# Patient Record
Sex: FEMALE | Race: WHITE | NOT HISPANIC OR LATINO | Employment: OTHER | URBAN - METROPOLITAN AREA
[De-identification: names, ages, dates, MRNs, and addresses within clinical notes are randomized per-mention and may not be internally consistent; named-entity substitution may affect disease eponyms.]

---

## 2017-01-26 ENCOUNTER — ALLSCRIPTS OFFICE VISIT (OUTPATIENT)
Dept: OTHER | Facility: OTHER | Age: 52
End: 2017-01-26

## 2017-01-26 ENCOUNTER — LAB REQUISITION (OUTPATIENT)
Dept: LAB | Facility: HOSPITAL | Age: 52
End: 2017-01-26
Payer: MEDICARE

## 2017-01-26 DIAGNOSIS — R35.0 FREQUENCY OF MICTURITION: ICD-10-CM

## 2017-01-26 LAB
BILIRUB UR QL STRIP: NORMAL
CLARITY UR: NORMAL
COLOR UR: YELLOW
GLUCOSE (HISTORICAL): NORMAL
HGB UR QL STRIP.AUTO: 250
KETONES UR STRIP-MCNC: NORMAL MG/DL
LEUKOCYTE ESTERASE UR QL STRIP: 500
NITRITE UR QL STRIP: NORMAL
PH UR STRIP.AUTO: 7 [PH]
PROT UR STRIP-MCNC: NORMAL MG/DL
SP GR UR STRIP.AUTO: 1.02
UROBILINOGEN UR QL STRIP.AUTO: NORMAL

## 2017-01-26 PROCEDURE — 87077 CULTURE AEROBIC IDENTIFY: CPT | Performed by: FAMILY MEDICINE

## 2017-01-26 PROCEDURE — 87086 URINE CULTURE/COLONY COUNT: CPT | Performed by: FAMILY MEDICINE

## 2017-01-26 PROCEDURE — 87186 SC STD MICRODIL/AGAR DIL: CPT | Performed by: FAMILY MEDICINE

## 2017-01-28 LAB — BACTERIA UR CULT: NORMAL

## 2017-03-01 ENCOUNTER — ALLSCRIPTS OFFICE VISIT (OUTPATIENT)
Dept: OTHER | Facility: OTHER | Age: 52
End: 2017-03-01

## 2017-03-16 ENCOUNTER — ALLSCRIPTS OFFICE VISIT (OUTPATIENT)
Dept: OTHER | Facility: OTHER | Age: 52
End: 2017-03-16

## 2017-03-16 ENCOUNTER — LAB REQUISITION (OUTPATIENT)
Dept: LAB | Facility: HOSPITAL | Age: 52
End: 2017-03-16
Payer: MEDICARE

## 2017-03-16 DIAGNOSIS — R35.0 FREQUENCY OF MICTURITION: ICD-10-CM

## 2017-03-16 LAB
BILIRUB UR QL STRIP: NORMAL
CLARITY UR: NORMAL
COLOR UR: YELLOW
GLUCOSE (HISTORICAL): NORMAL
HGB UR QL STRIP.AUTO: 250
KETONES UR STRIP-MCNC: NORMAL MG/DL
LEUKOCYTE ESTERASE UR QL STRIP: 75
NITRITE UR QL STRIP: NORMAL
PH UR STRIP.AUTO: 5 [PH]
PROT UR STRIP-MCNC: NORMAL MG/DL
SP GR UR STRIP.AUTO: 1.02
UROBILINOGEN UR QL STRIP.AUTO: NORMAL

## 2017-03-16 PROCEDURE — 87186 SC STD MICRODIL/AGAR DIL: CPT | Performed by: FAMILY MEDICINE

## 2017-03-16 PROCEDURE — 87077 CULTURE AEROBIC IDENTIFY: CPT | Performed by: FAMILY MEDICINE

## 2017-03-16 PROCEDURE — 87086 URINE CULTURE/COLONY COUNT: CPT | Performed by: FAMILY MEDICINE

## 2017-03-18 LAB — BACTERIA UR CULT: NORMAL

## 2017-03-24 ENCOUNTER — GENERIC CONVERSION - ENCOUNTER (OUTPATIENT)
Dept: OTHER | Facility: OTHER | Age: 52
End: 2017-03-24

## 2017-04-03 ENCOUNTER — ALLSCRIPTS OFFICE VISIT (OUTPATIENT)
Dept: OTHER | Facility: OTHER | Age: 52
End: 2017-04-03

## 2017-04-03 LAB
BILIRUB UR QL STRIP: NEGATIVE
CLARITY UR: NORMAL
COLOR UR: YELLOW
GLUCOSE (HISTORICAL): NORMAL
HGB UR QL STRIP.AUTO: 250
KETONES UR STRIP-MCNC: NEGATIVE MG/DL
LEUKOCYTE ESTERASE UR QL STRIP: NEGATIVE
NITRITE UR QL STRIP: NEGATIVE
PH UR STRIP.AUTO: 7 [PH]
PROT UR STRIP-MCNC: NEGATIVE MG/DL
SP GR UR STRIP.AUTO: 1.01
UROBILINOGEN UR QL STRIP.AUTO: NORMAL

## 2017-05-04 ENCOUNTER — HOSPITAL ENCOUNTER (OUTPATIENT)
Dept: RADIOLOGY | Facility: HOSPITAL | Age: 52
Discharge: HOME | End: 2017-05-04
Attending: FAMILY MEDICINE
Payer: MEDICARE

## 2017-05-04 ENCOUNTER — ALLSCRIPTS OFFICE VISIT (OUTPATIENT)
Dept: OTHER | Facility: OTHER | Age: 52
End: 2017-05-04

## 2017-05-04 ENCOUNTER — LAB REQUISITION (OUTPATIENT)
Dept: LAB | Facility: HOSPITAL | Age: 52
End: 2017-05-04
Payer: MEDICARE

## 2017-05-04 DIAGNOSIS — K21.9 GASTRO-ESOPHAGEAL REFLUX DISEASE WITHOUT ESOPHAGITIS: ICD-10-CM

## 2017-05-04 DIAGNOSIS — R35.0 FREQUENCY OF MICTURITION: ICD-10-CM

## 2017-05-04 LAB
BILIRUB UR QL STRIP: NORMAL
CLARITY UR: NORMAL
COLOR UR: YELLOW
GLUCOSE (HISTORICAL): NORMAL
HGB UR QL STRIP.AUTO: 250
KETONES UR STRIP-MCNC: NORMAL MG/DL
LEUKOCYTE ESTERASE UR QL STRIP: NORMAL
NITRITE UR QL STRIP: NORMAL
PH UR STRIP.AUTO: 6 [PH]
PROT UR STRIP-MCNC: NORMAL MG/DL
SP GR UR STRIP.AUTO: 1.02
UROBILINOGEN UR QL STRIP.AUTO: NORMAL

## 2017-05-04 PROCEDURE — 87086 URINE CULTURE/COLONY COUNT: CPT | Performed by: FAMILY MEDICINE

## 2017-05-04 PROCEDURE — 76770 US EXAM ABDO BACK WALL COMP: CPT

## 2017-05-05 LAB — BACTERIA UR CULT: NORMAL

## 2017-06-01 ENCOUNTER — ALLSCRIPTS OFFICE VISIT (OUTPATIENT)
Dept: OTHER | Facility: OTHER | Age: 52
End: 2017-06-01

## 2017-06-30 ENCOUNTER — ALLSCRIPTS OFFICE VISIT (OUTPATIENT)
Dept: OTHER | Facility: OTHER | Age: 52
End: 2017-06-30

## 2017-07-18 ENCOUNTER — ALLSCRIPTS OFFICE VISIT (OUTPATIENT)
Dept: OTHER | Facility: OTHER | Age: 52
End: 2017-07-18

## 2017-07-18 LAB
BILIRUB UR QL STRIP: NORMAL
CLARITY UR: NORMAL
COLOR UR: YELLOW
GLUCOSE (HISTORICAL): NORMAL
HGB UR QL STRIP.AUTO: 250
KETONES UR STRIP-MCNC: NORMAL MG/DL
LEUKOCYTE ESTERASE UR QL STRIP: 25
NITRITE UR QL STRIP: NORMAL
PH UR STRIP.AUTO: 7 [PH]
PROT UR STRIP-MCNC: NORMAL MG/DL
SP GR UR STRIP.AUTO: 1.01
UROBILINOGEN UR QL STRIP.AUTO: 1

## 2017-07-18 PROCEDURE — 87077 CULTURE AEROBIC IDENTIFY: CPT | Performed by: FAMILY MEDICINE

## 2017-07-18 PROCEDURE — 87186 SC STD MICRODIL/AGAR DIL: CPT | Performed by: FAMILY MEDICINE

## 2017-07-18 PROCEDURE — 87086 URINE CULTURE/COLONY COUNT: CPT | Performed by: FAMILY MEDICINE

## 2017-07-19 ENCOUNTER — LAB REQUISITION (OUTPATIENT)
Dept: LAB | Facility: HOSPITAL | Age: 52
End: 2017-07-19
Payer: MEDICARE

## 2017-07-19 ENCOUNTER — ALLSCRIPTS OFFICE VISIT (OUTPATIENT)
Dept: OTHER | Facility: OTHER | Age: 52
End: 2017-07-19

## 2017-07-19 DIAGNOSIS — R10.2 PELVIC AND PERINEAL PAIN: ICD-10-CM

## 2017-07-19 DIAGNOSIS — R31.9 HEMATURIA: ICD-10-CM

## 2017-07-19 DIAGNOSIS — N90.89 OTHER SPECIFIED NONINFLAMMATORY DISORDER OF VULVA AND PERINEUM: ICD-10-CM

## 2017-07-19 DIAGNOSIS — L29.2 PRURITUS VULVAE: ICD-10-CM

## 2017-07-19 PROCEDURE — 87070 CULTURE OTHR SPECIMN AEROBIC: CPT | Performed by: PHYSICIAN ASSISTANT

## 2017-07-19 PROCEDURE — 87186 SC STD MICRODIL/AGAR DIL: CPT | Performed by: PHYSICIAN ASSISTANT

## 2017-07-19 PROCEDURE — 87077 CULTURE AEROBIC IDENTIFY: CPT | Performed by: PHYSICIAN ASSISTANT

## 2017-07-22 LAB
BACTERIA GENITAL AEROBE CULT: NORMAL
BACTERIA UR CULT: NORMAL
BACTERIA UR CULT: NORMAL

## 2017-07-25 ENCOUNTER — HOSPITAL ENCOUNTER (OUTPATIENT)
Dept: RADIOLOGY | Facility: HOSPITAL | Age: 52
Discharge: HOME | End: 2017-07-25
Payer: MEDICARE

## 2017-07-25 DIAGNOSIS — R10.2 PELVIC AND PERINEAL PAIN: ICD-10-CM

## 2017-07-25 PROCEDURE — 76856 US EXAM PELVIC COMPLETE: CPT

## 2017-07-25 PROCEDURE — 76830 TRANSVAGINAL US NON-OB: CPT

## 2017-07-27 ENCOUNTER — ALLSCRIPTS OFFICE VISIT (OUTPATIENT)
Dept: OTHER | Facility: OTHER | Age: 52
End: 2017-07-27

## 2017-08-04 ENCOUNTER — GENERIC CONVERSION - ENCOUNTER (OUTPATIENT)
Dept: OTHER | Facility: OTHER | Age: 52
End: 2017-08-04

## 2017-09-14 ENCOUNTER — GENERIC CONVERSION - ENCOUNTER (OUTPATIENT)
Dept: OTHER | Facility: OTHER | Age: 52
End: 2017-09-14

## 2017-10-19 ENCOUNTER — ALLSCRIPTS OFFICE VISIT (OUTPATIENT)
Dept: OTHER | Facility: OTHER | Age: 52
End: 2017-10-19

## 2017-10-19 DIAGNOSIS — M81.8 OTHER OSTEOPOROSIS WITHOUT CURRENT PATHOLOGICAL FRACTURE: ICD-10-CM

## 2017-10-19 DIAGNOSIS — Z79.52 LONG TERM CURRENT USE OF SYSTEMIC STEROIDS: ICD-10-CM

## 2017-12-12 ENCOUNTER — HOSPITAL ENCOUNTER (OUTPATIENT)
Dept: RADIOLOGY | Facility: HOSPITAL | Age: 52
Discharge: HOME | End: 2017-12-12
Attending: FAMILY MEDICINE
Payer: MEDICARE

## 2017-12-12 DIAGNOSIS — M81.8 OTHER OSTEOPOROSIS WITHOUT CURRENT PATHOLOGICAL FRACTURE: ICD-10-CM

## 2017-12-12 DIAGNOSIS — Z79.52 LONG TERM CURRENT USE OF SYSTEMIC STEROIDS: ICD-10-CM

## 2017-12-12 PROCEDURE — 77080 DXA BONE DENSITY AXIAL: CPT

## 2017-12-21 ENCOUNTER — GENERIC CONVERSION - ENCOUNTER (OUTPATIENT)
Dept: OTHER | Facility: OTHER | Age: 52
End: 2017-12-21

## 2017-12-21 DIAGNOSIS — Z12.31 ENCOUNTER FOR SCREENING MAMMOGRAM FOR MALIGNANT NEOPLASM OF BREAST: ICD-10-CM

## 2018-01-12 VITALS
TEMPERATURE: 97.6 F | HEIGHT: 65 IN | RESPIRATION RATE: 18 BRPM | BODY MASS INDEX: 17 KG/M2 | WEIGHT: 102 LBS | HEART RATE: 66 BPM | DIASTOLIC BLOOD PRESSURE: 70 MMHG | OXYGEN SATURATION: 99 % | SYSTOLIC BLOOD PRESSURE: 122 MMHG

## 2018-01-13 VITALS
HEART RATE: 62 BPM | OXYGEN SATURATION: 98 % | TEMPERATURE: 97.6 F | SYSTOLIC BLOOD PRESSURE: 132 MMHG | WEIGHT: 100.03 LBS | DIASTOLIC BLOOD PRESSURE: 82 MMHG | RESPIRATION RATE: 18 BRPM | BODY MASS INDEX: 16.66 KG/M2 | HEIGHT: 65 IN

## 2018-01-13 VITALS
TEMPERATURE: 99 F | HEART RATE: 71 BPM | BODY MASS INDEX: 16.83 KG/M2 | DIASTOLIC BLOOD PRESSURE: 80 MMHG | RESPIRATION RATE: 18 BRPM | HEIGHT: 65 IN | SYSTOLIC BLOOD PRESSURE: 122 MMHG | WEIGHT: 101 LBS | OXYGEN SATURATION: 96 %

## 2018-01-13 VITALS
WEIGHT: 102 LBS | TEMPERATURE: 97.8 F | SYSTOLIC BLOOD PRESSURE: 132 MMHG | BODY MASS INDEX: 17.24 KG/M2 | OXYGEN SATURATION: 98 % | RESPIRATION RATE: 16 BRPM | HEART RATE: 70 BPM | DIASTOLIC BLOOD PRESSURE: 86 MMHG

## 2018-01-13 VITALS
DIASTOLIC BLOOD PRESSURE: 70 MMHG | WEIGHT: 100 LBS | SYSTOLIC BLOOD PRESSURE: 100 MMHG | HEART RATE: 88 BPM | RESPIRATION RATE: 18 BRPM | BODY MASS INDEX: 16.66 KG/M2 | OXYGEN SATURATION: 97 % | HEIGHT: 65 IN

## 2018-01-13 VITALS
BODY MASS INDEX: 16.91 KG/M2 | WEIGHT: 101.5 LBS | HEIGHT: 65 IN | DIASTOLIC BLOOD PRESSURE: 62 MMHG | SYSTOLIC BLOOD PRESSURE: 120 MMHG

## 2018-01-14 VITALS
RESPIRATION RATE: 16 BRPM | DIASTOLIC BLOOD PRESSURE: 60 MMHG | WEIGHT: 102 LBS | BODY MASS INDEX: 17 KG/M2 | TEMPERATURE: 97.6 F | HEART RATE: 72 BPM | HEIGHT: 65 IN | SYSTOLIC BLOOD PRESSURE: 110 MMHG | OXYGEN SATURATION: 100 %

## 2018-01-14 VITALS
HEART RATE: 52 BPM | RESPIRATION RATE: 18 BRPM | WEIGHT: 100.44 LBS | SYSTOLIC BLOOD PRESSURE: 128 MMHG | OXYGEN SATURATION: 96 % | BODY MASS INDEX: 16.73 KG/M2 | TEMPERATURE: 97.8 F | DIASTOLIC BLOOD PRESSURE: 74 MMHG | HEIGHT: 65 IN

## 2018-01-14 VITALS
HEART RATE: 67 BPM | RESPIRATION RATE: 18 BRPM | BODY MASS INDEX: 16.66 KG/M2 | TEMPERATURE: 97.6 F | DIASTOLIC BLOOD PRESSURE: 100 MMHG | SYSTOLIC BLOOD PRESSURE: 176 MMHG | OXYGEN SATURATION: 99 % | HEIGHT: 65 IN | WEIGHT: 100 LBS

## 2018-01-14 VITALS
BODY MASS INDEX: 16.73 KG/M2 | HEART RATE: 71 BPM | RESPIRATION RATE: 18 BRPM | WEIGHT: 100.44 LBS | DIASTOLIC BLOOD PRESSURE: 60 MMHG | OXYGEN SATURATION: 98 % | HEIGHT: 65 IN | TEMPERATURE: 97.1 F | SYSTOLIC BLOOD PRESSURE: 116 MMHG

## 2018-01-17 NOTE — MISCELLANEOUS
Message  Spoke with patient regarding pelvic U/S and culture results  Pelvic U/S showed stable fibroids  Endometrium of 7 mm; ovaries WNL  Fibroids are little changed over the last 2 years, but could be where pelvic pressure is originating  Vaginal culture showed growth of Proteus and E coli  Patient was put on Bactrim by her PCP, which both are sensitive to  Still complaining of vulvar burning and itching despite Topicort  She will finish course of Bactrim  If symptoms do not resolve, can discuss further treatment for lichen vs  atrophy  Patient to call the office  for appointment  Signatures   Electronically signed by : Salud Underwood, [facility];  Aug  4 2017  2:12PM EST                       (Author)

## 2018-01-18 NOTE — PROGRESS NOTES
Assessment    1  Ulcerative colitis without complications (174 4) (C09 96)   2  Anxiety (300 00) (F41 9)    Plan     Pt will continue her present meds  BP will be checked again in one month  Pt will schedule a PAP also     Order given for a mammogram  Pt had a colonoscopy in Sept 2015 performed by Dr Naif Cook  Dexa scan due in April 2016* MAMMO SCREENING BILATERAL W CAD; Status:Hold For - Scheduling; Requested for:22Jan2016;   Perform:[facility] Radiology; YAE:[id number];AWYQAHD; For:Visit for screening mammogram; Ordered By:Vandana Ruano; Chief Complaint  Multiple issues      History of Present Illness  HPI: Pt doing well as far as her UC is concerned  She is off steroids  She is still on chronic pain meds  Her urinary tract symptoms are resolved  She is trying to gain weight Her recent lipid profile was good  Her BP is up but she states she is under increased stress with her recent hosp and ER visits because of  for her autistic son  She isn't taking Mobic for joint aches because they are gone now   Hospital Based Practices Required Assessment:   Pain Assessment   the patient states they have pain  The pain is located in the lower back  (on a scale of 0 to 10, the patient rates the pain at 4 )       Review of Systems    Constitutional: No fever, no chills, feels well, no tiredness, no recent weight gain or loss  Cardiovascular: no complaints of slow or fast heart rate, no chest pain, no palpitations, no leg claudication or lower extremity edema  Respiratory: no complaints of shortness of breath, no wheezing, no dyspnea on exertion, no orthopnea or PND  Gastrointestinal: no complaints of abdominal pain, no constipation, no nausea or diarrhea, no vomiting, no bloody stools  Musculoskeletal: no complaints of arthralgia, no myalgia, no joint swelling or stiffness, no limb pain or swelling  Active Problems    1  Abdominal pain, epigastric (789 06) (R10 13)   2   Abnormal findings on diagnostic imaging of abdomen (793 6) (R93 5)   3  Abnormal weight loss (783 21) (R63 4)   4  Allergic rhinitis (477 9) (J30 9)   5  Ankle Injury (959 7)   6  Ankle Sprain (845 00)   7  Anxiety (300 00) (F41 9)   8  Arthralgia (719 40) (M25 50)   9  Atypical chest pain (786 59) (R07 89)   10  Benign essential hypertension (401 1) (I10)   11  Choledocholithiasis (574 50) (K80 50)   12  Cholelithiasis (574 20) (K80 20)   13  Closed Fracture Of The First Metatarsal Of The Right Foot (825 25)   14  Contraceptives (V25 02)   15  Controlled diabetes mellitus type II without complication (770 30) (Y93 7)   16  Depression (311) (F32 9)   17  Diarrhea (787 91) (R19 7)   18  Drug-induced osteoporosis (733 09,E980 5) (T50 901A,M81 8)   19  Esophageal reflux (530 81) (K21 9)   20  Fatigue (780 79) (R53 83)   21  Fibroid, uterine (218 9) (D25 9)   22  Foot Injury (959 7)   23  Headache (784 0) (R51)   24  Hematuria (599 70) (R31 9)   25  High risk medication use (V58 69) (Z79 899)   26  Hypercholesterolemia (272 0) (E78 0)   27  Hyperglycemia (790 29) (R73 9)   28  Hyperlipidemia (272 4) (E78 5)   29  Hypokalemia (276 8) (E87 6)   30  Hypotension (458 9) (I95 9)   31  Iatrogenic hypotension (458 29) (I95 89)   32  Infected Abrasion Of Leg (916 1)   33  Inflammatory Disorders Of Breast (611 0)   34  Iron deficiency anemia (280 9) (D50 9)   35  Kidney infection (590 9) (N15 9)   36  Kidney lesion (593 9) (N28 9)   37  Kidney stones (592 0) (N20 0)   38  Lactase deficiency (271 3) (E73 9)   39  Lumbar Strain (847 2)   40  Lump or mass in breast (611 72) (N63)   41  Microscopic hematuria (599 72) (R31 2)   42  Nausea (787 02) (R11 0)   43  Nausea (787 02) (R11 0)   44  Need for influenza vaccination (V04 81) (Z23)   45  Need for prophylactic vaccination and inoculation against influenza (V04 81) (Z23)   46  Need for vaccination with 13-polyvalent pneumococcal conjugate vaccine (V03 82) (Z23)   47   Nephrolithiasis (592 0) (N20 0) 48  Non-neoplastic nevus (448 1) (I78 1)   49  Oral thrush (112 0) (B37 0)   50  Other iron deficiency anemia (280 8) (D50 8)   51  Other polyp of sinus (471 8) (J33 8)   52  Painful joint (719 40) (M25 50)   53  Pancreatic cyst (577 2) (K86 2)   54  Paronychia of toe, unspecified laterality (681 11) (L03 039)   55  Pre-menstrual syndrome (625 4) (N94 3)   56  Pyelonephritis (590 80) (N12)   57  Restless legs syndrome (333 94) (G25 81)   58  Screening breast examination (V76 10) (Z12 39)   59  Seborrheic keratosis (702 19) (L82 1)   60  Skin Abscess Of Leg (682 6)   61  Solar dermatitis (692 70) (L57 8)   62  Ulcerative colitis without complications (028 3) (N11 49)   63  Vaginitis (616 10) (N76 0)   64  Visit for screening mammogram (V76 12) (Z12 31)   65  Wart (078 10) (B07 9)    Past Medical History    1  History of Abdominal Pain In Multiple Locations (789 09)    Family History    1  Family history of Reported Family History Of Heart Disease    2  Family history of Reported Family History Of Heart Disease    3  Family history of Asthma (V17 5)    4  Family history of Colon Cancer (V16 0)    Social History    · Former smoker (Z04 71) (V35 669)   · Never Drank Alcohol   · Never Used Drugs    Surgical History    1  Contraceptives (V25 02)    Current Meds   1  Balsalazide Disodium 750 MG Oral Capsule; TAKE 1 CAPSULE 3 TIMES DAILY    Requested for: 38Bou3937; Last Rx:81Spi3289 Ordered   2  Calcium 600+D 600-200 MG-UNIT Oral Tablet; Therapy: (Reina Johnson) to Recorded   3  Celecoxib 200 MG Oral Capsule; take 1 capsule by mouth twice a day; Therapy: 87OMW8268 to (Last Rx:84Rth9458)  Requested for: 09Orc0535 Ordered   4  Folic Acid 1 MG Oral Tablet; Therapy: (Recorded:26Pkw4328) to Recorded   5  Meloxicam 7 5 MG Oral Tablet; take one tablet by mouth every day; Therapy: 38NET5637 to (Last Rx:02Oct2015)  Requested for: 03GCU3406 Ordered   6   Nabumetone 750 MG Oral Tablet; TAKE 1 TABLET EVERY 12 HOURS DAILY; Therapy: 98DPT8243 to (Evaluate:21Jun2016)  Requested for: 50Veh0867; Last   Rx:34Wuh5795 Ordered   7  Omeprazole 40 MG Oral Capsule Delayed Release; one capsule daily  Requested for:   52Aac6453; Last Rx:96Czg5687 Ordered   8  Oxycodone-Acetaminophen 7 5-325 MG Oral Tablet; TAKE 1 TABLET EVERY 4 TO 6   HOURS AS NEEDED FOR PAIN;   Therapy: 30BBM0356 to (Evaluate:23Jan2016); Last Rx:13Jan2016 Ordered   9  Promethazine HCl - 12 5 MG Oral Tablet; TAKE 1 TABLET EVERY 6 HOURS AS   NEEDED FOR NAUSEA; Last Rx:13Jan2016 Ordered   10  Ventolin  (90 Base) MCG/ACT Inhalation Aerosol Solution; Therapy: 08WFB8650 to Recorded    Allergies    1  Asacol TBEC   2  Levaquin TABS    3  Dairy   4  No Known Latex Allergies    Vitals   Recorded: 96AED8743 10:40AM   Temperature 98 F   Heart Rate 68   Respiration 20   Systolic 593   Diastolic 80   Height 5 ft 4 5 in   Weight 102 lb 3 oz   BMI Calculated 17 27   BSA Calculated 1 48     Physical Exam    Constitutional   General appearance: Abnormal   underweight and appearance reflects stated age  Pulmonary   Respiratory effort: No increased work of breathing or signs of respiratory distress  Auscultation of lungs: Clear to auscultation  Cardiovascular   Auscultation of heart: Normal rate and rhythm, normal S1 and S2, without murmurs  Abdomen   Abdomen: Non-tender, no masses  Liver and spleen: No hepatomegaly or splenomegaly  Musculoskeletal   Gait and station: Normal     Psychiatric   Orientation to person, place, and time: Normal     Mood and affect: Abnormal   Mood and Affect: tearful  Results/Data  PHQ-2 Adult Depression Screening 22Jan2016 10:46AM User, Ahs     Test Name Result Flag Reference   PHQ-2 Adult Depression Score 0     Q1: 0, Q2: 0   PHQ-2 Adult Depression Screening Negative         Future Appointments    Date/Time Provider Specialty Site   03/01/2016 01:00 PM CHELY Marina   [facility]     Signatures Electronically signed by : CHELY Krishnan ; Jan 22 2016  9:29PM EST                       (Author)

## 2018-01-22 VITALS
HEIGHT: 65 IN | OXYGEN SATURATION: 100 % | BODY MASS INDEX: 17.66 KG/M2 | TEMPERATURE: 97.9 F | RESPIRATION RATE: 18 BRPM | HEART RATE: 71 BPM | WEIGHT: 106 LBS | SYSTOLIC BLOOD PRESSURE: 102 MMHG | DIASTOLIC BLOOD PRESSURE: 68 MMHG

## 2018-01-22 VITALS
HEART RATE: 75 BPM | RESPIRATION RATE: 18 BRPM | TEMPERATURE: 97.7 F | WEIGHT: 106 LBS | OXYGEN SATURATION: 99 % | SYSTOLIC BLOOD PRESSURE: 116 MMHG | DIASTOLIC BLOOD PRESSURE: 80 MMHG | BODY MASS INDEX: 17.66 KG/M2 | HEIGHT: 65 IN

## 2018-01-24 VITALS
SYSTOLIC BLOOD PRESSURE: 122 MMHG | DIASTOLIC BLOOD PRESSURE: 76 MMHG | TEMPERATURE: 98 F | HEIGHT: 65 IN | BODY MASS INDEX: 17.41 KG/M2 | WEIGHT: 104.5 LBS | HEART RATE: 86 BPM

## 2018-01-25 DIAGNOSIS — K21.9 CHRONIC GERD: Primary | ICD-10-CM

## 2018-01-25 RX ORDER — OMEPRAZOLE 40 MG/1
CAPSULE, DELAYED RELEASE ORAL
Qty: 90 CAPSULE | Refills: 3 | Status: SHIPPED | OUTPATIENT
Start: 2018-01-25 | End: 2018-01-29 | Stop reason: SDUPTHER

## 2018-01-29 DIAGNOSIS — G89.4 CHRONIC PAIN SYNDROME: Primary | ICD-10-CM

## 2018-01-29 DIAGNOSIS — K21.9 CHRONIC GERD: ICD-10-CM

## 2018-01-29 RX ORDER — OMEPRAZOLE 40 MG/1
40 CAPSULE, DELAYED RELEASE ORAL DAILY
Qty: 90 CAPSULE | Refills: 0 | Status: SHIPPED | OUTPATIENT
Start: 2018-01-29 | End: 2018-04-06 | Stop reason: SDUPTHER

## 2018-01-29 RX ORDER — OXYCODONE AND ACETAMINOPHEN 7.5; 325 MG/1; MG/1
1 TABLET ORAL EVERY 4 HOURS PRN
Qty: 30 TABLET | Refills: 0 | Status: SHIPPED | OUTPATIENT
Start: 2018-01-29 | End: 2018-02-13

## 2018-01-29 RX ORDER — OMEPRAZOLE 40 MG/1
40 CAPSULE, DELAYED RELEASE ORAL DAILY
Qty: 90 CAPSULE | Refills: 3 | Status: SHIPPED | OUTPATIENT
Start: 2018-01-29 | End: 2018-04-16 | Stop reason: SDUPTHER

## 2018-01-29 RX ORDER — OXYCODONE HYDROCHLORIDE AND ACETAMINOPHEN 5; 325 MG/1; MG/1
TABLET ORAL
COMMUNITY
End: 2018-01-29 | Stop reason: SDUPTHER

## 2018-01-29 RX ORDER — OXYCODONE HYDROCHLORIDE AND ACETAMINOPHEN 5; 325 MG/1; MG/1
1 TABLET ORAL EVERY 8 HOURS PRN
Qty: 30 TABLET | Refills: 0
Start: 2018-01-29 | End: 2018-02-13 | Stop reason: SDUPTHER

## 2018-02-12 ENCOUNTER — TELEPHONE (OUTPATIENT)
Dept: FAMILY MEDICINE CLINIC | Facility: CLINIC | Age: 53
End: 2018-02-12

## 2018-02-13 ENCOUNTER — TELEPHONE (OUTPATIENT)
Dept: FAMILY MEDICINE CLINIC | Facility: CLINIC | Age: 53
End: 2018-02-13

## 2018-02-13 DIAGNOSIS — G89.4 CHRONIC PAIN SYNDROME: ICD-10-CM

## 2018-02-13 RX ORDER — OXYCODONE HYDROCHLORIDE AND ACETAMINOPHEN 5; 325 MG/1; MG/1
1 TABLET ORAL EVERY 8 HOURS PRN
Qty: 30 TABLET | Refills: 0 | Status: SHIPPED | OUTPATIENT
Start: 2018-02-13 | End: 2018-02-21 | Stop reason: SDUPTHER

## 2018-02-21 ENCOUNTER — TELEPHONE (OUTPATIENT)
Dept: FAMILY MEDICINE CLINIC | Facility: CLINIC | Age: 53
End: 2018-02-21

## 2018-02-21 DIAGNOSIS — G89.4 CHRONIC PAIN SYNDROME: ICD-10-CM

## 2018-02-21 RX ORDER — OXYCODONE HYDROCHLORIDE AND ACETAMINOPHEN 5; 325 MG/1; MG/1
1 TABLET ORAL EVERY 8 HOURS PRN
Qty: 30 TABLET | Refills: 0 | Status: SHIPPED | OUTPATIENT
Start: 2018-02-21 | End: 2018-03-02 | Stop reason: SDUPTHER

## 2018-03-02 DIAGNOSIS — G89.4 CHRONIC PAIN SYNDROME: ICD-10-CM

## 2018-03-02 RX ORDER — OXYCODONE HYDROCHLORIDE AND ACETAMINOPHEN 5; 325 MG/1; MG/1
1 TABLET ORAL EVERY 8 HOURS PRN
Qty: 30 TABLET | Refills: 0 | Status: SHIPPED | OUTPATIENT
Start: 2018-03-02 | End: 2018-03-08 | Stop reason: SDUPTHER

## 2018-03-08 ENCOUNTER — OFFICE VISIT (OUTPATIENT)
Dept: FAMILY MEDICINE CLINIC | Facility: CLINIC | Age: 53
End: 2018-03-08
Payer: MEDICARE

## 2018-03-08 VITALS
HEART RATE: 77 BPM | DIASTOLIC BLOOD PRESSURE: 84 MMHG | RESPIRATION RATE: 18 BRPM | OXYGEN SATURATION: 100 % | WEIGHT: 100 LBS | SYSTOLIC BLOOD PRESSURE: 120 MMHG | BODY MASS INDEX: 16.9 KG/M2

## 2018-03-08 DIAGNOSIS — K51.919 ULCERATIVE COLITIS WITH COMPLICATION, UNSPECIFIED LOCATION (HCC): Primary | ICD-10-CM

## 2018-03-08 DIAGNOSIS — G89.4 CHRONIC PAIN SYNDROME: ICD-10-CM

## 2018-03-08 DIAGNOSIS — R63.6 UNDERWEIGHT: ICD-10-CM

## 2018-03-08 DIAGNOSIS — I10 ESSENTIAL HYPERTENSION: ICD-10-CM

## 2018-03-08 PROCEDURE — 99214 OFFICE O/P EST MOD 30 MIN: CPT | Performed by: FAMILY MEDICINE

## 2018-03-08 RX ORDER — ALBUTEROL SULFATE 90 UG/1
AEROSOL, METERED RESPIRATORY (INHALATION)
COMMUNITY
Start: 2016-01-04 | End: 2018-04-23 | Stop reason: SDUPTHER

## 2018-03-08 RX ORDER — OXYCODONE HYDROCHLORIDE AND ACETAMINOPHEN 5; 325 MG/1; MG/1
1 TABLET ORAL EVERY 8 HOURS PRN
Qty: 30 TABLET | Refills: 0 | Status: SHIPPED | OUTPATIENT
Start: 2018-03-08 | End: 2018-03-20 | Stop reason: SDUPTHER

## 2018-03-08 RX ORDER — PREDNISONE 1 MG/1
1 TABLET ORAL DAILY
COMMUNITY
Start: 2016-06-01 | End: 2018-04-16 | Stop reason: SDUPTHER

## 2018-03-08 RX ORDER — QUINAPRIL 20 MG/1
1 TABLET ORAL
COMMUNITY
Start: 2016-10-07 | End: 2018-03-20 | Stop reason: SDUPTHER

## 2018-03-08 RX ORDER — PREDNISONE 10 MG/1
TABLET ORAL
COMMUNITY
Start: 2017-06-30 | End: 2018-07-05

## 2018-03-08 RX ORDER — PREDNISONE 2.5 MG
TABLET ORAL
COMMUNITY
Start: 2017-07-27 | End: 2018-04-11 | Stop reason: SDUPTHER

## 2018-03-08 NOTE — PROGRESS NOTES
Assessment/Plan:    No problem-specific Assessment & Plan notes found for this encounter  Diagnoses and all orders for this visit:    Ulcerative colitis with complication, unspecified location [facility])  -     Ambulatory referral to Gastroenterology; Future    Chronic pain syndrome  -     oxyCODONE-acetaminophen (PERCOCET) 5-325 mg per tablet; Take 1 tablet by mouth every 8 (eight) hours as needed for moderate pain Max Daily Amount: 3 tablets    Essential hypertension    Underweight    Other orders  -     aspirin 81 MG tablet; Take 1 tablet by mouth daily  -     albuterol (VENTOLIN HFA) 90 mcg/act inhaler; Inhale  -     predniSONE 10 mg tablet; Take by mouth  -     predniSONE 2 5 mg tablet; Take by mouth  -     predniSONE 5 mg tablet; Take 1 tablet by mouth daily  -     quinapril (ACCUPRIL) 20 mg tablet; Take 1 tablet by mouth        Subjective:      Patient ID: Venecia Marroquin is a 45 y o  female  This is a follow-up appointment for 49-year-old patient who has a history of ulcerative colitis  The patient states that she is having more frequent diarrhea  The patient also states that her last evaluation by GI doctor was over 2 years ago  Her last colonoscopy was over 2 years ago  The patient is under some stress secondary to a court appointment tomorrow  The appointment is for child custody issues  The patient's describes her diet is poor  Patient denies other problems  The following portions of the patient's history were reviewed and updated as appropriate: allergies, current medications, past family history, past medical history, past social history, past surgical history and problem list     Review of Systems   Constitutional: Positive for appetite change  HENT: Negative  Eyes: Negative  Respiratory: Negative  Cardiovascular: Negative  Gastrointestinal: Positive for abdominal pain and diarrhea  Endocrine: Negative  Genitourinary: Negative  Musculoskeletal: Negative  Skin: Negative  Psychiatric/Behavioral: Positive for sleep disturbance  The patient appears anxious today  Objective:      /84   Pulse 77   Resp 18   Wt 45 4 kg (100 lb)   SpO2 100%   BMI 16 90 kg/m²          Physical Exam   Constitutional: She is oriented to person, place, and time  She appears well-developed and well-nourished  The patient is underweight with a BMI of 16 90   Cardiovascular: Normal rate, regular rhythm, normal heart sounds and intact distal pulses  Pulmonary/Chest: Effort normal and breath sounds normal    Abdominal: She exhibits no mass  There is no tenderness  There is no rebound  Bowel sounds are hyperactive  Musculoskeletal: Normal range of motion  Neurological: She is alert and oriented to person, place, and time  Psychiatric: She has a normal mood and affect  Her behavior is normal  Judgment and thought content normal      Patient appears anxious and is crying at times during this exam    Vitals reviewed

## 2018-03-08 NOTE — PATIENT INSTRUCTIONS
The patient was referred to [address] [place] [address] for evaluation of her also diff colitis  Patient most likely needs a follow-up colonoscopy  The patient will continue all her present medications  Her hypertension is well controlled  Patient will continue on prednisone 10 mg for the also for colitis at this point  The patient is aware that the steroids has some side effects and needs to be weaned down off the steroids

## 2018-03-19 ENCOUNTER — TELEPHONE (OUTPATIENT)
Dept: FAMILY MEDICINE CLINIC | Facility: CLINIC | Age: 53
End: 2018-03-19

## 2018-03-19 DIAGNOSIS — G89.4 CHRONIC PAIN SYNDROME: ICD-10-CM

## 2018-03-19 NOTE — TELEPHONE ENCOUNTER
Dr Best Gonzalez  SHE IS OUT OF QUINIPRIL, CALL INTO [address]  CALL HER WHEN PERCOCET IS READY

## 2018-03-20 RX ORDER — OXYCODONE HYDROCHLORIDE AND ACETAMINOPHEN 5; 325 MG/1; MG/1
1 TABLET ORAL EVERY 8 HOURS PRN
Qty: 30 TABLET | Refills: 0 | Status: SHIPPED | OUTPATIENT
Start: 2018-03-20 | End: 2018-04-02 | Stop reason: SDUPTHER

## 2018-03-20 RX ORDER — QUINAPRIL 20 MG/1
20 TABLET ORAL DAILY
Qty: 30 TABLET | Refills: 5 | Status: SHIPPED | OUTPATIENT
Start: 2018-03-20 | End: 2018-04-16 | Stop reason: SDUPTHER

## 2018-04-02 DIAGNOSIS — G89.4 CHRONIC PAIN SYNDROME: ICD-10-CM

## 2018-04-03 ENCOUNTER — TELEPHONE (OUTPATIENT)
Dept: FAMILY MEDICINE CLINIC | Facility: CLINIC | Age: 53
End: 2018-04-03

## 2018-04-03 NOTE — TELEPHONE ENCOUNTER
Pt called the on call phone tonight stating that she has left multiple messages for her medication refills and was told she would get the script for her Percocet by 8:00 PM tonight, but no one has told her it was ready for   Explained to pt that at this time there are no physicians in the office that would be able to write her a script and I as a resident would need the signature of an attending before I could prescribe Percocet  Pt states that she has ulcerative colitis and will be unable to sleep through the night without her pain medication  Let her know to try Tylenol for now and if she continues to have severe pain, to go to the ER until her script can be refilled tomorrow  Task sent to Dr Jacobo Perea for refill

## 2018-04-03 NOTE — TELEPHONE ENCOUNTER
Dr Garcia Screen REFILLS  SHE WANTS THEM TODAY  SEE PREVIOUS MESSAGE  CALL HER WHEN READY  SHE WILL BE UP LATER TODAY FOR THEM

## 2018-04-04 ENCOUNTER — TELEPHONE (OUTPATIENT)
Dept: FAMILY MEDICINE CLINIC | Facility: CLINIC | Age: 53
End: 2018-04-04

## 2018-04-04 RX ORDER — OXYCODONE HYDROCHLORIDE AND ACETAMINOPHEN 5; 325 MG/1; MG/1
1 TABLET ORAL EVERY 8 HOURS PRN
Qty: 30 TABLET | Refills: 0 | Status: SHIPPED | OUTPATIENT
Start: 2018-04-04 | End: 2018-04-13 | Stop reason: SDUPTHER

## 2018-04-04 NOTE — TELEPHONE ENCOUNTER
Dr Terrence Alexis  SHE WANTS TO  THE OXYCODONE AT 3:00 TODAY AND SHED WANTS THE NASAL SPRAY CALLED INTO [facility]

## 2018-04-06 ENCOUNTER — OFFICE VISIT (OUTPATIENT)
Dept: FAMILY MEDICINE CLINIC | Facility: CLINIC | Age: 53
End: 2018-04-06
Payer: MEDICARE

## 2018-04-06 VITALS
HEART RATE: 87 BPM | TEMPERATURE: 97.8 F | SYSTOLIC BLOOD PRESSURE: 108 MMHG | DIASTOLIC BLOOD PRESSURE: 70 MMHG | HEIGHT: 65 IN | WEIGHT: 101 LBS | OXYGEN SATURATION: 100 % | BODY MASS INDEX: 16.83 KG/M2

## 2018-04-06 DIAGNOSIS — I10 ESSENTIAL HYPERTENSION: ICD-10-CM

## 2018-04-06 DIAGNOSIS — R13.10 DYSPHAGIA, UNSPECIFIED TYPE: ICD-10-CM

## 2018-04-06 DIAGNOSIS — K51.911 ULCERATIVE COLITIS WITH RECTAL BLEEDING, UNSPECIFIED LOCATION (HCC): Primary | ICD-10-CM

## 2018-04-06 PROCEDURE — 99214 OFFICE O/P EST MOD 30 MIN: CPT | Performed by: FAMILY MEDICINE

## 2018-04-06 NOTE — PROGRESS NOTES
Assessment/Plan:    No problem-specific Assessment & Plan notes found for this encounter  Diagnoses and all orders for this visit:    Ulcerative colitis with rectal bleeding, unspecified location [facility])  -     Ambulatory referral to Gastroenterology; Future    Dysphagia, unspecified type  -     Ambulatory referral to Gastroenterology; Future        Subjective:      Patient ID: Hellen Crow is a 45 y o  female  F/u appt for UC  The pt is still having bloody diarrhea most of the time  She complains of trouble swallowing solid food at times  She feels like food gets stuck at times  Her weight is stable but she is underweight  She denies other problems  She is still taking Prednisone 10 mg daily to treat her UC        The following portions of the patient's history were reviewed and updated as appropriate: allergies, current medications, past family history, past medical history, past social history, past surgical history and problem list     Review of Systems   Constitutional: Negative  HENT: Positive for trouble swallowing  Respiratory: Negative  Cardiovascular: Negative  Gastrointestinal: Positive for blood in stool and diarrhea  Endocrine: Negative  Genitourinary: Negative  Musculoskeletal: Negative  Neurological: Negative  Psychiatric/Behavioral: Negative  Objective:      /70   Pulse 87   Temp 97 8 °F (36 6 °C) (Tympanic)   Ht 5' 5" (1 651 m)   Wt 45 8 kg (101 lb)   SpO2 100%   BMI 16 81 kg/m²          Physical Exam   Constitutional: She is oriented to person, place, and time  Underweight with BMI of 16 81   Cardiovascular: Normal rate, regular rhythm and normal heart sounds  Pulmonary/Chest: Effort normal and breath sounds normal    Abdominal: She exhibits no distension and no mass  There is tenderness  There is no rebound and no guarding  Musculoskeletal: Normal range of motion     Neurological: She is alert and oriented to person, place, and time  Psychiatric: She has a normal mood and affect   Her behavior is normal  Judgment and thought content normal

## 2018-04-06 NOTE — PATIENT INSTRUCTIONS
Pt has UC with rectal bleeding  She was given a referral to see GI for a colonoscopy and EGD for the UC and dysphagia diagnoses  The pt will continue her meds for HPT and UC  Pt will f/u in 2 months for chronic problems

## 2018-04-10 NOTE — TELEPHONE ENCOUNTER
flonasenasal spray , flovent 44 mcg spray for mouth for trouble swallowing  And also she needs refill of oxycodone she will be out this Saturday, and she wants to pickup on Friday

## 2018-04-11 DIAGNOSIS — G89.4 CHRONIC PAIN SYNDROME: ICD-10-CM

## 2018-04-11 DIAGNOSIS — K51.911 ULCERATIVE COLITIS WITH RECTAL BLEEDING, UNSPECIFIED LOCATION (HCC): Primary | ICD-10-CM

## 2018-04-13 DIAGNOSIS — G89.4 CHRONIC PAIN SYNDROME: ICD-10-CM

## 2018-04-13 DIAGNOSIS — J30.9 ALLERGIC RHINITIS, UNSPECIFIED SEASONALITY, UNSPECIFIED TRIGGER: Primary | ICD-10-CM

## 2018-04-13 RX ORDER — OXYCODONE HYDROCHLORIDE AND ACETAMINOPHEN 5; 325 MG/1; MG/1
1 TABLET ORAL EVERY 8 HOURS PRN
Qty: 30 TABLET | Refills: 0 | Status: SHIPPED | OUTPATIENT
Start: 2018-04-13 | End: 2018-04-20 | Stop reason: SDUPTHER

## 2018-04-13 RX ORDER — FLUTICASONE PROPIONATE 50 MCG
1 SPRAY, SUSPENSION (ML) NASAL DAILY
Qty: 16 G | Refills: 3 | Status: SHIPPED | OUTPATIENT
Start: 2018-04-13 | End: 2018-05-29 | Stop reason: SDUPTHER

## 2018-04-13 RX ORDER — PREDNISONE 1 MG/1
5 TABLET ORAL DAILY
Qty: 90 TABLET | Refills: 5 | Status: CANCELLED | OUTPATIENT
Start: 2018-04-13

## 2018-04-16 DIAGNOSIS — K51.911 ULCERATIVE COLITIS WITH RECTAL BLEEDING, UNSPECIFIED LOCATION (HCC): Primary | ICD-10-CM

## 2018-04-16 DIAGNOSIS — K21.9 CHRONIC GERD: ICD-10-CM

## 2018-04-16 DIAGNOSIS — G89.4 CHRONIC PAIN SYNDROME: ICD-10-CM

## 2018-04-16 RX ORDER — PREDNISONE 2.5 MG
2.5 TABLET ORAL DAILY
Qty: 90 TABLET | Refills: 5 | Status: SHIPPED | OUTPATIENT
Start: 2018-04-16 | End: 2018-07-05 | Stop reason: SDUPTHER

## 2018-04-16 RX ORDER — BALSALAZIDE DISODIUM 750 MG/1
2250 CAPSULE ORAL 2 TIMES DAILY
Qty: 180 CAPSULE | Refills: 5 | Status: SHIPPED | OUTPATIENT
Start: 2018-04-16 | End: 2018-10-03 | Stop reason: SDUPTHER

## 2018-04-16 RX ORDER — OMEPRAZOLE 40 MG/1
40 CAPSULE, DELAYED RELEASE ORAL DAILY
Qty: 90 CAPSULE | Refills: 3 | Status: SHIPPED | OUTPATIENT
Start: 2018-04-16 | End: 2019-02-12 | Stop reason: SDUPTHER

## 2018-04-16 RX ORDER — PREDNISONE 1 MG/1
5 TABLET ORAL DAILY
Qty: 90 TABLET | Refills: 3 | Status: SHIPPED | OUTPATIENT
Start: 2018-04-16 | End: 2018-05-05 | Stop reason: SDUPTHER

## 2018-04-16 RX ORDER — QUINAPRIL 20 MG/1
20 TABLET ORAL DAILY
Qty: 90 TABLET | Refills: 3 | Status: SHIPPED | OUTPATIENT
Start: 2018-04-16 | End: 2018-06-08

## 2018-04-20 DIAGNOSIS — G89.4 CHRONIC PAIN SYNDROME: ICD-10-CM

## 2018-04-20 RX ORDER — OXYCODONE HYDROCHLORIDE AND ACETAMINOPHEN 5; 325 MG/1; MG/1
1 TABLET ORAL EVERY 8 HOURS PRN
Qty: 30 TABLET | Refills: 0 | Status: SHIPPED | OUTPATIENT
Start: 2018-04-20 | End: 2018-05-01 | Stop reason: SDUPTHER

## 2018-04-23 ENCOUNTER — TELEPHONE (OUTPATIENT)
Dept: FAMILY MEDICINE CLINIC | Facility: CLINIC | Age: 53
End: 2018-04-23

## 2018-04-23 DIAGNOSIS — Z87.898 HISTORY OF WHEEZING: Primary | ICD-10-CM

## 2018-04-23 RX ORDER — ALBUTEROL SULFATE 90 UG/1
1 AEROSOL, METERED RESPIRATORY (INHALATION) EVERY 4 HOURS PRN
Qty: 1 INHALER | Refills: 0 | Status: SHIPPED | OUTPATIENT
Start: 2018-04-23 | End: 2019-09-16

## 2018-04-23 RX ORDER — ALBUTEROL SULFATE 90 UG/1
1 AEROSOL, METERED RESPIRATORY (INHALATION) EVERY 4 HOURS PRN
Qty: 1 INHALER | Refills: 5 | Status: SHIPPED | OUTPATIENT
Start: 2018-04-23 | End: 2018-04-23 | Stop reason: SDUPTHER

## 2018-04-23 NOTE — TELEPHONE ENCOUNTER
PT CALLED TO ASK IF YOU WILL ORDER AN ALBUTEROL INHALER FOR HER  SHE SAID SHE HAD THIS IN 2016 AND IT REALLY HELPED WITH HER ALLERGIES  USES [facility]

## 2018-05-01 DIAGNOSIS — G89.4 CHRONIC PAIN SYNDROME: ICD-10-CM

## 2018-05-02 RX ORDER — OXYCODONE HYDROCHLORIDE AND ACETAMINOPHEN 5; 325 MG/1; MG/1
1 TABLET ORAL EVERY 8 HOURS PRN
Qty: 30 TABLET | Refills: 0 | Status: SHIPPED | OUTPATIENT
Start: 2018-05-02 | End: 2018-05-09 | Stop reason: SDUPTHER

## 2018-05-05 DIAGNOSIS — K51.911 ULCERATIVE COLITIS WITH RECTAL BLEEDING, UNSPECIFIED LOCATION (HCC): ICD-10-CM

## 2018-05-09 DIAGNOSIS — G89.4 CHRONIC PAIN SYNDROME: ICD-10-CM

## 2018-05-10 DIAGNOSIS — G89.4 CHRONIC PAIN SYNDROME: ICD-10-CM

## 2018-05-10 RX ORDER — OXYCODONE HYDROCHLORIDE AND ACETAMINOPHEN 5; 325 MG/1; MG/1
1 TABLET ORAL EVERY 8 HOURS PRN
Qty: 30 TABLET | Refills: 0 | Status: SHIPPED | OUTPATIENT
Start: 2018-05-10 | End: 2018-05-10 | Stop reason: SDUPTHER

## 2018-05-10 RX ORDER — OXYCODONE HYDROCHLORIDE AND ACETAMINOPHEN 5; 325 MG/1; MG/1
TABLET ORAL
Qty: 30 TABLET | Refills: 0 | Status: SHIPPED | OUTPATIENT
Start: 2018-05-10 | End: 2018-05-18 | Stop reason: SDUPTHER

## 2018-05-10 RX ORDER — PREDNISONE 1 MG/1
TABLET ORAL
Qty: 30 TABLET | Refills: 5 | Status: SHIPPED | OUTPATIENT
Start: 2018-05-10 | End: 2018-07-05 | Stop reason: SDUPTHER

## 2018-05-18 ENCOUNTER — TELEPHONE (OUTPATIENT)
Dept: FAMILY MEDICINE CLINIC | Facility: CLINIC | Age: 53
End: 2018-05-18

## 2018-05-18 DIAGNOSIS — G89.4 CHRONIC PAIN SYNDROME: ICD-10-CM

## 2018-05-18 RX ORDER — OXYCODONE HYDROCHLORIDE AND ACETAMINOPHEN 5; 325 MG/1; MG/1
TABLET ORAL
Qty: 30 TABLET | Refills: 0 | Status: SHIPPED | OUTPATIENT
Start: 2018-05-21 | End: 2018-05-29 | Stop reason: SDUPTHER

## 2018-05-21 NOTE — TELEPHONE ENCOUNTER
Pt called about pain med, the pharmacy told her they dont have it   The chart says it was received by pharmacy 5/18 and dispense today

## 2018-05-29 ENCOUNTER — OFFICE VISIT (OUTPATIENT)
Dept: FAMILY MEDICINE CLINIC | Facility: CLINIC | Age: 53
End: 2018-05-29
Payer: MEDICARE

## 2018-05-29 VITALS
OXYGEN SATURATION: 100 % | TEMPERATURE: 98.4 F | HEIGHT: 65 IN | BODY MASS INDEX: 16.83 KG/M2 | RESPIRATION RATE: 16 BRPM | DIASTOLIC BLOOD PRESSURE: 60 MMHG | WEIGHT: 101 LBS | HEART RATE: 72 BPM | SYSTOLIC BLOOD PRESSURE: 108 MMHG

## 2018-05-29 DIAGNOSIS — J30.9 ALLERGIC RHINITIS, UNSPECIFIED SEASONALITY, UNSPECIFIED TRIGGER: ICD-10-CM

## 2018-05-29 DIAGNOSIS — G89.4 CHRONIC PAIN SYNDROME: ICD-10-CM

## 2018-05-29 DIAGNOSIS — J32.4 PANSINUSITIS, UNSPECIFIED CHRONICITY: Primary | ICD-10-CM

## 2018-05-29 PROCEDURE — 99213 OFFICE O/P EST LOW 20 MIN: CPT | Performed by: NURSE PRACTITIONER

## 2018-05-29 RX ORDER — FLUTICASONE PROPIONATE 50 MCG
2 SPRAY, SUSPENSION (ML) NASAL DAILY
Qty: 16 G | Refills: 0 | Status: SHIPPED | OUTPATIENT
Start: 2018-05-29

## 2018-05-29 RX ORDER — AMOXICILLIN AND CLAVULANATE POTASSIUM 875; 125 MG/1; MG/1
1 TABLET, FILM COATED ORAL EVERY 12 HOURS SCHEDULED
Qty: 14 TABLET | Refills: 0 | Status: SHIPPED | OUTPATIENT
Start: 2018-05-29 | End: 2018-06-05

## 2018-05-29 RX ORDER — FLUTICASONE PROPIONATE 50 MCG
1 SPRAY, SUSPENSION (ML) NASAL DAILY
Qty: 16 G | Refills: 0 | Status: SHIPPED | OUTPATIENT
Start: 2018-05-29 | End: 2018-07-05 | Stop reason: SDUPTHER

## 2018-05-29 NOTE — PROGRESS NOTES
Assessment/Plan:    1  Take NSAID for symptom relief  2  Drink plenty fluids will feeling ill  3  Follow-up condition changes or worsens     Diagnoses and all orders for this visit:    Pansinusitis, unspecified chronicity  -     amoxicillin-clavulanate (AUGMENTIN) 875-125 mg per tablet; Take 1 tablet by mouth every 12 (twelve) hours for 7 days    Allergic rhinitis, unspecified seasonality, unspecified trigger  -     fluticasone (FLONASE) 50 mcg/act nasal spray; 2 sprays into each nostril daily  -     fluticasone (FLONASE) 50 mcg/act nasal spray; 1 spray into each nostril daily          Subjective:      Patient ID: Jean-Claude Leonard is a 45 y o  female  3-year-old female presents with cough and congestion for about a week  Runny watery nose  Wet cough  Denies wheezing  Denies medications  Denies fever  The following portions of the patient's history were reviewed and updated as appropriate: allergies and current medications  Review of Systems   Constitutional: Negative  HENT: Positive for congestion  Respiratory: Positive for cough  Cardiovascular: Negative  Objective:      /60 (BP Location: Right arm, Patient Position: Sitting)   Pulse 72   Temp 98 4 °F (36 9 °C)   Resp 16   Ht 5' 5" (1 651 m)   Wt 45 8 kg (101 lb)   SpO2 100%   BMI 16 81 kg/m²          Physical Exam   Constitutional: She appears well-developed and well-nourished  HENT:   Head: Normocephalic and atraumatic  Right Ear: External ear normal    Left Ear: External ear normal    Nose: Right sinus exhibits maxillary sinus tenderness and frontal sinus tenderness  Left sinus exhibits maxillary sinus tenderness and frontal sinus tenderness  Mouth/Throat: Oropharynx is clear and moist    Cardiovascular: Normal rate, regular rhythm and normal heart sounds      Pulmonary/Chest: Effort normal and breath sounds normal

## 2018-05-30 RX ORDER — OXYCODONE HYDROCHLORIDE AND ACETAMINOPHEN 5; 325 MG/1; MG/1
TABLET ORAL
Qty: 30 TABLET | Refills: 0 | Status: SHIPPED | OUTPATIENT
Start: 2018-05-30 | End: 2018-06-08 | Stop reason: SDUPTHER

## 2018-06-08 ENCOUNTER — OFFICE VISIT (OUTPATIENT)
Dept: FAMILY MEDICINE CLINIC | Facility: CLINIC | Age: 53
End: 2018-06-08
Payer: MEDICARE

## 2018-06-08 VITALS
HEART RATE: 75 BPM | DIASTOLIC BLOOD PRESSURE: 68 MMHG | RESPIRATION RATE: 16 BRPM | TEMPERATURE: 98.7 F | WEIGHT: 102.8 LBS | SYSTOLIC BLOOD PRESSURE: 102 MMHG | OXYGEN SATURATION: 100 % | BODY MASS INDEX: 17.11 KG/M2

## 2018-06-08 DIAGNOSIS — B37.9 YEAST INFECTION: ICD-10-CM

## 2018-06-08 DIAGNOSIS — I10 ESSENTIAL HYPERTENSION: ICD-10-CM

## 2018-06-08 DIAGNOSIS — K51.90 ULCERATIVE COLITIS WITHOUT COMPLICATIONS, UNSPECIFIED LOCATION (HCC): ICD-10-CM

## 2018-06-08 DIAGNOSIS — L81.9 ATYPICAL PIGMENTED SKIN LESION: ICD-10-CM

## 2018-06-08 DIAGNOSIS — G89.4 CHRONIC PAIN SYNDROME: Primary | ICD-10-CM

## 2018-06-08 DIAGNOSIS — K64.4 EXTERNAL HEMORRHOIDS: ICD-10-CM

## 2018-06-08 PROCEDURE — 99214 OFFICE O/P EST MOD 30 MIN: CPT | Performed by: FAMILY MEDICINE

## 2018-06-08 RX ORDER — QUINAPRIL 10 MG/1
10 TABLET ORAL
Qty: 30 TABLET | Refills: 5 | Status: SHIPPED | OUTPATIENT
Start: 2018-06-08 | End: 2018-11-10 | Stop reason: SDUPTHER

## 2018-06-08 RX ORDER — FLUCONAZOLE 200 MG/1
200 TABLET ORAL DAILY
Qty: 1 TABLET | Refills: 0 | Status: SHIPPED | OUTPATIENT
Start: 2018-06-08 | End: 2018-06-09

## 2018-06-08 RX ORDER — OXYCODONE HYDROCHLORIDE AND ACETAMINOPHEN 5; 325 MG/1; MG/1
TABLET ORAL
Qty: 30 TABLET | Refills: 0 | Status: SHIPPED | OUTPATIENT
Start: 2018-06-08 | End: 2018-06-14 | Stop reason: SDUPTHER

## 2018-06-08 NOTE — PROGRESS NOTES
Assessment/Plan:    No problem-specific Assessment & Plan notes found for this encounter  Diagnoses and all orders for this visit:    Atypical pigmented skin lesion    Chronic pain syndrome  -     quinapril (ACCUPRIL) 10 mg tablet; Take 1 tablet (10 mg total) by mouth daily at bedtime  -     oxyCODONE-acetaminophen (PERCOCET) 5-325 mg per tablet; One tab q 8 hours prn pain    Essential hypertension    Ulcerative colitis without complications, unspecified location (HCC)    External hemorrhoids    Yeast infection  -     fluconazole (DIFLUCAN) 200 mg tablet; Take 1 tablet (200 mg total) by mouth daily for 1 day        Subjective:      Patient ID: Rolando Goldman is a 45 y o  female  This is a follow-up appointment for a 49-year-old female who was chronic medical problems which include hypertension,  Ulcerative colitis,  Opioid dependence  for chronic pain syndrome,  and a new complaint of a potential external hemorrhoid  She denies any new complaints other than the hemorrhoid potential  and a possible yeast infection secondary to recent antibiotics given for sinusitis  The patient also complains of an enlarging skin lesion in the medial aspect of her right thigh  The following portions of the patient's history were reviewed and updated as appropriate: allergies, current medications, past family history, past medical history, past social history, past surgical history and problem list     Review of Systems   Constitutional: Negative  Respiratory: Negative  Cardiovascular: Negative  Gastrointestinal: Positive for rectal pain  Musculoskeletal: Negative  Skin:          Skin lesion present on the medial aspect of her inner  right thigh   Psychiatric/Behavioral: Negative            Objective:      /68   Pulse 75   Temp 98 7 °F (37 1 °C)   Resp 16   Wt 46 6 kg (102 lb 12 8 oz)   SpO2 100%   BMI 17 11 kg/m²          Physical Exam   Constitutional: She is oriented to person, place, and time  She appears well-developed  Patient is underweight with BMI of 17 11   Cardiovascular: Normal rate, regular rhythm and normal heart sounds  Pulmonary/Chest: Effort normal and breath sounds normal    Abdominal: Soft  Bowel sounds are normal     The patient has external hemorrhoids nonthrombosed   Musculoskeletal: Normal range of motion  Neurological: She is alert and oriented to person, place, and time  Skin:    The patient has a large pigmented lesion in the inner aspect of her right thigh   Psychiatric: She has a normal mood and affect  Her behavior is normal  Judgment and thought content normal    Vitals reviewed

## 2018-06-08 NOTE — PATIENT INSTRUCTIONS
The patient's chronic pain meds were refilled today  The patient's blood pressure medicines were decreased secondary to low normal pressure  Her Accupril was decreased to 10 milligrams daily  The patient was started on 1 pill for a yeast infection  She was given Diflucan 200 milligrams 1 tablet today  The patient does have an external hemorrhoid and was advised that she could use preparation H to treat this problem  The patient is also have colitis was stable and her prednisone dose was decreased to 7 5 milligrams daily  The patient was asked to schedule an appointment  For biopsy of the atypical pigmented lesion on the inner aspect of her right thigh  Patient will also follow up in 1 month for chronic medical problems and recheck her blood pressure since I lowered her BP meds

## 2018-06-14 DIAGNOSIS — G89.4 CHRONIC PAIN SYNDROME: ICD-10-CM

## 2018-06-14 RX ORDER — OXYCODONE HYDROCHLORIDE AND ACETAMINOPHEN 5; 325 MG/1; MG/1
TABLET ORAL
Qty: 30 TABLET | Refills: 0 | Status: SHIPPED | OUTPATIENT
Start: 2018-06-14 | End: 2018-06-25 | Stop reason: SDUPTHER

## 2018-06-25 DIAGNOSIS — G89.4 CHRONIC PAIN SYNDROME: ICD-10-CM

## 2018-06-25 RX ORDER — OXYCODONE HYDROCHLORIDE AND ACETAMINOPHEN 5; 325 MG/1; MG/1
TABLET ORAL
Qty: 30 TABLET | Refills: 0 | Status: SHIPPED | OUTPATIENT
Start: 2018-06-25 | End: 2018-07-05 | Stop reason: SDUPTHER

## 2018-07-05 ENCOUNTER — PROCEDURE VISIT (OUTPATIENT)
Dept: FAMILY MEDICINE CLINIC | Facility: CLINIC | Age: 53
End: 2018-07-05
Payer: MEDICARE

## 2018-07-05 VITALS
OXYGEN SATURATION: 100 % | HEART RATE: 76 BPM | WEIGHT: 102 LBS | BODY MASS INDEX: 16.97 KG/M2 | DIASTOLIC BLOOD PRESSURE: 58 MMHG | TEMPERATURE: 98.4 F | RESPIRATION RATE: 16 BRPM | SYSTOLIC BLOOD PRESSURE: 112 MMHG

## 2018-07-05 DIAGNOSIS — L98.9 SKIN LESION, SUPERFICIAL: Primary | ICD-10-CM

## 2018-07-05 DIAGNOSIS — B07.8 OTHER VIRAL WARTS: ICD-10-CM

## 2018-07-05 DIAGNOSIS — G89.4 CHRONIC PAIN SYNDROME: ICD-10-CM

## 2018-07-05 DIAGNOSIS — K51.90 ULCERATIVE COLITIS WITHOUT COMPLICATIONS, UNSPECIFIED LOCATION (HCC): ICD-10-CM

## 2018-07-05 DIAGNOSIS — K51.911 ULCERATIVE COLITIS WITH RECTAL BLEEDING, UNSPECIFIED LOCATION (HCC): ICD-10-CM

## 2018-07-05 DIAGNOSIS — B36.9 FUNGAL DERMATOSIS: ICD-10-CM

## 2018-07-05 PROCEDURE — 88305 TISSUE EXAM BY PATHOLOGIST: CPT | Performed by: PATHOLOGY

## 2018-07-05 PROCEDURE — 99214 OFFICE O/P EST MOD 30 MIN: CPT | Performed by: FAMILY MEDICINE

## 2018-07-05 RX ORDER — OXYCODONE HYDROCHLORIDE AND ACETAMINOPHEN 5; 325 MG/1; MG/1
TABLET ORAL
Qty: 30 TABLET | Refills: 0 | Status: CANCELLED | OUTPATIENT
Start: 2018-07-05

## 2018-07-05 RX ORDER — PREDNISONE 2.5 MG
2.5 TABLET ORAL DAILY
Qty: 90 TABLET | Refills: 0 | Status: CANCELLED | OUTPATIENT
Start: 2018-07-05

## 2018-07-05 RX ORDER — CLOTRIMAZOLE 1 %
CREAM (GRAM) TOPICAL 2 TIMES DAILY
Qty: 30 G | Refills: 5 | Status: SHIPPED | OUTPATIENT
Start: 2018-07-05 | End: 2022-07-01

## 2018-07-05 RX ORDER — PREDNISONE 1 MG/1
5 TABLET ORAL DAILY
Qty: 30 TABLET | Refills: 0 | Status: CANCELLED | OUTPATIENT
Start: 2018-07-05

## 2018-07-05 RX ORDER — PREDNISONE 1 MG/1
5 TABLET ORAL DAILY
Qty: 30 TABLET | Refills: 5 | Status: SHIPPED | OUTPATIENT
Start: 2018-07-05 | End: 2018-12-13 | Stop reason: SDUPTHER

## 2018-07-05 RX ORDER — PREDNISONE 2.5 MG
2.5 TABLET ORAL DAILY
Qty: 30 TABLET | Refills: 5 | Status: SHIPPED | OUTPATIENT
Start: 2018-07-05 | End: 2018-12-13 | Stop reason: SDUPTHER

## 2018-07-05 RX ORDER — OXYCODONE HYDROCHLORIDE AND ACETAMINOPHEN 5; 325 MG/1; MG/1
TABLET ORAL
Qty: 30 TABLET | Refills: 0 | Status: SHIPPED | OUTPATIENT
Start: 2018-07-05 | End: 2018-07-12 | Stop reason: SDUPTHER

## 2018-07-05 NOTE — TELEPHONE ENCOUNTER
Dr Kandice Tinoco     Pt was seen today and when to the pharmacy none script is being release    Prednisone 2 5 and 5 mg    Also pt needs clotrimazole 1% cream (for a rash)      If any questions please call [phone] and [phone]

## 2018-07-05 NOTE — PROGRESS NOTES
Assessment/Plan:    No problem-specific Assessment & Plan notes found for this encounter  Diagnoses and all orders for this visit:    Skin lesion, superficial  -     Tissue Exam        Subjective:      Patient ID: Dwight Santiago is a 45 y o  female  This appointment is scheduled for a punch biopsy on atypical lesion on the patient's right medial thigh  The patient also has a rash in the midline of her back which has been present for some time and not resolving with Clotrimazole cream   The patient is also requesting refills on her steroids and chronic pain meds  Medication Refill   Associated symptoms include a rash  The following portions of the patient's history were reviewed and updated as appropriate: allergies, current medications, past family history, past medical history, past social history, past surgical history and problem list     Review of Systems   Constitutional: Negative  Respiratory: Negative  Cardiovascular: Negative  Skin: Positive for rash  Atypical skin lesion on the right medial thigh area  Psychiatric/Behavioral: Negative  Objective:      /58   Pulse 76   Temp 98 4 °F (36 9 °C)   Resp 16   Wt 46 3 kg (102 lb)   SpO2 100%   BMI 16 97 kg/m²          Physical Exam   Constitutional:   Patient is underweight with a BMI of 16 97   Musculoskeletal: Normal range of motion  Skin:   The patient has an atypical skin lesion on her right medial thigh  The patient also has a warty appearing lesion on her left ankle area  There is an erythematous rash in the midline portion of her back  Psychiatric: She has a normal mood and affect  Her behavior is normal  Judgment and thought content normal    Vitals reviewed

## 2018-07-05 NOTE — PATIENT INSTRUCTIONS
A 4 mm punch biopsy was done on the atypical skin lesion of her right medial thigh  Further workup depends on the biopsy report  The viral wart lesion was cryo'd on her left ankle  The patient's pain meds were refilled for chronic pain syndrome  Patient's prednisone doses were refilled to treat her colitis  The total dose of prednisone will be reduced down to 7 5 mg daily  Patient was asked to follow-up for chronic medical problems 2 months

## 2018-07-12 DIAGNOSIS — G89.4 CHRONIC PAIN SYNDROME: ICD-10-CM

## 2018-07-13 ENCOUNTER — TELEPHONE (OUTPATIENT)
Dept: FAMILY MEDICINE CLINIC | Facility: CLINIC | Age: 53
End: 2018-07-13

## 2018-07-13 RX ORDER — OXYCODONE HYDROCHLORIDE AND ACETAMINOPHEN 5; 325 MG/1; MG/1
TABLET ORAL
Qty: 30 TABLET | Refills: 0
Start: 2018-07-13 | End: 2018-07-20 | Stop reason: SDUPTHER

## 2018-07-13 NOTE — TELEPHONE ENCOUNTER
Script for percocet 5-325 , 30 tabs total no refills, available at  for pickup at patient's convenience      Hanna Vidal DO  07/13/18  9:00 AM

## 2018-07-20 DIAGNOSIS — G89.4 CHRONIC PAIN SYNDROME: ICD-10-CM

## 2018-07-20 RX ORDER — OXYCODONE HYDROCHLORIDE AND ACETAMINOPHEN 5; 325 MG/1; MG/1
TABLET ORAL
Qty: 30 TABLET | Refills: 0 | Status: SHIPPED | OUTPATIENT
Start: 2018-07-20 | End: 2018-07-30 | Stop reason: SDUPTHER

## 2018-07-30 DIAGNOSIS — G89.4 CHRONIC PAIN SYNDROME: ICD-10-CM

## 2018-08-01 RX ORDER — OXYCODONE HYDROCHLORIDE AND ACETAMINOPHEN 5; 325 MG/1; MG/1
TABLET ORAL
Qty: 30 TABLET | Refills: 0 | Status: SHIPPED | OUTPATIENT
Start: 2018-08-01 | End: 2018-08-07 | Stop reason: SDUPTHER

## 2018-08-07 DIAGNOSIS — G89.4 CHRONIC PAIN SYNDROME: ICD-10-CM

## 2018-08-08 RX ORDER — OXYCODONE HYDROCHLORIDE AND ACETAMINOPHEN 5; 325 MG/1; MG/1
TABLET ORAL
Qty: 30 TABLET | Refills: 0 | Status: SHIPPED | OUTPATIENT
Start: 2018-08-08 | End: 2018-08-15 | Stop reason: SDUPTHER

## 2018-08-15 DIAGNOSIS — G89.4 CHRONIC PAIN SYNDROME: ICD-10-CM

## 2018-08-16 RX ORDER — OXYCODONE HYDROCHLORIDE AND ACETAMINOPHEN 5; 325 MG/1; MG/1
TABLET ORAL
Qty: 30 TABLET | Refills: 0 | Status: SHIPPED | OUTPATIENT
Start: 2018-08-16 | End: 2018-08-31 | Stop reason: SDUPTHER

## 2018-08-31 ENCOUNTER — OFFICE VISIT (OUTPATIENT)
Dept: FAMILY MEDICINE CLINIC | Facility: CLINIC | Age: 53
End: 2018-08-31
Payer: MEDICARE

## 2018-08-31 VITALS
HEART RATE: 67 BPM | BODY MASS INDEX: 17.31 KG/M2 | DIASTOLIC BLOOD PRESSURE: 68 MMHG | WEIGHT: 104 LBS | OXYGEN SATURATION: 100 % | SYSTOLIC BLOOD PRESSURE: 108 MMHG | RESPIRATION RATE: 18 BRPM

## 2018-08-31 DIAGNOSIS — K51.911 ULCERATIVE COLITIS WITH RECTAL BLEEDING, UNSPECIFIED LOCATION (HCC): Primary | ICD-10-CM

## 2018-08-31 DIAGNOSIS — B36.9 FUNGAL DERMATOSIS: ICD-10-CM

## 2018-08-31 DIAGNOSIS — G89.4 CHRONIC PAIN SYNDROME: ICD-10-CM

## 2018-08-31 DIAGNOSIS — I10 ESSENTIAL HYPERTENSION: ICD-10-CM

## 2018-08-31 DIAGNOSIS — K64.4 EXTERNAL HEMORRHOIDS: ICD-10-CM

## 2018-08-31 PROCEDURE — 99214 OFFICE O/P EST MOD 30 MIN: CPT | Performed by: FAMILY MEDICINE

## 2018-08-31 RX ORDER — OXYCODONE HYDROCHLORIDE AND ACETAMINOPHEN 5; 325 MG/1; MG/1
TABLET ORAL
Qty: 30 TABLET | Refills: 0 | Status: SHIPPED | OUTPATIENT
Start: 2018-08-31 | End: 2018-09-10 | Stop reason: SDUPTHER

## 2018-08-31 NOTE — PROGRESS NOTES
Assessment/Plan:    No problem-specific Assessment & Plan notes found for this encounter  Diagnoses and all orders for this visit:    Ulcerative colitis with rectal bleeding, unspecified location [facility])    Chronic pain syndrome  -     oxyCODONE-acetaminophen (PERCOCET) 5-325 mg per tablet; One tab q 12 hours prn pain    External hemorrhoids    Essential hypertension    Fungal dermatosis        Subjective:      Patient ID: Chavo Weir is a 45 y o  female  F/u appt for check on ulcerative colitis  Pt still states some blood in her stools but possibly from her external hemorrhoids also  She has gained several lbs  She is being weaned off narcotics to treat her UC  Her prednisone is beinge weaned for the UC also  Her fungal rash on her back is improving,        The following portions of the patient's history were reviewed and updated as appropriate: allergies, current medications, past family history, past medical history, past social history, past surgical history and problem list     Review of Systems   Constitutional: Negative  Respiratory: Negative  Cardiovascular: Negative  Gastrointestinal: Positive for abdominal pain and blood in stool  Endocrine: Negative  Genitourinary: Negative  Musculoskeletal: Negative  Skin: Positive for rash  Psychiatric/Behavioral: Negative  Objective:      /68   Pulse 67   Resp 18   Wt 47 2 kg (104 lb)   SpO2 100%   BMI 17 31 kg/m²          Physical Exam   Constitutional: She is oriented to person, place, and time  Underweight  Cardiovascular: Normal rate, regular rhythm and normal heart sounds  Pulmonary/Chest: Effort normal and breath sounds normal    Abdominal: Soft  Bowel sounds are normal    Musculoskeletal: Normal range of motion  Neurological: She is alert and oriented to person, place, and time  Skin: Rash noted  Resolving fungal rash on hr mid back   Psychiatric: She has a normal mood and affect   Her behavior is normal  Judgment and thought content normal    Vitals reviewed

## 2018-08-31 NOTE — PATIENT INSTRUCTIONS
The pt's hypertension is well controlled  She will continue her present hypertensive meds  I will decrease her Prednisone dose to 5 mg daily to treat her UC  Her narcotic meds were refilled to treat her pain from the UC  The pt will continue her antifungal cream for her rash    The pt will f/u in 4 weeks to check her UC

## 2018-09-10 DIAGNOSIS — G89.4 CHRONIC PAIN SYNDROME: ICD-10-CM

## 2018-09-11 RX ORDER — OXYCODONE HYDROCHLORIDE AND ACETAMINOPHEN 5; 325 MG/1; MG/1
TABLET ORAL
Qty: 30 TABLET | Refills: 0 | Status: SHIPPED | OUTPATIENT
Start: 2018-09-11 | End: 2018-09-21 | Stop reason: SDUPTHER

## 2018-09-21 DIAGNOSIS — G89.4 CHRONIC PAIN SYNDROME: ICD-10-CM

## 2018-09-24 RX ORDER — OXYCODONE HYDROCHLORIDE AND ACETAMINOPHEN 5; 325 MG/1; MG/1
TABLET ORAL
Qty: 30 TABLET | Refills: 0 | Status: SHIPPED | OUTPATIENT
Start: 2018-09-24 | End: 2018-10-05 | Stop reason: SDUPTHER

## 2018-10-03 DIAGNOSIS — K51.911 ULCERATIVE COLITIS WITH RECTAL BLEEDING, UNSPECIFIED LOCATION (HCC): ICD-10-CM

## 2018-10-03 RX ORDER — BALSALAZIDE DISODIUM 750 MG/1
CAPSULE ORAL
Qty: 180 CAPSULE | Refills: 5 | Status: SHIPPED | OUTPATIENT
Start: 2018-10-03 | End: 2018-10-05 | Stop reason: SDUPTHER

## 2018-10-05 ENCOUNTER — OFFICE VISIT (OUTPATIENT)
Dept: FAMILY MEDICINE CLINIC | Facility: CLINIC | Age: 53
End: 2018-10-05
Payer: MEDICARE

## 2018-10-05 VITALS
DIASTOLIC BLOOD PRESSURE: 68 MMHG | BODY MASS INDEX: 17.31 KG/M2 | SYSTOLIC BLOOD PRESSURE: 106 MMHG | OXYGEN SATURATION: 100 % | HEART RATE: 68 BPM | RESPIRATION RATE: 18 BRPM | WEIGHT: 104 LBS | TEMPERATURE: 98.6 F

## 2018-10-05 DIAGNOSIS — G89.4 CHRONIC PAIN SYNDROME: ICD-10-CM

## 2018-10-05 DIAGNOSIS — K51.911 ULCERATIVE COLITIS WITH RECTAL BLEEDING, UNSPECIFIED LOCATION (HCC): ICD-10-CM

## 2018-10-05 DIAGNOSIS — Z23 NEED FOR INFLUENZA VACCINATION: Primary | ICD-10-CM

## 2018-10-05 DIAGNOSIS — K51.90 ULCERATIVE COLITIS WITHOUT COMPLICATIONS, UNSPECIFIED LOCATION (HCC): ICD-10-CM

## 2018-10-05 PROCEDURE — 99214 OFFICE O/P EST MOD 30 MIN: CPT | Performed by: FAMILY MEDICINE

## 2018-10-05 PROCEDURE — 90682 RIV4 VACC RECOMBINANT DNA IM: CPT

## 2018-10-05 PROCEDURE — 90471 IMMUNIZATION ADMIN: CPT

## 2018-10-05 RX ORDER — OXYCODONE HYDROCHLORIDE AND ACETAMINOPHEN 5; 325 MG/1; MG/1
TABLET ORAL
Qty: 30 TABLET | Refills: 0 | Status: SHIPPED | OUTPATIENT
Start: 2018-10-08 | End: 2018-10-18 | Stop reason: SDUPTHER

## 2018-10-05 RX ORDER — BALSALAZIDE DISODIUM 750 MG/1
CAPSULE ORAL
Qty: 180 CAPSULE | Refills: 3 | Status: SHIPPED | OUTPATIENT
Start: 2018-10-05 | End: 2019-05-08 | Stop reason: SDUPTHER

## 2018-10-06 NOTE — PATIENT INSTRUCTIONS
The pt will remain on Prednisone 5 mg daily for now  The pt will continue all her present meds for hypertension and UC  The pt was given a flu vac  The pt will continue her pain meds    F/u in one month for these problems

## 2018-10-06 NOTE — PROGRESS NOTES
Assessment/Plan:    No problem-specific Assessment & Plan notes found for this encounter  Diagnoses and all orders for this visit:    Need for influenza vaccination  -     influenza vaccine, 4425-0893, quadrivalent, recombinant, PF, 0 5 mL, for patients 18 yr+ ([facility])    Ulcerative colitis without complications, unspecified location [facility])    Chronic pain syndrome  -     oxyCODONE-acetaminophen (PERCOCET) 5-325 mg per tablet; One tab q 12 hours prn pain    Ulcerative colitis with rectal bleeding, unspecified location ([facility])  -     balsalazide (COLAZAL) 750 mg capsule; Take 3 capsule twice daily        Subjective:      Patient ID: Ana Cristina Bartlett is a 45 y o  female  This is a f/u appt for this 44 yo pt with UC  The pt's prednisone was decreased to 5 mg daily  She states she has more diarrhea but no blood  She denies any new complaints  She does have abd cramping/pain  The following portions of the patient's history were reviewed and updated as appropriate: allergies, current medications, past family history, past medical history, past social history, past surgical history and problem list     Review of Systems   Constitutional: Negative  Respiratory: Negative  Cardiovascular: Negative  Gastrointestinal: Positive for abdominal pain and diarrhea  Musculoskeletal: Negative  Neurological: Negative  Hematological: Negative  Psychiatric/Behavioral: Negative  Objective:      /68   Pulse 68   Temp 98 6 °F (37 °C)   Resp 18   Wt 47 2 kg (104 lb)   SpO2 100%   BMI 17 31 kg/m²          Physical Exam   Constitutional: She is oriented to person, place, and time  She appears well-developed and well-nourished  Pt is thin with BMI of 17 31   Cardiovascular: Normal rate, regular rhythm and normal heart sounds  Pulmonary/Chest: Effort normal and breath sounds normal    Abdominal: Soft  Bowel sounds are normal    Musculoskeletal: Normal range of motion  Neurological: She is alert and oriented to person, place, and time  Psychiatric: She has a normal mood and affect  Her behavior is normal  Judgment and thought content normal    Vitals reviewed

## 2018-10-18 DIAGNOSIS — G89.4 CHRONIC PAIN SYNDROME: ICD-10-CM

## 2018-10-18 RX ORDER — OXYCODONE HYDROCHLORIDE AND ACETAMINOPHEN 5; 325 MG/1; MG/1
TABLET ORAL
Qty: 30 TABLET | Refills: 0 | Status: SHIPPED | OUTPATIENT
Start: 2018-10-18 | End: 2018-10-30 | Stop reason: SDUPTHER

## 2018-10-30 DIAGNOSIS — G89.4 CHRONIC PAIN SYNDROME: ICD-10-CM

## 2018-10-31 RX ORDER — OXYCODONE HYDROCHLORIDE AND ACETAMINOPHEN 5; 325 MG/1; MG/1
TABLET ORAL
Qty: 30 TABLET | Refills: 0 | Status: SHIPPED | OUTPATIENT
Start: 2018-10-31 | End: 2018-11-12 | Stop reason: SDUPTHER

## 2018-11-10 DIAGNOSIS — G89.4 CHRONIC PAIN SYNDROME: ICD-10-CM

## 2018-11-12 DIAGNOSIS — G89.4 CHRONIC PAIN SYNDROME: ICD-10-CM

## 2018-11-12 RX ORDER — QUINAPRIL 10 MG/1
10 TABLET ORAL
Qty: 30 TABLET | Refills: 5 | Status: SHIPPED | OUTPATIENT
Start: 2018-11-12 | End: 2019-02-08 | Stop reason: ALTCHOICE

## 2018-11-12 RX ORDER — QUINAPRIL 10 MG/1
TABLET ORAL
Qty: 30 TABLET | Refills: 5 | Status: SHIPPED | OUTPATIENT
Start: 2018-11-12 | End: 2018-12-13 | Stop reason: SDUPTHER

## 2018-11-12 RX ORDER — OXYCODONE HYDROCHLORIDE AND ACETAMINOPHEN 5; 325 MG/1; MG/1
TABLET ORAL
Qty: 30 TABLET | Refills: 0 | Status: SHIPPED | OUTPATIENT
Start: 2018-11-15 | End: 2018-11-27 | Stop reason: SDUPTHER

## 2018-11-15 ENCOUNTER — OFFICE VISIT (OUTPATIENT)
Dept: FAMILY MEDICINE CLINIC | Facility: CLINIC | Age: 53
End: 2018-11-15
Payer: MEDICARE

## 2018-11-15 VITALS
OXYGEN SATURATION: 98 % | HEART RATE: 67 BPM | DIASTOLIC BLOOD PRESSURE: 60 MMHG | TEMPERATURE: 97.6 F | BODY MASS INDEX: 16.66 KG/M2 | WEIGHT: 100 LBS | HEIGHT: 65 IN | SYSTOLIC BLOOD PRESSURE: 100 MMHG

## 2018-11-15 DIAGNOSIS — R68.2 DRY MOUTH: ICD-10-CM

## 2018-11-15 DIAGNOSIS — G89.4 CHRONIC PAIN SYNDROME: ICD-10-CM

## 2018-11-15 DIAGNOSIS — H04.123 DRY EYES: ICD-10-CM

## 2018-11-15 DIAGNOSIS — L30.9 DERMATITIS DUE TO UNKNOWN CAUSE: ICD-10-CM

## 2018-11-15 DIAGNOSIS — K51.90 ULCERATIVE COLITIS WITHOUT COMPLICATIONS, UNSPECIFIED LOCATION (HCC): Primary | ICD-10-CM

## 2018-11-15 PROCEDURE — 99214 OFFICE O/P EST MOD 30 MIN: CPT | Performed by: FAMILY MEDICINE

## 2018-11-15 RX ORDER — MOMETASONE FUROATE 1 MG/G
CREAM TOPICAL DAILY
Qty: 45 G | Refills: 1 | Status: SHIPPED | OUTPATIENT
Start: 2018-11-15 | End: 2019-02-08 | Stop reason: ALTCHOICE

## 2018-11-15 NOTE — PROGRESS NOTES
Assessment/Plan:    No problem-specific Assessment & Plan notes found for this encounter  Diagnoses and all orders for this visit:    Ulcerative colitis without complications, unspecified location ([facility])    Chronic pain syndrome    Dry eyes    Dry mouth    Dermatitis due to unknown cause  -     mometasone (ELOCON) 0 1 % cream; Apply topically daily        Subjective:      Patient ID: Anne Wang is a 52 y o  female  This is a f/u appt for a 51 yo female with a history of ulcerative colitis ,hypertension, chronic pain syndrome  and a chronic rash on her back which persists  The rash didn't respond th antifungzl meds  The pt is still having diarrhea but no blood  She complains of dry eyes and mouth  The following portions of the patient's history were reviewed and updated as appropriate: allergies, current medications, past family history, past medical history, past social history, past surgical history and problem list     Review of Systems   Constitutional: Negative  HENT:        Dry eyes and mouth   Respiratory: Negative  Cardiovascular: Negative  Gastrointestinal: Positive for abdominal pain and diarrhea  Musculoskeletal: Negative  Skin: Positive for rash  Neurological: Negative  Psychiatric/Behavioral: Negative  Objective:      /60   Pulse 67   Temp 97 6 °F (36 4 °C)   Ht 5' 5" (1 651 m)   Wt 45 4 kg (100 lb)   SpO2 98%   BMI 16 64 kg/m²          Physical Exam   Constitutional: She appears well-developed and well-nourished  Pt is underweight   Cardiovascular: Normal rate and regular rhythm  Pulmonary/Chest: Effort normal and breath sounds normal    Abdominal: Soft  Bowel sounds are normal    Neurological: She is alert  Skin:   Macular and somewhat pustular rash on her mid back area  Psychiatric: She has a normal mood and affect  Her behavior is normal  Judgment and thought content normal    Vitals reviewed

## 2018-11-16 NOTE — PATIENT INSTRUCTIONS
Pt will continue Prednisone 5 mg daily for her UC  Pt will continue her present dose of pain meds for her chronic pain syndrome  Pt was started on a trial of Mometasone cream to apply to the back rash  Pt will f/u in 2 weeks to check the rash and if not gone will get a skin bx  BP is low and Accupril will be stopped

## 2018-11-26 DIAGNOSIS — G89.4 CHRONIC PAIN SYNDROME: ICD-10-CM

## 2018-11-27 DIAGNOSIS — G89.4 CHRONIC PAIN SYNDROME: ICD-10-CM

## 2018-11-27 RX ORDER — OXYCODONE HYDROCHLORIDE AND ACETAMINOPHEN 5; 325 MG/1; MG/1
TABLET ORAL
Qty: 30 TABLET | Refills: 0 | Status: SHIPPED | OUTPATIENT
Start: 2018-11-30 | End: 2018-12-13 | Stop reason: SDUPTHER

## 2018-11-27 RX ORDER — OXYCODONE HYDROCHLORIDE AND ACETAMINOPHEN 5; 325 MG/1; MG/1
TABLET ORAL
Qty: 30 TABLET | Refills: 0 | Status: CANCELLED | OUTPATIENT
Start: 2018-11-30

## 2018-12-13 ENCOUNTER — OFFICE VISIT (OUTPATIENT)
Dept: FAMILY MEDICINE CLINIC | Facility: CLINIC | Age: 53
End: 2018-12-13
Payer: MEDICARE

## 2018-12-13 VITALS
BODY MASS INDEX: 17.87 KG/M2 | OXYGEN SATURATION: 99 % | TEMPERATURE: 99.2 F | WEIGHT: 107.4 LBS | RESPIRATION RATE: 18 BRPM | HEART RATE: 72 BPM | SYSTOLIC BLOOD PRESSURE: 138 MMHG | DIASTOLIC BLOOD PRESSURE: 70 MMHG

## 2018-12-13 DIAGNOSIS — K51.00 ULCERATIVE (CHRONIC) ENTEROCOLITIS, WITHOUT COMPLICATIONS (HCC): ICD-10-CM

## 2018-12-13 DIAGNOSIS — L81.9 ATYPICAL PIGMENTED SKIN LESION: ICD-10-CM

## 2018-12-13 DIAGNOSIS — K51.90 ULCERATIVE COLITIS WITHOUT COMPLICATIONS, UNSPECIFIED LOCATION (HCC): Primary | ICD-10-CM

## 2018-12-13 DIAGNOSIS — G89.4 CHRONIC PAIN SYNDROME: ICD-10-CM

## 2018-12-13 PROCEDURE — 99214 OFFICE O/P EST MOD 30 MIN: CPT | Performed by: FAMILY MEDICINE

## 2018-12-13 RX ORDER — PREDNISONE 2.5 MG
2.5 TABLET ORAL DAILY
Qty: 15 TABLET | Refills: 0 | Status: SHIPPED | OUTPATIENT
Start: 2018-12-13 | End: 2019-05-08 | Stop reason: SDUPTHER

## 2018-12-13 RX ORDER — PREDNISONE 1 MG/1
5 TABLET ORAL DAILY
Qty: 30 TABLET | Refills: 5 | Status: SHIPPED | OUTPATIENT
Start: 2018-12-13 | End: 2019-02-12 | Stop reason: SDUPTHER

## 2018-12-13 RX ORDER — OXYCODONE HYDROCHLORIDE AND ACETAMINOPHEN 5; 325 MG/1; MG/1
TABLET ORAL
Qty: 30 TABLET | Refills: 0 | Status: SHIPPED | OUTPATIENT
Start: 2018-12-13 | End: 2018-12-26 | Stop reason: SDUPTHER

## 2018-12-14 DIAGNOSIS — K51.00 ULCERATIVE (CHRONIC) ENTEROCOLITIS, WITHOUT COMPLICATIONS (HCC): ICD-10-CM

## 2018-12-14 RX ORDER — PREDNISONE 2.5 MG
2.5 TABLET ORAL DAILY
Qty: 15 TABLET | Refills: 0 | OUTPATIENT
Start: 2018-12-14

## 2018-12-14 NOTE — PROGRESS NOTES
Assessment/Plan:    No problem-specific Assessment & Plan notes found for this encounter  Diagnoses and all orders for this visit:    Ulcerative colitis with rectal bleeding, unspecified location (HCC)  -     predniSONE 5 mg tablet; Take 1 tablet (5 mg total) by mouth daily  -     predniSONE 2 5 mg tablet; Take 1 tablet (2 5 mg total) by mouth daily        Subjective:      Patient ID: Nena Allen is a 52 y o  female  F/u appt for UC and psoriatic rash on her back  The pt states recently more diarrhea but no blood  The pt did gain 7 lbs  She states the rash on her back is decreasing in size  She needs refills on her pain meds  The following portions of the patient's history were reviewed and updated as appropriate: allergies, current medications, past family history, past medical history, past social history, past surgical history and problem list     Review of Systems   Constitutional: Negative  Respiratory: Negative  Cardiovascular: Negative  Gastrointestinal: Positive for diarrhea  Endocrine: Negative  Genitourinary: Negative  Skin: Positive for rash  Psychiatric/Behavioral: Negative  Objective:      /70 (BP Location: Left arm, Patient Position: Sitting, Cuff Size: Adult)   Pulse 72   Temp 99 2 °F (37 3 °C) (Tympanic)   Resp 18   Wt 48 7 kg (107 lb 6 4 oz)   SpO2 99%   BMI 17 87 kg/m²          Physical Exam   Constitutional: She is oriented to person, place, and time  Pt is underweight with BMI of 17 87   Cardiovascular: Regular rhythm and normal heart sounds  Pulmonary/Chest: Breath sounds normal    Neurological: She is alert and oriented to person, place, and time  Skin:   Decreasing erythematous rash on middle of back   Psychiatric: She has a normal mood and affect  Her behavior is normal  Judgment and thought content normal    Vitals reviewed

## 2018-12-14 NOTE — PATIENT INSTRUCTIONS
Prednisone will be increased to 7 5 mg daily for two weeks to treat UC  Prednisone refill called in  Pain meds refilled  Pt will continue steroid cream to skin lesion which looks like psoriatic skin lesion  F/u appt in 1 month  Pt will go back to Prdnisone 5 mg daily after 2 weeks

## 2018-12-26 DIAGNOSIS — G89.4 CHRONIC PAIN SYNDROME: ICD-10-CM

## 2018-12-26 RX ORDER — OXYCODONE HYDROCHLORIDE AND ACETAMINOPHEN 5; 325 MG/1; MG/1
TABLET ORAL
Qty: 36 TABLET | Refills: 0 | Status: SHIPPED | OUTPATIENT
Start: 2018-12-29 | End: 2019-01-14 | Stop reason: SDUPTHER

## 2019-01-09 DIAGNOSIS — G89.4 CHRONIC PAIN SYNDROME: ICD-10-CM

## 2019-01-09 RX ORDER — OXYCODONE HYDROCHLORIDE AND ACETAMINOPHEN 5; 325 MG/1; MG/1
TABLET ORAL
Qty: 36 TABLET | Refills: 0 | OUTPATIENT
Start: 2019-01-09 | End: 2019-01-26

## 2019-01-14 DIAGNOSIS — G89.4 CHRONIC PAIN SYNDROME: ICD-10-CM

## 2019-01-14 RX ORDER — OXYCODONE HYDROCHLORIDE AND ACETAMINOPHEN 5; 325 MG/1; MG/1
TABLET ORAL
Qty: 36 TABLET | Refills: 0 | Status: SHIPPED | OUTPATIENT
Start: 2019-01-14 | End: 2019-01-29 | Stop reason: SDUPTHER

## 2019-01-14 NOTE — TELEPHONE ENCOUNTER
Pt came at 3:36pm to check if any doctor prescribe  the oxycodone acetaminophen percocet 5-325 mg and was sent the script to the pharmacy  Pt is out of med today, she came early and told her to come back Monday      [address] [place] [address] Drive     Please call pt if any problem [phone]

## 2019-01-29 DIAGNOSIS — G89.4 CHRONIC PAIN SYNDROME: ICD-10-CM

## 2019-01-29 RX ORDER — OXYCODONE HYDROCHLORIDE AND ACETAMINOPHEN 5; 325 MG/1; MG/1
TABLET ORAL
Qty: 30 TABLET | Refills: 0 | Status: SHIPPED | OUTPATIENT
Start: 2019-02-01 | End: 2019-02-08 | Stop reason: SDUPTHER

## 2019-02-08 ENCOUNTER — OFFICE VISIT (OUTPATIENT)
Dept: FAMILY MEDICINE CLINIC | Facility: CLINIC | Age: 54
End: 2019-02-08
Payer: MEDICARE

## 2019-02-08 VITALS
HEART RATE: 68 BPM | RESPIRATION RATE: 18 BRPM | OXYGEN SATURATION: 99 % | BODY MASS INDEX: 17.83 KG/M2 | DIASTOLIC BLOOD PRESSURE: 80 MMHG | TEMPERATURE: 97.8 F | WEIGHT: 107 LBS | HEIGHT: 65 IN | SYSTOLIC BLOOD PRESSURE: 140 MMHG

## 2019-02-08 DIAGNOSIS — I10 ESSENTIAL HYPERTENSION: ICD-10-CM

## 2019-02-08 DIAGNOSIS — G89.4 CHRONIC PAIN SYNDROME: ICD-10-CM

## 2019-02-08 DIAGNOSIS — G47.00 INSOMNIA, UNSPECIFIED TYPE: Primary | ICD-10-CM

## 2019-02-08 DIAGNOSIS — K51.90 ULCERATIVE COLITIS WITHOUT COMPLICATIONS, UNSPECIFIED LOCATION (HCC): ICD-10-CM

## 2019-02-08 PROCEDURE — 99214 OFFICE O/P EST MOD 30 MIN: CPT | Performed by: FAMILY MEDICINE

## 2019-02-08 RX ORDER — OXYCODONE HYDROCHLORIDE AND ACETAMINOPHEN 5; 325 MG/1; MG/1
TABLET ORAL
Qty: 30 TABLET | Refills: 0 | Status: SHIPPED | OUTPATIENT
Start: 2019-02-12 | End: 2019-02-21 | Stop reason: SDUPTHER

## 2019-02-08 NOTE — PROGRESS NOTES
Assessment/Plan:    No problem-specific Assessment & Plan notes found for this encounter  Diagnoses and all orders for this visit:    Insomnia, unspecified type    Ulcerative colitis without complications, unspecified location (HCC)    Chronic pain syndrome    Essential hypertension        Subjective:      Patient ID: Anne Wang is a 52 y o  female  This is a follow-up appointment for 47-year-old female with a history of ulcerative colitis and hypertension  Most recently I had stopped patient's hypertensive meds because of low blood pressure readings  Patient states that also colitis is not acting up has it has in the past   Patient is on long-term steroid use and Asacol for treatment of her ulcerative colitis  Patient's weight is actually very good at 107 lb  She denies any new complaints  The following portions of the patient's history were reviewed and updated as appropriate: allergies, current medications, past family history, past medical history, past social history, past surgical history and problem list     Review of Systems   Constitutional: Negative  Respiratory: Negative  Cardiovascular: Negative  Gastrointestinal: Negative  Endocrine: Negative  Musculoskeletal: Negative  Neurological: Negative  Psychiatric/Behavioral: Negative  Objective:      /80   Pulse 68   Temp 97 8 °F (36 6 °C) (Tympanic)   Resp 18   Ht 5' 5" (1 651 m)   Wt 48 5 kg (107 lb)   SpO2 99%   BMI 17 81 kg/m²          Physical Exam   Constitutional: She is oriented to person, place, and time  She appears well-developed and well-nourished  The patient is underweight with a BMI of 17 81   Cardiovascular: Normal rate, regular rhythm, normal heart sounds and intact distal pulses  Pulmonary/Chest: Effort normal and breath sounds normal    Abdominal: Soft  Bowel sounds are normal    Musculoskeletal: Normal range of motion     Neurological: She is alert and oriented to person, place, and time  Psychiatric: She has a normal mood and affect  Her behavior is normal  Judgment and thought content normal    Vitals reviewed

## 2019-02-08 NOTE — PATIENT INSTRUCTIONS
Patient's hypertension is stable without medications at this point  I will continue to monitor her blood pressure her follow-up appointments to see if I need to restart medications  The patient is also colitis is actually stable at this point also  Patient will continue prednisone 5 mg daily in her Asacol to treat her also colitis  Patient will follow up with me in 2 months for the also colitis and hypertension screening  The patient does some instances insomnia and she was suggested that try melatonin 2 mg at bedtime to treat this problem

## 2019-02-12 DIAGNOSIS — K21.9 CHRONIC GERD: ICD-10-CM

## 2019-02-12 DIAGNOSIS — K51.00 ULCERATIVE (CHRONIC) ENTEROCOLITIS, WITHOUT COMPLICATIONS (HCC): ICD-10-CM

## 2019-02-13 RX ORDER — PREDNISONE 1 MG/1
TABLET ORAL
Qty: 90 TABLET | Refills: 3 | Status: SHIPPED | OUTPATIENT
Start: 2019-02-13 | End: 2019-09-18 | Stop reason: SDUPTHER

## 2019-02-13 RX ORDER — OMEPRAZOLE 40 MG/1
CAPSULE, DELAYED RELEASE ORAL
Qty: 90 CAPSULE | Refills: 3 | Status: SHIPPED | OUTPATIENT
Start: 2019-02-13 | End: 2019-09-18 | Stop reason: HOSPADM

## 2019-02-21 DIAGNOSIS — G89.4 CHRONIC PAIN SYNDROME: ICD-10-CM

## 2019-02-21 RX ORDER — OXYCODONE HYDROCHLORIDE AND ACETAMINOPHEN 5; 325 MG/1; MG/1
TABLET ORAL
Qty: 29 TABLET | Refills: 0 | Status: SHIPPED | OUTPATIENT
Start: 2019-02-21 | End: 2019-03-05 | Stop reason: SDUPTHER

## 2019-03-05 DIAGNOSIS — G89.4 CHRONIC PAIN SYNDROME: ICD-10-CM

## 2019-03-07 RX ORDER — OXYCODONE HYDROCHLORIDE AND ACETAMINOPHEN 5; 325 MG/1; MG/1
TABLET ORAL
Qty: 28 TABLET | Refills: 0 | Status: SHIPPED | OUTPATIENT
Start: 2019-03-07 | End: 2019-03-18 | Stop reason: SDUPTHER

## 2019-03-07 NOTE — TELEPHONE ENCOUNTER
DR Judah Randall PT IS CALLING AGAIN FOR HER REFILL  SHE WOULD LIKE THIS TO BE DONE, AS SHE IS OVER AT PHYSICAL THERAPY WITH HER SON AND SHE WOULD LIKE TO PICK IT UP AT THE PHARM AFTER THAT

## 2019-03-07 NOTE — TELEPHONE ENCOUNTER
Pt called in again looking for RX  Pt would like to pick this up tonight, States she is out of medication as of today

## 2019-03-18 DIAGNOSIS — G89.4 CHRONIC PAIN SYNDROME: ICD-10-CM

## 2019-03-18 RX ORDER — OXYCODONE HYDROCHLORIDE AND ACETAMINOPHEN 5; 325 MG/1; MG/1
TABLET ORAL
Qty: 28 TABLET | Refills: 0 | Status: SHIPPED | OUTPATIENT
Start: 2019-03-22 | End: 2019-03-28 | Stop reason: SDUPTHER

## 2019-03-18 RX ORDER — OXYCODONE HYDROCHLORIDE AND ACETAMINOPHEN 5; 325 MG/1; MG/1
TABLET ORAL
Qty: 28 TABLET | Refills: 0 | Status: CANCELLED | OUTPATIENT
Start: 2019-03-18 | End: 2019-03-31

## 2019-03-28 DIAGNOSIS — G89.4 CHRONIC PAIN SYNDROME: ICD-10-CM

## 2019-03-28 RX ORDER — OXYCODONE HYDROCHLORIDE AND ACETAMINOPHEN 5; 325 MG/1; MG/1
TABLET ORAL
Qty: 28 TABLET | Refills: 0 | Status: SHIPPED | OUTPATIENT
Start: 2019-04-02 | End: 2019-04-08 | Stop reason: SDUPTHER

## 2019-04-08 DIAGNOSIS — G89.4 CHRONIC PAIN SYNDROME: ICD-10-CM

## 2019-04-09 ENCOUNTER — TELEPHONE (OUTPATIENT)
Dept: FAMILY MEDICINE CLINIC | Facility: CLINIC | Age: 54
End: 2019-04-09

## 2019-04-09 RX ORDER — OXYCODONE HYDROCHLORIDE AND ACETAMINOPHEN 5; 325 MG/1; MG/1
TABLET ORAL
Qty: 26 TABLET | Refills: 0 | Status: SHIPPED | OUTPATIENT
Start: 2019-04-17 | End: 2019-04-11 | Stop reason: SDUPTHER

## 2019-04-11 ENCOUNTER — OFFICE VISIT (OUTPATIENT)
Dept: FAMILY MEDICINE CLINIC | Facility: CLINIC | Age: 54
End: 2019-04-11
Payer: MEDICARE

## 2019-04-11 VITALS
BODY MASS INDEX: 17.64 KG/M2 | DIASTOLIC BLOOD PRESSURE: 82 MMHG | SYSTOLIC BLOOD PRESSURE: 140 MMHG | WEIGHT: 106 LBS | HEART RATE: 66 BPM | RESPIRATION RATE: 18 BRPM | TEMPERATURE: 98.7 F | OXYGEN SATURATION: 99 %

## 2019-04-11 DIAGNOSIS — G47.01 INSOMNIA DUE TO MEDICAL CONDITION: ICD-10-CM

## 2019-04-11 DIAGNOSIS — K51.911 ULCERATIVE COLITIS WITH RECTAL BLEEDING, UNSPECIFIED LOCATION (HCC): Primary | ICD-10-CM

## 2019-04-11 DIAGNOSIS — G89.4 CHRONIC PAIN SYNDROME: ICD-10-CM

## 2019-04-11 PROCEDURE — 99214 OFFICE O/P EST MOD 30 MIN: CPT | Performed by: FAMILY MEDICINE

## 2019-04-11 RX ORDER — OXYCODONE HYDROCHLORIDE AND ACETAMINOPHEN 5; 325 MG/1; MG/1
TABLET ORAL
Qty: 26 TABLET | Refills: 0 | Status: SHIPPED | OUTPATIENT
Start: 2019-04-11 | End: 2019-04-11

## 2019-04-11 RX ORDER — OXYCODONE HYDROCHLORIDE AND ACETAMINOPHEN 5; 325 MG/1; MG/1
TABLET ORAL
Qty: 26 TABLET | Refills: 0 | Status: SHIPPED | OUTPATIENT
Start: 2019-04-11 | End: 2019-04-22

## 2019-04-15 ENCOUNTER — TELEPHONE (OUTPATIENT)
Dept: FAMILY MEDICINE CLINIC | Facility: CLINIC | Age: 54
End: 2019-04-15

## 2019-04-15 DIAGNOSIS — K51.911 ULCERATIVE COLITIS WITH RECTAL BLEEDING, UNSPECIFIED LOCATION (HCC): Primary | ICD-10-CM

## 2019-04-15 RX ORDER — PREDNISONE 10 MG/1
10 TABLET ORAL DAILY
Qty: 14 TABLET | Refills: 0 | Status: SHIPPED | OUTPATIENT
Start: 2019-04-15 | End: 2019-07-30 | Stop reason: ALTCHOICE

## 2019-04-22 ENCOUNTER — TELEPHONE (OUTPATIENT)
Dept: FAMILY MEDICINE CLINIC | Facility: CLINIC | Age: 54
End: 2019-04-22

## 2019-04-24 DIAGNOSIS — G89.4 CHRONIC PAIN SYNDROME: Primary | ICD-10-CM

## 2019-04-24 RX ORDER — OXYCODONE HYDROCHLORIDE AND ACETAMINOPHEN 5; 325 MG/1; MG/1
TABLET ORAL
Qty: 24 TABLET | Refills: 0 | Status: SHIPPED | OUTPATIENT
Start: 2019-04-24 | End: 2019-05-03 | Stop reason: SDUPTHER

## 2019-05-01 ENCOUNTER — OFFICE VISIT (OUTPATIENT)
Dept: FAMILY MEDICINE CLINIC | Facility: CLINIC | Age: 54
End: 2019-05-01
Payer: MEDICARE

## 2019-05-01 VITALS — SYSTOLIC BLOOD PRESSURE: 160 MMHG | DIASTOLIC BLOOD PRESSURE: 90 MMHG

## 2019-05-01 DIAGNOSIS — L81.9 ATYPICAL PIGMENTED SKIN LESION: ICD-10-CM

## 2019-05-01 DIAGNOSIS — I10 ESSENTIAL HYPERTENSION: ICD-10-CM

## 2019-05-01 DIAGNOSIS — L03.031 PARONYCHIA OF GREAT TOE, RIGHT: ICD-10-CM

## 2019-05-01 DIAGNOSIS — K21.9 GASTROESOPHAGEAL REFLUX DISEASE WITHOUT ESOPHAGITIS: ICD-10-CM

## 2019-05-01 DIAGNOSIS — R63.6 UNDERWEIGHT: ICD-10-CM

## 2019-05-01 DIAGNOSIS — K51.911 ULCERATIVE COLITIS WITH RECTAL BLEEDING, UNSPECIFIED LOCATION (HCC): Primary | ICD-10-CM

## 2019-05-01 DIAGNOSIS — G89.4 CHRONIC PAIN SYNDROME: ICD-10-CM

## 2019-05-01 PROCEDURE — 99214 OFFICE O/P EST MOD 30 MIN: CPT | Performed by: FAMILY MEDICINE

## 2019-05-01 RX ORDER — DICYCLOMINE HCL 20 MG
TABLET ORAL
Qty: 30 TABLET | Refills: 5 | Status: SHIPPED | OUTPATIENT
Start: 2019-05-01 | End: 2019-08-15

## 2019-05-01 RX ORDER — CEPHALEXIN 500 MG/1
500 CAPSULE ORAL EVERY 12 HOURS SCHEDULED
Qty: 14 CAPSULE | Refills: 0 | Status: SHIPPED | OUTPATIENT
Start: 2019-05-01 | End: 2019-05-08

## 2019-05-02 ENCOUNTER — TELEPHONE (OUTPATIENT)
Dept: FAMILY MEDICINE CLINIC | Facility: CLINIC | Age: 54
End: 2019-05-02

## 2019-05-03 DIAGNOSIS — G89.4 CHRONIC PAIN SYNDROME: ICD-10-CM

## 2019-05-03 RX ORDER — OXYCODONE HYDROCHLORIDE AND ACETAMINOPHEN 5; 325 MG/1; MG/1
TABLET ORAL
Qty: 24 TABLET | Refills: 0 | Status: SHIPPED | OUTPATIENT
Start: 2019-05-05 | End: 2019-05-16 | Stop reason: SDUPTHER

## 2019-05-08 ENCOUNTER — TELEPHONE (OUTPATIENT)
Dept: FAMILY MEDICINE CLINIC | Facility: CLINIC | Age: 54
End: 2019-05-08

## 2019-05-08 DIAGNOSIS — K51.00 ULCERATIVE (CHRONIC) ENTEROCOLITIS, WITHOUT COMPLICATIONS (HCC): ICD-10-CM

## 2019-05-08 DIAGNOSIS — K51.911 ULCERATIVE COLITIS WITH RECTAL BLEEDING, UNSPECIFIED LOCATION (HCC): ICD-10-CM

## 2019-05-09 RX ORDER — PREDNISONE 2.5 MG
TABLET ORAL
Qty: 90 TABLET | Refills: 5 | Status: SHIPPED | OUTPATIENT
Start: 2019-05-09 | End: 2019-09-18 | Stop reason: HOSPADM

## 2019-05-09 RX ORDER — BALSALAZIDE DISODIUM 750 MG/1
CAPSULE ORAL
Qty: 540 CAPSULE | Refills: 5 | Status: SHIPPED | OUTPATIENT
Start: 2019-05-09 | End: 2020-05-16

## 2019-05-13 DIAGNOSIS — G89.4 CHRONIC PAIN SYNDROME: ICD-10-CM

## 2019-05-13 RX ORDER — OXYCODONE HYDROCHLORIDE AND ACETAMINOPHEN 5; 325 MG/1; MG/1
TABLET ORAL
Qty: 24 TABLET | Refills: 0 | Status: CANCELLED | OUTPATIENT
Start: 2019-05-13 | End: 2019-05-25

## 2019-05-15 ENCOUNTER — TELEPHONE (OUTPATIENT)
Dept: FAMILY MEDICINE CLINIC | Facility: CLINIC | Age: 54
End: 2019-05-15

## 2019-05-16 DIAGNOSIS — G89.4 CHRONIC PAIN SYNDROME: ICD-10-CM

## 2019-05-16 RX ORDER — OXYCODONE HYDROCHLORIDE AND ACETAMINOPHEN 5; 325 MG/1; MG/1
TABLET ORAL
Qty: 12 TABLET | Refills: 0 | Status: SHIPPED | OUTPATIENT
Start: 2019-05-16 | End: 2019-05-20 | Stop reason: SDUPTHER

## 2019-05-20 DIAGNOSIS — G89.4 CHRONIC PAIN SYNDROME: ICD-10-CM

## 2019-05-20 RX ORDER — OXYCODONE HYDROCHLORIDE AND ACETAMINOPHEN 5; 325 MG/1; MG/1
TABLET ORAL
Qty: 11 TABLET | Refills: 0 | Status: SHIPPED | OUTPATIENT
Start: 2019-05-22 | End: 2019-05-30 | Stop reason: SDUPTHER

## 2019-05-22 ENCOUNTER — TELEPHONE (OUTPATIENT)
Dept: FAMILY MEDICINE CLINIC | Facility: CLINIC | Age: 54
End: 2019-05-22

## 2019-05-23 DIAGNOSIS — B37.9 YEAST INFECTION: Primary | ICD-10-CM

## 2019-05-23 RX ORDER — FLUCONAZOLE 150 MG/1
150 TABLET ORAL ONCE
Qty: 1 TABLET | Refills: 0 | Status: SHIPPED | OUTPATIENT
Start: 2019-05-23 | End: 2019-05-23

## 2019-05-30 ENCOUNTER — OFFICE VISIT (OUTPATIENT)
Dept: FAMILY MEDICINE CLINIC | Facility: CLINIC | Age: 54
End: 2019-05-30
Payer: MEDICARE

## 2019-05-30 VITALS
HEART RATE: 70 BPM | DIASTOLIC BLOOD PRESSURE: 76 MMHG | WEIGHT: 109.13 LBS | TEMPERATURE: 98.7 F | BODY MASS INDEX: 18.16 KG/M2 | SYSTOLIC BLOOD PRESSURE: 128 MMHG | OXYGEN SATURATION: 98 %

## 2019-05-30 DIAGNOSIS — L03.031 ONYCHIA OF TOE OF RIGHT FOOT: ICD-10-CM

## 2019-05-30 DIAGNOSIS — I10 ESSENTIAL HYPERTENSION: Primary | ICD-10-CM

## 2019-05-30 DIAGNOSIS — R10.13 EPIGASTRIC PAIN: ICD-10-CM

## 2019-05-30 DIAGNOSIS — G89.4 CHRONIC PAIN SYNDROME: ICD-10-CM

## 2019-05-30 DIAGNOSIS — K51.90 ULCERATIVE COLITIS WITHOUT COMPLICATIONS, UNSPECIFIED LOCATION (HCC): ICD-10-CM

## 2019-05-30 PROCEDURE — 99214 OFFICE O/P EST MOD 30 MIN: CPT | Performed by: FAMILY MEDICINE

## 2019-05-30 RX ORDER — QUINAPRIL 20 MG/1
TABLET ORAL EVERY 24 HOURS
COMMUNITY
End: 2020-01-21 | Stop reason: SDUPTHER

## 2019-05-30 RX ORDER — OXYCODONE HYDROCHLORIDE AND ACETAMINOPHEN 5; 325 MG/1; MG/1
TABLET ORAL
Qty: 10 TABLET | Refills: 0 | Status: SHIPPED | OUTPATIENT
Start: 2019-05-30 | End: 2019-06-07 | Stop reason: SDUPTHER

## 2019-05-31 DIAGNOSIS — G89.4 CHRONIC PAIN SYNDROME: ICD-10-CM

## 2019-06-03 ENCOUNTER — PATIENT OUTREACH (OUTPATIENT)
Dept: FAMILY MEDICINE CLINIC | Facility: CLINIC | Age: 54
End: 2019-06-03

## 2019-06-07 DIAGNOSIS — G89.4 CHRONIC PAIN SYNDROME: ICD-10-CM

## 2019-06-07 RX ORDER — OXYCODONE HYDROCHLORIDE AND ACETAMINOPHEN 5; 325 MG/1; MG/1
TABLET ORAL
Qty: 10 TABLET | Refills: 0 | Status: SHIPPED | OUTPATIENT
Start: 2019-06-07 | End: 2019-06-10

## 2019-06-07 RX ORDER — OXYCODONE HYDROCHLORIDE AND ACETAMINOPHEN 5; 325 MG/1; MG/1
TABLET ORAL
Qty: 9 TABLET | Refills: 0 | Status: SHIPPED | OUTPATIENT
Start: 2019-06-07 | End: 2019-06-17 | Stop reason: SDUPTHER

## 2019-06-11 ENCOUNTER — OFFICE VISIT (OUTPATIENT)
Dept: PODIATRY | Facility: CLINIC | Age: 54
End: 2019-06-11
Payer: MEDICARE

## 2019-06-11 ENCOUNTER — TELEPHONE (OUTPATIENT)
Dept: FAMILY MEDICINE CLINIC | Facility: CLINIC | Age: 54
End: 2019-06-11

## 2019-06-11 VITALS
BODY MASS INDEX: 18.16 KG/M2 | HEART RATE: 70 BPM | RESPIRATION RATE: 16 BRPM | DIASTOLIC BLOOD PRESSURE: 76 MMHG | WEIGHT: 109 LBS | HEIGHT: 65 IN | SYSTOLIC BLOOD PRESSURE: 128 MMHG

## 2019-06-11 DIAGNOSIS — L03.039 PARONYCHIA OF TOENAIL, UNSPECIFIED LATERALITY: ICD-10-CM

## 2019-06-11 DIAGNOSIS — B35.1 ONYCHOMYCOSIS: ICD-10-CM

## 2019-06-11 DIAGNOSIS — M79.671 PAIN IN BOTH FEET: Primary | ICD-10-CM

## 2019-06-11 DIAGNOSIS — B35.3 TINEA PEDIS OF BOTH FEET: ICD-10-CM

## 2019-06-11 DIAGNOSIS — M79.672 PAIN IN BOTH FEET: Primary | ICD-10-CM

## 2019-06-11 PROCEDURE — 99202 OFFICE O/P NEW SF 15 MIN: CPT | Performed by: PODIATRIST

## 2019-06-11 RX ORDER — TERBINAFINE HYDROCHLORIDE 250 MG/1
250 TABLET ORAL DAILY
Qty: 30 TABLET | Refills: 0 | Status: SHIPPED | OUTPATIENT
Start: 2019-06-11 | End: 2019-07-11

## 2019-06-11 RX ORDER — KETOCONAZOLE 20 MG/G
CREAM TOPICAL DAILY
Qty: 60 G | Refills: 1 | Status: SHIPPED | OUTPATIENT
Start: 2019-06-11 | End: 2022-07-01

## 2019-06-17 DIAGNOSIS — G89.4 CHRONIC PAIN SYNDROME: ICD-10-CM

## 2019-06-17 RX ORDER — OXYCODONE HYDROCHLORIDE AND ACETAMINOPHEN 5; 325 MG/1; MG/1
TABLET ORAL
Qty: 8 TABLET | Refills: 0 | Status: SHIPPED | OUTPATIENT
Start: 2019-06-17 | End: 2019-06-21 | Stop reason: SDUPTHER

## 2019-06-21 DIAGNOSIS — G89.4 CHRONIC PAIN SYNDROME: ICD-10-CM

## 2019-06-21 RX ORDER — OXYCODONE HYDROCHLORIDE AND ACETAMINOPHEN 5; 325 MG/1; MG/1
TABLET ORAL
Qty: 7 TABLET | Refills: 0 | Status: SHIPPED | OUTPATIENT
Start: 2019-06-21 | End: 2019-07-01 | Stop reason: SDUPTHER

## 2019-06-27 ENCOUNTER — TELEPHONE (OUTPATIENT)
Dept: FAMILY MEDICINE CLINIC | Facility: CLINIC | Age: 54
End: 2019-06-27

## 2019-07-01 DIAGNOSIS — G89.4 CHRONIC PAIN SYNDROME: ICD-10-CM

## 2019-07-01 RX ORDER — OXYCODONE HYDROCHLORIDE AND ACETAMINOPHEN 5; 325 MG/1; MG/1
TABLET ORAL
Qty: 6 TABLET | Refills: 0 | Status: SHIPPED | OUTPATIENT
Start: 2019-07-01 | End: 2019-07-05 | Stop reason: SDUPTHER

## 2019-07-05 DIAGNOSIS — G89.4 CHRONIC PAIN SYNDROME: ICD-10-CM

## 2019-07-05 RX ORDER — OXYCODONE HYDROCHLORIDE AND ACETAMINOPHEN 5; 325 MG/1; MG/1
TABLET ORAL
Qty: 5 TABLET | Refills: 0 | Status: SHIPPED | OUTPATIENT
Start: 2019-07-05 | End: 2019-07-10 | Stop reason: SDUPTHER

## 2019-07-08 ENCOUNTER — TELEPHONE (OUTPATIENT)
Dept: FAMILY MEDICINE CLINIC | Facility: CLINIC | Age: 54
End: 2019-07-08

## 2019-07-08 NOTE — TELEPHONE ENCOUNTER
Patient came in office for refill of Percocet  Dr Robina Lombard on vacation, was advised to forward to you  Thank you  Patient also request to refill passed weekend, since office is closed

## 2019-07-10 DIAGNOSIS — G89.4 CHRONIC PAIN SYNDROME: ICD-10-CM

## 2019-07-10 RX ORDER — OXYCODONE HYDROCHLORIDE AND ACETAMINOPHEN 5; 325 MG/1; MG/1
TABLET ORAL
Qty: 5 TABLET | Refills: 0 | Status: SHIPPED | OUTPATIENT
Start: 2019-07-10 | End: 2019-07-12 | Stop reason: SDUPTHER

## 2019-07-10 NOTE — TELEPHONE ENCOUNTER
[facility]  Pt called and needs this today  Can you please refill for Dr Nile Adames?

## 2019-07-10 NOTE — TELEPHONE ENCOUNTER
Patient requesting refill for Percocet  Can you refill this for Richy Bobo who it out of office? Please advise!

## 2019-07-12 ENCOUNTER — TELEPHONE (OUTPATIENT)
Dept: FAMILY MEDICINE CLINIC | Facility: CLINIC | Age: 54
End: 2019-07-12

## 2019-07-12 DIAGNOSIS — G89.4 CHRONIC PAIN SYNDROME: ICD-10-CM

## 2019-07-13 RX ORDER — OXYCODONE HYDROCHLORIDE AND ACETAMINOPHEN 5; 325 MG/1; MG/1
TABLET ORAL
Qty: 4 TABLET | Refills: 0 | Status: SHIPPED | OUTPATIENT
Start: 2019-07-16 | End: 2019-07-19 | Stop reason: SDUPTHER

## 2019-07-15 DIAGNOSIS — G89.4 CHRONIC PAIN SYNDROME: Primary | ICD-10-CM

## 2019-07-15 RX ORDER — IBUPROFEN 600 MG/1
600 TABLET ORAL EVERY 6 HOURS PRN
Qty: 30 TABLET | Refills: 1 | Status: SHIPPED | OUTPATIENT
Start: 2019-07-15 | End: 2022-07-01

## 2019-07-15 NOTE — TELEPHONE ENCOUNTER
Pt says she doesn't have one for tonight and she is also concerned about what she is supposed to do for pain/sleep when you stop this   Pharmacist told her  there are other meds that arent as strong for pain/sleep

## 2019-07-16 ENCOUNTER — TELEPHONE (OUTPATIENT)
Dept: FAMILY MEDICINE CLINIC | Facility: CLINIC | Age: 54
End: 2019-07-16

## 2019-07-16 NOTE — TELEPHONE ENCOUNTER
Weaning off Percocet to continue  Pt has appt with GI on 7/30 which she needs to keep to check status of UC  Pt has been weaned down off of Percocet over 1 1/2 years  Narcotics are not her answer to UC problems

## 2019-07-16 NOTE — TELEPHONE ENCOUNTER
Pt took ibuprofen last night and had pain and diarrhea all night  Cant take ibuprofen with UC per the pt who talked to the pharmacist   Feet hurt and feel restless at night  Says percocet helps her sleep 4 hours  Asking for something to help calm stomach pain and nerves at night to sleep but nothing to strong  Very concerned about weaning process of percocet

## 2019-07-17 NOTE — TELEPHONE ENCOUNTER
Pt called back and she will stop the Ibuprofen for side effects    Pt can continue her present Percocet med

## 2019-07-17 NOTE — TELEPHONE ENCOUNTER
DR Jarad Frost - PT IS CALLING AGAIN TO SPEAK TO YOU  SHE CAN'T TAKE IBUPROFEN  IT'S MAKING HER SICK  SHE WANTS YOU TO CALL HER  SHE SAID SHE IS TAKING TOO MUCH MEDICATION AND HER STOMACH CAN'T HANDLE IT    SEE PREVIOUS MESSAGE

## 2019-07-19 ENCOUNTER — TELEPHONE (OUTPATIENT)
Dept: FAMILY MEDICINE CLINIC | Facility: CLINIC | Age: 54
End: 2019-07-19

## 2019-07-19 DIAGNOSIS — G89.4 CHRONIC PAIN SYNDROME: ICD-10-CM

## 2019-07-19 RX ORDER — OXYCODONE HYDROCHLORIDE AND ACETAMINOPHEN 5; 325 MG/1; MG/1
TABLET ORAL
Qty: 14 TABLET | Refills: 0 | Status: SHIPPED | OUTPATIENT
Start: 2019-07-19 | End: 2019-07-23

## 2019-07-29 ENCOUNTER — TELEPHONE (OUTPATIENT)
Dept: FAMILY MEDICINE CLINIC | Facility: CLINIC | Age: 54
End: 2019-07-29

## 2019-07-29 NOTE — TELEPHONE ENCOUNTER
Pt was given a rx on 7/19/19 for 14 pills, one pill daily  She has enough until 8/1/19 that will get her through her GI appt  She doesn't need any more refills

## 2019-07-30 ENCOUNTER — TELEPHONE (OUTPATIENT)
Dept: FAMILY MEDICINE CLINIC | Facility: CLINIC | Age: 54
End: 2019-07-30

## 2019-07-30 ENCOUNTER — OFFICE VISIT (OUTPATIENT)
Dept: GASTROENTEROLOGY | Facility: CLINIC | Age: 54
End: 2019-07-30
Payer: MEDICARE

## 2019-07-30 VITALS
DIASTOLIC BLOOD PRESSURE: 80 MMHG | HEIGHT: 65 IN | BODY MASS INDEX: 18.46 KG/M2 | HEART RATE: 60 BPM | RESPIRATION RATE: 18 BRPM | SYSTOLIC BLOOD PRESSURE: 128 MMHG | WEIGHT: 110.8 LBS | TEMPERATURE: 96.7 F

## 2019-07-30 DIAGNOSIS — K21.9 GASTROESOPHAGEAL REFLUX DISEASE WITHOUT ESOPHAGITIS: ICD-10-CM

## 2019-07-30 DIAGNOSIS — F41.9 ANXIETY: Primary | ICD-10-CM

## 2019-07-30 DIAGNOSIS — R13.10 DYSPHAGIA, UNSPECIFIED TYPE: ICD-10-CM

## 2019-07-30 DIAGNOSIS — K51.011 ULCERATIVE PANCOLITIS WITH RECTAL BLEEDING (HCC): Primary | ICD-10-CM

## 2019-07-30 PROCEDURE — 99204 OFFICE O/P NEW MOD 45 MIN: CPT | Performed by: INTERNAL MEDICINE

## 2019-07-30 RX ORDER — ESCITALOPRAM OXALATE 10 MG/1
10 TABLET ORAL DAILY
Qty: 30 TABLET | Refills: 5 | Status: SHIPPED | OUTPATIENT
Start: 2019-07-30 | End: 2019-09-16

## 2019-07-30 NOTE — TELEPHONE ENCOUNTER
I spoke to the pt again  We will continue the wean off narcotics with her last rx  She will keep her appt with Dr Hortencia Wolf today at 3:25 PM for her UC and possible ulcer ds    I will start the pt on Lexapro 10 mg for anxiety/stress which is also contributing to her insomnia

## 2019-07-30 NOTE — ASSESSMENT & PLAN NOTE
Possible from peptic stricture especially with history of chronic GERD    Rule out eosinophilic esophagitis or Schatzki's ring     -advised to chew well before swallowing    -schedule for EGD

## 2019-07-30 NOTE — ASSESSMENT & PLAN NOTE
History of ulcerative colitis, on chronic steroid therapy as described in HPI  She still reports having episodes of diarrhea with some fresh bleeding when she wipes     -which should differently consider biologic agents and get her off prednisone because of the long-term side effects     -check stool studies including fecal calprotectin    -routine lab workup along with CRP, sed rate    -check viral hepatitis panel and also TB QuantiFERON    -schedule for colonoscopy to assess the status colitis and also for biopsies as surveillance    -patient was given instructions for the bowel prep and she expressed understanding    -Patient was explained about  the risks and benefits of the procedure  Risks including but not limited to bleeding, infection, perforation were explained in detail  Also explained about less than 100% sensitivity with the exam and other alternatives

## 2019-07-30 NOTE — PROGRESS NOTES
Consultation - 126 [facility]  Nancy Gomez 1965 female         Chief Complaint:  Ulcerative colitis    HPI:  57-year-old female with history of ulcerative colitis diagnosed about 20 years ago on chronic steroids was referred since she is trying to change her gastroenterologist   She was followed by Dr Jayro Putnam for many years and on chronic steroids with prednisone 40 mg daily in the beginning that was gradually decreased to 20 and then to 7 5 mg now  She tried 6 MP but could not tolerate because of severe leg pain and weakness  She also takes Colazal 1 tablet 3 times a day  She never tried on biologic agents  Still reports having loose bowel movements with blood when she wipes  Complaining about some cramping  Good appetite, no recent weight loss  She has problems with acid reflux for which she takes omeprazole and reports having difficulty swallowing at times  Her last colonoscopy was about 4 years ago  She has not had any lab work in the past couple of years  REVIEW OF SYSTEMS: Review of Systems   Constitutional: Negative for activity change, appetite change, chills, diaphoresis, fatigue, fever and unexpected weight change  HENT: Negative for ear discharge, ear pain, facial swelling, hearing loss, nosebleeds, sore throat, tinnitus and voice change  Eyes: Negative for pain, discharge, redness, itching and visual disturbance  Respiratory: Negative for apnea, cough, chest tightness, shortness of breath and wheezing  Cardiovascular: Negative for chest pain and palpitations  Gastrointestinal:        As noted in HPI   Endocrine: Negative for cold intolerance, heat intolerance and polyuria  Genitourinary: Negative for difficulty urinating, dysuria, flank pain, hematuria and urgency  Musculoskeletal: Negative for arthralgias, back pain, gait problem, joint swelling and myalgias  Skin: Negative for rash and wound     Neurological: Negative for dizziness, tremors, seizures, speech difficulty, light-headedness, numbness and headaches  Hematological: Negative for adenopathy  Does not bruise/bleed easily  Psychiatric/Behavioral: Negative for agitation, behavioral problems and confusion  The patient is not nervous/anxious           Past Medical History:   Diagnosis Date    Arthritis     Atypical chest pain     last assessed: 13    Bloody diarrhea     last assesed:     Breast lump     last assessed: 10/10/14    [facility]     last assessed: 16    Candidiasis of mouth     last assessed: 14    Choledocholithiasis     last assessed: 14    Cholelithiasis     last assessed: 7/21/15    Closed fracture of first metatarsal bone of right foot     last assessed: 13    Costovertebral angle pain     last assessed: 17    Elevated blood pressure reading     last assessed: 16    Grief reaction     last assessed: 16    Herpes zoster     last assessed: 11/3/16    High risk medication use     last assessed: 16    Hypokalemia     last assessed: 16    Hypotension     last assessed: 5/15/15    Inflammatory disorder of breast     last assessed: 13    Iron deficiency anemia     last assessed: 13    Microscopic hematuria     last assessed: 13    On prednisone therapy     last assessed: 10/19/17    Opioid use, unspecified, uncomplicated     last assessed: 3/1/17    Other polyp of sinus     last assessed: 13    Paronychia of toe     last assessed: 13    Pharyngoesophageal dysphagia     resolved: 3/1/16    Tinea corporis     last assessed: 16    Ul[facility]ative colitis [facility]     Underweight     last assessed: 3/1/16    Urinary frequency     resolved: 16    Viral warts       Past Surgical History:   Procedure Laterality Date    BREAST SURGERY      left breast lumpectomy    CARPAL TUNNEL RELEASE Bilateral      SECTION N/A     CHOLECYSTECTOMY      ESOPHAGOGASTRODUODENOSCOPY N/A 3/14/2016    Procedure: ESOPHAGOGASTRODUODENOSCOPY (EGD); Surgeon: Sandrine Dorsey MD;  Location: [facility]; Service:     TONSILLECTOMY N/A      Social History     Socioeconomic History    Marital status: Single     Spouse name: Not on file    Number of children: Not on file    Years of education: Not on file    Highest education level: Not on file   Occupational History    Not on file   Social Needs    Financial resource strain: Not on file    Food insecurity:     Worry: Not on file     Inability: Not on file    Transportation needs:     Medical: Not on file     Non-medical: Not on file   Tobacco Use    Smoking status: Never Smoker    Smokeless tobacco: Never Used   Substance and Sexual Activity    Alcohol use: No    Drug use: No    Sexual activity: Not on file   Lifestyle    Physical activity:     Days per week: Not on file     Minutes per session: Not on file    Stress: Not on file   Relationships    Social connections:     Talks on phone: Not on file     Gets together: Not on file     Attends Sabianist service: Not on file     Active member of club or organization: Not on file     Attends meetings of clubs or organizations: Not on file     Relationship status: Not on file    Intimate partner violence:     Fear of current or ex partner: Not on file     Emotionally abused: Not on file     Physically abused: Not on file     Forced sexual activity: Not on file   Other Topics Concern    Not on file   Social History Narrative    Not on file     Family History   Problem Relation Age of Onset    Heart disease Mother     Heart disease Father     Colon cancer Maternal Grandfather     Asthma Son      Dairy aid  [lactase]; Levaquin [levofloxacin in d5w];  Levofloxacin; Mesalamine; and Other  Current Outpatient Medications   Medication Sig Dispense Refill    albuterol (VENTOLIN HFA) 90 mcg/act inhaler Inhale 1 puff every 4 (four) hours as needed for wheezing 1 Inhaler 0    aspirin 81 MG tablet Take 1 tablet by mouth daily      balsalazide (COLAZAL) 750 mg capsule TAKE 3 CAPSULES TWICE DAILY 540 capsule 5    clotrimazole (LOTRIMIN) 1 % cream Apply topically 2 (two) times a day 30 g 5    escitalopram (LEXAPRO) 10 mg tablet Take 1 tablet (10 mg total) by mouth daily 30 tablet 5    fluticasone (FLONASE) 50 mcg/act nasal spray 2 sprays into each nostril daily 16 g 0    folic acid (FOLVITE) 1 mg tablet Take 1 mg by mouth daily   ibuprofen (MOTRIN) 600 mg tablet Take 1 tablet (600 mg total) by mouth every 6 (six) hours as needed for moderate pain 30 tablet 1    omeprazole (PriLOSEC) 40 MG capsule TAKE 1 CAPSULE EVERY DAY 90 capsule 3    predniSONE 2 5 mg tablet TAKE 1 TABLET EVERY DAY 90 tablet 5    predniSONE 5 mg tablet TAKE 1 TABLET EVERY DAY 90 tablet 3    quinapril (ACCUPRIL) 20 mg tablet every 24 hours      dicyclomine (BENTYL) 20 mg tablet One tab q 6 hours prn abdominal cramps and pain (Patient not taking: Reported on 5/30/2019) 30 tablet 5    ketoconazole (NIZORAL) 2 % cream Apply topically daily for 30 days 60 g 1     No current facility-administered medications for this visit  Blood pressure 128/80, pulse 60, temperature (!) 96 7 °F (35 9 °C), temperature source Tympanic, resp  rate 18, height 5' 5" (1 651 m), weight 50 3 kg (110 lb 12 8 oz)  PHYSICAL EXAM: Physical Exam   Constitutional: She is oriented to person, place, and time  She appears well-developed and well-nourished  HENT:   Head: Normocephalic and atraumatic  Nose: Nose normal    Mouth/Throat: Oropharynx is clear and moist    Eyes: Conjunctivae are normal  Right eye exhibits no discharge  Left eye exhibits no discharge  No scleral icterus  Neck: Neck supple  No JVD present  No tracheal deviation present  No thyromegaly present  Cardiovascular: Normal rate, regular rhythm and normal heart sounds  Exam reveals no gallop and no friction rub     No murmur heard   Pulmonary/Chest: Effort normal and breath sounds normal  No respiratory distress  She has no wheezes  She has no rales  She exhibits no tenderness  Abdominal: Soft  Bowel sounds are normal  She exhibits no distension and no mass  There is no tenderness  There is no rebound and no guarding  No hernia  Musculoskeletal: She exhibits no edema, tenderness or deformity  Lymphadenopathy:     She has no cervical adenopathy  Neurological: She is alert and oriented to person, place, and time  Skin: Skin is warm and dry  No rash noted  No erythema  Psychiatric: She has a normal mood and affect  Her behavior is normal  Thought content normal         Lab Results   Component Value Date    WBC 12 60 (H) 04/21/2016    HGB 13 2 05/23/2016    HCT 40 9 04/21/2016    MCV 92 04/21/2016     04/21/2016     Lab Results   Component Value Date    GLUCOSE 107 01/04/2016    CALCIUM 9 0 10/21/2016     01/04/2016    K 3 7 10/21/2016    CO2 35 (H) 10/21/2016     10/21/2016    BUN 16 10/21/2016    CREATININE 0 88 10/21/2016     Lab Results   Component Value Date    ALT 22 04/11/2016    AST 16 04/11/2016    ALKPHOS 154 (H) 04/11/2016    BILITOT 0 5 01/04/2016     No results found for: INR, PROTIME    Dxa Bone Density Spine Hip And Pelvis    Result Date: 12/12/2017  Impression: 1  Based on the [facility] classification, the T-score of -2 5 in the lumbar spine is consistent with osteoporosis  2   Since the prior study, there has been no significant change in bone mineral density  3   According to the [address] [place] [address], prescription therapy is recommended with a T-score of -2 5 or less in the spine or hip after appropriate evaluation to exclude secondary causes   4   A daily intake of at least 1200 mg Calcium and 800 to 1000 IU of Vitamin D, as well as weight bearing and muscle strengthening exercise, fall prevention and avoidance of tobacco and excessive alcohol intake as  basic preventive measures are suggested  5   Repeat DXA  in 18 - 24 months, on the same machine, as clinically indicated  The 10 year risk of hip fracture is 2 6%, with the 10 year risk of major osteoporotic fracture being 12 5%, as calculated by the [facility] fracture risk assessment tool (FRAX)  WHO CLASSIFICATION: Normal (a T-score of -1 0 or higher) Low bone mineral density (a T-score of less than -1 0 but higher than -2 5) Osteoporosis (a T-score of -2 5 or less) Severe osteoporosis (a T-score of -2 5 or less with a fragility fracture)   Workstation performed: [id number]       ASSESSMENT & PLAN:    Ulcerative colitis with rectal bleeding (HCC)  History of ulcerative colitis, on chronic steroid therapy as described in HPI  She still reports having episodes of diarrhea with some fresh bleeding when she wipes     -which should differently consider biologic agents and get her off prednisone because of the long-term side effects     -check stool studies including fecal calprotectin    -routine lab workup along with CRP, sed rate    -check viral hepatitis panel and also TB QuantiFERON    -schedule for colonoscopy to assess the status colitis and also for biopsies as surveillance    -patient was given instructions for the bowel prep and she expressed understanding    -Patient was explained about  the risks and benefits of the procedure  Risks including but not limited to bleeding, infection, perforation were explained in detail  Also explained about less than 100% sensitivity with the exam and other alternatives  Dysphagia  Possible from peptic stricture especially with history of chronic GERD  Rule out eosinophilic esophagitis or Schatzki's ring     -advised to chew well before swallowing    -schedule for EGD    Gastroesophageal reflux disease without esophagitis  Gastroesophageal reflux disease - Patient has the symptoms of chronic acid reflux for a long time  Possible hiatal hernia or LES weakness    Should rule out Street's esophagus because of chronic symptoms  -schedule for EGD    -consider change omeprazole to Pepcid or Zantac depending on the endoscopy findings    -Patient was explained about the lifestyle and dietary modifications  Advised to avoid fatty foods, chocolates, caffeine, alcohol and any other triggering foods  Avoid eating for at least 3 hours before going to bed

## 2019-07-30 NOTE — TELEPHONE ENCOUNTER
PT STATES THE SIDE EFFECTS ON THE NEW MEDICATION HAS HER CONCERNED AND IS ASKING IF YOU COULD PLEASE CALL HER TO DISCUSS

## 2019-07-30 NOTE — TELEPHONE ENCOUNTER
Patient states she is requesting the medication 2 days early so she doesn't run out after 8/1/2019   Please advise

## 2019-07-30 NOTE — ASSESSMENT & PLAN NOTE
Gastroesophageal reflux disease - Patient has the symptoms of chronic acid reflux for a long time  Possible hiatal hernia or LES weakness  Should rule out Street's esophagus because of chronic symptoms  -schedule for EGD    -consider change omeprazole to Pepcid or Zantac depending on the endoscopy findings    -Patient was explained about the lifestyle and dietary modifications  Advised to avoid fatty foods, chocolates, caffeine, alcohol and any other triggering foods  Avoid eating for at least 3 hours before going to bed

## 2019-07-30 NOTE — TELEPHONE ENCOUNTER
Pt is being weaned off medication and she is aware of the long weaning process    She doesn't need anymore narcotics after this long weaning process

## 2019-07-31 ENCOUNTER — TELEPHONE (OUTPATIENT)
Dept: FAMILY MEDICINE CLINIC | Facility: CLINIC | Age: 54
End: 2019-07-31

## 2019-07-31 DIAGNOSIS — G89.4 CHRONIC PAIN SYNDROME: Primary | ICD-10-CM

## 2019-07-31 RX ORDER — OXYCODONE HYDROCHLORIDE AND ACETAMINOPHEN 5; 325 MG/1; MG/1
TABLET ORAL
Qty: 7 TABLET | Refills: 0 | Status: SHIPPED | OUTPATIENT
Start: 2019-07-31 | End: 2019-08-15

## 2019-07-31 NOTE — TELEPHONE ENCOUNTER
Pt was looking for a Refill for Oxycodone  She was requesting 1 more refill her RX being she took her last dose today  I did explain to Pt that she no longer has a script for Oxycodone as she has been weaned off of the med and  Pt states she went to GI and has an upcoming appt for 9/18/19 for EDG/colonoscopy  Pt states she is really not feeling well and like this filled tonight

## 2019-07-31 NOTE — TELEPHONE ENCOUNTER
DR Navneet Tavarez - PT IS TAKING ESCITALOPRAM   IT'S MAKING HER SICK  SHE WAS UP ALL NIGHT VOMITTING  SHE WANTS TO SPEAK TO YOU

## 2019-08-05 DIAGNOSIS — G89.4 CHRONIC PAIN SYNDROME: ICD-10-CM

## 2019-08-06 RX ORDER — OXYCODONE HYDROCHLORIDE AND ACETAMINOPHEN 5; 325 MG/1; MG/1
TABLET ORAL
Qty: 7 TABLET | Refills: 0 | OUTPATIENT
Start: 2019-08-06

## 2019-08-07 NOTE — TELEPHONE ENCOUNTER
Pt called in looking for Pain meds, informed Per Note that she was out of refills and informed her that this has been discussed  Pt states that she was to be started a new medication after stopping Lexapro, she would like to start the new medication if possible  Pt states she is not able to sleep and has tried many OTC for this  Pt would like a call back       [phone]

## 2019-08-15 ENCOUNTER — TRANSCRIBE ORDERS (OUTPATIENT)
Dept: ADMINISTRATIVE | Facility: HOSPITAL | Age: 54
End: 2019-08-15

## 2019-08-15 ENCOUNTER — APPOINTMENT (OUTPATIENT)
Dept: LAB | Facility: HOSPITAL | Age: 54
End: 2019-08-15
Attending: INTERNAL MEDICINE
Payer: MEDICARE

## 2019-08-15 ENCOUNTER — OFFICE VISIT (OUTPATIENT)
Dept: FAMILY MEDICINE CLINIC | Facility: CLINIC | Age: 54
End: 2019-08-15
Payer: MEDICARE

## 2019-08-15 VITALS
TEMPERATURE: 98.8 F | OXYGEN SATURATION: 99 % | SYSTOLIC BLOOD PRESSURE: 154 MMHG | HEART RATE: 68 BPM | BODY MASS INDEX: 18.8 KG/M2 | WEIGHT: 113 LBS | DIASTOLIC BLOOD PRESSURE: 84 MMHG

## 2019-08-15 DIAGNOSIS — R63.6 UNDERWEIGHT: ICD-10-CM

## 2019-08-15 DIAGNOSIS — K21.9 GASTROESOPHAGEAL REFLUX DISEASE WITHOUT ESOPHAGITIS: ICD-10-CM

## 2019-08-15 DIAGNOSIS — L82.1 SEBORRHEIC KERATOSES: ICD-10-CM

## 2019-08-15 DIAGNOSIS — K51.90 ULCERATIVE COLITIS WITHOUT COMPLICATIONS, UNSPECIFIED LOCATION (HCC): Primary | ICD-10-CM

## 2019-08-15 DIAGNOSIS — I10 ESSENTIAL HYPERTENSION: ICD-10-CM

## 2019-08-15 DIAGNOSIS — G47.09 OTHER INSOMNIA: ICD-10-CM

## 2019-08-15 DIAGNOSIS — K51.011 ULCERATIVE PANCOLITIS WITH RECTAL BLEEDING (HCC): ICD-10-CM

## 2019-08-15 LAB
ALBUMIN SERPL BCP-MCNC: 3.5 G/DL (ref 3.5–5)
ALP SERPL-CCNC: 99 U/L (ref 46–116)
ALT SERPL W P-5'-P-CCNC: 35 U/L (ref 12–78)
ANION GAP SERPL CALCULATED.3IONS-SCNC: 8 MMOL/L (ref 4–13)
AST SERPL W P-5'-P-CCNC: 51 U/L (ref 5–45)
BASOPHILS # BLD AUTO: 0.06 THOUSANDS/ΜL (ref 0–0.1)
BASOPHILS NFR BLD AUTO: 0 % (ref 0–1)
BILIRUB DIRECT SERPL-MCNC: 0 MG/DL (ref 0–0.2)
BILIRUB SERPL-MCNC: 0.3 MG/DL (ref 0.2–1)
BUN SERPL-MCNC: 21 MG/DL (ref 5–25)
CALCIUM SERPL-MCNC: 9.1 MG/DL (ref 8.3–10.1)
CHLORIDE SERPL-SCNC: 104 MMOL/L (ref 100–108)
CO2 SERPL-SCNC: 28 MMOL/L (ref 21–32)
CREAT SERPL-MCNC: 0.94 MG/DL (ref 0.6–1.3)
CRP SERPL QL: <0.5 MG/L
EOSINOPHIL # BLD AUTO: 0.09 THOUSAND/ΜL (ref 0–0.61)
EOSINOPHIL NFR BLD AUTO: 1 % (ref 0–6)
ERYTHROCYTE [DISTWIDTH] IN BLOOD BY AUTOMATED COUNT: 23.1 % (ref 11.6–15.1)
ERYTHROCYTE [SEDIMENTATION RATE] IN BLOOD: 16 MM/HOUR (ref 2–25)
GFR SERPL CREATININE-BSD FRML MDRD: 69 ML/MIN/1.73SQ M
GLUCOSE SERPL-MCNC: 92 MG/DL (ref 65–140)
HCT VFR BLD AUTO: 32.3 % (ref 34.8–46.1)
HGB BLD-MCNC: 9.1 G/DL (ref 11.5–15.4)
IMM GRANULOCYTES # BLD AUTO: 0.12 THOUSAND/UL (ref 0–0.2)
IMM GRANULOCYTES NFR BLD AUTO: 1 % (ref 0–2)
LYMPHOCYTES # BLD AUTO: 1.8 THOUSANDS/ΜL (ref 0.6–4.47)
LYMPHOCYTES NFR BLD AUTO: 11 % (ref 14–44)
MCH RBC QN AUTO: 20.6 PG (ref 26.8–34.3)
MCHC RBC AUTO-ENTMCNC: 28.2 G/DL (ref 31.4–37.4)
MCV RBC AUTO: 73 FL (ref 82–98)
MONOCYTES # BLD AUTO: 0.78 THOUSAND/ΜL (ref 0.17–1.22)
MONOCYTES NFR BLD AUTO: 5 % (ref 4–12)
NEUTROPHILS # BLD AUTO: 13.96 THOUSANDS/ΜL (ref 1.85–7.62)
NEUTS SEG NFR BLD AUTO: 82 % (ref 43–75)
NRBC BLD AUTO-RTO: 0 /100 WBCS
PLATELET # BLD AUTO: 218 THOUSANDS/UL (ref 149–390)
PMV BLD AUTO: 10.3 FL (ref 8.9–12.7)
POTASSIUM SERPL-SCNC: 4.4 MMOL/L (ref 3.5–5.3)
PROT SERPL-MCNC: 7.8 G/DL (ref 6.4–8.2)
RBC # BLD AUTO: 4.42 MILLION/UL (ref 3.81–5.12)
SODIUM SERPL-SCNC: 140 MMOL/L (ref 136–145)
WBC # BLD AUTO: 16.81 THOUSAND/UL (ref 4.31–10.16)

## 2019-08-15 PROCEDURE — 80048 BASIC METABOLIC PNL TOTAL CA: CPT

## 2019-08-15 PROCEDURE — 80076 HEPATIC FUNCTION PANEL: CPT

## 2019-08-15 PROCEDURE — 86803 HEPATITIS C AB TEST: CPT

## 2019-08-15 PROCEDURE — 86705 HEP B CORE ANTIBODY IGM: CPT

## 2019-08-15 PROCEDURE — 87340 HEPATITIS B SURFACE AG IA: CPT

## 2019-08-15 PROCEDURE — 36415 COLL VENOUS BLD VENIPUNCTURE: CPT

## 2019-08-15 PROCEDURE — 85025 COMPLETE CBC W/AUTO DIFF WBC: CPT

## 2019-08-15 PROCEDURE — 83993 ASSAY FOR CALPROTECTIN FECAL: CPT

## 2019-08-15 PROCEDURE — 85652 RBC SED RATE AUTOMATED: CPT

## 2019-08-15 PROCEDURE — 99214 OFFICE O/P EST MOD 30 MIN: CPT | Performed by: FAMILY MEDICINE

## 2019-08-15 PROCEDURE — 86480 TB TEST CELL IMMUN MEASURE: CPT

## 2019-08-15 PROCEDURE — 86140 C-REACTIVE PROTEIN: CPT

## 2019-08-15 PROCEDURE — 87505 NFCT AGENT DETECTION GI: CPT

## 2019-08-15 PROCEDURE — 86704 HEP B CORE ANTIBODY TOTAL: CPT

## 2019-08-15 NOTE — PATIENT INSTRUCTIONS
The pt's UC is stable at this point  She will continue to use Prednisone 5 mg daily and Colazal to treat the UC pending her colonoscopy  She will continue her Omeprazole to treat her GERD until her EGD  Her insomnia may be improving because she is off Percocet  The pt was advised to supplement with Vit D and calcium to treat her osteoporosis  The pt will schedule an appt for cryo to her seborrheic keratosis  The pt will continue her present BP meds to treat hypertension    F/u in 6 weeks for chronic medical problems

## 2019-08-15 NOTE — PROGRESS NOTES
Assessment/Plan:    No problem-specific Assessment & Plan notes found for this encounter  Diagnoses and all orders for this visit:    Seborrheic keratoses        Subjective:      Patient ID: Yvonne Kenny is a 52 y o  female  This is a f/u appt for a 53 yo female with a past history of ulcerative colitis and GERD  The pt recently saw her new GI physician who has scheduled her for an EGD and colonoscopy on 9/18/19 The pt is a long time user of Prednisone to treat her UC because of lack of insurance to afford meds or med side effects  The pt has developed side effects to the Prednisone that include past kidney stones, hypertension, GERD and osteoporosis  She has been weaned down to 5 mg of Prednisone daily  She also was addicted to [employer] but has now been weaned off after 1 1/2 years  The pt's weight is actually very good at 113 lbs  She is still having problems with insomnia but it is improving also  The pt does have several skin lesions on her legs that appear to be seborrheic keratosis  The following portions of the patient's history were reviewed and updated as appropriate: allergies, current medications, past family history, past medical history, past social history, past surgical history and problem list     Review of Systems   Constitutional: Negative  HENT: Negative  Eyes: Negative  Respiratory: Negative  Cardiovascular: Negative  Gastrointestinal: Negative  Endocrine: Negative  Genitourinary: Negative  Musculoskeletal: Negative  Skin:        Skin lesions on her ankles and calves which are tan to brown and scaly   Allergic/Immunologic: Negative  Neurological: Negative  Hematological: Negative  Psychiatric/Behavioral: Negative  Objective:      /84   Pulse 68   Temp 98 8 °F (37 1 °C) (Tympanic)   Wt 51 3 kg (113 lb)   SpO2 99%   BMI 18 80 kg/m²          Physical Exam   Constitutional: She is oriented to person, place, and time  She appears well-developed and well-nourished  Under weight with BMI of 18 80   Cardiovascular: Normal rate, regular rhythm, normal heart sounds and intact distal pulses  Pulmonary/Chest: Effort normal and breath sounds normal    Abdominal: Soft  Bowel sounds are normal    Musculoskeletal: Normal range of motion  Neurological: She is alert and oriented to person, place, and time  Skin:   Brownish skin lesions on her ankles and calves    Psychiatric: She has a normal mood and affect  Her behavior is normal  Judgment and thought content normal    Vitals reviewed

## 2019-08-16 ENCOUNTER — TELEPHONE (OUTPATIENT)
Dept: GASTROENTEROLOGY | Facility: AMBULARY SURGERY CENTER | Age: 54
End: 2019-08-16

## 2019-08-16 ENCOUNTER — TELEPHONE (OUTPATIENT)
Dept: FAMILY MEDICINE CLINIC | Facility: CLINIC | Age: 54
End: 2019-08-16

## 2019-08-16 LAB
C DIFF TOX GENS STL QL NAA+PROBE: NORMAL
CAMPYLOBACTER DNA SPEC NAA+PROBE: NORMAL
GAMMA INTERFERON BACKGROUND BLD IA-ACNC: 0.06 IU/ML
HBV CORE AB SER QL: NORMAL
HBV CORE IGM SER QL: NORMAL
HBV SURFACE AG SER QL: NORMAL
HCV AB SER QL: NORMAL
M TB IFN-G BLD-IMP: ABNORMAL
M TB IFN-G CD4+ BCKGRND COR BLD-ACNC: -0.01 IU/ML
M TB IFN-G CD4+ BCKGRND COR BLD-ACNC: 0.01 IU/ML
MITOGEN IGNF BCKGRD COR BLD-ACNC: <0.1 IU/ML
SALMONELLA DNA SPEC QL NAA+PROBE: NORMAL
SHIGA TOXIN STX GENE SPEC NAA+PROBE: NORMAL
SHIGELLA DNA SPEC QL NAA+PROBE: NORMAL

## 2019-08-16 NOTE — TELEPHONE ENCOUNTER
----- Message from Bart Li MD sent at 8/15/2019  6:01 PM EDT -----  Inflammation markers are normal   White cell count is high which could be due to steroids    Proceed with colonoscopy

## 2019-08-16 NOTE — TELEPHONE ENCOUNTER
S/w Pt, states she received a  from [facility] and was informed that her WB count was high, she would like a call back to see if you could look over her labs and explain why this might be

## 2019-08-19 LAB — CALPROTECTIN STL-MCNT: 316 UG/G (ref 0–120)

## 2019-08-23 NOTE — TELEPHONE ENCOUNTER
Pt was away on vacation for one week  I finally reached her and discussed her results on the phone    WBC count elevated with chronic steroid use

## 2019-08-28 ENCOUNTER — HOSPITAL ENCOUNTER (OUTPATIENT)
Dept: RADIOLOGY | Facility: HOSPITAL | Age: 54
Discharge: HOME | End: 2019-08-28
Payer: MEDICARE

## 2019-08-28 ENCOUNTER — TELEPHONE (OUTPATIENT)
Dept: FAMILY MEDICINE CLINIC | Facility: CLINIC | Age: 54
End: 2019-08-28

## 2019-08-28 DIAGNOSIS — R89.9 ABNORMAL LABORATORY TEST: ICD-10-CM

## 2019-08-28 DIAGNOSIS — R89.9 ABNORMAL LABORATORY TEST: Primary | ICD-10-CM

## 2019-08-28 PROCEDURE — 71046 X-RAY EXAM CHEST 2 VIEWS: CPT

## 2019-08-28 NOTE — TELEPHONE ENCOUNTER
Pt called to let you know that her TB Gold was + and she is going for a chest xray  - ordered by GI  She said she feels so much better off of pain medication

## 2019-08-29 ENCOUNTER — TELEPHONE (OUTPATIENT)
Dept: GASTROENTEROLOGY | Facility: AMBULARY SURGERY CENTER | Age: 54
End: 2019-08-29

## 2019-08-29 NOTE — TELEPHONE ENCOUNTER
----- Message from Gilberto Sommer PA-C sent at 8/28/2019  3:58 PM EDT -----  Please let patient know her CXR is negative  This is good news   We will see her at her upcoming colonoscopy

## 2019-09-09 ENCOUNTER — TELEPHONE (OUTPATIENT)
Dept: FAMILY MEDICINE CLINIC | Facility: CLINIC | Age: 54
End: 2019-09-09

## 2019-09-09 NOTE — TELEPHONE ENCOUNTER
PT CALLED TO REQUEST A LETTER TO BE EXCUSED FROM [employer] DUE TO HER HEALTH AND CARING FOR HER DISABLED SON

## 2019-09-16 NOTE — PRE-PROCEDURE INSTRUCTIONS
Pre-Surgery Instructions:   Medication Instructions    aspirin 81 MG tablet Patient was instructed by Physician and understands   balsalazide (COLAZAL) 750 mg capsule Patient was instructed by Physician and understands   clotrimazole (LOTRIMIN) 1 % cream Patient was instructed by Physician and understands   fluticasone (FLONASE) 50 mcg/act nasal spray Patient was instructed by Physician and understands   folic acid (FOLVITE) 1 mg tablet Patient was instructed by Physician and understands   ibuprofen (MOTRIN) 600 mg tablet Patient was instructed by Physician and understands   ketoconazole (NIZORAL) 2 % cream Patient was instructed by Physician and understands   omeprazole (PriLOSEC) 40 MG capsule Patient was instructed by Physician and understands   predniSONE 2 5 mg tablet Patient was instructed by Physician and understands   predniSONE 5 mg tablet Patient was instructed by Physician and understands   quinapril (ACCUPRIL) 20 mg tablet Instructed patient per Anesthesia Guidelines

## 2019-09-17 ENCOUNTER — TELEPHONE (OUTPATIENT)
Dept: FAMILY MEDICINE CLINIC | Facility: CLINIC | Age: 54
End: 2019-09-17

## 2019-09-17 NOTE — TELEPHONE ENCOUNTER
Dr Cadena Later      PT needs a letter for [facility] duty  Please call pt when is ready at the front      thanks

## 2019-09-18 ENCOUNTER — ANESTHESIA (OUTPATIENT)
Dept: GASTROENTEROLOGY | Facility: AMBULARY SURGERY CENTER | Age: 54
End: 2019-09-18

## 2019-09-18 ENCOUNTER — ANESTHESIA EVENT (OUTPATIENT)
Dept: GASTROENTEROLOGY | Facility: AMBULARY SURGERY CENTER | Age: 54
End: 2019-09-18

## 2019-09-18 ENCOUNTER — HOSPITAL ENCOUNTER (OUTPATIENT)
Dept: GASTROENTEROLOGY | Facility: AMBULARY SURGERY CENTER | Age: 54
Setting detail: OUTPATIENT SURGERY
Discharge: HOME | End: 2019-09-18
Attending: INTERNAL MEDICINE
Payer: MEDICARE

## 2019-09-18 VITALS
TEMPERATURE: 96.6 F | SYSTOLIC BLOOD PRESSURE: 147 MMHG | DIASTOLIC BLOOD PRESSURE: 82 MMHG | WEIGHT: 113 LBS | HEIGHT: 65 IN | BODY MASS INDEX: 18.83 KG/M2 | HEART RATE: 59 BPM | RESPIRATION RATE: 16 BRPM | OXYGEN SATURATION: 99 %

## 2019-09-18 DIAGNOSIS — K51.011 ULCERATIVE PANCOLITIS WITH RECTAL BLEEDING (HCC): ICD-10-CM

## 2019-09-18 DIAGNOSIS — K21.9 GASTROESOPHAGEAL REFLUX DISEASE WITHOUT ESOPHAGITIS: ICD-10-CM

## 2019-09-18 DIAGNOSIS — R13.10 DYSPHAGIA, UNSPECIFIED TYPE: ICD-10-CM

## 2019-09-18 DIAGNOSIS — K51.811 OTHER ULCERATIVE COLITIS WITH RECTAL BLEEDING (HCC): Primary | ICD-10-CM

## 2019-09-18 DIAGNOSIS — K51.00 ULCERATIVE (CHRONIC) ENTEROCOLITIS, WITHOUT COMPLICATIONS (HCC): ICD-10-CM

## 2019-09-18 PROCEDURE — 45380 COLONOSCOPY AND BIOPSY: CPT | Performed by: INTERNAL MEDICINE

## 2019-09-18 PROCEDURE — 88305 TISSUE EXAM BY PATHOLOGIST: CPT | Performed by: PATHOLOGY

## 2019-09-18 PROCEDURE — 43239 EGD BIOPSY SINGLE/MULTIPLE: CPT | Performed by: INTERNAL MEDICINE

## 2019-09-18 RX ORDER — LIDOCAINE HYDROCHLORIDE 10 MG/ML
INJECTION, SOLUTION EPIDURAL; INFILTRATION; INTRACAUDAL; PERINEURAL AS NEEDED
Status: DISCONTINUED | OUTPATIENT
Start: 2019-09-18 | End: 2019-09-18 | Stop reason: SURG

## 2019-09-18 RX ORDER — FAMOTIDINE 20 MG/1
20 TABLET, FILM COATED ORAL 2 TIMES DAILY
Qty: 60 TABLET | Refills: 11 | Status: SHIPPED | OUTPATIENT
Start: 2019-09-18

## 2019-09-18 RX ORDER — PREDNISONE 1 MG/1
40 TABLET ORAL DAILY
Qty: 300 TABLET | Refills: 0 | Status: SHIPPED | OUTPATIENT
Start: 2019-09-18 | End: 2019-12-13 | Stop reason: SDUPTHER

## 2019-09-18 RX ORDER — PROPOFOL 10 MG/ML
INJECTION, EMULSION INTRAVENOUS AS NEEDED
Status: DISCONTINUED | OUTPATIENT
Start: 2019-09-18 | End: 2019-09-18 | Stop reason: SURG

## 2019-09-18 RX ORDER — PROPOFOL 10 MG/ML
INJECTION, EMULSION INTRAVENOUS CONTINUOUS PRN
Status: DISCONTINUED | OUTPATIENT
Start: 2019-09-18 | End: 2019-09-18 | Stop reason: SURG

## 2019-09-18 RX ORDER — SODIUM CHLORIDE 9 MG/ML
100 INJECTION, SOLUTION INTRAVENOUS CONTINUOUS
Status: DISCONTINUED | OUTPATIENT
Start: 2019-09-18 | End: 2019-09-22 | Stop reason: HOSPADM

## 2019-09-18 RX ADMIN — PROPOFOL 30 MG: 10 INJECTION, EMULSION INTRAVENOUS at 10:50

## 2019-09-18 RX ADMIN — PROPOFOL 70 MG: 10 INJECTION, EMULSION INTRAVENOUS at 10:49

## 2019-09-18 RX ADMIN — PROPOFOL 150 MCG/KG/MIN: 10 INJECTION, EMULSION INTRAVENOUS at 10:49

## 2019-09-18 RX ADMIN — SODIUM CHLORIDE 100 ML/HR: 0.9 INJECTION, SOLUTION INTRAVENOUS at 10:43

## 2019-09-18 RX ADMIN — LIDOCAINE HYDROCHLORIDE 50 MG: 10 INJECTION, SOLUTION EPIDURAL; INFILTRATION; INTRACAUDAL; PERINEURAL at 10:49

## 2019-09-18 NOTE — ANESTHESIA POSTPROCEDURE EVALUATION
Post-Op Assessment Note    CV Status:  Stable  Pain Score: 0    Pain management: adequate     Mental Status:  Sleepy   Hydration Status:  Euvolemic   PONV Controlled:  Controlled   Airway Patency:  Patent   Post Op Vitals Reviewed: Yes      Staff: Anesthesiologist           BP   112/64   Temp     Pulse 56   Resp   18   SpO2   99

## 2019-09-18 NOTE — ANESTHESIA PREPROCEDURE EVALUATION
Review of Systems/Medical History  Patient summary reviewed  Chart reviewed  No history of anesthetic complications     Cardiovascular  Exercise tolerance (METS): >4,  Hypertension ,    Pulmonary  Negative pulmonary ROS        GI/Hepatic    GERD ,   Comment: ulcerative colitis     Kidney stones,        Endo/Other  Negative endo/other ROS      GYN  Negative gynecology ROS          Hematology  Anemia ,     Musculoskeletal    Arthritis     Neurology  Negative neurology ROS      Psychology   Anxiety,              Physical Exam    Airway    Mallampati score: I  TM Distance: >3 FB  Neck ROM: full     Dental   No notable dental hx     Cardiovascular  Rhythm: regular, Rate: normal, Cardiovascular exam normal    Pulmonary  Pulmonary exam normal Breath sounds clear to auscultation,     Other Findings        Anesthesia Plan  ASA Score- 2     Anesthesia Type- IV sedation with anesthesia with ASA Monitors  Additional Monitors:   Airway Plan:         Plan Factors- Patient instructed to abstain from smoking on day of procedure  Patient did not smoke on day of surgery  Induction- intravenous  Postoperative Plan-     Informed Consent- Anesthetic plan and risks discussed with patient

## 2019-09-18 NOTE — H&P
History and Physical - [facility]  Ghazala Castillo 52 y o  female MRN: [id number]        HPI:  24-year-old female with history ulcerative colitis on chronic steroid therapy still reports having loose bowel movements  Complaining about some difficulty swallowing      Historical Information   Past Medical History:   Diagnosis Date    Anxiety     Arthritis     Atypical chest pain     last assessed: 12/6/13    Bloody diarrhea     last assesed: 58/23/16    Breast lump     last assessed: 10/10/14    Candidiasis of esophagus ([facility] [address] )     last assessed: 2/24/16    Candidiasis of mouth     last assessed: 8/7/14    Choledocholithiasis     last assessed: 6/5/14    Cholelithiasis     last assessed: 7/21/15    Closed fracture of first metatarsal bone of right foot     last assessed: 12/11/13    Costovertebral angle pain     last assessed: 5/4/17    Elevated blood pressure reading     last assessed: 7/29/16    Grief reaction     last assessed: 4/4/16    Herpes zoster     last assessed: 11/3/16    High risk medication use     last assessed: 12/23/16    Hypokalemia     last assessed: 4/12/16    Hypotension     last assessed: 5/15/15    Inflammatory disorder of breast     last assessed: 12/6/13    Iron deficiency anemia     last assessed: 12/6/13    Microscopic hematuria     last assessed: 12/6/13    On prednisone therapy     last assessed: 10/19/17    Opioid use, unspecified, uncomplicated     last assessed: 3/1/17    Other polyp of sinus     last assessed: 12/6/13    Paronychia of toe     last assessed: 12/6/13    Pharyngoesophageal dysphagia     resolved: 3/1/16    Tinea corporis     last assessed: 8/25/16    Ulcerative colitis ([facility] [address] )     Underweight     last assessed: 3/1/16    Urinary frequency     resolved: 7/29/16    Viral warts      Past Surgical History:   Procedure Laterality Date    BREAST SURGERY      left breast lumpectomy    CARPAL TUNNEL RELEASE Bilateral      SECTION N/A     CHOLECYSTECTOMY      ESOPHAGOGASTRODUODENOSCOPY N/A 3/14/2016    Procedure: ESOPHAGOGASTRODUODENOSCOPY (EGD); Surgeon: Mariana Isidro MD;  [facility]; Service:     TONSILLECTOMY N/A      Social History   Social History     Substance and Sexual Activity   Alcohol Use No     Social History     Substance and Sexual Activity   Drug Use No     Social History     Tobacco Use   Smoking Status Never Smoker   Smokeless Tobacco Never Used     Family History   Problem Relation Age of Onset    Heart disease Mother     Stroke Mother     Heart disease Father         MI    Colon cancer Maternal Grandfather     Asthma Son     Autism Son     Heart disease Brother         CAD    Cancer Maternal Grandmother         colon    Heart disease Brother     COPD Brother         smoker    No Known Problems Brother     No Known Problems Half-Sister        Meds/Allergies       (Not in a hospital admission)    Allergies   Allergen Reactions    Dairy Aid [Lactase] GI Intolerance    Levaquin [Levofloxacin In D5w] Other (See Comments)     Redness at injection site    Mercaptopurine      Causes weakness in legs    Mesalamine GI Intolerance    Other Other (See Comments)     6 mp  Causes weakness in legs  Objective     There were no vitals taken for this visit      PHYSICAL EXAM:    Gen: NAD  CV: S1 & S2 normal, RRR  CHEST: Clear to auscultate  ABD: soft, NT/ND, good bowel sounds  EXT: no edema    ASSESSMENT:     Dysphagia, diarrhea, ulcerative colitis    PLAN:    EGD and colonoscopy

## 2019-09-20 ENCOUNTER — TELEPHONE (OUTPATIENT)
Dept: GASTROENTEROLOGY | Facility: AMBULARY SURGERY CENTER | Age: 54
End: 2019-09-20

## 2019-09-23 ENCOUNTER — IMMUNIZATIONS (OUTPATIENT)
Dept: FAMILY MEDICINE CLINIC | Facility: CLINIC | Age: 54
End: 2019-09-23
Payer: MEDICARE

## 2019-09-23 DIAGNOSIS — Z23 ENCOUNTER FOR IMMUNIZATION: ICD-10-CM

## 2019-09-23 PROCEDURE — 90682 RIV4 VACC RECOMBINANT DNA IM: CPT | Performed by: FAMILY MEDICINE

## 2019-09-23 PROCEDURE — 90471 IMMUNIZATION ADMIN: CPT | Performed by: FAMILY MEDICINE

## 2019-09-24 ENCOUNTER — TELEPHONE (OUTPATIENT)
Dept: FAMILY MEDICINE CLINIC | Facility: CLINIC | Age: 54
End: 2019-09-24

## 2019-09-24 NOTE — TELEPHONE ENCOUNTER
Patient was prescribed Prednisone 40mg does not want to take this dose that was ordered by GI would like to talk to you

## 2019-09-25 NOTE — TELEPHONE ENCOUNTER
Pt called back to discuss Prednisone treatment  Secondary to many side effects from high dose Prednisone she will start at 20 mg instead of 40 mg

## 2019-10-09 ENCOUNTER — TELEPHONE (OUTPATIENT)
Dept: GASTROENTEROLOGY | Facility: CLINIC | Age: 54
End: 2019-10-09

## 2019-10-09 NOTE — TELEPHONE ENCOUNTER
----- Message from Naeved Driscoll MD sent at 9/24/2019  5:38 PM EDT -----  Biopsies showed inflammation of the colon as noted during the colonoscopy  Continue prednisone 40 mg daily and taper down by 5 mg every week until 15 mg a week to be continued  Spoke to pt at length about biologic agents but she is concerned about the side effects and refusing  She prefers to continue prednisone 15 or 20 mg depending on her symptoms and she understands long-term side effects  Advised her to get bone scan as ordered and to take calcium supplements  She would like to see Dr Umm Dunn in [place] and discuss with him also  Doreen Her- please call pt with appt

## 2019-10-09 NOTE — TELEPHONE ENCOUNTER
Called pt to inform her that [facility] stated they will follow Medicare guidelines for her apt with Dr Racquel Dean on 11/4  Ref # [id number] with note:   "(9/25/2019 09:33 EDT by Michelle Bocanegra:)  Member have Medicare A & B primary, therefore no precertification is required   Authorization request voided "

## 2019-10-11 ENCOUNTER — OFFICE VISIT (OUTPATIENT)
Dept: FAMILY MEDICINE CLINIC | Facility: CLINIC | Age: 54
End: 2019-10-11
Payer: MEDICARE

## 2019-10-11 VITALS
BODY MASS INDEX: 18.97 KG/M2 | OXYGEN SATURATION: 99 % | RESPIRATION RATE: 16 BRPM | WEIGHT: 114 LBS | SYSTOLIC BLOOD PRESSURE: 140 MMHG | DIASTOLIC BLOOD PRESSURE: 72 MMHG | HEART RATE: 78 BPM

## 2019-10-11 DIAGNOSIS — K51.90 ULCERATIVE COLITIS WITHOUT COMPLICATIONS, UNSPECIFIED LOCATION (HCC): Primary | ICD-10-CM

## 2019-10-11 DIAGNOSIS — R63.6 UNDERWEIGHT: ICD-10-CM

## 2019-10-11 DIAGNOSIS — L82.1 SEBORRHEIC KERATOSES: ICD-10-CM

## 2019-10-11 DIAGNOSIS — I10 ESSENTIAL HYPERTENSION: ICD-10-CM

## 2019-10-11 PROCEDURE — 99214 OFFICE O/P EST MOD 30 MIN: CPT | Performed by: FAMILY MEDICINE

## 2019-10-11 RX ORDER — HYDROCODONE BITARTRATE AND ACETAMINOPHEN 5; 325 MG/1; MG/1
TABLET ORAL
Refills: 0 | COMMUNITY
Start: 2019-09-30 | End: 2020-01-16

## 2019-10-14 NOTE — PATIENT INSTRUCTIONS
Pt will continue 20 mg prednisone daily and follow GI weaning protocol  Pt will continue her present hypertensive meds  Cryo will be rescheduled for her seborrheic keratosis

## 2019-10-14 NOTE — PROGRESS NOTES
Assessment/Plan:    No problem-specific Assessment & Plan notes found for this encounter  There are no diagnoses linked to this encounter  Subjective:      Patient ID: Ira Purdy is a 62 y o  female  This 63 yo female has a hx of ulcerative colitis  She was recently scoped by GI(EGD and Colonoscopy)  GI wanted her to start high dose Prednisone at 40 mg daily but she has past history of many side effects from the Prednisone so she started 20 mg daily  GI will wean her down off the steroids  The pt is considering Remicaid use  She states some mild diarrhea but she is maintaining her weight  She has some skin lesions to cryo but the tank was empty and cryo will be rescheduled  Not e the pt is off all narcotic meds  The following portions of the patient's history were reviewed and updated as appropriate: allergies, current medications, past family history, past medical history, past social history, past surgical history and problem list     Review of Systems   Constitutional: Negative  HENT: Negative  Eyes: Negative  Respiratory: Negative  Cardiovascular: Negative  Gastrointestinal: Positive for blood in stool and diarrhea  Endocrine: Negative  Musculoskeletal: Negative  Allergic/Immunologic: Negative  Neurological: Negative  Hematological: Negative  Psychiatric/Behavioral: Negative  All other systems reviewed and are negative  Objective:      /72   Pulse 78   Resp 16   Wt 51 7 kg (114 lb)   SpO2 99%   BMI 18 97 kg/m²          Physical Exam   Constitutional: She is oriented to person, place, and time  She appears well-developed and well-nourished  Pt is underweight   Cardiovascular: Normal rate, regular rhythm, normal heart sounds and intact distal pulses  Pulmonary/Chest: Effort normal and breath sounds normal    Abdominal: Soft  Bowel sounds are normal    Musculoskeletal: Normal range of motion     Neurological: She is alert and oriented to person, place, and time  Psychiatric: She has a normal mood and affect   Her behavior is normal  Judgment and thought content normal

## 2019-10-25 ENCOUNTER — TELEPHONE (OUTPATIENT)
Dept: FAMILY MEDICINE CLINIC | Facility: CLINIC | Age: 54
End: 2019-10-25

## 2019-10-25 NOTE — TELEPHONE ENCOUNTER
Patient called in looking for a letter that was discussed on 10/15/19  She needs letter before court date which is scheduled for 11/6/19

## 2019-10-28 NOTE — TELEPHONE ENCOUNTER
Patient walked in to follow up on the letter she states she requested 2 weeks ago  Patient states she needs the letter this week urgently  Patient can be reached at [phone] or [phone]  Please advise  Thank you

## 2019-10-29 ENCOUNTER — TELEPHONE (OUTPATIENT)
Dept: GASTROENTEROLOGY | Facility: CLINIC | Age: 54
End: 2019-10-29

## 2019-10-29 NOTE — TELEPHONE ENCOUNTER
Patients GI provider:  Dr Andreia Clarke     Number to return call: ([phone] or [phone]    Reason for call: Pt calling to r/s her appt she has with dr Andreia Clarke, she is unable to do Monday appts   Please assist in r/s     Scheduled procedure/appointment date if applicable: Apt/procedure - 11/4 follow up per dr Niranjan Robertson (cancelled)

## 2019-10-31 NOTE — TELEPHONE ENCOUNTER
Patient scheduled with Adalberto Worthington in [place] after fellow 11-6 3:00 pm -difficult to get to [facility] with disabled son for early morning or early afternoon due to school

## 2019-11-05 ENCOUNTER — OFFICE VISIT (OUTPATIENT)
Dept: GASTROENTEROLOGY | Facility: CLINIC | Age: 54
End: 2019-11-05
Payer: MEDICARE

## 2019-11-05 VITALS
SYSTOLIC BLOOD PRESSURE: 147 MMHG | HEIGHT: 65 IN | BODY MASS INDEX: 18.49 KG/M2 | WEIGHT: 111 LBS | TEMPERATURE: 99.2 F | HEART RATE: 60 BPM | DIASTOLIC BLOOD PRESSURE: 79 MMHG

## 2019-11-05 DIAGNOSIS — K51.011 ULCERATIVE PANCOLITIS WITH RECTAL BLEEDING (HCC): Primary | ICD-10-CM

## 2019-11-05 PROCEDURE — 99204 OFFICE O/P NEW MOD 45 MIN: CPT | Performed by: INTERNAL MEDICINE

## 2019-11-05 NOTE — PROGRESS NOTES
[facility] - Outpatient Consultation  Opal Bazan 62 y o  female MRN: [id number]  Encounter: [id number]          ASSESSMENT AND PLAN:    Opal Bazan is a 62 y o  female who presents with complaint of long-standing UC, treated with mesalamines and chronic prednisone use  We had a very long discussion regarding her disease course, the negative impact of chronic steroid use, the benefits of steroid sparing agents (namely biologic) and the potential side effects but overall safety profile of the biologic medications  She is adamantly against using biologics and states multiple reasons, but mainly revolving around potential side effects (some real, some factitious and not proven to be associated with the use of biologics)  We discussed the safety profile of Entyvio but she insists it will cause her to have a heart attack (although I explain this is not a known side effect and would be very rare)  Available labs and imaging reviewed  1  Ulcerative pancolitis with rectal bleeding ([address] )        No orders of the defined types were placed in this encounter  -- Continue treatment as you have been doing  Please continue to wean off the prednisone as you are doing  Please continue the Balsalazide  -- Please consider starting the Entyvio (or Vedolizumab) as we discussed  -- Recommend using the Crohn's and colitis foundation website as a resource for information and for support (http://www  crohnscolitisfoundation  org [crohnscolitisfoundation  org])  -- Avoid NSAIDs, smoking  -- Routine health care maintenance, including  *Yearly Flu shot - can receive from PCP  *DEXA scan, once off steroids for 3 months  *Routine PAP smears  *Skin exam with a dermatologist routinely  *Routine dental and opthalmology examinations     Please call in 3 weeks to touch base about medication decisions  Please reach out if you have any questions or concerns at any time         I have spent 60 minutes with Patient  today in which greater than 50% of this time was spent in counseling/coordination of care regarding Diagnostic results, Prognosis, Risks and benefits of tx options, Patient and family education and Risk factor reductions  ______________________________________________________________________    HPI:    Demetria Gant is a 62 y o  female who presents with complaint of long-standing UC, managed mostly with mesalamines and prednisone  She developed symptoms in  with a sense of food poisoning, with loose stools, abdominal cramps, and then bloody stools  She was started on 6MP but could not walk or move her legs  She also developed panic attacks (from the stress)  She has been on steroids for the past 20 years  Grandmother  of colon cancer at age 80    On average she has 2-3 BMs per day, but can be liquidy at times  She will occasionally see blood in her stool  + Lower abdominal pains  Occasional lower back pain  + rash on her back  No mouth sores  No fevers, chills  Occasional heartburn and she takes omeprazole, and will switch to pepcid  Colonoscopy (2019):  · Diffuse mild inflammation was noted throughout more in the cecum and rectum  Biopsies were done from different parts of the colon and placed in separate containers  · Few diverticula in the descending colon and sigmoid colon    EGD (2019):  Normal    Path (2019):  A  Esophagus, bx:  - Squamous mucosa with no significant histopathologic abnormality   - No intraepithelial eosinophils identified   - Negative for intestinal metaplasia, dysplasia, and malignancy       B  Large Intestine, Cecal bx:  - Chronic active colitis  - Negative for granulomas, dysplasia, and malignancy       C  Large Intestine, right colon bx:  - Chronic active colitis  - Negative for granulomas, dysplasia, and malignancy       D  Large Intestine, Transverse Colon bx:  - Chronic active colitis    - Negative for granulomas, dysplasia, and malignancy       E  Large Intestine, left colon bx:  - Colonic mucosa with no significant histopathologic abnormality   - No evidence of microscopic colitis or acute inflammation   - Negative for dysplasia and malignancy       F  Rectum, rectal bx:  - Chronic active proctitis  - Negative for granulomas, dysplasia, and malignancy        REVIEW OF SYSTEMS:  10 point ROS reviewed and negative, except as above    Historical Information   Past Medical History:   Diagnosis Date    Anxiety     Arthritis     Atypical chest pain     last assessed: 12/6/13    Bloody diarrhea     last assesed: 58/23/16    Breast lump     last assessed: 10/10/14    [facility]     last assessed: 2/24/16    Candidiasis of mouth     last assessed: 8/7/14    Choledocholithiasis     last assessed: 6/5/14    Cholelithiasis     last assessed: 7/21/15    Closed fracture of first metatarsal bone of right foot     last assessed: 12/11/13    Costovertebral angle pain     last assessed: 5/4/17    Elevated blood pressure reading     last assessed: 7/29/16    Grief reaction     last assessed: 4/4/16    Herpes zoster     last assessed: 11/3/16    High risk medication use     last assessed: 12/23/16    Hypokalemia     last assessed: 4/12/16    Hypotension     last assessed: 5/15/15    Inflammatory disorder of breast     last assessed: 12/6/13    Iron deficiency anemia     last assessed: 12/6/13    Microscopic hematuria     last assessed: 12/6/13    On prednisone therapy     last assessed: 10/19/17    Opioid use, unspecified, uncomplicated     last assessed: 3/1/17    Other polyp of sinus     last assessed: 12/6/13    Paronychia of toe     last assessed: 12/6/13    Pharyngoesophageal dysphagia     resolved: 3/1/16    Tinea corporis     last assessed: 8/25/16    [facility]     Underweight     last assessed: 3/1/16    Urinary frequency     resolved: 7/29/16    Viral warts      Past Surgical History:   Procedure Laterality Date    BREAST SURGERY      left breast lumpectomy    CARPAL TUNNEL RELEASE Bilateral      SECTION N/A     CHOLECYSTECTOMY      COLONOSCOPY  2019    EGD  2019    ESOPHAGOGASTRODUODENOSCOPY N/A 3/14/2016    Procedure: ESOPHAGOGASTRODUODENOSCOPY (EGD); Surgeon: Loreto Madden MD;  Location: [facility] GI LAB; Service:     TONSILLECTOMY N/A      Social History   Social History     Substance and Sexual Activity   Alcohol Use No     Social History     Substance and Sexual Activity   Drug Use No     Social History     Tobacco Use   Smoking Status Never Smoker   Smokeless Tobacco Never Used     Family History   Problem Relation Age of Onset    Heart disease Mother     Stroke Mother     Heart disease Father         MI    Colon cancer Maternal Grandfather     Asthma Son     Autism Son     Heart disease Brother         CAD    Cancer Maternal Grandmother         colon    Heart disease Brother     COPD Brother         smoker    No Known Problems Brother     No Known Problems Half-Sister        Meds/Allergies       Current Outpatient Medications:     aspirin 81 MG tablet    balsalazide (COLAZAL) 750 mg capsule    fluticasone (FLONASE) 50 mcg/act nasal spray    folic acid (FOLVITE) 1 mg tablet    ibuprofen (MOTRIN) 600 mg tablet    predniSONE 5 mg tablet    quinapril (ACCUPRIL) 20 mg tablet    clotrimazole (LOTRIMIN) 1 % cream    famotidine (PEPCID) 20 mg tablet    HYDROcodone-acetaminophen (NORCO) 5-325 mg per tablet    ketoconazole (NIZORAL) 2 % cream    Allergies   Allergen Reactions    Dairy Aid [Lactase] GI Intolerance    Levaquin [Levofloxacin In D5w] Other (See Comments)     Redness at injection site    Mercaptopurine      Causes weakness in legs    Mesalamine GI Intolerance    Other Other (See Comments)     6 mp  Causes weakness in legs             Objective     Blood pressure 147/79, pulse 60, temperature 99 2 °F (37 3 °C), temperature source Tympanic, height 5' 5" (1 651 m), weight 50 3 kg (111 lb)  Body mass index is 18 47 kg/m²  PHYSICAL EXAM:      General Appearance:   Alert, cooperative, no distress   HEENT:   Normocephalic, atraumatic, anicteric  Neck:  Supple, symmetrical, trachea midline   Lungs:   Clear to auscultation bilaterally; no rales, rhonchi or wheezing; respirations unlabored    Heart[de-identified]   Regular rate and rhythm; no murmur, rub, or gallop  Abdomen:   Soft, non-tender, non-distended; normal bowel sounds; no masses, no organomegaly    Genitalia:   Deferred    Rectal:   Deferred    Extremities:  No cyanosis, clubbing or edema    Pulses:  2+ and symmetric    Skin:  No jaundice, rashes, or lesions    Lymph nodes:  No palpable cervical lymphadenopathy        Lab Results:   No visits with results within 1 Day(s) from this visit     Latest known visit with results is:   Hospital Outpatient Visit on 09/18/2019   Component Date Value    Case Report 09/18/2019                      Value:Surgical Pathology Report                         Case: [id number]                                   Authorizing Provider:  Junior Turner MD          Collected:           09/18/2019 1053              Ordering Location:     [facility]   Received:            09/18/2019 [address] [place] [address] East:           Valentino Marques MD                                                   Specimens:   A) - Esophagus, Esophageal bx                                                                       B) - Large Intestine, Cecum, Cecal bx                                                               C) - Large Intestine, Right/Ascending Colon, right colon bx                                         D) - Large Intestine, Transverse Colon, Transverse Colon bx                                         E) - Large Intestine, Left/Descending Colon, left colon bx                                                                    F) - Rectum, rectal bx                                                                     Final Diagnosis 09/18/2019                      Value: This result contains rich text formatting which cannot be displayed here   Additional Information 09/18/2019                      Value: This result contains rich text formatting which cannot be displayed here  Paige Au Gross Description 09/18/2019                      Value: This result contains rich text formatting which cannot be displayed here  Lab Results   Component Value Date    WBC 16 81 (H) 08/15/2019    HGB 9 1 (L) 08/15/2019    HCT 32 3 (L) 08/15/2019    MCV 73 (L) 08/15/2019     08/15/2019     Lab Results   Component Value Date     01/04/2016    SODIUM 140 08/15/2019    K 4 4 08/15/2019     08/15/2019    CO2 28 08/15/2019    ANIONGAP 17 2 01/04/2016    AGAP 8 08/15/2019    BUN 21 08/15/2019    CREATININE 0 94 08/15/2019    GLUC 92 08/15/2019    CALCIUM 9 1 08/15/2019    AST 51 (H) 08/15/2019    ALT 35 08/15/2019    ALKPHOS 99 08/15/2019    PROT 7 4 01/04/2016    TP 7 8 08/15/2019    BILITOT 0 5 01/04/2016    TBILI 0 30 08/15/2019    EGFR 69 08/15/2019         Radiology Results:   No results found

## 2019-11-05 NOTE — PATIENT INSTRUCTIONS
-- Continue treatment as you have been doing  Please continue to wean off the prednisone as you are doing  Please continue the Balsalazide  -- Please consider starting the Entyvio (or Vedolizumab) as we discussed  -- Recommend using the Crohn's and colitis foundation website as a resource for information and for support (http://www  crohnscolitisfoundation  org [crohnscolitisfoundation  org])  -- Avoid NSAIDs, smoking  -- Routine health care maintenance, including  *Yearly Flu shot - can receive from PCP  *DEXA scan, once off steroids for 3 months  *Routine PAP smears  *Skin exam with a dermatologist routinely  *Routine dental and opthalmology examinations     Please call in 3 weeks to touch base about medication decisions  Please reach out if you have any questions or concerns at any time

## 2019-11-19 ENCOUNTER — HOSPITAL ENCOUNTER (EMERGENCY)
Facility: HOSPITAL | Age: 54
Discharge: HOME | End: 2019-11-19
Attending: EMERGENCY MEDICINE | Admitting: EMERGENCY MEDICINE
Payer: MEDICARE

## 2019-11-19 ENCOUNTER — APPOINTMENT (EMERGENCY)
Dept: RADIOLOGY | Facility: HOSPITAL | Age: 54
End: 2019-11-19
Payer: MEDICARE

## 2019-11-19 VITALS
BODY MASS INDEX: 19.12 KG/M2 | TEMPERATURE: 98 F | SYSTOLIC BLOOD PRESSURE: 175 MMHG | WEIGHT: 112 LBS | OXYGEN SATURATION: 99 % | RESPIRATION RATE: 18 BRPM | DIASTOLIC BLOOD PRESSURE: 98 MMHG | HEART RATE: 60 BPM | HEIGHT: 64 IN

## 2019-11-19 DIAGNOSIS — N39.0 UTI (URINARY TRACT INFECTION): ICD-10-CM

## 2019-11-19 DIAGNOSIS — N20.1 URETERAL STONE: ICD-10-CM

## 2019-11-19 DIAGNOSIS — N13.30 HYDRONEPHROSIS: Primary | ICD-10-CM

## 2019-11-19 LAB
ANION GAP SERPL CALCULATED.3IONS-SCNC: 6 MMOL/L (ref 4–13)
BACTERIA UR QL AUTO: ABNORMAL /HPF
BASOPHILS # BLD AUTO: 0.05 THOUSANDS/ΜL (ref 0–0.1)
BASOPHILS NFR BLD AUTO: 0 % (ref 0–1)
BILIRUB UR QL STRIP: NEGATIVE
BUN SERPL-MCNC: 14 MG/DL (ref 5–25)
CALCIUM SERPL-MCNC: 8.1 MG/DL (ref 8.3–10.1)
CHLORIDE SERPL-SCNC: 106 MMOL/L (ref 100–108)
CLARITY UR: ABNORMAL
CO2 SERPL-SCNC: 28 MMOL/L (ref 21–32)
COLOR UR: YELLOW
CREAT SERPL-MCNC: 0.9 MG/DL (ref 0.6–1.3)
EOSINOPHIL # BLD AUTO: 0.07 THOUSAND/ΜL (ref 0–0.61)
EOSINOPHIL NFR BLD AUTO: 0 % (ref 0–6)
ERYTHROCYTE [DISTWIDTH] IN BLOOD BY AUTOMATED COUNT: 21 % (ref 11.6–15.1)
GFR SERPL CREATININE-BSD FRML MDRD: 72 ML/MIN/1.73SQ M
GLUCOSE SERPL-MCNC: 94 MG/DL (ref 65–140)
GLUCOSE UR STRIP-MCNC: NEGATIVE MG/DL
HCT VFR BLD AUTO: 28.8 % (ref 34.8–46.1)
HGB BLD-MCNC: 8.2 G/DL (ref 11.5–15.4)
HGB UR QL STRIP.AUTO: ABNORMAL
HYALINE CASTS #/AREA URNS LPF: ABNORMAL /LPF
IMM GRANULOCYTES # BLD AUTO: 0.26 THOUSAND/UL (ref 0–0.2)
IMM GRANULOCYTES NFR BLD AUTO: 2 % (ref 0–2)
KETONES UR STRIP-MCNC: NEGATIVE MG/DL
LEUKOCYTE ESTERASE UR QL STRIP: ABNORMAL
LYMPHOCYTES # BLD AUTO: 1.85 THOUSANDS/ΜL (ref 0.6–4.47)
LYMPHOCYTES NFR BLD AUTO: 11 % (ref 14–44)
MCH RBC QN AUTO: 21.5 PG (ref 26.8–34.3)
MCHC RBC AUTO-ENTMCNC: 28.5 G/DL (ref 31.4–37.4)
MCV RBC AUTO: 76 FL (ref 82–98)
MONOCYTES # BLD AUTO: 0.68 THOUSAND/ΜL (ref 0.17–1.22)
MONOCYTES NFR BLD AUTO: 4 % (ref 4–12)
MUCOUS THREADS UR QL AUTO: ABNORMAL
NEUTROPHILS # BLD AUTO: 13.52 THOUSANDS/ΜL (ref 1.85–7.62)
NEUTS SEG NFR BLD AUTO: 83 % (ref 43–75)
NITRITE UR QL STRIP: NEGATIVE
NON-SQ EPI CELLS URNS QL MICRO: ABNORMAL /HPF
NRBC BLD AUTO-RTO: 0 /100 WBCS
PH UR STRIP.AUTO: 6 [PH]
PLATELET # BLD AUTO: 473 THOUSANDS/UL (ref 149–390)
PMV BLD AUTO: 9.1 FL (ref 8.9–12.7)
POTASSIUM SERPL-SCNC: 3.9 MMOL/L (ref 3.5–5.3)
PROT UR STRIP-MCNC: ABNORMAL MG/DL
RBC # BLD AUTO: 3.81 MILLION/UL (ref 3.81–5.12)
RBC #/AREA URNS AUTO: ABNORMAL /HPF
SODIUM SERPL-SCNC: 140 MMOL/L (ref 136–145)
SP GR UR STRIP.AUTO: 1.02 (ref 1–1.03)
UROBILINOGEN UR QL STRIP.AUTO: 0.2 E.U./DL
WBC # BLD AUTO: 16.43 THOUSAND/UL (ref 4.31–10.16)
WBC #/AREA URNS AUTO: ABNORMAL /HPF

## 2019-11-19 PROCEDURE — 74018 RADEX ABDOMEN 1 VIEW: CPT

## 2019-11-19 PROCEDURE — 81001 URINALYSIS AUTO W/SCOPE: CPT | Performed by: PHYSICIAN ASSISTANT

## 2019-11-19 PROCEDURE — 85025 COMPLETE CBC W/AUTO DIFF WBC: CPT | Performed by: PHYSICIAN ASSISTANT

## 2019-11-19 PROCEDURE — 87077 CULTURE AEROBIC IDENTIFY: CPT | Performed by: PHYSICIAN ASSISTANT

## 2019-11-19 PROCEDURE — 96365 THER/PROPH/DIAG IV INF INIT: CPT

## 2019-11-19 PROCEDURE — 99284 EMERGENCY DEPT VISIT MOD MDM: CPT

## 2019-11-19 PROCEDURE — 96375 TX/PRO/DX INJ NEW DRUG ADDON: CPT

## 2019-11-19 PROCEDURE — 36415 COLL VENOUS BLD VENIPUNCTURE: CPT | Performed by: PHYSICIAN ASSISTANT

## 2019-11-19 PROCEDURE — 87086 URINE CULTURE/COLONY COUNT: CPT | Performed by: PHYSICIAN ASSISTANT

## 2019-11-19 PROCEDURE — 96361 HYDRATE IV INFUSION ADD-ON: CPT

## 2019-11-19 PROCEDURE — 87186 SC STD MICRODIL/AGAR DIL: CPT | Performed by: PHYSICIAN ASSISTANT

## 2019-11-19 PROCEDURE — 74176 CT ABD & PELVIS W/O CONTRAST: CPT

## 2019-11-19 PROCEDURE — 80048 BASIC METABOLIC PNL TOTAL CA: CPT | Performed by: PHYSICIAN ASSISTANT

## 2019-11-19 RX ORDER — KETOROLAC TROMETHAMINE 30 MG/ML
15 INJECTION, SOLUTION INTRAMUSCULAR; INTRAVENOUS ONCE
Status: COMPLETED | OUTPATIENT
Start: 2019-11-19 | End: 2019-11-19

## 2019-11-19 RX ORDER — TAMSULOSIN HYDROCHLORIDE 0.4 MG/1
0.4 CAPSULE ORAL
Qty: 5 CAPSULE | Refills: 0 | Status: SHIPPED | OUTPATIENT
Start: 2019-11-19 | End: 2022-07-01

## 2019-11-19 RX ORDER — MORPHINE SULFATE 4 MG/ML
4 INJECTION, SOLUTION INTRAMUSCULAR; INTRAVENOUS ONCE
Status: DISCONTINUED | OUTPATIENT
Start: 2019-11-19 | End: 2019-11-19

## 2019-11-19 RX ORDER — CEFTRIAXONE 2 G/50ML
2000 INJECTION, SOLUTION INTRAVENOUS ONCE
Status: COMPLETED | OUTPATIENT
Start: 2019-11-19 | End: 2019-11-19

## 2019-11-19 RX ORDER — CEPHALEXIN 500 MG/1
500 CAPSULE ORAL 2 TIMES DAILY
Qty: 14 CAPSULE | Refills: 0 | Status: SHIPPED | OUTPATIENT
Start: 2019-11-19 | End: 2019-11-26

## 2019-11-19 RX ADMIN — SODIUM CHLORIDE 1000 ML: 0.9 INJECTION, SOLUTION INTRAVENOUS at 10:50

## 2019-11-19 RX ADMIN — KETOROLAC TROMETHAMINE 15 MG: 30 INJECTION, SOLUTION INTRAMUSCULAR at 11:56

## 2019-11-19 RX ADMIN — CEFTRIAXONE 2000 MG: 2 INJECTION, SOLUTION INTRAVENOUS at 11:56

## 2019-11-19 NOTE — ED PROVIDER NOTES
History  Chief Complaint   Patient presents with    Back Pain     L sided back and groin pain, started this am  Pt states "I think I am passing a stone"     63 y/o female, h/o UC/HTN/GERD, presenting today with left-sided back pain that radiates to the left groin and left lower quadrant, feels very similar to her prior kidney stones which she has not have in the past 2 years  States that she has had issues passing stones before in the past for the head place multiple stents  She recently was seen by her GI doctor on  to increase her prednisone dose to 15 mg daily, states that this sometimes will cause her to get stones  This feels different from her UC pain  Had an episode of nausea and vomiting this morning however this has subsided  She did take ibuprofen earlier this morning as well as Percocet with minimal relief  Urinating regularly  Pain is very sharp  Denies diarrhea, constipation, fevers, cough, congestion, changes in urination  Prior to Admission Medications   Prescriptions Last Dose Informant Patient Reported? Taking? HYDROcodone-acetaminophen (NORCO) 5-325 mg per tablet  Self Yes No   aspirin 81 MG tablet  Self Yes No   Sig: Take 1 tablet by mouth daily   balsalazide (COLAZAL) 750 mg capsule  Self No No   Sig: TAKE 3 CAPSULES TWICE DAILY   clotrimazole (LOTRIMIN) 1 % cream  Self No No   Sig: Apply topically 2 (two) times a day   Patient not taking: Reported on 2019   famotidine (PEPCID) 20 mg tablet  Self No No   Sig: Take 1 tablet (20 mg total) by mouth 2 (two) times a day   Patient not taking: Reported on 2019   fluticasone (FLONASE) 50 mcg/act nasal spray  Self No No   Si sprays into each nostril daily   Patient taking differently: 2 sprays into each nostril as needed    folic acid (FOLVITE) 1 mg tablet  Self Yes No   Sig: Take 1 mg by mouth daily     ibuprofen (MOTRIN) 600 mg tablet  Self No No   Sig: Take 1 tablet (600 mg total) by mouth every 6 (six) hours as needed for moderate pain   Patient taking differently: Take 600 mg by mouth as needed for moderate pain    ketoconazole (NIZORAL) 2 % cream   No No   Sig: Apply topically daily for 30 days   predniSONE 5 mg tablet  Self No No   Sig: Take 8 tablets (40 mg total) by mouth daily Taper down by 5 mg every week   quinapril (ACCUPRIL) 20 mg tablet  Self Yes No   Sig: every 24 hours      Facility-Administered Medications: None       Past Medical History:   Diagnosis Date    Anxiety     Arthritis     Atypical chest pain     last assessed: 12/6/13    Bloody diarrhea     last assesed: 58/23/16    Breast lump     last assessed: 10/10/14    Candidiasis of esophagus ([facility] [address] )     last assessed: 2/24/16    Candidiasis of mouth     last assessed: 8/7/14    Choledocholithiasis     last assessed: 6/5/14    Cholelithiasis     last assessed: 7/21/15    Closed fracture of first metatarsal bone of right foot     last assessed: 12/11/13    Costovertebral angle pain     last assessed: 5/4/17    Elevated blood pressure reading     last assessed: 7/29/16    Grief reaction     last assessed: 4/4/16    Herpes zoster     last assessed: 11/3/16    High risk medication use     last assessed: 12/23/16    Hypokalemia     last assessed: 4/12/16    Hypotension     last assessed: 5/15/15    Inflammatory disorder of breast     last assessed: 12/6/13    Iron deficiency anemia     last assessed: 12/6/13    Microscopic hematuria     last assessed: 12/6/13    On prednisone therapy     last assessed: 10/19/17    Opioid use, unspecified, uncomplicated     last assessed: 3/1/17    Other polyp of sinus     last assessed: 12/6/13    Paronychia of toe     last assessed: 12/6/13    Pharyngoesophageal dysphagia     resolved: 3/1/16    Tinea corporis     last assessed: 8/25/16    Ulcerative colitis ([facility] [address] )     Underweight     last assessed: 3/1/16    Urinary frequency     resolved: 7/29/16    Viral warts        Past Surgical History: Procedure Laterality Date    BREAST SURGERY      left breast lumpectomy    CARPAL TUNNEL RELEASE Bilateral      SECTION N/A     CHOLECYSTECTOMY      COLONOSCOPY  2019    EGD  2019    ESOPHAGOGASTRODUODENOSCOPY N/A 3/14/2016    Procedure: ESOPHAGOGASTRODUODENOSCOPY (EGD); Surgeon: Inga Wang MD;  Location: [facility] GI LAB; Service:     TONSILLECTOMY N/A        Family History   Problem Relation Age of Onset    Heart disease Mother     Stroke Mother     Heart disease Father         MI    Colon cancer Maternal Grandfather     Asthma Son     Autism Son     Heart disease Brother         CAD    Cancer Maternal Grandmother         colon    Heart disease Brother     COPD Brother         smoker    No Known Problems Brother     No Known Problems Half-Sister      I have reviewed and agree with the history as documented  Social History     Tobacco Use    Smoking status: Never Smoker    Smokeless tobacco: Never Used   Substance Use Topics    Alcohol use: No    Drug use: No        Review of Systems   Constitutional: Negative  Negative for chills, fever and unexpected weight change  Denies IV drug use     HENT: Negative  Eyes: Negative  Respiratory: Negative  Negative for cough, chest tightness, shortness of breath and wheezing  Cardiovascular: Negative  Negative for chest pain and palpitations  Gastrointestinal: Negative  Negative for abdominal pain, constipation, diarrhea, nausea and vomiting  Genitourinary: Positive for flank pain  Negative for decreased urine volume, difficulty urinating, dyspareunia, dysuria, enuresis, frequency, genital sores, hematuria, menstrual problem, pelvic pain, urgency, vaginal bleeding, vaginal discharge and vaginal pain  Denies numbness, tingling in the groin  Musculoskeletal: Positive for back pain  Negative for arthralgias, gait problem, joint swelling, myalgias, neck pain and neck stiffness  Skin: Negative  Negative for color change  Neurological: Negative  Negative for dizziness, tremors, weakness, light-headedness, numbness and headaches  All other systems reviewed and are negative  Physical Exam  Physical Exam   Constitutional: She is oriented to person, place, and time  She appears well-developed and well-nourished  HENT:   Head: Normocephalic and atraumatic  Nose: Nose normal    Eyes: Conjunctivae are normal    Neck: Normal range of motion  Cardiovascular: Normal rate, regular rhythm, normal heart sounds and intact distal pulses  Exam reveals no gallop and no friction rub  No murmur heard  Pulmonary/Chest: Effort normal and breath sounds normal  No stridor  No respiratory distress  She has no wheezes  She has no rales  She exhibits no tenderness  S PO2 is 99% indicating adequate oxygenation   Abdominal: Soft  Bowel sounds are normal  She exhibits no distension and no mass  There is tenderness  There is no rebound and no guarding  No hernia  Musculoskeletal:        Arms:  Neurological: She is alert and oriented to person, place, and time  Skin: Skin is warm and dry  Capillary refill takes less than 2 seconds  Nursing note and vitals reviewed        Vital Signs  ED Triage Vitals   Temperature Pulse Respirations Blood Pressure SpO2   11/19/19 1016 11/19/19 1016 11/19/19 1016 11/19/19 1016 11/19/19 1016   (!) 97 3 °F (36 3 °C) 56 18 (!) 206/102 99 %      Temp Source Heart Rate Source Patient Position - Orthostatic VS BP Location FiO2 (%)   11/19/19 1016 11/19/19 1016 11/19/19 1016 11/19/19 1016 --   Tympanic Monitor Lying Right arm       Pain Score       11/19/19 1019       Worst Possible Pain           Vitals:    11/19/19 1016   BP: (!) 206/102   Pulse: 56   Patient Position - Orthostatic VS: Lying         Visual Acuity      ED Medications  Medications   sodium chloride 0 9 % bolus 1,000 mL (1,000 mL Intravenous New Bag 11/19/19 1050)   ketorolac (TORADOL) injection 15 mg (15 mg Intravenous Given 11/19/19 1156)   cefTRIAXone (ROCEPHIN) IVPB (premix) 2,000 mg (2,000 mg Intravenous New Bag 11/19/19 1156)       Diagnostic Studies  Results Reviewed     Procedure Component Value Units Date/Time    Urine Microscopic [[id number]]  (Abnormal) Collected:  11/19/19 1053    Lab Status:  Final result Specimen:  Urine, Clean Catch Updated:  11/19/19 1119     RBC, UA 4-10 /hpf      WBC, UA 30-50 /hpf      Epithelial Cells Occasional /hpf      Bacteria, UA Occasional /hpf      Hyaline Casts, UA 0-1 /lpf      MUCUS THREADS Occasional    Urine culture [[id number]] Collected:  11/19/19 1053    Lab Status:   In process Specimen:  Urine, Clean Catch Updated:  11/19/19 6182    Basic metabolic panel [[id number]]  (Abnormal) Collected:  11/19/19 1050    Lab Status:  Final result Specimen:  Blood from Arm, Right Updated:  11/19/19 1108     Sodium 140 mmol/L      Potassium 3 9 mmol/L      Chloride 106 mmol/L      CO2 28 mmol/L      ANION GAP 6 mmol/L      BUN 14 mg/dL      Creatinine 0 90 mg/dL      Glucose 94 mg/dL      Calcium 8 1 mg/dL      eGFR 72 ml/min/1 73sq m     Narrative:       [place] guidelines for Chronic Kidney Disease (CKD):     Stage 1 with normal or high GFR (GFR > 90 mL/min/1 73 square meters)    Stage 2 Mild CKD (GFR = 60-89 mL/min/1 73 square meters)    Stage 3A Moderate CKD (GFR = 45-59 mL/min/1 73 square meters)    Stage 3B Moderate CKD (GFR = 30-44 mL/min/1 73 square meters)    Stage 4 Severe CKD (GFR = 15-29 mL/min/1 73 square meters)    Stage 5 End Stage CKD (GFR <15 mL/min/1 73 square meters)  Note: GFR calculation is accurate only with a steady state creatinine    UA w Reflex to Microscopic w Reflex to Culture [[id number]]  (Abnormal) Collected:  11/19/19 1053    Lab Status:  Final result Specimen:  Urine, Clean Catch Updated:  11/19/19 1100     Color, UA Yellow     Clarity, UA Slightly Cloudy     Specific Gravity, UA 1 025     pH, UA 6 0     Leukocytes, UA Small     Nitrite, UA Negative     Protein, UA 30 (1+) mg/dl      Glucose, UA Negative mg/dl      Ketones, UA Negative mg/dl      Urobilinogen, UA 0 2 E U /dl      Bilirubin, UA Negative     Blood, UA Large    CBC and differential [[id number]]  (Abnormal) Collected:  11/19/19 1050    Lab Status:  Final result Specimen:  Blood from Arm, Right Updated:  11/19/19 1056     WBC 16 43 Thousand/uL      RBC 3 81 Million/uL      Hemoglobin 8 2 g/dL      Hematocrit 28 8 %      MCV 76 fL      MCH 21 5 pg      MCHC 28 5 g/dL      RDW 21 0 %      MPV 9 1 fL      Platelets 724 Thousands/uL      nRBC 0 /100 WBCs      Neutrophils Relative 83 %      Immat GRANS % 2 %      Lymphocytes Relative 11 %      Monocytes Relative 4 %      Eosinophils Relative 0 %      Basophils Relative 0 %      Neutrophils Absolute 13 52 Thousands/µL      Immature Grans Absolute 0 26 Thousand/uL      Lymphocytes Absolute 1 85 Thousands/µL      Monocytes Absolute 0 68 Thousand/µL      Eosinophils Absolute 0 07 Thousand/µL      Basophils Absolute 0 05 Thousands/µL                  XR abdomen 1 view kub   Final Result by Ida Zavala MD (11/19 1221)      The patient's known left ureter calculus is extremely difficult to see but is faintly visible overlying the sacrum on image #1  Workstation performed: [id number]         CT renal stone study abdomen pelvis wo contrast   Final Result by Say Coronel DO (11/19 1130)      Mild LEFT hydroureteronephrosis secondary to a 5 mm distal ureteric calculus  Mild ipsilateral perinephric fat stranding which may be reactive  Correlate clinically for infection  Question punctate nonobstructing right renal calculus  Bilateral renal cysts including probable hemorrhagic or proteinaceous left lower pole cyst       Colonic diverticulosis without evidence of diverticulitis        Workstation performed: [id number]                    Procedures  Procedures       ED Course  ED Course as of Nov 19 1242 Tue Nov 19, 2019   1058 Patient on high-dose steroids   WBC(!): 16 43   1141 Speaking with Dr Edwin Campo       [phone] Patient states she cannot stay in the hospital                                   MDM  Number of Diagnoses or Management Options  Hydronephrosis:   Ureteral stone:   UTI (urinary tract infection):   Diagnosis management comments: Patient researched on [facility] with her most recent controlled substance being filled on 9/30 for percocet 2 days supply  She is not requesting narcotics for her pain as she was recently tapered off of oxycodone  She is feeling better after taking her left over percocet at home  Leukocytosis may be secondary due to chronic steroid use, patient's prior WBC level was the same 3 months ago  However will consider infectious etiology  Discussed case with Dr Edwin Campo regarding patient's ureter stone and labs showing UTI that is also present  Ideally it would be best for patient to be admitted however patient states that she is unable to stay here as she takes care of her son and needs to pick him up from school and is autistic so she is unable to stay overnight in the hospital   I discussed with patient that we will start her on antibiotics  Dr Edwin Campo was able to fit her in for an appointment tomorrow at 9:00 a m  Patient understands that having signs of an obstructed stone with a UTI could be detrimental to her health and gave her strict return precautions for any worsening otherwise she agrees to follow up with Dr Edwin Campo tomorrow  Patient verbalizes understanding and agrees with the above assessment and plan         Amount and/or Complexity of Data Reviewed  Clinical lab tests: ordered and reviewed  Tests in the radiology section of CPT®: ordered and reviewed  Review and summarize past medical records: yes  Discuss the patient with other providers: yes (Dr Edwin Campo )  Independent visualization of images, tracings, or specimens: yes        Disposition  Final diagnoses:   Hydronephrosis   UTI (urinary tract infection)   Ureteral stone     Time reflects when diagnosis was documented in both MDM as applicable and the Disposition within this note     Time User Action Codes Description Comment    11/19/2019 12:39 PM Andreia Prince Add [N13 30] Hydronephrosis     11/19/2019 12:39 PM Andreia Prince Add [N39 0] UTI (urinary tract infection)     11/19/2019 12:39 PM Andreia Prince Add [N20 1] Ureteral stone       ED Disposition     ED Disposition Condition Date/Time Comment    Discharge Stable Tue Nov 19, 2019 12:38 PM Romeo Garg discharge to home/self care  Follow-up Information     Follow up With Specialties Details Why Contact Info Additional Information    Henri Licona MD Urology Go in 1 day for your appointment at [address] Emergency Department Emergency Medicine Go to  If symptoms worsen such as fevers, severe pain etc [address] [facility] [address]  [phone] [facility] [address]          Patient's Medications   Discharge Prescriptions    CEPHALEXIN (KEFLEX) 500 MG CAPSULE    Take 1 capsule (500 mg total) by mouth 2 (two) times a day for 7 days       Start Date: 11/19/2019End Date: 11/26/2019       Order Dose: 500 mg       Quantity: 14 capsule    Refills: 0    TAMSULOSIN (FLOMAX) 0 4 MG    Take 1 capsule (0 4 mg total) by mouth daily with dinner for 5 days       Start Date: 11/19/2019End Date: 11/24/2019       Order Dose: 0 4 mg       Quantity: 5 capsule    Refills: 0     No discharge procedures on file      ED Provider  Electronically Signed by           eVra Leon PA-C  11/19/19 5829

## 2019-11-21 ENCOUNTER — TRANSCRIBE ORDERS (OUTPATIENT)
Dept: ADMINISTRATIVE | Facility: HOSPITAL | Age: 54
End: 2019-11-21

## 2019-11-21 ENCOUNTER — OFFICE VISIT (OUTPATIENT)
Dept: PODIATRY | Facility: CLINIC | Age: 54
End: 2019-11-21
Payer: MEDICARE

## 2019-11-21 ENCOUNTER — HOSPITAL ENCOUNTER (OUTPATIENT)
Dept: RADIOLOGY | Facility: HOSPITAL | Age: 54
Discharge: HOME | End: 2019-11-21
Attending: UROLOGY
Payer: MEDICARE

## 2019-11-21 VITALS — WEIGHT: 112 LBS | HEIGHT: 64 IN | BODY MASS INDEX: 19.12 KG/M2 | RESPIRATION RATE: 16 BRPM

## 2019-11-21 DIAGNOSIS — B35.1 ONYCHOMYCOSIS: ICD-10-CM

## 2019-11-21 DIAGNOSIS — L03.039 PARONYCHIA OF TOENAIL, UNSPECIFIED LATERALITY: ICD-10-CM

## 2019-11-21 DIAGNOSIS — M20.41 HAMMER TOE OF RIGHT FOOT: ICD-10-CM

## 2019-11-21 DIAGNOSIS — M79.671 PAIN IN BOTH FEET: Primary | ICD-10-CM

## 2019-11-21 DIAGNOSIS — M79.672 PAIN IN BOTH FEET: Primary | ICD-10-CM

## 2019-11-21 DIAGNOSIS — N20.0 KIDNEY STONE: Primary | ICD-10-CM

## 2019-11-21 LAB — BACTERIA UR CULT: ABNORMAL

## 2019-11-21 PROCEDURE — 99213 OFFICE O/P EST LOW 20 MIN: CPT | Performed by: PODIATRIST

## 2019-11-21 PROCEDURE — 74018 RADEX ABDOMEN 1 VIEW: CPT

## 2019-11-21 RX ORDER — TERBINAFINE HYDROCHLORIDE 250 MG/1
250 TABLET ORAL DAILY
Qty: 30 TABLET | Refills: 0 | Status: SHIPPED | OUTPATIENT
Start: 2019-11-21 | End: 2020-01-02 | Stop reason: SDUPTHER

## 2019-11-21 NOTE — PROGRESS NOTES
Assessment/Plan:  Recalcitrant mycosis right hallux nail  Secondary paronychia  Pain upon ambulation  Hammertoe 5th right toe with secondary clavi     Plan  Foot exam performed  Nails debrided  Clavi by debrided  Refill of terbinafine ordered  Patient remain on topical antifungal   She will take biotin as directed  She may need osteo trips he  No problem-specific Assessment & Plan notes found for this encounter  Diagnoses and all orders for this visit:    Pain in both feet    Onychomycosis  -     terbinafine (LamISIL) 250 mg tablet; Take 1 tablet (250 mg total) by mouth daily    Paronychia of toenail, unspecified laterality  -     terbinafine (LamISIL) 250 mg tablet; Take 1 tablet (250 mg total) by mouth daily    Hammer toe of right foot          Subjective:  Patient has ongoing problems with her feet  She is concerned about fungus of her nails  She also has of painful small toe  No history of trauma  She is not taking Lamisil for many months      Past Medical History:   Diagnosis Date    Anxiety     Arthritis     Atypical chest pain     last assessed: 12/6/13    Bloody diarrhea     last assesed: 58/23/16    Breast lump     last assessed: 10/10/14    Candidiasis of esophagus ([facility] [address] )     last assessed: 2/24/16    Candidiasis of mouth     last assessed: 8/7/14    Choledocholithiasis     last assessed: 6/5/14    Cholelithiasis     last assessed: 7/21/15    Closed fracture of first metatarsal bone of right foot     last assessed: 12/11/13    Costovertebral angle pain     last assessed: 5/4/17    Elevated blood pressure reading     last assessed: 7/29/16    Grief reaction     last assessed: 4/4/16    Herpes zoster     last assessed: 11/3/16    High risk medication use     last assessed: 12/23/16    Hypokalemia     last assessed: 4/12/16    Hypotension     last assessed: 5/15/15    Inflammatory disorder of breast     last assessed: 12/6/13    Iron deficiency anemia     last assessed: 13    Microscopic hematuria     last assessed: 13    On prednisone therapy     last assessed: 10/19/17    Opioid use, unspecified, uncomplicated     last assessed: 3/1/17    Other polyp of sinus     last assessed: 13    Paronychia of toe     last assessed: 13    Pharyngoesophageal dysphagia     resolved: 3/1/16    Tinea corporis     last assessed: 16    Ulcerative colitis ([address] )     Underweight     last assessed: 3/1/16    Urinary frequency     resolved: 16    Viral warts        Past Surgical History:   Procedure Laterality Date    BREAST SURGERY      left breast lumpectomy    CARPAL TUNNEL RELEASE Bilateral      SECTION N/A     CHOLECYSTECTOMY      COLONOSCOPY  2019    EGD  2019    ESOPHAGOGASTRODUODENOSCOPY N/A 3/14/2016    Procedure: ESOPHAGOGASTRODUODENOSCOPY (EGD); Surgeon: Lisa Stock MD;  Location: [facility] GI LAB; Service:     TONSILLECTOMY N/A        Allergies   Allergen Reactions    Dairy Aid [Lactase] GI Intolerance    Levaquin [Levofloxacin In D5w] Other (See Comments)     Redness at injection site    Mercaptopurine      Causes weakness in legs    Mesalamine GI Intolerance    Other Other (See Comments)     6 mp  Causes weakness in legs           Current Outpatient Medications:     aspirin 81 MG tablet, Take 1 tablet by mouth daily, Disp: , Rfl:     balsalazide (COLAZAL) 750 mg capsule, TAKE 3 CAPSULES TWICE DAILY, Disp: 540 capsule, Rfl: 5    cephalexin (KEFLEX) 500 mg capsule, Take 1 capsule (500 mg total) by mouth 2 (two) times a day for 7 days, Disp: 14 capsule, Rfl: 0    clotrimazole (LOTRIMIN) 1 % cream, Apply topically 2 (two) times a day (Patient not taking: Reported on 2019), Disp: 30 g, Rfl: 5    famotidine (PEPCID) 20 mg tablet, Take 1 tablet (20 mg total) by mouth 2 (two) times a day (Patient not taking: Reported on 2019), Disp: 60 tablet, Rfl: 11    fluticasone (FLONASE) 50 mcg/act nasal spray, 2 sprays into each nostril daily (Patient taking differently: 2 sprays into each nostril as needed ), Disp: 16 g, Rfl: 0    folic acid (FOLVITE) 1 mg tablet, Take 1 mg by mouth daily  , Disp: , Rfl:     HYDROcodone-acetaminophen (NORCO) 5-325 mg per tablet, , Disp: , Rfl: 0    ibuprofen (MOTRIN) 600 mg tablet, Take 1 tablet (600 mg total) by mouth every 6 (six) hours as needed for moderate pain (Patient taking differently: Take 600 mg by mouth as needed for moderate pain ), Disp: 30 tablet, Rfl: 1    ketoconazole (NIZORAL) 2 % cream, Apply topically daily for 30 days, Disp: 60 g, Rfl: 1    predniSONE 5 mg tablet, Take 8 tablets (40 mg total) by mouth daily Taper down by 5 mg every week, Disp: 300 tablet, Rfl: 0    quinapril (ACCUPRIL) 20 mg tablet, every 24 hours, Disp: , Rfl:     tamsulosin (FLOMAX) 0 4 mg, Take 1 capsule (0 4 mg total) by mouth daily with dinner for 5 days, Disp: 5 capsule, Rfl: 0    terbinafine (LamISIL) 250 mg tablet, Take 1 tablet (250 mg total) by mouth daily, Disp: 30 tablet, Rfl: 0    Patient Active Problem List   Diagnosis    Ulcerative colitis with rectal bleeding (HCC)    Dysphagia    Pansinusitis    Chronic pain syndrome    Essential hypertension    Atypical pigmented skin lesion    Ulcerative colitis without complications (HCC)    External hemorrhoids    Yeast infection    Skin lesion, superficial    Fungal dermatosis    Other viral warts    Need for influenza vaccination    Dry eyes    Dry mouth    Dermatitis due to unknown cause    Insomnia    Gastroesophageal reflux disease without esophagitis    Underweight    Paronychia of great toe, right    Onychia of toe of right foot    Epigastric pain    Pain in both feet    Tinea pedis of both feet    Onychomycosis    Paronychia of toenail    Seborrheic keratoses          Patient ID: Romeo Garg is a 62 y o  female      HPI    The following portions of the patient's history were reviewed and updated as appropriate:     family history includes Asthma in her son; Autism in her son; COPD in her brother; Cancer in her maternal grandmother; Colon cancer in her maternal grandfather; Heart disease in her brother, brother, father, and mother; No Known Problems in her brother and half-sister; Stroke in her mother  reports that she has never smoked  She has never used smokeless tobacco  She reports that she does not drink alcohol or use drugs  Vitals:    11/21/19 1653   Resp: 16       Review of Systems      Objective:  Patient's shoes and socks removed  Foot ExamPhysical Exam      Patient's shoes and socks removed  Foot Exam     General  General Appearance: appears stated age and healthy   Orientation: alert and oriented to person, place, and time   Affect: appropriate   Gait: unimpaired         Right Foot/Ankle      Inspection and Palpation  Ecchymosis: none  Swelling: none   Arch: pes planus  Hammertoes: fifth toe  Hallux valgus: no  Hallux limitus: yes  Skin Exam: callus, dry skin, tinea and skin changes;      Neurovascular  Dorsalis pedis: 2+  Posterior tibial: 2+  Saphenous nerve sensation: normal  Tibial nerve sensation: normal  Superficial peroneal nerve sensation: normal  Deep peroneal nerve sensation: normal  Sural nerve sensation: normal  Achilles reflex: 2+  Babinski reflex: 2+        Left Foot/Ankle       Inspection and Palpation  Ecchymosis: none  Swelling: none   Arch: pes planus  Hammertoes: fifth toe  Hallux valgus: no  Hallux limitus: yes  Skin Exam: callus, dry skin, tinea and skin changes;      Neurovascular  Dorsalis pedis: 2+  Posterior tibial: 2+  Saphenous nerve sensation: normal  Tibial nerve sensation: normal  Superficial peroneal nerve sensation: normal  Deep peroneal nerve sensation: normal  Sural nerve sensation: normal  Achilles reflex: 2+  Babinski reflex: 2+           Physical Exam   Constitutional: She is oriented to person, place, and time   She appears well-developed and well-nourished  Cardiovascular: Normal rate and regular rhythm  Pulses:       Dorsalis pedis pulses are 2+ on the right side, and 2+ on the left side  Posterior tibial pulses are 2+ on the right side, and 2+ on the left side  Feet:   Right Foot:   Skin Integrity: Positive for callus and dry skin  Left Foot:   Skin Integrity: Positive for callus and dry skin  Neurological: She is alert and oriented to person, place, and time  Reflex Scores:       Achilles reflexes are 2+ on the right side and 2+ on the left side  Skin: Skin is warm and dry  Capillary refill takes less than 2 seconds  All nails demonstrate dystrophy  Hallux nail bilateral has distal mycosis  There is also periungual tinea pedis of hallux bilateral     Clavi  medial aspect right toe  Vitals reviewed      Procedures

## 2019-11-22 ENCOUNTER — HOSPITAL ENCOUNTER (OUTPATIENT)
Dept: RADIOLOGY | Facility: HOSPITAL | Age: 54
Discharge: HOME | End: 2019-11-22
Attending: UROLOGY
Payer: MEDICARE

## 2019-11-22 ENCOUNTER — TRANSCRIBE ORDERS (OUTPATIENT)
Dept: ADMINISTRATIVE | Facility: HOSPITAL | Age: 54
End: 2019-11-22

## 2019-11-22 DIAGNOSIS — Z87.442 PERSONAL HISTORY OF URINARY CALCULI: ICD-10-CM

## 2019-11-22 DIAGNOSIS — Z87.442 PERSONAL HISTORY OF URINARY CALCULI: Primary | ICD-10-CM

## 2019-11-22 PROCEDURE — 74176 CT ABD & PELVIS W/O CONTRAST: CPT

## 2019-11-25 ENCOUNTER — TELEPHONE (OUTPATIENT)
Dept: FAMILY MEDICINE CLINIC | Facility: CLINIC | Age: 54
End: 2019-11-25

## 2019-11-25 DIAGNOSIS — N76.0 ACUTE VAGINITIS: Primary | ICD-10-CM

## 2019-11-25 RX ORDER — FLUCONAZOLE 150 MG/1
150 TABLET ORAL ONCE
Qty: 1 TABLET | Refills: 0 | Status: SHIPPED | OUTPATIENT
Start: 2019-11-25 | End: 2019-11-25

## 2019-12-13 ENCOUNTER — OFFICE VISIT (OUTPATIENT)
Dept: FAMILY MEDICINE CLINIC | Facility: CLINIC | Age: 54
End: 2019-12-13
Payer: MEDICARE

## 2019-12-13 VITALS
DIASTOLIC BLOOD PRESSURE: 80 MMHG | SYSTOLIC BLOOD PRESSURE: 130 MMHG | HEART RATE: 67 BPM | WEIGHT: 116 LBS | BODY MASS INDEX: 19.91 KG/M2 | RESPIRATION RATE: 16 BRPM | OXYGEN SATURATION: 98 %

## 2019-12-13 DIAGNOSIS — Z23 NEED FOR DIPHTHERIA-TETANUS-PERTUSSIS (TDAP) VACCINE: ICD-10-CM

## 2019-12-13 DIAGNOSIS — I10 ESSENTIAL HYPERTENSION: Primary | ICD-10-CM

## 2019-12-13 DIAGNOSIS — K51.90 ULCERATIVE COLITIS WITHOUT COMPLICATIONS, UNSPECIFIED LOCATION (HCC): ICD-10-CM

## 2019-12-13 DIAGNOSIS — M54.50 LOW BACK PAIN WITHOUT SCIATICA, UNSPECIFIED BACK PAIN LATERALITY, UNSPECIFIED CHRONICITY: ICD-10-CM

## 2019-12-13 DIAGNOSIS — Z12.31 BREAST CANCER SCREENING BY MAMMOGRAM: ICD-10-CM

## 2019-12-13 DIAGNOSIS — R31.9 HEMATURIA, UNSPECIFIED TYPE: ICD-10-CM

## 2019-12-13 DIAGNOSIS — N20.0 KIDNEY STONES: ICD-10-CM

## 2019-12-13 DIAGNOSIS — K51.00 ULCERATIVE (CHRONIC) ENTEROCOLITIS, WITHOUT COMPLICATIONS (HCC): ICD-10-CM

## 2019-12-13 DIAGNOSIS — Z23 ENCOUNTER FOR IMMUNIZATION: ICD-10-CM

## 2019-12-13 DIAGNOSIS — L82.1 SEBORRHEIC KERATOSES: ICD-10-CM

## 2019-12-13 LAB
SL AMB  POCT GLUCOSE, UA: NORMAL
SL AMB LEUKOCYTE ESTERASE,UA: NORMAL
SL AMB POCT BILIRUBIN,UA: NORMAL
SL AMB POCT BLOOD,UA: 250
SL AMB POCT CLARITY,UA: CLEAR
SL AMB POCT COLOR,UA: YELLOW
SL AMB POCT KETONES,UA: NORMAL
SL AMB POCT NITRITE,UA: NORMAL
SL AMB POCT PH,UA: 6
SL AMB POCT SPECIFIC GRAVITY,UA: 1.02
SL AMB POCT URINE PROTEIN: NORMAL
SL AMB POCT UROBILINOGEN: NORMAL

## 2019-12-13 PROCEDURE — 90471 IMMUNIZATION ADMIN: CPT

## 2019-12-13 PROCEDURE — 99214 OFFICE O/P EST MOD 30 MIN: CPT | Performed by: FAMILY MEDICINE

## 2019-12-13 PROCEDURE — 81002 URINALYSIS NONAUTO W/O SCOPE: CPT | Performed by: FAMILY MEDICINE

## 2019-12-13 PROCEDURE — 90715 TDAP VACCINE 7 YRS/> IM: CPT

## 2019-12-13 RX ORDER — PREDNISONE 1 MG/1
5 TABLET ORAL DAILY
Qty: 30 TABLET | Refills: 5 | Status: SHIPPED | OUTPATIENT
Start: 2019-12-13 | End: 2019-12-17 | Stop reason: SDUPTHER

## 2019-12-13 NOTE — PROGRESS NOTES
Addended by: TUNDE KELLOGG on: 9/22/2017 04:05 PM     Modules accepted: Orders     Assessment/Plan:    No problem-specific Assessment & Plan notes found for this encounter  Diagnoses and all orders for this visit:    Essential hypertension    Ulcerative colitis without complications, unspecified location [facility])    Breast cancer screening by mammogram  -     Mammo screening bilateral w cad; Future    Need for diphtheria-tetanus-pertussis (Tdap) vaccine    Seborrheic keratoses    Ulcerative (chronic) enterocolitis, without complications (HCC)  -     predniSONE 5 mg tablet; Take 1 tablet (5 mg total) by mouth daily    Encounter for immunization  -     TDAP VACCINE GREATER THAN OR EQUAL TO 6YO IM    Low back pain without sciatica, unspecified back pain laterality, unspecified chronicity  -     POCT urine dip  -     Urine culture    Kidney stones    Hematuria, unspecified type        Subjective:      Patient ID: Lucio Perales is a 62 y o  female  This is a follow-up appointment for a 38-year-old female with a history of ulcerative colitis, hypertension, and recently diagnosed with recurring kidney stones  The patient also was recently diagnosed with urinary tract infection and has completed antibiotics  The patient is concerned that the infection may not be done  The patient also is presently going through some dental work  Presently the patient takes prednisone 10 mg daily for her ulcerative colitis treatment  The patient also has some skin lesions which he is requesting cryotherapy  The following portions of the patient's history were reviewed and updated as appropriate: allergies, current medications, past family history, past medical history, past social history, past surgical history and problem list     Review of Systems   Constitutional: Negative  Respiratory: Negative  Cardiovascular: Negative  Endocrine: Negative  Musculoskeletal: Positive for back pain  Skin:        Three skin lesions, which appear to be seborrheic keratosis   Neurological: Negative  Psychiatric/Behavioral: Negative  Objective:      /80   Pulse 67   Resp 16   Wt 52 6 kg (116 lb)   SpO2 98%   BMI 19 91 kg/m²          Physical Exam   Constitutional: She is oriented to person, place, and time  She appears well-developed and well-nourished  Cardiovascular: Normal rate, regular rhythm and normal heart sounds  Pulmonary/Chest: Effort normal and breath sounds normal    Abdominal: Soft  Bowel sounds are normal    Musculoskeletal: Normal range of motion  Neurological: She is alert and oriented to person, place, and time  Skin:   The patient has 3 dry skin lesions on her lower extremities  Psychiatric: She has a normal mood and affect  Her behavior is normal  Judgment and thought content normal    Vitals reviewed

## 2019-12-13 NOTE — PATIENT INSTRUCTIONS
The patient is also to have colitis is stable at this point  Her prednisone will be decreased to 5 mg daily  The patient has kidney stones presently  Her risk for kidney stones or increase with long-term prednisone use  The patient's hypertension is well controlled with present medications  The use of prednisone also increases the risk for hypertension  The patient had a repeat urine analysis done  It shows some hematuria and a culture was sent for analysis  Antibiotic treatment will be pending the results of the culture  The patient had cryotherapy to her 3 seborrheic keratosis like lesions on her legs  The patient was given an order for screening mammogram   The patient was also given DTaP to update her immunization status  The patient will follow up with me and approximately 1 month to check the status of her hypertension and kidney stones

## 2019-12-15 LAB
BACTERIA UR CULT: NORMAL
Lab: NO GROWTH

## 2019-12-17 DIAGNOSIS — K51.00 ULCERATIVE (CHRONIC) ENTEROCOLITIS, WITHOUT COMPLICATIONS (HCC): ICD-10-CM

## 2019-12-19 RX ORDER — PREDNISONE 1 MG/1
TABLET ORAL
Qty: 90 TABLET | Refills: 0 | Status: SHIPPED | OUTPATIENT
Start: 2019-12-19 | End: 2020-05-08

## 2019-12-19 RX ORDER — OMEPRAZOLE 40 MG/1
CAPSULE, DELAYED RELEASE ORAL
Qty: 90 CAPSULE | Refills: 0 | Status: SHIPPED | OUTPATIENT
Start: 2019-12-19 | End: 2020-05-08

## 2020-01-02 ENCOUNTER — OFFICE VISIT (OUTPATIENT)
Dept: PODIATRY | Facility: CLINIC | Age: 55
End: 2020-01-02
Payer: MEDICARE

## 2020-01-02 VITALS — WEIGHT: 116 LBS | HEIGHT: 64 IN | BODY MASS INDEX: 19.81 KG/M2 | RESPIRATION RATE: 17 BRPM

## 2020-01-02 DIAGNOSIS — B35.1 ONYCHOMYCOSIS: ICD-10-CM

## 2020-01-02 DIAGNOSIS — M20.41 HAMMER TOE OF RIGHT FOOT: Primary | ICD-10-CM

## 2020-01-02 DIAGNOSIS — M79.671 PAIN IN BOTH FEET: ICD-10-CM

## 2020-01-02 DIAGNOSIS — M79.672 PAIN IN BOTH FEET: ICD-10-CM

## 2020-01-02 DIAGNOSIS — L03.039 PARONYCHIA OF TOENAIL, UNSPECIFIED LATERALITY: ICD-10-CM

## 2020-01-02 DIAGNOSIS — M89.8X9 BONY EXOSTOSIS: ICD-10-CM

## 2020-01-02 PROCEDURE — 99213 OFFICE O/P EST LOW 20 MIN: CPT | Performed by: PODIATRIST

## 2020-01-02 RX ORDER — TERBINAFINE HYDROCHLORIDE 250 MG/1
250 TABLET ORAL DAILY
Qty: 30 TABLET | Refills: 0 | Status: SHIPPED | OUTPATIENT
Start: 2020-01-02 | End: 2020-02-06 | Stop reason: SDUPTHER

## 2020-01-02 NOTE — PROGRESS NOTES
Assessment/Plan:  Recalcitrant mycosis right hallux nail  Secondary paronychia  Pain upon ambulation  Hammertoe 5th right toe with secondary clavi      Plan  Foot exam performed  Nails debrided  Clavi by debrided  Refill of terbinafine ordered  Patient remain on topical antifungal   She will take biotin as directed  She may need ostectomy of distal phalanx  We will try Cantharone 1st   Patient declines     No problem-specific Assessment & Plan notes found for this encounter          Diagnoses and all orders for this visit:     Pain in both feet     Onychomycosis  -     terbinafine (LamISIL) 250 mg tablet; Take 1 tablet (250 mg total) by mouth daily     Paronychia of toenail, unspecified laterality  -     terbinafine (LamISIL) 250 mg tablet; Take 1 tablet (250 mg total) by mouth daily     Hammer toe of right foot            Subjective:  Patient has ongoing problems with her feet  She is concerned about fungus of her nails  She also has of painful small toe  No history of trauma    She is not taking Lamisil for many months           Past Medical History:   Diagnosis Date    Anxiety      Arthritis      Atypical chest pain       last assessed: 12/6/13    Bloody diarrhea       last assesed: 58/23/16    Breast lump       last assessed: 10/10/14    Candidiasis of esophagus (HCC)       last assessed: 2/24/16    Candidiasis of mouth       last assessed: 8/7/14    Choledocholithiasis       last assessed: 6/5/14    Cholelithiasis       last assessed: 7/21/15    Closed fracture of first metatarsal bone of right foot       last assessed: 12/11/13    Costovertebral angle pain       last assessed: 5/4/17    Elevated blood pressure reading       last assessed: 7/29/16    Grief reaction       last assessed: 4/4/16    Herpes zoster       last assessed: 11/3/16    High risk medication use       last assessed: 12/23/16    Hypokalemia       last assessed: 4/12/16    Hypotension       last assessed: 5/15/15  Inflammatory disorder of breast       last assessed: 13    Iron deficiency anemia       last assessed: 13    Microscopic hematuria       last assessed: 13    On prednisone therapy       last assessed: 10/19/17    Opioid use, unspecified, uncomplicated       last assessed: 3/1/17    Other polyp of sinus       last assessed: 13    Paronychia of toe       last assessed: 13    Pharyngoesophageal dysphagia       resolved: 3/1/16    Tinea corporis       last assessed: 16    Ulcerative colitis ([address] )      Underweight       last assessed: 3/1/16    Urinary frequency       resolved: 16    Viral warts                 Past Surgical History:   Procedure Laterality Date    BREAST SURGERY         left breast lumpectomy    CARPAL TUNNEL RELEASE Bilateral       SECTION N/A      CHOLECYSTECTOMY        COLONOSCOPY   2019    EGD   2019    ESOPHAGOGASTRODUODENOSCOPY N/A 3/14/2016     Procedure: ESOPHAGOGASTRODUODENOSCOPY (EGD); Surgeon: Loreto Madden MD;  Location: [facility]; Service:     TONSILLECTOMY N/A                 Allergies   Allergen Reactions    Dairy Aid [Lactase] GI Intolerance    Levaquin [Levofloxacin In D5w] Other (See Comments)       Redness at injection site    Mercaptopurine         Causes weakness in legs    Mesalamine GI Intolerance    Other Other (See Comments)       6 mp   Causes weakness in legs             Current Outpatient Medications:     aspirin 81 MG tablet, Take 1 tablet by mouth daily, Disp: , Rfl:     balsalazide (COLAZAL) 750 mg capsule, TAKE 3 CAPSULES TWICE DAILY, Disp: 540 capsule, Rfl: 5    cephalexin (KEFLEX) 500 mg capsule, Take 1 capsule (500 mg total) by mouth 2 (two) times a day for 7 days, Disp: 14 capsule, Rfl: 0    clotrimazole (LOTRIMIN) 1 % cream, Apply topically 2 (two) times a day (Patient not taking: Reported on 2019), Disp: 30 g, Rfl: 5    famotidine (PEPCID) 20 mg tablet, Take 1 tablet (20 mg total) by mouth 2 (two) times a day (Patient not taking: Reported on 11/5/2019), Disp: 60 tablet, Rfl: 11    fluticasone (FLONASE) 50 mcg/act nasal spray, 2 sprays into each nostril daily (Patient taking differently: 2 sprays into each nostril as needed ), Disp: 16 g, Rfl: 0    folic acid (FOLVITE) 1 mg tablet, Take 1 mg by mouth daily  , Disp: , Rfl:     HYDROcodone-acetaminophen (NORCO) 5-325 mg per tablet, , Disp: , Rfl: 0    ibuprofen (MOTRIN) 600 mg tablet, Take 1 tablet (600 mg total) by mouth every 6 (six) hours as needed for moderate pain (Patient taking differently: Take 600 mg by mouth as needed for moderate pain ), Disp: 30 tablet, Rfl: 1    ketoconazole (NIZORAL) 2 % cream, Apply topically daily for 30 days, Disp: 60 g, Rfl: 1    predniSONE 5 mg tablet, Take 8 tablets (40 mg total) by mouth daily Taper down by 5 mg every week, Disp: 300 tablet, Rfl: 0    quinapril (ACCUPRIL) 20 mg tablet, every 24 hours, Disp: , Rfl:     tamsulosin (FLOMAX) 0 4 mg, Take 1 capsule (0 4 mg total) by mouth daily with dinner for 5 days, Disp: 5 capsule, Rfl: 0    terbinafine (LamISIL) 250 mg tablet, Take 1 tablet (250 mg total) by mouth daily, Disp: 30 tablet, Rfl: 0         Patient Active Problem List   Diagnosis    Ulcerative colitis with rectal bleeding (HCC)    Dysphagia    Pansinusitis    Chronic pain syndrome    Essential hypertension    Atypical pigmented skin lesion    Ulcerative colitis without complications (HCC)    External hemorrhoids    Yeast infection    Skin lesion, superficial    Fungal dermatosis    Other viral warts    Need for influenza vaccination    Dry eyes    Dry mouth    Dermatitis due to unknown cause    Insomnia    Gastroesophageal reflux disease without esophagitis    Underweight    Paronychia of great toe, right    Onychia of toe of right foot    Epigastric pain    Pain in both feet    Tinea pedis of both feet    Onychomycosis    Paronychia of toenail    Seborrheic keratoses             Patient ID: Skylar Buckner is a 62 y o  female      HPI     The following portions of the patient's history were reviewed and updated as appropriate:      family history includes Asthma in her son; Autism in her son; COPD in her brother; Cancer in her maternal grandmother; Colon cancer in her maternal grandfather; Heart disease in her brother, brother, father, and mother; No Known Problems in her brother and half-sister; Stroke in her mother        reports that she has never smoked  She has never used smokeless tobacco  She reports that she does not drink alcohol or use drugs          Vitals:     11/21/19 1653   Resp: 16         Review of Systems       Objective:  Patient's shoes and socks removed     Foot ExamPhysical Exam       Patient's shoes and socks removed    Foot Exam     General  General Appearance: appears stated age and healthy   Orientation: alert and oriented to person, place, and time   Affect: appropriate   Gait: unimpaired         Right Foot/Ankle      Inspection and Palpation  Ecchymosis: none  Swelling: none   Arch: pes planus  Hammertoes: fifth toe  Hallux valgus: no  Hallux limitus: yes  Skin Exam: callus, dry skin, tinea and skin changes;      Neurovascular  Dorsalis pedis: 2+  Posterior tibial: 2+  Saphenous nerve sensation: normal  Tibial nerve sensation: normal  Superficial peroneal nerve sensation: normal  Deep peroneal nerve sensation: normal  Sural nerve sensation: normal  Achilles reflex: 2+  Babinski reflex: 2+        Left Foot/Ankle       Inspection and Palpation  Ecchymosis: none  Swelling: none   Arch: pes planus  Hammertoes: fifth toe  Hallux valgus: no  Hallux limitus: yes  Skin Exam: callus, dry skin, tinea and skin changes;      Neurovascular  Dorsalis pedis: 2+  Posterior tibial: 2+  Saphenous nerve sensation: normal  Tibial nerve sensation: normal  Superficial peroneal nerve sensation: normal  Deep peroneal nerve sensation: normal  Sural nerve sensation: normal  Achilles reflex: 2+  Babinski reflex: 2+           Physical Exam   Constitutional: She is oriented to person, place, and time  She appears well-developed and well-nourished  Cardiovascular: Normal rate and regular rhythm  Pulses:       Dorsalis pedis pulses are 2+ on the right side, and 2+ on the left side         Posterior tibial pulses are 2+ on the right side, and 2+ on the left side  Feet:   Right Foot:   Skin Integrity: Positive for callus and dry skin  Left Foot:   Skin Integrity: Positive for callus and dry skin  Neurological: She is alert and oriented to person, place, and time  Reflex Scores:       Achilles reflexes are 2+ on the right side and 2+ on the left side  Skin: Skin is warm and dry  Capillary refill takes less than 2 seconds    All nails demonstrate dystrophy   Hallux nail bilateral has distal mycosis   There is also periungual tinea pedis of hallux bilateral    Mycosis resolving slowly      Clavi  medial aspect right toe  Vitals reviewed

## 2020-01-06 ENCOUNTER — HOSPITAL ENCOUNTER (OUTPATIENT)
Dept: RADIOLOGY | Facility: HOSPITAL | Age: 55
Discharge: HOME | End: 2020-01-06
Attending: INTERNAL MEDICINE
Payer: MEDICARE

## 2020-01-06 ENCOUNTER — TRANSCRIBE ORDERS (OUTPATIENT)
Dept: ADMINISTRATIVE | Facility: HOSPITAL | Age: 55
End: 2020-01-06

## 2020-01-06 DIAGNOSIS — K51.011 ULCERATIVE PANCOLITIS WITH RECTAL BLEEDING (HCC): ICD-10-CM

## 2020-01-06 PROCEDURE — 77080 DXA BONE DENSITY AXIAL: CPT

## 2020-01-16 ENCOUNTER — OFFICE VISIT (OUTPATIENT)
Dept: FAMILY MEDICINE CLINIC | Facility: CLINIC | Age: 55
End: 2020-01-16
Payer: MEDICARE

## 2020-01-16 ENCOUNTER — TELEPHONE (OUTPATIENT)
Dept: GASTROENTEROLOGY | Facility: MEDICAL CENTER | Age: 55
End: 2020-01-16

## 2020-01-16 VITALS
SYSTOLIC BLOOD PRESSURE: 130 MMHG | OXYGEN SATURATION: 99 % | DIASTOLIC BLOOD PRESSURE: 68 MMHG | HEIGHT: 64 IN | TEMPERATURE: 98.1 F | HEART RATE: 75 BPM | BODY MASS INDEX: 19.12 KG/M2 | WEIGHT: 112 LBS

## 2020-01-16 DIAGNOSIS — Z12.31 SCREENING MAMMOGRAM, ENCOUNTER FOR: ICD-10-CM

## 2020-01-16 DIAGNOSIS — L82.1 SEBORRHEIC KERATOSES: ICD-10-CM

## 2020-01-16 DIAGNOSIS — I10 ESSENTIAL HYPERTENSION: Primary | ICD-10-CM

## 2020-01-16 DIAGNOSIS — B36.9 FUNGAL DERMATOSIS: ICD-10-CM

## 2020-01-16 DIAGNOSIS — K51.90 ULCERATIVE COLITIS WITHOUT COMPLICATIONS, UNSPECIFIED LOCATION (HCC): ICD-10-CM

## 2020-01-16 PROCEDURE — 99214 OFFICE O/P EST MOD 30 MIN: CPT | Performed by: FAMILY MEDICINE

## 2020-01-17 PROBLEM — Z12.31 SCREENING MAMMOGRAM, ENCOUNTER FOR: Status: ACTIVE | Noted: 2020-01-17

## 2020-01-17 NOTE — PROGRESS NOTES
Assessment/Plan:    No problem-specific Assessment & Plan notes found for this encounter  Diagnoses and all orders for this visit:    Essential hypertension    Ulcerative colitis without complications, unspecified location [facility])    Screening mammogram, encounter for  -     Mammo screening bilateral w cad; Future    Fungal dermatosis    Seborrheic keratoses        Subjective:      Patient ID: Sammy Herring is a 62 y o  female  This is a f/u visit for this 63 yo female with past history of ulcerative colitis, hypertension, and GERD who is on Prednisone and Colazal to treat her UC  She states some recent diarrhea but no blood in her stools  She denies any abd pain  Her weight is stable      The following portions of the patient's history were reviewed and updated as appropriate: allergies, current medications, past family history, past medical history, past social history, past surgical history and problem list     Review of Systems   Constitutional: Negative  HENT: Negative  Respiratory: Negative  Cardiovascular: Negative  Gastrointestinal: Positive for diarrhea  Negative for blood in stool  Endocrine: Negative  Musculoskeletal: Negative  Neurological: Negative  Psychiatric/Behavioral: Negative  Objective:      /68   Pulse 75   Temp 98 1 °F (36 7 °C)   Ht 5' 4" (1 626 m)   Wt 50 8 kg (112 lb)   SpO2 99%   BMI 19 22 kg/m²          Physical Exam   Constitutional: She is oriented to person, place, and time  She appears well-developed and well-nourished  Pt is under weight with BMI of 19 22   Cardiovascular: Normal rate, regular rhythm and normal heart sounds  Pulmonary/Chest: Effort normal and breath sounds normal    Abdominal: Soft  Bowel sounds are normal  She exhibits no distension and no mass  There is no tenderness  There is no guarding  Musculoskeletal: Normal range of motion  Neurological: She is alert and oriented to person, place, and time  Skin:   Pt has a scaly rash on her mid back   Psychiatric: She has a normal mood and affect  Her behavior is normal  Judgment and thought content normal    Vitals reviewed

## 2020-01-17 NOTE — PATIENT INSTRUCTIONS
The pt 's BP is up slightly but she increased her Prednisone to 10 mg daily with her recent diarrhea  Pt advised to decrease Prednisone to 5 mg daily  Pt will continue her present BP meds for hypertension  Pt will continue Colazal for UC also  Pt can continue to apply antifungal cream to rash on her mid back  Pt has several seborrheic keratosis lesions that will be cryoed with her next visit  Pt given order for a screening mammogram again    F/u in 3 months

## 2020-01-21 DIAGNOSIS — K51.811 OTHER ULCERATIVE COLITIS WITH RECTAL BLEEDING (HCC): ICD-10-CM

## 2020-01-21 DIAGNOSIS — I10 ESSENTIAL HYPERTENSION: Primary | ICD-10-CM

## 2020-01-21 RX ORDER — QUINAPRIL 20 MG/1
20 TABLET ORAL DAILY
Qty: 30 TABLET | Refills: 5 | Status: SHIPPED | OUTPATIENT
Start: 2020-01-21 | End: 2020-03-16 | Stop reason: SDUPTHER

## 2020-02-06 ENCOUNTER — OFFICE VISIT (OUTPATIENT)
Dept: PODIATRY | Facility: CLINIC | Age: 55
End: 2020-02-06
Payer: MEDICARE

## 2020-02-06 VITALS
BODY MASS INDEX: 19.12 KG/M2 | DIASTOLIC BLOOD PRESSURE: 68 MMHG | SYSTOLIC BLOOD PRESSURE: 130 MMHG | HEART RATE: 75 BPM | WEIGHT: 112 LBS | HEIGHT: 64 IN | RESPIRATION RATE: 16 BRPM

## 2020-02-06 DIAGNOSIS — B35.1 ONYCHOMYCOSIS: ICD-10-CM

## 2020-02-06 DIAGNOSIS — M20.41 HAMMER TOE OF RIGHT FOOT: Primary | ICD-10-CM

## 2020-02-06 DIAGNOSIS — M89.8X9 BONY EXOSTOSIS: ICD-10-CM

## 2020-02-06 DIAGNOSIS — L03.039 PARONYCHIA OF TOENAIL, UNSPECIFIED LATERALITY: ICD-10-CM

## 2020-02-06 PROCEDURE — 99213 OFFICE O/P EST LOW 20 MIN: CPT | Performed by: PODIATRIST

## 2020-02-06 PROCEDURE — 73620 X-RAY EXAM OF FOOT: CPT | Performed by: PODIATRIST

## 2020-02-06 RX ORDER — TERBINAFINE HYDROCHLORIDE 250 MG/1
250 TABLET ORAL DAILY
Qty: 30 TABLET | Refills: 0 | Status: SHIPPED | OUTPATIENT
Start: 2020-02-06 | End: 2020-03-07

## 2020-03-12 NOTE — PROGRESS NOTES
Assessment/Plan:  Pain upon ambulation  Hammertoe formation  Resolving mycosis  Plan  Foot exam performed  Nails debrided  X-rays taken to evaluate osteoarthritis  Hammertoe evaluation done  Refill of Lamisil done  There are no diagnoses linked to this encounter  Subjective:  Patient has decreased pain in the right foot  She only has pain occasionally depending on the shoes she wears  She took medication as prescribed  Allergies   Allergen Reactions    Dairy Aid [Lactase] GI Intolerance    Levaquin [Levofloxacin In D5w] Other (See Comments)     Redness at injection site    Mercaptopurine      Causes weakness in legs    Mesalamine GI Intolerance    Other Other (See Comments)     6 mp  Causes weakness in legs  Current Outpatient Medications:     aspirin 81 MG tablet, Take 1 tablet by mouth daily, Disp: , Rfl:     balsalazide (COLAZAL) 750 mg capsule, TAKE 3 CAPSULES TWICE DAILY, Disp: 540 capsule, Rfl: 5    clotrimazole (LOTRIMIN) 1 % cream, Apply topically 2 (two) times a day (Patient not taking: Reported on 11/5/2019), Disp: 30 g, Rfl: 5    famotidine (PEPCID) 20 mg tablet, Take 1 tablet (20 mg total) by mouth 2 (two) times a day (Patient not taking: Reported on 11/5/2019), Disp: 60 tablet, Rfl: 11    fluticasone (FLONASE) 50 mcg/act nasal spray, 2 sprays into each nostril daily (Patient taking differently: 2 sprays into each nostril as needed ), Disp: 16 g, Rfl: 0    folic acid (FOLVITE) 1 mg tablet, Take 1 mg by mouth daily  , Disp: , Rfl:     ibuprofen (MOTRIN) 600 mg tablet, Take 1 tablet (600 mg total) by mouth every 6 (six) hours as needed for moderate pain (Patient taking differently: Take 600 mg by mouth as needed for moderate pain ), Disp: 30 tablet, Rfl: 1    ketoconazole (NIZORAL) 2 % cream, Apply topically daily for 30 days, Disp: 60 g, Rfl: 1    omeprazole (PriLOSEC) 40 MG capsule, TAKE 1 CAPSULE EVERY DAY, Disp: 90 capsule, Rfl: 0    predniSONE 5 mg tablet, TAKE 1 TABLET EVERY DAY, Disp: 90 tablet, Rfl: 0    quinapril (ACCUPRIL) 20 mg tablet, Take 1 tablet (20 mg total) by mouth daily, Disp: 30 tablet, Rfl: 5    tamsulosin (FLOMAX) 0 4 mg, Take 1 capsule (0 4 mg total) by mouth daily with dinner for 5 days, Disp: 5 capsule, Rfl: 0    Patient Active Problem List   Diagnosis    Ulcerative colitis with rectal bleeding (HCC)    Dysphagia    Pansinusitis    Chronic pain syndrome    Essential hypertension    Atypical pigmented skin lesion    Ulcerative colitis without complications (HCC)    External hemorrhoids    Yeast infection    Skin lesion, superficial    Fungal dermatosis    Other viral warts    Need for influenza vaccination    Dry eyes    Dry mouth    Dermatitis due to unknown cause    Insomnia    Gastroesophageal reflux disease without esophagitis    Underweight    Paronychia of great toe, right    Onychia of toe of right foot    Epigastric pain    Pain in both feet    Tinea pedis of both feet    Onychomycosis    Paronychia of toenail    Seborrheic keratoses    Breast cancer screening by mammogram    Need for diphtheria-tetanus-pertussis (Tdap) vaccine    Kidney stones    Hematuria    Encounter for immunization    Low back pain without sciatica    Ulcerative (chronic) enterocolitis, without complications (HCC)    Screening mammogram, encounter for          Patient ID: Ira Purdy is a 62 y o  female  HPI    The following portions of the patient's history were reviewed and updated as appropriate:     family history includes Asthma in her son; Autism in her son; COPD in her brother; Cancer in her maternal grandmother; Colon cancer in her maternal grandfather; Heart disease in her brother, brother, father, and mother; No Known Problems in her brother and half-sister; Stroke in her mother  reports that she has never smoked   She has never used smokeless tobacco  She reports that she does not drink alcohol or use drugs     Vitals:    02/06/20 1652   BP: 130/68   Pulse: 75   Resp: 16       Review of Systems      Objective:  Patient's shoes and socks removed  Foot ExamPhysical Exam       Foot Exam     General  General Appearance: appears stated age and healthy   Orientation: alert and oriented to person, place, and time   Affect: appropriate   Gait: unimpaired         Right Foot/Ankle      Inspection and Palpation  Ecchymosis: none  Swelling: none   Arch: pes planus  Hammertoes: fifth toe  Hallux valgus: no  Hallux limitus: yes  Skin Exam: callus, dry skin, tinea and skin changes;      Neurovascular  Dorsalis pedis: 2+  Posterior tibial: 2+  Saphenous nerve sensation: normal  Tibial nerve sensation: normal  Superficial peroneal nerve sensation: normal  Deep peroneal nerve sensation: normal  Sural nerve sensation: normal  Achilles reflex: 2+  Babinski reflex: 2+        Left Foot/Ankle       Inspection and Palpation  Ecchymosis: none  Swelling: none   Arch: pes planus  Hammertoes: fifth toe  Hallux valgus: no  Hallux limitus: yes  Skin Exam: callus, dry skin, tinea and skin changes;      Neurovascular  Dorsalis pedis: 2+  Posterior tibial: 2+  Saphenous nerve sensation: normal  Tibial nerve sensation: normal  Superficial peroneal nerve sensation: normal  Deep peroneal nerve sensation: normal  Sural nerve sensation: normal  Achilles reflex: 2+  Babinski reflex: 2+           Physical Exam   Constitutional: She is oriented to person, place, and time  She appears well-developed and well-nourished  Cardiovascular: Normal rate and regular rhythm  Pulses:       Dorsalis pedis pulses are 2+ on the right side, and 2+ on the left side         Posterior tibial pulses are 2+ on the right side, and 2+ on the left side  Feet:   Right Foot:   Skin Integrity: Positive for callus and dry skin  Left Foot:   Skin Integrity: Positive for callus and dry skin  Neurological: She is alert and oriented to person, place, and time     Reflex Scores:       Achilles reflexes are 2+ on the right side and 2+ on the left side  Skin: Skin is warm and dry  Capillary refill takes less than 2 seconds    All nails demonstrate dystrophy   Hallux nail bilateral has distal mycosis   There is also periungual tinea pedis of hallux bilateral    Mycosis resolving slowly      Clavi  medial aspect right toe  X-ray  Right foot x-ray demonstrates osteoarthritis  There is hallux valgus formation  Hammertoe formation    There is enlarged medial tubercle 5th right toe distal phalanx noted The patient is a 38y Male complaining of altered mental status.

## 2020-03-16 DIAGNOSIS — I10 ESSENTIAL HYPERTENSION: ICD-10-CM

## 2020-03-16 RX ORDER — QUINAPRIL 20 MG/1
20 TABLET ORAL DAILY
Qty: 30 TABLET | Refills: 5 | Status: SHIPPED | OUTPATIENT
Start: 2020-03-16 | End: 2020-03-19 | Stop reason: SDUPTHER

## 2020-03-19 RX ORDER — QUINAPRIL 20 MG/1
20 TABLET ORAL DAILY
Qty: 30 TABLET | Refills: 5 | Status: SHIPPED | OUTPATIENT
Start: 2020-03-19 | End: 2020-07-30

## 2020-03-19 NOTE — TELEPHONE ENCOUNTER
Patient called stating the prescription was sent to the wrong place, would like it resent to [facility] [place], [place]  Please advise  thank you

## 2020-03-20 ENCOUNTER — TELEPHONE (OUTPATIENT)
Dept: FAMILY MEDICINE CLINIC | Facility: CLINIC | Age: 55
End: 2020-03-20

## 2020-03-20 NOTE — TELEPHONE ENCOUNTER
Patient called requesting a call back from Dr Arvin Diamond but did not want to disclose why  Please advise  Thank you

## 2020-05-08 DIAGNOSIS — K51.00 ULCERATIVE (CHRONIC) ENTEROCOLITIS, WITHOUT COMPLICATIONS (HCC): ICD-10-CM

## 2020-05-08 RX ORDER — OMEPRAZOLE 40 MG/1
CAPSULE, DELAYED RELEASE ORAL
Qty: 90 CAPSULE | Refills: 3 | Status: SHIPPED | OUTPATIENT
Start: 2020-05-08 | End: 2021-02-13

## 2020-05-08 RX ORDER — PREDNISONE 1 MG/1
TABLET ORAL
Qty: 90 TABLET | Refills: 3 | Status: SHIPPED | OUTPATIENT
Start: 2020-05-08 | End: 2020-12-17

## 2020-05-15 DIAGNOSIS — K51.911 ULCERATIVE COLITIS WITH RECTAL BLEEDING, UNSPECIFIED LOCATION (HCC): ICD-10-CM

## 2020-05-16 RX ORDER — BALSALAZIDE DISODIUM 750 MG/1
CAPSULE ORAL
Qty: 540 CAPSULE | Refills: 5 | Status: SHIPPED | OUTPATIENT
Start: 2020-05-16

## 2020-05-26 DIAGNOSIS — K51.919 ULCERATIVE COLITIS WITH COMPLICATION, UNSPECIFIED LOCATION (HCC): Primary | ICD-10-CM

## 2020-05-26 RX ORDER — PREDNISONE 2.5 MG
TABLET ORAL
Qty: 90 TABLET | Refills: 0 | Status: SHIPPED | OUTPATIENT
Start: 2020-05-26 | End: 2020-08-04

## 2020-07-29 DIAGNOSIS — I10 ESSENTIAL HYPERTENSION: ICD-10-CM

## 2020-07-30 RX ORDER — QUINAPRIL 20 MG/1
TABLET ORAL
Qty: 90 TABLET | Refills: 3 | Status: SHIPPED | OUTPATIENT
Start: 2020-07-30 | End: 2021-05-27

## 2020-08-04 DIAGNOSIS — K51.919 ULCERATIVE COLITIS WITH COMPLICATION, UNSPECIFIED LOCATION (HCC): ICD-10-CM

## 2020-08-04 RX ORDER — PREDNISONE 2.5 MG
TABLET ORAL
Qty: 90 TABLET | Refills: 0 | Status: SHIPPED | OUTPATIENT
Start: 2020-08-04 | End: 2020-10-10

## 2020-09-24 ENCOUNTER — IMMUNIZATIONS (OUTPATIENT)
Dept: FAMILY MEDICINE CLINIC | Facility: CLINIC | Age: 55
End: 2020-09-24
Payer: MEDICARE

## 2020-09-24 ENCOUNTER — TELEPHONE (OUTPATIENT)
Dept: FAMILY MEDICINE CLINIC | Facility: CLINIC | Age: 55
End: 2020-09-24

## 2020-09-24 DIAGNOSIS — Z23 ENCOUNTER FOR IMMUNIZATION: ICD-10-CM

## 2020-09-24 PROCEDURE — 90682 RIV4 VACC RECOMBINANT DNA IM: CPT

## 2020-09-24 PROCEDURE — G0008 ADMIN INFLUENZA VIRUS VAC: HCPCS

## 2020-10-09 DIAGNOSIS — K51.919 ULCERATIVE COLITIS WITH COMPLICATION, UNSPECIFIED LOCATION (HCC): ICD-10-CM

## 2020-10-10 RX ORDER — PREDNISONE 2.5 MG
TABLET ORAL
Qty: 90 TABLET | Refills: 0 | Status: SHIPPED | OUTPATIENT
Start: 2020-10-10 | End: 2020-12-17

## 2020-12-16 DIAGNOSIS — K51.919 ULCERATIVE COLITIS WITH COMPLICATION, UNSPECIFIED LOCATION (HCC): ICD-10-CM

## 2020-12-17 RX ORDER — PREDNISONE 2.5 MG
TABLET ORAL
Qty: 90 TABLET | Refills: 0 | Status: SHIPPED | OUTPATIENT
Start: 2020-12-17 | End: 2021-02-24

## 2021-02-12 DIAGNOSIS — K51.00 ULCERATIVE (CHRONIC) ENTEROCOLITIS, WITHOUT COMPLICATIONS (HCC): ICD-10-CM

## 2021-02-13 RX ORDER — OMEPRAZOLE 40 MG/1
CAPSULE, DELAYED RELEASE ORAL
Qty: 90 CAPSULE | Refills: 3 | Status: SHIPPED | OUTPATIENT
Start: 2021-02-13 | End: 2021-11-23

## 2021-02-22 DIAGNOSIS — K51.919 ULCERATIVE COLITIS WITH COMPLICATION, UNSPECIFIED LOCATION (HCC): ICD-10-CM

## 2021-02-24 RX ORDER — PREDNISONE 2.5 MG
TABLET ORAL
Qty: 90 TABLET | Refills: 0 | Status: SHIPPED | OUTPATIENT
Start: 2021-02-24 | End: 2021-04-29

## 2021-03-01 ENCOUNTER — TELEPHONE (OUTPATIENT)
Dept: FAMILY MEDICINE CLINIC | Facility: CLINIC | Age: 56
End: 2021-03-01

## 2021-03-01 NOTE — TELEPHONE ENCOUNTER
Patient is C/o of Red Rash with scabs in her scalp, states she has had for over a month and states that it seems to give her a headache as the Rash is in [facility] area  I did schedule her in office visit for 3/17/2020 @ 7:30 pm but is would like sooner appt time is possible as Patient's Son in normally in bed by that time

## 2021-03-17 ENCOUNTER — OFFICE VISIT (OUTPATIENT)
Dept: FAMILY MEDICINE CLINIC | Facility: CLINIC | Age: 56
End: 2021-03-17
Payer: MEDICARE

## 2021-03-17 VITALS
SYSTOLIC BLOOD PRESSURE: 136 MMHG | TEMPERATURE: 97.3 F | HEART RATE: 70 BPM | OXYGEN SATURATION: 98 % | BODY MASS INDEX: 20.06 KG/M2 | RESPIRATION RATE: 20 BRPM | DIASTOLIC BLOOD PRESSURE: 80 MMHG | WEIGHT: 120.4 LBS | HEIGHT: 65 IN

## 2021-03-17 DIAGNOSIS — Z12.31 SCREENING MAMMOGRAM, ENCOUNTER FOR: ICD-10-CM

## 2021-03-17 DIAGNOSIS — L98.9 DERMATOSIS: Primary | ICD-10-CM

## 2021-03-17 PROCEDURE — 99213 OFFICE O/P EST LOW 20 MIN: CPT | Performed by: FAMILY MEDICINE

## 2021-03-17 RX ORDER — MOMETASONE FUROATE 1 MG/G
CREAM TOPICAL DAILY
Qty: 45 G | Refills: 0 | Status: SHIPPED | OUTPATIENT
Start: 2021-03-17

## 2021-03-20 NOTE — PATIENT INSTRUCTIONS
Pt given rx for Elocon to treat rash in the right temple area  Pt can use it on the rash on her back also  Pt given order for screening mammogram   Pt will sign up with My Chart and schedule Covid vaccination

## 2021-03-20 NOTE — PROGRESS NOTES
Assessment/Plan:    No problem-specific Assessment & Plan notes found for this encounter  Diagnoses and all orders for this visit:    Dermatosis  -     mometasone (ELOCON) 0 1 % cream; Apply topically daily    Screening mammogram, encounter for  -     Mammo screening bilateral w 3d & cad; Future        Subjective:      Patient ID: Sammy Herring is a 60 y o  female  Pt states itchy rash at her right temple area for about two weeks  She has a chronic rash in her mid back area that is still present but not spreading  She denies other problems      The following portions of the patient's history were reviewed and updated as appropriate: allergies, current medications, past family history, past medical history, past social history, past surgical history and problem list     Review of Systems   HENT: Negative  Eyes: Negative  Respiratory: Negative  Cardiovascular: Negative  Gastrointestinal: Negative  Endocrine: Negative  Musculoskeletal: Negative  Skin: Positive for rash  Allergic/Immunologic: Negative  Neurological: Negative  Hematological: Negative  Psychiatric/Behavioral: Negative  All other systems reviewed and are negative  Objective:      /80 (BP Location: Left arm, Patient Position: Sitting, Cuff Size: Standard)   Pulse 70   Temp (!) 97 3 °F (36 3 °C) (Tympanic)   Resp 20   Ht 5' 5" (1 651 m)   Wt 54 6 kg (120 lb 6 4 oz)   SpO2 98%   BMI 20 04 kg/m²          Physical Exam  Constitutional:       Appearance: Normal appearance  She is normal weight  Cardiovascular:      Rate and Rhythm: Normal rate and regular rhythm  Pulmonary:      Effort: Pulmonary effort is normal       Breath sounds: Normal breath sounds  Skin:     Comments: Less than dime sized erythematous rash with area of excoriation in the right temple area  Erythematous rash in mid thoracic area of her back   Neurological:      Mental Status: She is alert     Psychiatric: Mood and Affect: Mood normal          Behavior: Behavior normal          Thought Content:  Thought content normal          Judgment: Judgment normal

## 2021-04-21 ENCOUNTER — IMMUNIZATIONS (OUTPATIENT)
Dept: FAMILY MEDICINE CLINIC | Facility: HOSPITAL | Age: 56
End: 2021-04-21

## 2021-04-21 DIAGNOSIS — Z23 ENCOUNTER FOR IMMUNIZATION: Primary | ICD-10-CM

## 2021-04-21 PROCEDURE — 91301 SARS-COV-2 / COVID-19 MRNA VACCINE (MODERNA) 100 MCG: CPT

## 2021-04-21 PROCEDURE — 0011A SARS-COV-2 / COVID-19 MRNA VACCINE (MODERNA) 100 MCG: CPT

## 2021-04-28 DIAGNOSIS — K51.919 ULCERATIVE COLITIS WITH COMPLICATION, UNSPECIFIED LOCATION (HCC): ICD-10-CM

## 2021-04-29 RX ORDER — PREDNISONE 2.5 MG
TABLET ORAL
Qty: 90 TABLET | Refills: 0 | Status: SHIPPED | OUTPATIENT
Start: 2021-04-29 | End: 2021-05-16

## 2021-05-15 DIAGNOSIS — K51.919 ULCERATIVE COLITIS WITH COMPLICATION, UNSPECIFIED LOCATION (HCC): ICD-10-CM

## 2021-05-16 RX ORDER — PREDNISONE 2.5 MG
TABLET ORAL
Qty: 90 TABLET | Refills: 0 | Status: SHIPPED | OUTPATIENT
Start: 2021-05-16 | End: 2021-07-30

## 2021-05-25 ENCOUNTER — IMMUNIZATIONS (OUTPATIENT)
Dept: FAMILY MEDICINE CLINIC | Facility: HOSPITAL | Age: 56
End: 2021-05-25

## 2021-05-25 DIAGNOSIS — Z23 ENCOUNTER FOR IMMUNIZATION: Primary | ICD-10-CM

## 2021-05-25 PROCEDURE — 91301 SARS-COV-2 / COVID-19 MRNA VACCINE (MODERNA) 100 MCG: CPT

## 2021-05-25 PROCEDURE — 0012A SARS-COV-2 / COVID-19 MRNA VACCINE (MODERNA) 100 MCG: CPT

## 2021-05-26 DIAGNOSIS — I10 ESSENTIAL HYPERTENSION: ICD-10-CM

## 2021-05-27 RX ORDER — QUINAPRIL 20 MG/1
TABLET ORAL
Qty: 90 TABLET | Refills: 3 | Status: SHIPPED | OUTPATIENT
Start: 2021-05-27 | End: 2022-03-17 | Stop reason: SDUPTHER

## 2021-07-30 DIAGNOSIS — K51.919 ULCERATIVE COLITIS WITH COMPLICATION, UNSPECIFIED LOCATION (HCC): ICD-10-CM

## 2021-07-30 RX ORDER — PREDNISONE 2.5 MG
TABLET ORAL
Qty: 90 TABLET | Refills: 0 | Status: SHIPPED | OUTPATIENT
Start: 2021-07-30 | End: 2021-08-18

## 2021-08-17 DIAGNOSIS — K51.919 ULCERATIVE COLITIS WITH COMPLICATION, UNSPECIFIED LOCATION (HCC): ICD-10-CM

## 2021-08-18 RX ORDER — PREDNISONE 2.5 MG
TABLET ORAL
Qty: 90 TABLET | Refills: 0 | Status: SHIPPED | OUTPATIENT
Start: 2021-08-18 | End: 2021-09-07

## 2021-09-05 DIAGNOSIS — K51.919 ULCERATIVE COLITIS WITH COMPLICATION, UNSPECIFIED LOCATION (HCC): ICD-10-CM

## 2021-09-07 RX ORDER — PREDNISONE 2.5 MG
TABLET ORAL
Qty: 90 TABLET | Refills: 0 | Status: SHIPPED | OUTPATIENT
Start: 2021-09-07 | End: 2021-09-23

## 2021-09-22 DIAGNOSIS — K51.919 ULCERATIVE COLITIS WITH COMPLICATION, UNSPECIFIED LOCATION (HCC): ICD-10-CM

## 2021-09-23 RX ORDER — PREDNISONE 2.5 MG
TABLET ORAL
Qty: 90 TABLET | Refills: 0 | Status: SHIPPED | OUTPATIENT
Start: 2021-09-23 | End: 2021-10-19

## 2021-10-11 DIAGNOSIS — K51.919 ULCERATIVE COLITIS WITH COMPLICATION, UNSPECIFIED LOCATION (HCC): ICD-10-CM

## 2021-10-15 ENCOUNTER — IMMUNIZATIONS (OUTPATIENT)
Dept: FAMILY MEDICINE CLINIC | Facility: CLINIC | Age: 56
End: 2021-10-15
Payer: MEDICARE

## 2021-10-15 DIAGNOSIS — Z23 ENCOUNTER FOR IMMUNIZATION: Primary | ICD-10-CM

## 2021-10-15 PROCEDURE — 90682 RIV4 VACC RECOMBINANT DNA IM: CPT

## 2021-10-15 PROCEDURE — G0008 ADMIN INFLUENZA VIRUS VAC: HCPCS

## 2021-10-19 RX ORDER — PREDNISONE 2.5 MG
TABLET ORAL
Qty: 90 TABLET | Refills: 0 | Status: SHIPPED | OUTPATIENT
Start: 2021-10-19 | End: 2021-11-08 | Stop reason: SDUPTHER

## 2021-11-08 DIAGNOSIS — K51.919 ULCERATIVE COLITIS WITH COMPLICATION, UNSPECIFIED LOCATION (HCC): ICD-10-CM

## 2021-11-08 RX ORDER — PREDNISONE 2.5 MG
TABLET ORAL
Qty: 90 TABLET | Refills: 0 | Status: SHIPPED | OUTPATIENT
Start: 2021-11-08 | End: 2021-12-20 | Stop reason: SDUPTHER

## 2021-11-10 ENCOUNTER — TELEPHONE (OUTPATIENT)
Dept: FAMILY MEDICINE CLINIC | Facility: CLINIC | Age: 56
End: 2021-11-10

## 2021-11-22 DIAGNOSIS — K51.00 ULCERATIVE (CHRONIC) ENTEROCOLITIS, WITHOUT COMPLICATIONS (HCC): ICD-10-CM

## 2021-11-23 RX ORDER — OMEPRAZOLE 40 MG/1
CAPSULE, DELAYED RELEASE ORAL
Qty: 90 CAPSULE | Refills: 3 | Status: SHIPPED | OUTPATIENT
Start: 2021-11-23

## 2021-12-17 ENCOUNTER — IMMUNIZATIONS (OUTPATIENT)
Dept: FAMILY MEDICINE CLINIC | Facility: HOSPITAL | Age: 56
End: 2021-12-17

## 2021-12-17 DIAGNOSIS — Z23 ENCOUNTER FOR IMMUNIZATION: Primary | ICD-10-CM

## 2021-12-17 PROCEDURE — 91301 COVID-19 MODERNA VACC 0.5 ML: CPT

## 2021-12-17 PROCEDURE — 0011A COVID-19 MODERNA VACC 0.5 ML: CPT

## 2021-12-20 ENCOUNTER — TELEPHONE (OUTPATIENT)
Dept: FAMILY MEDICINE CLINIC | Facility: CLINIC | Age: 56
End: 2021-12-20

## 2021-12-20 DIAGNOSIS — K51.919 ULCERATIVE COLITIS WITH COMPLICATION, UNSPECIFIED LOCATION (HCC): ICD-10-CM

## 2021-12-20 RX ORDER — PREDNISONE 2.5 MG
TABLET ORAL
Qty: 90 TABLET | Refills: 0 | Status: SHIPPED | OUTPATIENT
Start: 2021-12-20 | End: 2021-12-22 | Stop reason: SDUPTHER

## 2021-12-22 ENCOUNTER — TELEPHONE (OUTPATIENT)
Dept: FAMILY MEDICINE CLINIC | Facility: CLINIC | Age: 56
End: 2021-12-22

## 2021-12-22 DIAGNOSIS — R11.2 NON-INTRACTABLE VOMITING WITH NAUSEA, UNSPECIFIED VOMITING TYPE: Primary | ICD-10-CM

## 2021-12-22 DIAGNOSIS — K51.919 ULCERATIVE COLITIS WITH COMPLICATION, UNSPECIFIED LOCATION (HCC): ICD-10-CM

## 2021-12-22 RX ORDER — PREDNISONE 2.5 MG
TABLET ORAL
Qty: 90 TABLET | Refills: 5 | Status: SHIPPED | OUTPATIENT
Start: 2021-12-22 | End: 2022-05-20

## 2021-12-22 RX ORDER — ONDANSETRON 4 MG/1
4 TABLET, FILM COATED ORAL EVERY 8 HOURS PRN
Qty: 10 TABLET | Refills: 0 | Status: SHIPPED | OUTPATIENT
Start: 2021-12-22

## 2021-12-22 RX ORDER — PREDNISONE 2.5 MG
TABLET ORAL
Qty: 21 TABLET | Refills: 1 | Status: SHIPPED | OUTPATIENT
Start: 2021-12-22 | End: 2021-12-22 | Stop reason: SDUPTHER

## 2022-03-16 ENCOUNTER — TELEPHONE (OUTPATIENT)
Dept: FAMILY MEDICINE CLINIC | Facility: CLINIC | Age: 57
End: 2022-03-16

## 2022-03-17 DIAGNOSIS — I10 ESSENTIAL HYPERTENSION: ICD-10-CM

## 2022-03-18 RX ORDER — QUINAPRIL 20 MG/1
20 TABLET ORAL DAILY
Qty: 90 TABLET | Refills: 3 | Status: SHIPPED | OUTPATIENT
Start: 2022-03-18

## 2022-05-19 DIAGNOSIS — K51.919 ULCERATIVE COLITIS WITH COMPLICATION, UNSPECIFIED LOCATION (HCC): ICD-10-CM

## 2022-05-24 RX ORDER — PREDNISONE 2.5 MG
TABLET ORAL
Qty: 270 TABLET | Refills: 0 | Status: SHIPPED | OUTPATIENT
Start: 2022-05-24

## 2022-06-27 ENCOUNTER — RA CDI HCC (OUTPATIENT)
Dept: OTHER | Facility: HOSPITAL | Age: 57
End: 2022-06-27

## 2022-07-01 ENCOUNTER — OFFICE VISIT (OUTPATIENT)
Dept: FAMILY MEDICINE CLINIC | Facility: CLINIC | Age: 57
End: 2022-07-01
Payer: MEDICARE

## 2022-07-01 VITALS
SYSTOLIC BLOOD PRESSURE: 144 MMHG | RESPIRATION RATE: 18 BRPM | BODY MASS INDEX: 19.73 KG/M2 | TEMPERATURE: 97.6 F | WEIGHT: 118.4 LBS | HEIGHT: 65 IN | HEART RATE: 76 BPM | OXYGEN SATURATION: 98 % | DIASTOLIC BLOOD PRESSURE: 90 MMHG

## 2022-07-01 DIAGNOSIS — Z12.31 BREAST CANCER SCREENING BY MAMMOGRAM: ICD-10-CM

## 2022-07-01 DIAGNOSIS — R13.10 DYSPHAGIA, UNSPECIFIED TYPE: ICD-10-CM

## 2022-07-01 DIAGNOSIS — M81.0 OSTEOPOROSIS, UNSPECIFIED OSTEOPOROSIS TYPE, UNSPECIFIED PATHOLOGICAL FRACTURE PRESENCE: ICD-10-CM

## 2022-07-01 DIAGNOSIS — K51.90 ULCERATIVE COLITIS WITHOUT COMPLICATIONS, UNSPECIFIED LOCATION (HCC): Primary | ICD-10-CM

## 2022-07-01 DIAGNOSIS — I10 ESSENTIAL HYPERTENSION: ICD-10-CM

## 2022-07-01 DIAGNOSIS — L98.9 SKIN LESION OF BACK: ICD-10-CM

## 2022-07-01 PROCEDURE — 99214 OFFICE O/P EST MOD 30 MIN: CPT | Performed by: FAMILY MEDICINE

## 2022-07-01 NOTE — PATIENT INSTRUCTIONS
The patient was referred to GI secondary to her history of ulcerative colitis and being overdue for colonoscopy  She was also referred to GI because of her dysphagia  The patient has a longstanding history of prednisone use to treat her also diff colitis which could be contributing to problems with dysphagia  She was also referred to Dermatology for potential biopsy of the lesion in her mid back  Dermatology could also evaluate the rash on her right temple  I doubt this is a rash associated with shingles  The patient was given an order for DEXA scan to follow-up her history of osteoporosis  She was also given an order for screening mammogram   The patient is overdue for a mammogram   Patient will follow-up with me in 2 months when she has completed consults with GI and Dermatology  She is overdue for a gynecological exam also  She is up-to-date with her COVID vaccinations

## 2022-07-01 NOTE — PROGRESS NOTES
Assessment/Plan:    No problem-specific Assessment & Plan notes found for this encounter  Diagnoses and all orders for this visit:    Ulcerative colitis without complications, unspecified location [facility])  -     Ambulatory Referral to Gastroenterology; Future    Osteoporosis, unspecified osteoporosis type, unspecified pathological fracture presence  -     DXA bone density spine hip and pelvis; Future    Dysphagia, unspecified type  -     Ambulatory Referral to Gastroenterology; Future    Essential hypertension    Skin lesion of back  -     Ambulatory Referral to Dermatology; Future    Breast cancer screening by mammogram  -     Mammo screening bilateral w 3d & cad; Future        Subjective:      Patient ID: Thuan Kendall is a 60 y o  female  This is a follow-up appointment for this 47-year-old female with past medical problems that include ulcerative colitis, hypertension, osteoporosis, GERD, and a atypical pigmented lesion on her back  The patient states most recently problems with swallowing  She states she has to massage her neck in order to get food down her throat  She also has a rash on her right temple area which at times causes burning sensation  She states that this rash has been there for many months  The patient is also overdue for a mammogram and for a DEXA scan to follow-up her osteoporosis  Note the patient has been on oral prednisone to treat her ulcerative colitis for many years  The patient was either allergic or could not afford other medications used to treat ulcerative colitis  The patient has been compliant with her hypertensive meds  She states she is under increased stress secondary to problems generated by the father of her autistic son        The following portions of the patient's history were reviewed and updated as appropriate: allergies, current medications, past family history, past medical history, past social history, past surgical history and problem list     Review of Systems   HENT:        Pain in the right temple area associated with a rash   Gastrointestinal:        Difficulty swallowing   Skin:        Erythematous papule on middle of her back   Psychiatric/Behavioral: Negative  Objective:      /90   Pulse 76   Temp 97 6 °F (36 4 °C) (Tympanic)   Resp 18   Ht 5' 5" (1 651 m)   Wt 53 7 kg (118 lb 6 4 oz)   SpO2 98%   BMI 19 70 kg/m²          Physical Exam  Constitutional:       Comments: Patient is thin with a BMI of 19 70   HENT:      Right Ear: Tympanic membrane normal       Left Ear: Tympanic membrane normal       Mouth/Throat:      Mouth: Mucous membranes are moist       Pharynx: Oropharynx is clear  Cardiovascular:      Rate and Rhythm: Normal rate and regular rhythm  Heart sounds: Normal heart sounds  Pulmonary:      Effort: Pulmonary effort is normal       Breath sounds: Normal breath sounds  Abdominal:      General: Abdomen is flat  Bowel sounds are normal       Palpations: Abdomen is soft  Musculoskeletal:         General: Normal range of motion  Skin:     Comments: Erythematous papule in the midline of her back  Mild erythematous rash in her right temple area   Neurological:      Mental Status: She is alert  Psychiatric:         Mood and Affect: Mood normal          Behavior: Behavior normal          Thought Content:  Thought content normal          Judgment: Judgment normal

## 2022-08-16 ENCOUNTER — TELEPHONE (OUTPATIENT)
Dept: FAMILY MEDICINE CLINIC | Facility: CLINIC | Age: 57
End: 2022-08-16

## 2022-09-21 DIAGNOSIS — K51.00 ULCERATIVE (CHRONIC) ENTEROCOLITIS, WITHOUT COMPLICATIONS (HCC): ICD-10-CM

## 2022-09-21 RX ORDER — OMEPRAZOLE 40 MG/1
CAPSULE, DELAYED RELEASE ORAL
Qty: 90 CAPSULE | Refills: 3 | Status: SHIPPED | OUTPATIENT
Start: 2022-09-21

## 2022-10-17 ENCOUNTER — OFFICE VISIT (OUTPATIENT)
Dept: FAMILY MEDICINE CLINIC | Facility: CLINIC | Age: 57
End: 2022-10-17
Payer: MEDICARE

## 2022-10-17 DIAGNOSIS — J30.9 ALLERGIC RHINITIS, UNSPECIFIED SEASONALITY, UNSPECIFIED TRIGGER: ICD-10-CM

## 2022-10-17 DIAGNOSIS — Z23 ENCOUNTER FOR IMMUNIZATION: ICD-10-CM

## 2022-10-17 DIAGNOSIS — Z11.4 SCREENING FOR HIV (HUMAN IMMUNODEFICIENCY VIRUS): ICD-10-CM

## 2022-10-17 DIAGNOSIS — Z12.31 ENCOUNTER FOR SCREENING MAMMOGRAM FOR BREAST CANCER: ICD-10-CM

## 2022-10-17 DIAGNOSIS — M81.0 OSTEOPOROSIS, UNSPECIFIED OSTEOPOROSIS TYPE, UNSPECIFIED PATHOLOGICAL FRACTURE PRESENCE: ICD-10-CM

## 2022-10-17 DIAGNOSIS — K51.90 ULCERATIVE COLITIS WITHOUT COMPLICATIONS, UNSPECIFIED LOCATION (HCC): Primary | ICD-10-CM

## 2022-10-17 DIAGNOSIS — K51.919 ULCERATIVE COLITIS WITH COMPLICATION, UNSPECIFIED LOCATION (HCC): ICD-10-CM

## 2022-10-17 PROBLEM — G89.4 CHRONIC PAIN SYNDROME: Status: RESOLVED | Noted: 2018-06-08 | Resolved: 2022-10-17

## 2022-10-17 PROCEDURE — G0008 ADMIN INFLUENZA VIRUS VAC: HCPCS

## 2022-10-17 PROCEDURE — 90682 RIV4 VACC RECOMBINANT DNA IM: CPT

## 2022-10-17 PROCEDURE — 99214 OFFICE O/P EST MOD 30 MIN: CPT | Performed by: FAMILY MEDICINE

## 2022-10-17 RX ORDER — FLUTICASONE PROPIONATE 50 MCG
2 SPRAY, SUSPENSION (ML) NASAL DAILY
Qty: 16 G | Refills: 0 | Status: SHIPPED | OUTPATIENT
Start: 2022-10-17

## 2022-10-17 RX ORDER — PREDNISONE 2.5 MG
TABLET ORAL
Qty: 270 TABLET | Refills: 0 | Status: CANCELLED | OUTPATIENT
Start: 2022-10-17

## 2022-10-17 NOTE — PROGRESS NOTES
Name: Hermes Cartwright      : 1965      MRN: [id number]  Encounter Provider: Andi Chapman DO  Encounter Date: 10/17/2022   Encounter department: [facility]     1  Ulcerative colitis without complications, unspecified location ([facility] )    2  Osteoporosis, unspecified osteoporosis type, unspecified pathological fracture presence    3  Encounter for immunization  -     influenza vaccine, quadrivalent, recombinant, PF, 0 5 mL, for patients 18 yr+ (FLUBLOK)    4  Encounter for screening mammogram for breast cancer  -     Mammo screening bilateral w 3d & cad; Future; Expected date: 10/17/2022    5  Screening for HIV (human immunodeficiency virus)  -     HIV 1/2 Antigen/Antibody (4th Generation) w Reflex [facility]; Future    6  Allergic rhinitis, unspecified seasonality, unspecified trigger  -     fluticasone (FLONASE) 50 mcg/act nasal spray; 2 sprays into each nostril daily    7  Ulcerative colitis with complication, unspecified location [facility])           Subjective      This is a follow-up appointment for this 57-year-old female with past medical history of ulcerative colitis  Patient has been on steroids for many years to treat ulcerative colitis because of side effects from past medications and also because of lack of insurance to afford other also colitis medications  Patient is overdue for follow-up with her GI doctor  She was given referrals in July to make an appointment but did not do it  The patient is also overdue for DEXA scan  Her osteoporosis is related to her long-term steroid use  The patient has skin changes also secondary to long-term steroid use  I had a long discussion with the patient that she needs to be weaned off steroids  The patient also has a history of epigastric pain related to the steroids  Review of Systems   HENT: Negative  Eyes: Negative  Respiratory: Negative  Cardiovascular: Negative  Gastrointestinal: Negative  Endocrine: Negative  Musculoskeletal: Negative  Skin:        Purplish skin lesions on her arms and extremities related to steroid use   Allergic/Immunologic: Negative  Neurological: Negative  Hematological: Negative  Psychiatric/Behavioral: Negative  All other systems reviewed and are negative  Current Outpatient Medications on File Prior to Visit   Medication Sig   • aspirin 81 MG tablet Take 1 tablet by mouth daily   • balsalazide (COLAZAL) 750 mg capsule TAKE 3 CAPSULES TWICE DAILY   • folic acid (FOLVITE) 1 mg tablet Take 1 mg by mouth daily  • omeprazole (PriLOSEC) 40 MG capsule TAKE 1 CAPSULE EVERY DAY   • predniSONE 2 5 mg tablet TAKE 3 TABLETS EVERY DAY   • quinapril (ACCUPRIL) 20 mg tablet Take 1 tablet (20 mg total) by mouth daily   • [DISCONTINUED] fluticasone (FLONASE) 50 mcg/act nasal spray 2 sprays into each nostril daily (Patient taking differently: 2 sprays into each nostril as needed)   • [DISCONTINUED] ondansetron (ZOFRAN) 4 mg tablet Take 1 tablet (4 mg total) by mouth every 8 (eight) hours as needed for nausea or vomiting   • [DISCONTINUED] famotidine (PEPCID) 20 mg tablet Take 1 tablet (20 mg total) by mouth 2 (two) times a day (Patient not taking: Reported on 11/5/2019)   • [DISCONTINUED] mometasone (ELOCON) 0 1 % cream Apply topically daily (Patient not taking: No sig reported)       Objective     There were no vitals taken for this visit  Physical Exam  Constitutional:       Appearance: Normal appearance  Musculoskeletal:         General: Normal range of motion  Skin:     Comments: Ecchymotic areas scattered on her forearms related to steroid use   Neurological:      Mental Status: She is alert  Psychiatric:         Mood and Affect: Mood normal          Behavior: Behavior normal          Thought Content:  Thought content normal          Judgment: Judgment normal        Massimo Duarte DO

## 2022-10-17 NOTE — PATIENT INSTRUCTIONS
Patient steroid use will be weaned down from 7 5 mg a day to 5 mg per day  In 30 days the patient should be weaned down even further  Patient needs to make an appointment for follow-up with her GI doctor for her ulcerative colitis  The patient needs to get off long-term steroid use because of all the side effects that is causing her which includes the osteoporosis and skin changes  The patient needs to get a DEXA scan to follow-up her osteoporosis  The patient did receive a flu shot tonight

## 2022-10-31 DIAGNOSIS — I10 ESSENTIAL HYPERTENSION: ICD-10-CM

## 2022-10-31 RX ORDER — QUINAPRIL 20 MG/1
20 TABLET ORAL DAILY
Qty: 90 TABLET | Refills: 3 | Status: SHIPPED | OUTPATIENT
Start: 2022-10-31

## 2022-12-28 DIAGNOSIS — K51.919 ULCERATIVE COLITIS WITH COMPLICATION, UNSPECIFIED LOCATION (HCC): ICD-10-CM

## 2022-12-28 RX ORDER — PREDNISONE 2.5 MG
TABLET ORAL
Qty: 270 TABLET | Refills: 0 | Status: SHIPPED | OUTPATIENT
Start: 2022-12-28

## 2023-03-07 NOTE — TELEPHONE ENCOUNTER
Dr Jayjay Cuellar REFILL FOR OXYCODONE AND NASAL SPRAY/THROAT SPRAY  SHE NEEDS HER OXYCODONE TODAY  CALL HER WHEN THIS IS READY  THE SPRAYS CAN BE CALLED INTO [facility] 
none

## 2023-07-05 DIAGNOSIS — K51.919 ULCERATIVE COLITIS WITH COMPLICATION, UNSPECIFIED LOCATION (HCC): ICD-10-CM

## 2023-07-06 ENCOUNTER — TELEPHONE (OUTPATIENT)
Dept: FAMILY MEDICINE CLINIC | Facility: CLINIC | Age: 58
End: 2023-07-06

## 2023-07-06 ENCOUNTER — APPOINTMENT (EMERGENCY)
Dept: RADIOLOGY | Facility: HOSPITAL | Age: 58
DRG: 386 | End: 2023-07-06
Payer: MEDICARE

## 2023-07-06 ENCOUNTER — HOSPITAL ENCOUNTER (INPATIENT)
Facility: HOSPITAL | Age: 58
LOS: 4 days | Discharge: HOME | DRG: 386 | End: 2023-07-11
Attending: EMERGENCY MEDICINE | Admitting: FAMILY MEDICINE
Payer: MEDICARE

## 2023-07-06 DIAGNOSIS — L98.9 SKIN LESION, SUPERFICIAL: ICD-10-CM

## 2023-07-06 DIAGNOSIS — R00.1 SINUS BRADYCARDIA: ICD-10-CM

## 2023-07-06 DIAGNOSIS — K51.011 ULCERATIVE PANCOLITIS WITH RECTAL BLEEDING (HCC): ICD-10-CM

## 2023-07-06 DIAGNOSIS — K52.9 COLITIS: Primary | ICD-10-CM

## 2023-07-06 DIAGNOSIS — I10 ESSENTIAL HYPERTENSION: ICD-10-CM

## 2023-07-06 DIAGNOSIS — D64.9 SYMPTOMATIC ANEMIA: ICD-10-CM

## 2023-07-06 DIAGNOSIS — R00.1 BRADYCARDIA WITH LESS THAN 60 BEATS PER MINUTE: ICD-10-CM

## 2023-07-06 DIAGNOSIS — K51.90 ULCERATIVE COLITIS WITHOUT COMPLICATIONS, UNSPECIFIED LOCATION (HCC): ICD-10-CM

## 2023-07-06 DIAGNOSIS — K51.911 ULCERATIVE COLITIS WITH RECTAL BLEEDING, UNSPECIFIED LOCATION (HCC): ICD-10-CM

## 2023-07-06 DIAGNOSIS — I49.8 BRADYARRHYTHMIA: ICD-10-CM

## 2023-07-06 DIAGNOSIS — K51.90 ULCERATIVE COLITIS (HCC): ICD-10-CM

## 2023-07-06 DIAGNOSIS — D50.0 ANEMIA, BLOOD LOSS: ICD-10-CM

## 2023-07-06 DIAGNOSIS — K51.00 ULCERATIVE (CHRONIC) ENTEROCOLITIS, WITHOUT COMPLICATIONS (HCC): ICD-10-CM

## 2023-07-06 PROBLEM — N17.9 ACUTE KIDNEY INJURY (HCC): Status: ACTIVE | Noted: 2023-07-06

## 2023-07-06 PROBLEM — Z87.19 HISTORY OF ULCERATIVE COLITIS: Status: ACTIVE | Noted: 2019-12-13

## 2023-07-06 PROBLEM — D17.1 LIPOMA OF ABDOMINAL WALL: Status: ACTIVE | Noted: 2017-05-04

## 2023-07-06 PROBLEM — F11.20 UNCOMPLICATED OPIOID DEPENDENCE (HCC): Status: ACTIVE | Noted: 2017-09-14

## 2023-07-06 PROBLEM — R10.2 PELVIC PAIN IN FEMALE: Status: ACTIVE | Noted: 2017-07-19

## 2023-07-06 LAB
2HR DELTA HS TROPONIN: 0 NG/L
ABO GROUP BLD: NORMAL
ALBUMIN SERPL BCP-MCNC: 3.1 G/DL (ref 3.5–5)
ALP SERPL-CCNC: 65 U/L (ref 34–104)
ALT SERPL W P-5'-P-CCNC: 6 U/L (ref 7–52)
ANION GAP SERPL CALCULATED.3IONS-SCNC: 8 MMOL/L
AST SERPL W P-5'-P-CCNC: 15 U/L (ref 13–39)
BACTERIA UR QL AUTO: ABNORMAL /HPF
BASOPHILS # BLD AUTO: 0.05 THOUSANDS/ÂΜL (ref 0–0.1)
BASOPHILS NFR BLD AUTO: 0 % (ref 0–1)
BILIRUB SERPL-MCNC: 0.37 MG/DL (ref 0.2–1)
BILIRUB UR QL STRIP: NEGATIVE
BLD GP AB SCN SERPL QL: NEGATIVE
BUN SERPL-MCNC: 17 MG/DL (ref 5–25)
CALCIUM ALBUM COR SERPL-MCNC: 9.5 MG/DL (ref 8.3–10.1)
CALCIUM SERPL-MCNC: 8.8 MG/DL (ref 8.4–10.2)
CARDIAC TROPONIN I PNL SERPL HS: 3 NG/L
CARDIAC TROPONIN I PNL SERPL HS: 3 NG/L
CHLORIDE SERPL-SCNC: 107 MMOL/L (ref 96–108)
CLARITY UR: CLEAR
CO2 SERPL-SCNC: 22 MMOL/L (ref 21–32)
COLOR UR: ABNORMAL
CREAT SERPL-MCNC: 1.41 MG/DL (ref 0.6–1.3)
CRP SERPL QL: 5 MG/L
EOSINOPHIL # BLD AUTO: 0.17 THOUSAND/ÂΜL (ref 0–0.61)
EOSINOPHIL NFR BLD AUTO: 1 % (ref 0–6)
ERYTHROCYTE [DISTWIDTH] IN BLOOD BY AUTOMATED COUNT: 24.2 % (ref 11.6–15.1)
GFR SERPL CREATININE-BSD FRML MDRD: 40 ML/MIN/1.73SQ M
GLUCOSE SERPL-MCNC: 85 MG/DL (ref 65–140)
GLUCOSE UR STRIP-MCNC: NEGATIVE MG/DL
HCT VFR BLD AUTO: 23.2 % (ref 34.8–46.1)
HGB BLD-MCNC: 5.9 G/DL (ref 11.5–15.4)
HGB UR QL STRIP.AUTO: ABNORMAL
HYALINE CASTS #/AREA URNS LPF: ABNORMAL /LPF
IMM GRANULOCYTES # BLD AUTO: 0.13 THOUSAND/UL (ref 0–0.2)
IMM GRANULOCYTES NFR BLD AUTO: 1 % (ref 0–2)
KETONES UR STRIP-MCNC: NEGATIVE MG/DL
LEUKOCYTE ESTERASE UR QL STRIP: ABNORMAL
LIPASE SERPL-CCNC: 14 U/L (ref 11–82)
LYMPHOCYTES # BLD AUTO: 1.28 THOUSANDS/ÂΜL (ref 0.6–4.47)
LYMPHOCYTES NFR BLD AUTO: 8 % (ref 14–44)
MAGNESIUM SERPL-MCNC: 1.7 MG/DL (ref 1.9–2.7)
MCH RBC QN AUTO: 16.5 PG (ref 26.8–34.3)
MCHC RBC AUTO-ENTMCNC: 25.8 G/DL (ref 31.4–37.4)
MCV RBC AUTO: 65 FL (ref 82–98)
MONOCYTES # BLD AUTO: 1.26 THOUSAND/ÂΜL (ref 0.17–1.22)
MONOCYTES NFR BLD AUTO: 8 % (ref 4–12)
NEUTROPHILS # BLD AUTO: 13.99 THOUSANDS/ÂΜL (ref 1.85–7.62)
NEUTS SEG NFR BLD AUTO: 82 % (ref 43–75)
NITRITE UR QL STRIP: NEGATIVE
NON-SQ EPI CELLS URNS QL MICRO: ABNORMAL /HPF
NRBC BLD AUTO-RTO: 0 /100 WBCS
PH UR STRIP.AUTO: 6 [PH]
PLATELET # BLD AUTO: 652 THOUSANDS/UL (ref 149–390)
PMV BLD AUTO: 8.2 FL (ref 8.9–12.7)
POTASSIUM SERPL-SCNC: 3.6 MMOL/L (ref 3.5–5.3)
PROT SERPL-MCNC: 6.6 G/DL (ref 6.4–8.4)
PROT UR STRIP-MCNC: NEGATIVE MG/DL
RBC # BLD AUTO: 3.58 MILLION/UL (ref 3.81–5.12)
RBC #/AREA URNS AUTO: ABNORMAL /HPF
RH BLD: POSITIVE
SODIUM SERPL-SCNC: 137 MMOL/L (ref 135–147)
SP GR UR STRIP.AUTO: <=1.005 (ref 1–1.03)
SPECIMEN EXPIRATION DATE: NORMAL
UROBILINOGEN UR QL STRIP.AUTO: 0.2 E.U./DL
WBC # BLD AUTO: 16.88 THOUSAND/UL (ref 4.31–10.16)
WBC #/AREA URNS AUTO: ABNORMAL /HPF

## 2023-07-06 PROCEDURE — 84484 ASSAY OF TROPONIN QUANT: CPT | Performed by: EMERGENCY MEDICINE

## 2023-07-06 PROCEDURE — 82728 ASSAY OF FERRITIN: CPT

## 2023-07-06 PROCEDURE — 99284 EMERGENCY DEPT VISIT MOD MDM: CPT

## 2023-07-06 PROCEDURE — 99223 1ST HOSP IP/OBS HIGH 75: CPT | Performed by: INTERNAL MEDICINE

## 2023-07-06 PROCEDURE — 86140 C-REACTIVE PROTEIN: CPT | Performed by: PHYSICIAN ASSISTANT

## 2023-07-06 PROCEDURE — 86900 BLOOD TYPING SEROLOGIC ABO: CPT | Performed by: EMERGENCY MEDICINE

## 2023-07-06 PROCEDURE — 81001 URINALYSIS AUTO W/SCOPE: CPT | Performed by: EMERGENCY MEDICINE

## 2023-07-06 PROCEDURE — G1004 CDSM NDSC: HCPCS

## 2023-07-06 PROCEDURE — 83735 ASSAY OF MAGNESIUM: CPT | Performed by: EMERGENCY MEDICINE

## 2023-07-06 PROCEDURE — 87209 SMEAR COMPLEX STAIN: CPT | Performed by: PHYSICIAN ASSISTANT

## 2023-07-06 PROCEDURE — 74177 CT ABD & PELVIS W/CONTRAST: CPT

## 2023-07-06 PROCEDURE — 30233N1 TRANSFUSION OF NONAUTOLOGOUS RED BLOOD CELLS INTO PERIPHERAL VEIN, PERCUTANEOUS APPROACH: ICD-10-PCS | Performed by: INTERNAL MEDICINE

## 2023-07-06 PROCEDURE — 86920 COMPATIBILITY TEST SPIN: CPT

## 2023-07-06 PROCEDURE — 86901 BLOOD TYPING SEROLOGIC RH(D): CPT | Performed by: EMERGENCY MEDICINE

## 2023-07-06 PROCEDURE — 85025 COMPLETE CBC W/AUTO DIFF WBC: CPT | Performed by: EMERGENCY MEDICINE

## 2023-07-06 PROCEDURE — 83550 IRON BINDING TEST: CPT

## 2023-07-06 PROCEDURE — 87177 OVA AND PARASITES SMEARS: CPT | Performed by: PHYSICIAN ASSISTANT

## 2023-07-06 PROCEDURE — 96374 THER/PROPH/DIAG INJ IV PUSH: CPT

## 2023-07-06 PROCEDURE — 83690 ASSAY OF LIPASE: CPT | Performed by: EMERGENCY MEDICINE

## 2023-07-06 PROCEDURE — 87493 C DIFF AMPLIFIED PROBE: CPT | Performed by: PHYSICIAN ASSISTANT

## 2023-07-06 PROCEDURE — 93005 ELECTROCARDIOGRAM TRACING: CPT

## 2023-07-06 PROCEDURE — 80053 COMPREHEN METABOLIC PANEL: CPT | Performed by: EMERGENCY MEDICINE

## 2023-07-06 PROCEDURE — 36415 COLL VENOUS BLD VENIPUNCTURE: CPT | Performed by: EMERGENCY MEDICINE

## 2023-07-06 PROCEDURE — 96361 HYDRATE IV INFUSION ADD-ON: CPT

## 2023-07-06 PROCEDURE — 86850 RBC ANTIBODY SCREEN: CPT | Performed by: EMERGENCY MEDICINE

## 2023-07-06 PROCEDURE — 96375 TX/PRO/DX INJ NEW DRUG ADDON: CPT

## 2023-07-06 PROCEDURE — 87505 NFCT AGENT DETECTION GI: CPT | Performed by: PHYSICIAN ASSISTANT

## 2023-07-06 PROCEDURE — P9016 RBC LEUKOCYTES REDUCED: HCPCS

## 2023-07-06 PROCEDURE — 83540 ASSAY OF IRON: CPT

## 2023-07-06 PROCEDURE — 36430 TRANSFUSION BLD/BLD COMPNT: CPT

## 2023-07-06 RX ORDER — METRONIDAZOLE 500 MG/100ML
500 INJECTION, SOLUTION INTRAVENOUS EVERY 8 HOURS
Status: DISCONTINUED | OUTPATIENT
Start: 2023-07-06 | End: 2023-07-07

## 2023-07-06 RX ORDER — PANTOPRAZOLE SODIUM 40 MG/1
40 TABLET, DELAYED RELEASE ORAL
Status: DISCONTINUED | OUTPATIENT
Start: 2023-07-07 | End: 2023-07-07

## 2023-07-06 RX ORDER — MESALAMINE 400 MG/1
800 CAPSULE, DELAYED RELEASE ORAL 3 TIMES DAILY
Status: DISCONTINUED | OUTPATIENT
Start: 2023-07-06 | End: 2023-07-11 | Stop reason: HOSPADM

## 2023-07-06 RX ORDER — POTASSIUM CHLORIDE 29.8 MG/ML
40 INJECTION INTRAVENOUS ONCE
Status: DISCONTINUED | OUTPATIENT
Start: 2023-07-06 | End: 2023-07-06

## 2023-07-06 RX ORDER — ONDANSETRON 2 MG/ML
4 INJECTION INTRAMUSCULAR; INTRAVENOUS EVERY 6 HOURS PRN
Status: DISCONTINUED | OUTPATIENT
Start: 2023-07-06 | End: 2023-07-11 | Stop reason: ALTCHOICE

## 2023-07-06 RX ORDER — LORAZEPAM 2 MG/ML
1 INJECTION INTRAMUSCULAR ONCE
Status: COMPLETED | OUTPATIENT
Start: 2023-07-06 | End: 2023-07-06

## 2023-07-06 RX ORDER — FAMOTIDINE 10 MG/ML
20 INJECTION, SOLUTION INTRAVENOUS ONCE
Status: COMPLETED | OUTPATIENT
Start: 2023-07-06 | End: 2023-07-06

## 2023-07-06 RX ORDER — HYDROXYZINE HYDROCHLORIDE 25 MG/1
25 TABLET, FILM COATED ORAL
Status: COMPLETED | OUTPATIENT
Start: 2023-07-06 | End: 2023-07-06

## 2023-07-06 RX ORDER — CEFTRIAXONE 1 G/50ML
1000 INJECTION, SOLUTION INTRAVENOUS EVERY 24 HOURS
Status: DISCONTINUED | OUTPATIENT
Start: 2023-07-06 | End: 2023-07-07

## 2023-07-06 RX ORDER — POTASSIUM CHLORIDE 14.9 MG/ML
20 INJECTION INTRAVENOUS ONCE
Status: COMPLETED | OUTPATIENT
Start: 2023-07-06 | End: 2023-07-06

## 2023-07-06 RX ORDER — PREDNISONE 2.5 MG/1
TABLET ORAL
Qty: 180 TABLET | Refills: 1 | Status: SHIPPED | OUTPATIENT
Start: 2023-07-06 | End: 2023-07-11

## 2023-07-06 RX ORDER — ONDANSETRON 2 MG/ML
4 INJECTION INTRAMUSCULAR; INTRAVENOUS ONCE
Status: COMPLETED | OUTPATIENT
Start: 2023-07-06 | End: 2023-07-06

## 2023-07-06 RX ORDER — METRONIDAZOLE 500 MG/100ML
500 INJECTION, SOLUTION INTRAVENOUS ONCE
Status: DISCONTINUED | OUTPATIENT
Start: 2023-07-06 | End: 2023-07-06

## 2023-07-06 RX ORDER — SODIUM CHLORIDE 9 MG/ML
100 INJECTION, SOLUTION INTRAVENOUS CONTINUOUS
Status: DISCONTINUED | OUTPATIENT
Start: 2023-07-06 | End: 2023-07-08

## 2023-07-06 RX ORDER — POTASSIUM CHLORIDE 14.9 MG/ML
20 INJECTION INTRAVENOUS ONCE
Status: COMPLETED | OUTPATIENT
Start: 2023-07-06 | End: 2023-07-07

## 2023-07-06 RX ORDER — FOLIC ACID 1 MG/1
1 TABLET ORAL DAILY
Status: DISCONTINUED | OUTPATIENT
Start: 2023-07-07 | End: 2023-07-11 | Stop reason: HOSPADM

## 2023-07-06 RX ORDER — MAGNESIUM SULFATE HEPTAHYDRATE 40 MG/ML
4 INJECTION, SOLUTION INTRAVENOUS ONCE
Status: COMPLETED | OUTPATIENT
Start: 2023-07-06 | End: 2023-07-07

## 2023-07-06 RX ORDER — CEFTRIAXONE 1 G/50ML
1000 INJECTION, SOLUTION INTRAVENOUS ONCE
Status: DISCONTINUED | OUTPATIENT
Start: 2023-07-06 | End: 2023-07-06

## 2023-07-06 RX ADMIN — HYDROXYZINE HYDROCHLORIDE 25 MG: 25 TABLET, FILM COATED ORAL at 22:28

## 2023-07-06 RX ADMIN — SODIUM CHLORIDE 1000 ML: 0.9 INJECTION, SOLUTION INTRAVENOUS at 12:25

## 2023-07-06 RX ADMIN — MESALAMINE 800 MG: 400 CAPSULE, DELAYED RELEASE ORAL at 20:47

## 2023-07-06 RX ADMIN — METRONIDAZOLE 500 MG: 500 INJECTION, SOLUTION INTRAVENOUS at 20:14

## 2023-07-06 RX ADMIN — FAMOTIDINE 20 MG: 10 INJECTION, SOLUTION INTRAVENOUS at 12:28

## 2023-07-06 RX ADMIN — SODIUM CHLORIDE 100 ML/HR: 0.9 INJECTION, SOLUTION INTRAVENOUS at 17:27

## 2023-07-06 RX ADMIN — MAGNESIUM SULFATE HEPTAHYDRATE 4 G: 40 INJECTION, SOLUTION INTRAVENOUS at 20:13

## 2023-07-06 RX ADMIN — LORAZEPAM 1 MG: 2 INJECTION INTRAMUSCULAR; INTRAVENOUS at 15:11

## 2023-07-06 RX ADMIN — CEFTRIAXONE 1000 MG: 1 INJECTION, SOLUTION INTRAVENOUS at 19:55

## 2023-07-06 RX ADMIN — POTASSIUM CHLORIDE 20 MEQ: 14.9 INJECTION, SOLUTION INTRAVENOUS at 20:13

## 2023-07-06 RX ADMIN — POTASSIUM CHLORIDE 20 MEQ: 14.9 INJECTION, SOLUTION INTRAVENOUS at 22:51

## 2023-07-06 RX ADMIN — MESALAMINE 800 MG: 400 CAPSULE, DELAYED RELEASE ORAL at 17:26

## 2023-07-06 RX ADMIN — ONDANSETRON 4 MG: 2 INJECTION INTRAMUSCULAR; INTRAVENOUS at 20:46

## 2023-07-06 RX ADMIN — IOHEXOL 100 ML: 350 INJECTION, SOLUTION INTRAVENOUS at 14:03

## 2023-07-06 RX ADMIN — ONDANSETRON 4 MG: 2 INJECTION INTRAMUSCULAR; INTRAVENOUS at 12:27

## 2023-07-06 NOTE — ASSESSMENT & PLAN NOTE
Reported mechanical dysphagia over the past couple months. · Continue on PPI 40 mg daily  · EGD demonstrated strictures status post dilation. Biopsy results pending to rule out eosinophilic esophagitis and H. Pylori.

## 2023-07-06 NOTE — TELEPHONE ENCOUNTER
Called to schedule an appt. She said she will have to call back she is currently on way to ED to help with stomach issues.

## 2023-07-06 NOTE — ASSESSMENT & PLAN NOTE
Resolved  · POA Cr 1.41. Baseline appears to be around 0.8-0.9.   · Likely related to GI volume loss and dehydration  · IV hydration  · Avoid nephrotoxic medications

## 2023-07-06 NOTE — ASSESSMENT & PLAN NOTE
Stable BP on admission, have been elevated throughout admission. Home medication include quinapril 20 mg daily.   · Discharged on quinapril 40 mg daily and amlodipine 5 mg daily  · Follow-up [facility] in 1 week with BP log  · Reduced BP meds if appropriate

## 2023-07-06 NOTE — PLAN OF CARE
Problem: PAIN - ADULT  Goal: Verbalizes/displays adequate comfort level or baseline comfort level  Description: Interventions:  - Encourage patient to monitor pain and request assistance  - Assess pain using appropriate pain scale  - Administer analgesics based on type and severity of pain and evaluate response  - Implement non-pharmacological measures as appropriate and evaluate response  - Consider cultural and social influences on pain and pain management  - Notify physician/advanced practitioner if interventions unsuccessful or patient reports new pain  Outcome: Progressing     Problem: INFECTION - ADULT  Goal: Absence or prevention of progression during hospitalization  Description: INTERVENTIONS:  - Assess and monitor for signs and symptoms of infection  - Monitor lab/diagnostic results  - Monitor all insertion sites, i.e. indwelling lines, tubes, and drains  - Monitor endotracheal if appropriate and nasal secretions for changes in amount and color  - [place] appropriate cooling/warming therapies per order  - Administer medications as ordered  - Instruct and encourage patient and family to use good hand hygiene technique  - Identify and instruct in appropriate isolation precautions for identified infection/condition  Outcome: Progressing  Goal: Absence of fever/infection during neutropenic period  Description: INTERVENTIONS:  - Monitor WBC    Outcome: Progressing     Problem: DISCHARGE PLANNING  Goal: Discharge to home or other facility with appropriate resources  Description: INTERVENTIONS:  - Identify barriers to discharge w/patient and caregiver  - Arrange for needed discharge resources and transportation as appropriate  - Identify discharge learning needs (meds, wound care, etc.)  - Arrange for interpretive services to assist at discharge as needed  - Refer to Case Management Department for coordinating discharge planning if the patient needs post-hospital services based on physician/advanced practitioner order or complex needs related to functional status, cognitive ability, or social support system  Outcome: Progressing     Problem: Knowledge Deficit  Goal: Patient/family/caregiver demonstrates understanding of disease process, treatment plan, medications, and discharge instructions  Description: Complete learning assessment and assess knowledge base.   Interventions:  - Provide teaching at level of understanding  - Provide teaching via preferred learning methods  Outcome: Progressing     Problem: GASTROINTESTINAL - ADULT  Goal: Minimal or absence of nausea and/or vomiting  Description: INTERVENTIONS:  - Administer IV fluids if ordered to ensure adequate hydration  - Maintain NPO status until nausea and vomiting are resolved  - Nasogastric tube if ordered  - Administer ordered antiemetic medications as needed  - Provide nonpharmacologic comfort measures as appropriate  - Advance diet as tolerated, if ordered  - Consider nutrition services referral to assist patient with adequate nutrition and appropriate food choices  Outcome: Progressing  Goal: Maintains or returns to baseline bowel function  Description: INTERVENTIONS:  - Assess bowel function  - Encourage oral fluids to ensure adequate hydration  - Administer IV fluids if ordered to ensure adequate hydration  - Administer ordered medications as needed  - Encourage mobilization and activity  - Consider nutritional services referral to assist patient with adequate nutrition and appropriate food choices  Outcome: Progressing  Goal: Maintains adequate nutritional intake  Description: INTERVENTIONS:  - Monitor percentage of each meal consumed  - Identify factors contributing to decreased intake, treat as appropriate  - Assist with meals as needed  - Monitor I&O, weight, and lab values if indicated  - Obtain nutrition services referral as needed  Outcome: Progressing  Goal: Oral mucous membranes remain intact  Description: INTERVENTIONS  - Assess oral mucosa and hygiene practices  - Implement preventative oral hygiene regimen  - Implement oral medicated treatments as ordered  - Initiate Nutrition services referral as needed  Outcome: Progressing

## 2023-07-06 NOTE — Clinical Note
Case was discussed with Dr Chiquis West and the patient's admission status was agreed to be Admission Status: inpatient status to the service of   .

## 2023-07-06 NOTE — QUICK NOTE
76-year-old female with past medical history of ulcerative colitis anemia on chronic steroids presented with abdominal discomfort with bloody diarrhea for the past 2 weeks. Patient also has very poor oral intake. Patient also reported that she has trouble swallowing solid food and feels like gas bubble in the retrosternal area sometimes food getting stuck in her throat. Patient has chronically been enlarged which is being down titrated by PCP. Patient reported that she tried multiple medications for ulcerative colitis and allergic reaction to same. Vitals were stable in the ED. Labs showed a white count of 16,000 with a hemoglobin level of 5.9. CT abdomen pelvis with chronic wall thickening extending from transverse colon to the rectum with loss of haustral folds keeping with colitis with mild bladder wall thickening suggesting cystitis. Physical examination:  Cardiovascular-S2 heard without any murmurs rubs gallops  Respiratory-clear to auscultation bilaterally  Abdomen-bowel sounds soft mild left-sided tenderness    Assessment and plan:  1. Symptomatic anemia-patient noted to have significantly low MCV. Check iron panel. Likely in the setting of blood loss from GI bleeding. Patient will be given during self-care with the transition. Monitor hemoglobin. 2. Colitis in a patient with known history ulcerative colitis-ulcerative colitis flareup versus infectious colitis. Check stool for bacterial panel, C. difficile toxin A/B and calprotectin level. Patient will be empirically started on Rocephin 1 g IV daily and 500 mg IV every 8 hours. Patient was kept NPO. Symptomatic treatment with IV hydration and medications. GI consultation. If stool studies are negative might need to consider steroids  3.  Elevated creatinine-likely secondary dehydration

## 2023-07-06 NOTE — H&P
History and Physical - [facility] [facility]     Patient Information: Jose Gonzalez 59 y.o. female MRN: [id number]  Unit/Bed#: [address] [place] [address] Encounter: [id number]  Admitting Physician: Aung Henry MD   PCP: Desiree Flower DO  Date of Admission:  07/06/23     Assessment and Plan     * Symptomatic anemia  Assessment & Plan  · POA Hgb 5.9 a/w SOB, palpitation, lightheadedness. She reported having worsening blood in stool and  diarrhea over the last 2 weeks. · History of ulcerative colitis with GI bleed. Last GI follow-up in 2019  · CT AP: Colonic wall thickening from the transverse colon to the rectum with loss of normal haustral folds    Plan  · Remain n.p.o. and IVF hydration  · Administered 2 units of blood, monitor H&H  · GI consulted, appreciate recommendations    Colitis  Assessment & Plan  As evidenced by colonic wall thickening from the transverse colon to the rectum. · Infection versus inflammation cause of GI bleed  · Stool studies pending: Fecal calprotectin, C. difficile PCR, stool enteric panel, Hep B surface antigen, hep B core antibody, ova and parasite, TB  · Continue Rocephin daily and Flagyl 500 mg every 8 hours  · Monitor CBC and fever curve    Acute kidney injury (HCC)  Assessment & Plan  · POA Cr 1.41. Baseline appears to be around 0.8-0.9  · No excessive use of NSAIDs  · Likely related to GI volume loss and dehydration  · IV hydration  · Avoid nephrotoxic medications    History of ulcerative colitis  Assessment & Plan  · History of ulcerative colitis diagnosed in 1989, been on steroid for the past 20 years. · Home medications include steroid 5 mg daily and balsalazide 750 mg 3 tablets twice daily  · GI consulted, recommended that biologic could be beneficial if infectious etiology is ruled out  · Continue on melena Solimene 300 mg 3 times daily  · Holding prednisone    Essential hypertension  Assessment & Plan  Stable BP on admission.   Home medication include quinapril 20 mg daily. · Hold home BP med in setting of possible GI bleed and soft BP    Pharyngoesophageal dysphagia  Assessment & Plan  · Reported mechanical dysphagia over the past couple months. · Will likely need EGD  · GI consulted, appreciate recommendations  · Continue on PPI 40 mg daily       Fluids:  cc/hr  Electrolyte repletion: Replete Mag and Potassium as needed. Nutrition:        Diet Orders   (From admission, onward)             Start     Ordered    07/06/23 1606  Diet NPO  Diet effective now        References:    Adult Nutrition Support Algorithm    RD Therapeutic Diet Order Protocol   Question Answer Comment   Diet Type NPO    RD to adjust diet per protocol? Yes        07/06/23 1605               VTE Prophylaxis: VTE Score: 1 Low Risk (Score 0-2) - Encourage Ambulation. Code Status: Level 1 - Full Code  Anticipated Length of Stay:  Patient will be admitted on an Observation basis with an anticipated length of stay of  less than 2 midnights. Justification for Hospital Stay: Symptomatic anemia and colitis  Total Time for Visit, including Counseling / Coordination of Care: 45 mins. Greater than 50% of this total time spent on direct patient counseling and coordination of care. Patient Information Sharing: With the consent of Tiff Dunn, their loved ones Elisa Kidd, son) were notified today by inpatient team of the patient’s condition and current plan. All questions answered. Chief Complaint:     Chief Complaint   Patient presents with   • Abdominal Pain     States hx of ulcerative colitis. Has had diarrhea for a week or more. Took Pepto Bismol, Imodium, Dramamine and eating brat diet. States has pain upper abdomen. • Diarrhea       Subjective      History of Present Illness:     Tiff Dunn is a 59 y.o. female with PMH of ulcerative colitis on chronic oral steroids presents with worsening bloody diarrhea for the past 2 weeks.   She reported having scant blood in the stool. It is associated with nausea and diffuse abdominal pain, but no vomiting. Patient reported having 4-5 bowel movements a days, some of which wakes her up at night. She usually eats out and cannot recall if she had anything out of ordinary. She denies any sick contact, recent travels. She reported taking only 1 Advil and denies excessive NSAID use. She states that she was diagnosed with ulcerative colitis back in 1989 and have been on steroids for the past 20 years. Last time she saw a GI doctor was in 2019, but has not followed up due to [facility]. ED course: Ativan 1 mg, famotidine 20 mg, Zofran 4 mg, NS 1 L     Review of Systems:  Review of Systems   Constitutional: Positive for fatigue. Negative for chills and fever. HENT: Negative for ear pain and sore throat. Eyes: Negative for pain and visual disturbance. Respiratory: Negative for cough and shortness of breath. Cardiovascular: Negative for chest pain and palpitations. Gastrointestinal: Positive for abdominal pain, blood in stool, diarrhea and nausea. Negative for vomiting. Endocrine: Positive for cold intolerance. Genitourinary: Negative for dysuria and hematuria. Musculoskeletal: Negative for arthralgias and back pain. Skin: Negative for color change and rash. Neurological: Positive for dizziness and light-headedness. Negative for seizures and syncope. All other systems reviewed and are negative.        Past Medical History:   Diagnosis Date   • Anxiety    • Arthritis    • Atypical chest pain     last assessed: 12/6/13   • Bloody diarrhea     last assesed: 58/23/16   • Breast lump     last assessed: 10/10/14   • Candidiasis of esophagus ([address])     last assessed: 2/24/16   • Candidiasis of mouth     last assessed: 8/7/14   • Choledocholithiasis     last assessed: 6/5/14   • Cholelithiasis     last assessed: 7/21/15   • Closed fracture of first metatarsal bone of right foot     last assessed: 12/11/13   • Costovertebral angle pain     last assessed: 17   • Elevated blood pressure reading     last assessed: 16   • Grief reaction     last assessed: 16   • Herpes zoster     last assessed: 11/3/16   • High risk medication use     last assessed: 16   • Hypokalemia     last assessed: 16   • Hypotension     last assessed: 5/15/15   • Inflammatory disorder of breast     last assessed: 13   • Iron deficiency anemia     last assessed: 13   • Microscopic hematuria     last assessed: 13   • On prednisone therapy     last assessed: 10/19/17   • Opioid use, unspecified, uncomplicated     last assessed: 3/1/17   • Other polyp of sinus     last assessed: 13   • Paronychia of toe     last assessed: 13   • Pharyngoesophageal dysphagia     resolved: 3/1/16   • Tinea corporis     last assessed: 16   • Ulcerative colitis ([address])    • Underweight     last assessed: 3/1/16   • Urinary frequency     resolved: 16   • Viral warts      Past Surgical History:   Procedure Laterality Date   • BREAST SURGERY      left breast lumpectomy   • CARPAL TUNNEL RELEASE Bilateral    •  SECTION N/A    • CHOLECYSTECTOMY     • COLONOSCOPY  2019   • EGD  2019   • ESOPHAGOGASTRODUODENOSCOPY N/A 3/14/2016    Procedure: ESOPHAGOGASTRODUODENOSCOPY (EGD); Surgeon: Anotnio Sanchez MD;  Location: [facility]; Service:    • TONSILLECTOMY N/A      Allergies   Allergen Reactions   • Dairy Aid [Tilactase] GI Intolerance   • Levaquin [Levofloxacin In D5w] Other (See Comments)     Redness at injection site   • Mercaptopurine      Causes weakness in legs   • Mesalamine GI Intolerance   • Other Other (See Comments)     6 mp. Causes weakness in legs. Prior to Admission Medications   Prescriptions Last Dose Informant Patient Reported?  Taking?   aspirin 81 MG tablet 2023 Self Yes Yes   Sig: Take 1 tablet by mouth daily   balsalazide (COLAZAL) 750 mg capsule 2023  No Yes   Sig: TAKE 3 CAPSULES TWICE DAILY   fluticasone (FLONASE) 50 mcg/act nasal spray Not Taking  No No   Si sprays into each nostril daily   Patient not taking: Reported on 140   folic acid (FOLVITE) 1 mg tablet 2023 Self Yes Yes   Sig: Take 1 mg by mouth daily.    omeprazole (PriLOSEC) 40 MG capsule 2023  No Yes   Sig: TAKE 1 CAPSULE EVERY DAY   predniSONE 2.5 mg tablet 2023  No Yes   Sig: TAKE TWO TABLET DAILY   quinapril (ACCUPRIL) 20 mg tablet 2023  No Yes   Sig: TAKE ONE TABLET DAILY      Facility-Administered Medications: None     Social History     Socioeconomic History   • Marital status: Single     Spouse name: Not on file   • Number of children: Not on file   • Years of education: Not on file   • Highest education level: Not on file   Occupational History   • Not on file   Tobacco Use   • Smoking status: Never   • Smokeless tobacco: Never   Vaping Use   • Vaping Use: Never used   Substance and Sexual Activity   • Alcohol use: No   • Drug use: No   • Sexual activity: Not on file   Other Topics Concern   • Not on file   Social History Narrative   • Not on file     Social Determinants of Health     Financial Resource Strain: Not on file   Food Insecurity: Not on file   Transportation Needs: Not on file   Physical Activity: Not on file   Stress: Not on file   Social Connections: Not on file   Intimate Partner Violence: Not on file   Housing Stability: Not on file     Family History   Problem Relation Age of Onset   • Heart disease Mother    • Stroke Mother    • Heart disease Father         MI   • Colon cancer Maternal Grandfather    • Asthma Son    • Autism Son    • Heart disease Brother         CAD   • Cancer Maternal Grandmother         colon   • Heart disease Brother    • COPD Brother         smoker   • No Known Problems Brother    • No Known Problems Half-Sister            Objective     Physical Exam:   Vitals:   Blood Pressure: 127/77 (23 1639)  Pulse: 76 (23 1639)  Temperature: 99.4 °F (37.4 °C) (07/06/23 1639)  Temp Source: Oral (07/06/23 1639)  Respirations: 20 (07/06/23 1638)  Height: 5' 5" (165.1 cm) (07/06/23 1639)  Weight - Scale: 47.4 kg (104 lb 8 oz) (07/06/23 1639)  SpO2: 99 % (07/06/23 1639)     Physical Exam  Vitals and nursing note reviewed. Constitutional:       General: She is not in acute distress. Appearance: Normal appearance. She is not ill-appearing. HENT:      Head: Normocephalic and atraumatic. Cardiovascular:      Rate and Rhythm: Normal rate and regular rhythm. Pulses: Normal pulses. Heart sounds: Normal heart sounds. No murmur heard. Pulmonary:      Effort: Pulmonary effort is normal. No respiratory distress. Breath sounds: Normal breath sounds. No wheezing. Abdominal:      General: Bowel sounds are normal.      Palpations: Abdomen is soft. Tenderness: There is abdominal tenderness (periumbilical). Musculoskeletal:      Right lower leg: No edema. Left lower leg: No edema. Skin:     General: Skin is warm and dry. Findings: Lesion (mid back lumbar region, pink scab, no tenderness to touch) present. Neurological:      General: No focal deficit present. Mental Status: She is alert and oriented to person, place, and time. Psychiatric:         Mood and Affect: Mood normal.         Behavior: Behavior normal.          Lab Results: I have personally reviewed pertinent reports.     Results from last 7 days   Lab Units 07/06/23  1223   WBC Thousand/uL 16.88*   HEMOGLOBIN g/dL 5.9*   HEMATOCRIT % 23.2*   PLATELETS Thousands/uL 652*   NEUTROS PCT % 82*   LYMPHS PCT % 8*   MONOS PCT % 8   EOS PCT % 1     Results from last 7 days   Lab Units 07/06/23  1223   POTASSIUM mmol/L 3.6   CHLORIDE mmol/L 107   CO2 mmol/L 22   BUN mg/dL 17   CREATININE mg/dL 1.41*   CALCIUM mg/dL 8.8   ALK PHOS U/L 65   ALT U/L 6*   AST U/L 15   EGFR ml/min/1.73sq m 40   MAGNESIUM mg/dL 1.7*                      Results from last 7 days   Lab Units 07/06/23  1536   COLOR UA  Light Yellow   CLARITY UA  Clear   SPEC GRAV UA  <=1.005   PH UA  6.0   LEUKOCYTES UA  Trace*   NITRITE UA  Negative   GLUCOSE UA mg/dl Negative   KETONES UA mg/dl Negative   BILIRUBIN UA  Negative   BLOOD UA  Small*      Results from last 7 days   Lab Units 07/06/23  1536   RBC UA /hpf None Seen   WBC UA /hpf 0-1   EPITHELIAL CELLS WET PREP /hpf Occasional   BACTERIA UA /hpf None Seen      Results from last 7 days   Lab Units 07/06/23  1454 07/06/23  1302   HS TNI 0HR ng/L  --  3   HS TNI 2HR ng/L 3  --              Imaging: I have personally reviewed pertinent reports. CT abdomen pelvis with contrast    Result Date: 7/6/2023  1. There is colonic wall thickening extending from the transverse colon to the rectum with loss of the normal haustral folds. Findings in keeping with colitis, likely related to history of ulcerative colitis with active inflammation. 2. Mild bladder wall thickening may be due to cystitis. Suggest correlation with urinalysis. The study was marked in [facility] for immediate notification.  Workstation performed: [id number]             Entire H&P was discussed with Dr. Prashant Elizabeth who agreed to what is noted above     ** Please Note: This note has been constructed using a voice recognition system **     Nolvia Dickerson MD  07/06/23  5:37 PM

## 2023-07-06 NOTE — ASSESSMENT & PLAN NOTE
POA bouts of diarrhea, abdominal pain. CT AP revealing colonic wall thickening from the transverse colon to the rectum. s/p Rocephin and Flagyl, antibiotics were discontinued once C. difficile stool enteric panels returned negative  · Negative ova and parasite, elevated fecal calprotectin.   · Negative Hep B surface antigen, hep B core antibody, and TB     Plan  · Testing for Sjogren's ordered due to patient's complaint of dry eyes and mouth  · Continue Balsalazide   · Complete steroid taper (prednisone 40 mg daily, taper down by 5 mg weekly)  · Follow-up GI outpatient  · Follow-up tissue biopsy result  · Continue daily low fiber diet  · Repeat colonoscopy in 1 year

## 2023-07-06 NOTE — ASSESSMENT & PLAN NOTE
History of ulcerative colitis diagnosed in 1989, been on steroid for the past 20 years. Home medications include steroid 5 mg daily and balsalazide 750 mg 3 tablets twice daily.   · See "colitis" for further plan

## 2023-07-06 NOTE — TELEPHONE ENCOUNTER
----- Message from Marta Delaney MD sent at 7/6/2023  8:21 AM EDT -----  Regarding: Please schedule patient for Medicare Annual Wellness Visit. Please schedule patient for Medicare Annual Wellness Visit within the next 8 weeks.   Thanks, Dr. Gregorio Ibrahim

## 2023-07-06 NOTE — CONSULTS
Consultation -[facility]   Keya Bates 59 y.o. female MRN: [id number]    Unit/Bed#: ED 09 Encounter: [id number]\      Physician Requesting Consult: Dr Fidel Hernández     Reason for Consult / Principal Problem: colitis      HPI:   59-year-old female with history of ulcerative colitis. She is on chronic oral steroids presents with worsening bloody diarrhea for the past 2 weeks. She reported having scant blood in the stool. It is associated with nausea and diffuse abdominal pain, but no vomiting. Patient reported having 4-5 bowel movements a days, some of which wakes her up at night. She usually eats out and cannot recall if she had anything out of ordinary. She denies any sick contact, recent travels. She reported taking only 1 Advil and denies excessive NSAID use. She states that she was diagnosed with ulcerative colitis back in 1989 and have been on steroids for the past 20 years  Patient was last seen by GI in 2019 by IBD specialsit and at that time was recommended to start biologic medications and patient was having chronic prednisone use but she was adamantly against using biologics. Appears she was getting prednisone through her primary doctor although they were advising her that she would need to wean off. She otherwise had colonoscopy in 2019 which showed diffuse mild inflammation throughout the colon but more in the cecum and the rectum. EGD at that time was normal.  CAT scan on admission showed colonic wall thickening extending from the transverse colon to the rectum with loss of the normal haustral folds. Findings in keeping with colitis, likely related to history of ulcerative colitis with active inflammation. Mild bladder wall thickening may be due to cystitis. Suggest correlation with urinalysis.   Patient reports that her primary doctor did take her down to the 2.5 mg of prednisone but she did not tolerate this and was having increasing diarrhea and was taking Pepto-Bismol as well as Imodium with little relief. She otherwise reports that she was hesitant to start the medication other than the steroids due to potential side effects as stated above which she reiterated. Patient reports that she is taking care of her son who is autistic so it is difficult for her to have procedures done as well as get a ride so she would prefer having GI work-up while she is here. Her primary doctor wanted her to have an EGD due to difficulty swallowing as well as colonoscopy for follow-up to the ulcerative colitis. Her  was present. Allergies: Allergies   Allergen Reactions   • Dairy Aid [Tilactase] GI Intolerance   • Levaquin [Levofloxacin In D5w] Other (See Comments)     Redness at injection site   • Mercaptopurine      Causes weakness in legs   • Mesalamine GI Intolerance   • Other Other (See Comments)     6 mp. Causes weakness in legs. Medications:No current facility-administered medications for this encounter. Current Outpatient Medications:   •  aspirin 81 MG tablet, Take 1 tablet by mouth daily, Disp: , Rfl:   •  balsalazide (COLAZAL) 750 mg capsule, TAKE 3 CAPSULES TWICE DAILY, Disp: 540 capsule, Rfl: 5  •  fluticasone (FLONASE) 50 mcg/act nasal spray, 2 sprays into each nostril daily, Disp: 16 g, Rfl: 0  •  folic acid (FOLVITE) 1 mg tablet, Take 1 mg by mouth daily. , Disp: , Rfl:   •  omeprazole (PriLOSEC) 40 MG capsule, TAKE 1 CAPSULE EVERY DAY, Disp: 90 capsule, Rfl: 3  •  predniSONE 2.5 mg tablet, TAKE TWO TABLET DAILY, Disp: 180 tablet, Rfl: 1  •  quinapril (ACCUPRIL) 20 mg tablet, TAKE ONE TABLET DAILY, Disp: 90 tablet, Rfl: 3    Past Medical history:  Past Medical History:   Diagnosis Date   • Anxiety    • Arthritis    • Atypical chest pain     last assessed: 12/6/13   • Bloody diarrhea     last assesed: 58/23/16   • Breast lump     last assessed: 10/10/14   • Candidiasis of esophagus ([address])     last assessed: 2/24/16   • Candidiasis of mouth last assessed: 14   • Choledocholithiasis     last assessed: 14   • Cholelithiasis     last assessed: 7/21/15   • Closed fracture of first metatarsal bone of right foot     last assessed: 13   • Costovertebral angle pain     last assessed: 17   • Elevated blood pressure reading     last assessed: 16   • Grief reaction     last assessed: 16   • Herpes zoster     last assessed: 11/3/16   • High risk medication use     last assessed: 16   • Hypokalemia     last assessed: 16   • Hypotension     last assessed: 5/15/15   • Inflammatory disorder of breast     last assessed: 13   • Iron deficiency anemia     last assessed: 13   • Microscopic hematuria     last assessed: 13   • On prednisone therapy     last assessed: 10/19/17   • Opioid use, unspecified, uncomplicated     last assessed: 3/1/17   • Other polyp of sinus     last assessed: 13   • Paronychia of toe     last assessed: 13   • Pharyngoesophageal dysphagia     resolved: 3/1/16   • Tinea corporis     last assessed: 16   • Ulcerative colitis ([address])    • Underweight     last assessed: 3/1/16   • Urinary frequency     resolved: 16   • Viral warts        Past Surgical History:   Past Surgical History:   Procedure Laterality Date   • BREAST SURGERY      left breast lumpectomy   • CARPAL TUNNEL RELEASE Bilateral    •  SECTION N/A    • CHOLECYSTECTOMY     • COLONOSCOPY  2019   • EGD  2019   • ESOPHAGOGASTRODUODENOSCOPY N/A 3/14/2016    Procedure: ESOPHAGOGASTRODUODENOSCOPY (EGD); Surgeon: Jazzy Lacy MD;  Location: [facility] GI LAB;   Service:    • TONSILLECTOMY N/A        Family History:   Family History   Problem Relation Age of Onset   • Heart disease Mother    • Stroke Mother    • Heart disease Father         MI   • Colon cancer Maternal Grandfather    • Asthma Son    • Autism Son    • Heart disease Brother         CAD   • Cancer Maternal Grandmother         colon   • Heart disease Brother    • COPD Brother         smoker   • No Known Problems Brother    • No Known Problems Half-Sister        Social history:   Social History     Socioeconomic History   • Marital status: Single     Spouse name: Not on file   • Number of children: Not on file   • Years of education: Not on file   • Highest education level: Not on file   Occupational History   • Not on file   Tobacco Use   • Smoking status: Never   • Smokeless tobacco: Never   Vaping Use   • Vaping Use: Never used   Substance and Sexual Activity   • Alcohol use: No   • Drug use: No   • Sexual activity: Not on file   Other Topics Concern   • Not on file   Social History Narrative   • Not on file     Social Determinants of Health     Financial Resource Strain: Not on file   Food Insecurity: Not on file   Transportation Needs: Not on file   Physical Activity: Not on file   Stress: Not on file   Social Connections: Not on file   Intimate Partner Violence: Not on file   Housing Stability: Not on file       Review of Systems: All other systems were reviewed and were negative, otherwise please refer to HPI    Physical Exam: /80   Pulse 82   Temp 98.1 °F (36.7 °C) (Oral)   Resp 18   Wt 47.4 kg (104 lb 9.6 oz)   SpO2 100%   BMI 17.41 kg/m²     General Appearance:    Alert, cooperative, no distress, appears stated age   Head:    Normocephalic, without obvious abnormality, atraumatic   Eyes:    No scleral icterus           Mouth:  Mucosa moist   Neck:   Supple, symmetrical, trachea midline, no thyromegaly       Lungs:     Clear to auscultation bilaterally, respirations unlabored       Heart:    Regular rate and rhythm, S1 and S2 normal, no murmur, rub   or gallop     Abdomen:     Soft, positive bowel sounds tenderness palpation of epigastric area nondistended no rebound rigidity or guarding   Genitalia:   deferred   Rectal:   deferred   Extremities:   Extremities normal,no cyanosis or edema       Skin:   Skin color, texture, turgor normal, no rashes or lesions       Neurologic:   Grossly intact, no focal deficit           Lab Results:   Recent Results (from the past 24 hour(s))   CBC and differential    Collection Time: 07/06/23 12:23 PM   Result Value Ref Range    WBC 16.88 (H) 4.31 - 10.16 Thousand/uL    RBC 3.58 (L) 3.81 - 5.12 Million/uL    Hemoglobin 5.9 (LL) 11.5 - 15.4 g/dL    Hematocrit 23.2 (L) 34.8 - 46.1 %    MCV 65 (L) 82 - 98 fL    MCH 16.5 (L) 26.8 - 34.3 pg    MCHC 25.8 (L) 31.4 - 37.4 g/dL    RDW 24.2 (H) 11.6 - 15.1 %    MPV 8.2 (L) 8.9 - 12.7 fL    Platelets 731 (H) 436 - 390 Thousands/uL    nRBC 0 /100 WBCs    Neutrophils Relative 82 (H) 43 - 75 %    Immat GRANS % 1 0 - 2 %    Lymphocytes Relative 8 (L) 14 - 44 %    Monocytes Relative 8 4 - 12 %    Eosinophils Relative 1 0 - 6 %    Basophils Relative 0 0 - 1 %    Neutrophils Absolute 13.99 (H) 1.85 - 7.62 Thousands/µL    Immature Grans Absolute 0.13 0.00 - 0.20 Thousand/uL    Lymphocytes Absolute 1.28 0.60 - 4.47 Thousands/µL    Monocytes Absolute 1.26 (H) 0.17 - 1.22 Thousand/µL    Eosinophils Absolute 0.17 0.00 - 0.61 Thousand/µL    Basophils Absolute 0.05 0.00 - 0.10 Thousands/µL   Comprehensive metabolic panel    Collection Time: 07/06/23 12:23 PM   Result Value Ref Range    Sodium 137 135 - 147 mmol/L    Potassium 3.6 3.5 - 5.3 mmol/L    Chloride 107 96 - 108 mmol/L    CO2 22 21 - 32 mmol/L    ANION GAP 8 mmol/L    BUN 17 5 - 25 mg/dL    Creatinine 1.41 (H) 0.60 - 1.30 mg/dL    Glucose 85 65 - 140 mg/dL    Calcium 8.8 8.4 - 10.2 mg/dL    Corrected Calcium 9.5 8.3 - 10.1 mg/dL    AST 15 13 - 39 U/L    ALT 6 (L) 7 - 52 U/L    Alkaline Phosphatase 65 34 - 104 U/L    Total Protein 6.6 6.4 - 8.4 g/dL    Albumin 3.1 (L) 3.5 - 5.0 g/dL    Total Bilirubin 0.37 0.20 - 1.00 mg/dL    eGFR 40 ml/min/1.73sq m   Magnesium    Collection Time: 07/06/23 12:23 PM   Result Value Ref Range    Magnesium 1.7 (L) 1.9 - 2.7 mg/dL   Lipase    Collection Time: 07/06/23 12:23 PM   Result Value Ref Range    Lipase 14 11 - 82 u/L   Type and screen    Collection Time: 07/06/23  1:02 PM   Result Value Ref Range    ABO Grouping O     Rh Factor Positive     Antibody Screen Negative     Specimen Expiration Date [id number]    HS Troponin 0hr (reflex protocol)    Collection Time: 07/06/23  1:02 PM   Result Value Ref Range    hs TnI 0hr 3 "Refer to ACS Flowchart"- see link ng/L   Prepare Leukoreduced RBC: 2 Units    Collection Time: 07/06/23  2:33 PM   Result Value Ref Range    Unit Product Code H2081C36     Unit Number [id number]     Unit ABO O     Unit [facility] POS     Crossmatch Compatible     Unit Dispense Status Issued     Unit Product Volume 350 mL    Unit Product Code I1812C50     Unit Number [id number]     Unit ABO O     Unit RH POS     Crossmatch Compatible     Unit Dispense Status Crossmatched     Unit Product Volume 350 mL   HS Troponin I 2hr    Collection Time: 07/06/23  2:54 PM   Result Value Ref Range    hs TnI 2hr 3 "Refer to ACS Flowchart"- see link ng/L    Delta 2hr hsTnI 0 <20 ng/L       Imaging Studies: CT abdomen pelvis with contrast    Result Date: 7/6/2023  Narrative: CT ABDOMEN AND PELVIS WITH IV CONTRAST INDICATION:   Diarrhea Nausea/vomiting Anemia diarrhea. COMPARISON: CT abdomen pelvis 11/22/2019 TECHNIQUE:  CT examination of the abdomen and pelvis was performed. Multiplanar 2D reformatted images were created from the source data. This examination, like all CT scans performed in the [facility], was performed utilizing techniques to minimize radiation dose exposure, including the use of iterative reconstruction and automated exposure control. Radiation dose length product (DLP) for this visit:  708.04 mGy-cm IV Contrast:  100 mL of iohexol (OMNIPAQUE) Enteric Contrast:  Enteric contrast was not administered. FINDINGS: Evaluation of the upper abdomen limited by motion artifact.  ABDOMEN LOWER CHEST:  No clinically significant abnormality identified in the visualized lower chest. LIVER/BILIARY TREE:  Unremarkable. GALLBLADDER: Gallbladder is surgically absent. SPLEEN:  Unremarkable. PANCREAS:  Unremarkable. ADRENAL GLANDS:  Unremarkable. KIDNEYS/URETERS: No hydronephrosis. Punctate nonobstructing left renal calculus. Bilateral renal cysts. One or more sharply circumscribed subcentimeter renal hypodensities are present, too small to accurately characterize, and statistically most likely benign findings. According to recent literature (Radiology 2019) no further workup of these findings is recommended. STOMACH AND BOWEL: There is colonic wall thickening extending from the transverse colon to the rectum with loss of the normal haustral folds. There is associated prominence of the pericolic vessels and subcentimeter pericolic lymph nodes. Findings in keeping with colitis, likely related to history of ulcerative colitis. APPENDIX:  No findings to suggest appendicitis. ABDOMINOPELVIC CAVITY:  No ascites. No pneumoperitoneum. No lymphadenopathy. VESSELS: Atherosclerotic changes are present. No evidence of aneurysm. PELVIS REPRODUCTIVE ORGANS: Fibroid uterus. URINARY BLADDER: Mild bladder wall thickening may be due to cystitis. Suggest correlation with urinalysis. ABDOMINAL WALL/INGUINAL REGIONS:  Unremarkable. OSSEOUS STRUCTURES:  No acute fracture or destructive osseous lesion. Impression: 1. There is colonic wall thickening extending from the transverse colon to the rectum with loss of the normal haustral folds. Findings in keeping with colitis, likely related to history of ulcerative colitis with active inflammation. 2. Mild bladder wall thickening may be due to cystitis. Suggest correlation with urinalysis. The study was marked in [facility] for immediate notification.  Workstation performed: [id number]       Assessment/Plan:   64-year-old female with longstanding ulcerative colitis and CAT scan does show colonic wall thickening extending from the transverse colon to the rectum. -Patient noted to have severe anemia, patient is status post 2 units of packed RBCs, hemoglobin 9.3 today  -Stool studies were negative for enteric panel as well as C. difficile and ova and parasite is pending  -C-reactive protein was mildly elevated at 5  -We will start on IV steroids to treat suspected ulcerative colitis exacerbation  -No need to continue antibiotics from GI standpoint  -Patient will need further evaluation with EGD and colonoscopy due to the severe anemia, this can be done inpatient versus outpatient pending her clinical course patient would prefer inpatient due to lack of transportation, but patient does have to care for her child with autism  -Patient has also complained of dysphagia so EGD will be planned in the near future as above, will recommend to continue PPI and will add Carafate  -Patient likely needs biologic therapy as recommended in the past which she adamantly have refused to control her disease state but will get updated labs including QuantiFERON as well as hepatitis B testing  -We will advance diet to clear liquid diet and then can advance as tolerated to low residue lactose restricted thereafter    Thank you for the consultation. We will make further recommendations pending her course. Case will be discussed with Dr. Chico Odell.

## 2023-07-06 NOTE — ASSESSMENT & PLAN NOTE
Hgb 5.9 on admission, patient presenting with SOB, palpitation, lightheadedness. Reporting having worsening blood in stool and diarrhea X 2 weeks. · S/p 3 doses of Venofer and 2 units PRBCs, hemoglobin has remained stable ranging form 8-9s. · Likely due to iron deficiency as evidenced by low iron and ferritin  · Consider continuing Venofer infusion due to colitis.

## 2023-07-07 ENCOUNTER — TELEPHONE (OUTPATIENT)
Dept: FAMILY MEDICINE CLINIC | Facility: CLINIC | Age: 58
End: 2023-07-07

## 2023-07-07 LAB
ABO GROUP BLD BPU: NORMAL
ABO GROUP BLD BPU: NORMAL
ALBUMIN SERPL BCP-MCNC: 2.9 G/DL (ref 3.5–5)
ALP SERPL-CCNC: 63 U/L (ref 34–104)
ALT SERPL W P-5'-P-CCNC: 5 U/L (ref 7–52)
ANION GAP SERPL CALCULATED.3IONS-SCNC: 6 MMOL/L
AST SERPL W P-5'-P-CCNC: 14 U/L (ref 13–39)
ATRIAL RATE: 78 BPM
BASOPHILS # BLD AUTO: 0.04 THOUSANDS/ÂΜL (ref 0–0.1)
BASOPHILS NFR BLD AUTO: 0 % (ref 0–1)
BILIRUB SERPL-MCNC: 0.44 MG/DL (ref 0.2–1)
BPU ID: NORMAL
BPU ID: NORMAL
BUN SERPL-MCNC: 10 MG/DL (ref 5–25)
C DIFF TOX GENS STL QL NAA+PROBE: NEGATIVE
CALCIUM ALBUM COR SERPL-MCNC: 8.7 MG/DL (ref 8.3–10.1)
CALCIUM SERPL-MCNC: 7.8 MG/DL (ref 8.4–10.2)
CAMPYLOBACTER DNA SPEC NAA+PROBE: NORMAL
CHLORIDE SERPL-SCNC: 110 MMOL/L (ref 96–108)
CO2 SERPL-SCNC: 21 MMOL/L (ref 21–32)
CREAT SERPL-MCNC: 1.14 MG/DL (ref 0.6–1.3)
CROSSMATCH: NORMAL
CROSSMATCH: NORMAL
EOSINOPHIL # BLD AUTO: 0.14 THOUSAND/ÂΜL (ref 0–0.61)
EOSINOPHIL NFR BLD AUTO: 1 % (ref 0–6)
ERYTHROCYTE [DISTWIDTH] IN BLOOD BY AUTOMATED COUNT: 29.5 % (ref 11.6–15.1)
FERRITIN SERPL-MCNC: 2 NG/ML (ref 11–307)
GFR SERPL CREATININE-BSD FRML MDRD: 52 ML/MIN/1.73SQ M
GLUCOSE P FAST SERPL-MCNC: 69 MG/DL (ref 65–99)
GLUCOSE SERPL-MCNC: 69 MG/DL (ref 65–140)
HBV CORE AB SER QL: NORMAL
HBV SURFACE AG SER QL: NORMAL
HCT VFR BLD AUTO: 32.1 % (ref 34.8–46.1)
HCT VFR BLD AUTO: 33.2 % (ref 34.8–46.1)
HGB BLD-MCNC: 9.3 G/DL (ref 11.5–15.4)
HGB BLD-MCNC: 9.4 G/DL (ref 11.5–15.4)
IMM GRANULOCYTES # BLD AUTO: 0.04 THOUSAND/UL (ref 0–0.2)
IMM GRANULOCYTES NFR BLD AUTO: 0 % (ref 0–2)
IRON SATN MFR SERPL: 4 % (ref 15–50)
IRON SERPL-MCNC: 12 UG/DL (ref 50–170)
LYMPHOCYTES # BLD AUTO: 2.29 THOUSANDS/ÂΜL (ref 0.6–4.47)
LYMPHOCYTES NFR BLD AUTO: 18 % (ref 14–44)
MAGNESIUM SERPL-MCNC: 2.7 MG/DL (ref 1.9–2.7)
MCH RBC QN AUTO: 21.2 PG (ref 26.8–34.3)
MCHC RBC AUTO-ENTMCNC: 29 G/DL (ref 31.4–37.4)
MCV RBC AUTO: 73 FL (ref 82–98)
MONOCYTES # BLD AUTO: 1.49 THOUSAND/ÂΜL (ref 0.17–1.22)
MONOCYTES NFR BLD AUTO: 12 % (ref 4–12)
NEUTROPHILS # BLD AUTO: 8.53 THOUSANDS/ÂΜL (ref 1.85–7.62)
NEUTS SEG NFR BLD AUTO: 69 % (ref 43–75)
NRBC BLD AUTO-RTO: 0 /100 WBCS
P AXIS: -19 DEGREES
PLATELET # BLD AUTO: 515 THOUSANDS/UL (ref 149–390)
PMV BLD AUTO: 8.4 FL (ref 8.9–12.7)
POTASSIUM SERPL-SCNC: 4 MMOL/L (ref 3.5–5.3)
PR INTERVAL: 138 MS
PROT SERPL-MCNC: 5.9 G/DL (ref 6.4–8.4)
QRS AXIS: 39 DEGREES
QRSD INTERVAL: 90 MS
QT INTERVAL: 396 MS
QTC INTERVAL: 451 MS
RBC # BLD AUTO: 4.38 MILLION/UL (ref 3.81–5.12)
SALMONELLA DNA SPEC QL NAA+PROBE: NORMAL
SHIGA TOXIN STX GENE SPEC NAA+PROBE: NORMAL
SHIGELLA DNA SPEC QL NAA+PROBE: NORMAL
SODIUM SERPL-SCNC: 137 MMOL/L (ref 135–147)
T WAVE AXIS: 80 DEGREES
TIBC SERPL-MCNC: 328 UG/DL (ref 250–450)
UNIT DISPENSE STATUS: NORMAL
UNIT DISPENSE STATUS: NORMAL
UNIT PRODUCT CODE: NORMAL
UNIT PRODUCT CODE: NORMAL
UNIT PRODUCT VOLUME: 350 ML
UNIT PRODUCT VOLUME: 350 ML
UNIT RH: NORMAL
UNIT RH: NORMAL
VENTRICULAR RATE: 78 BPM
WBC # BLD AUTO: 12.53 THOUSAND/UL (ref 4.31–10.16)

## 2023-07-07 PROCEDURE — 99232 SBSQ HOSP IP/OBS MODERATE 35: CPT | Performed by: INTERNAL MEDICINE

## 2023-07-07 PROCEDURE — 85025 COMPLETE CBC W/AUTO DIFF WBC: CPT

## 2023-07-07 PROCEDURE — 83993 ASSAY FOR CALPROTECTIN FECAL: CPT | Performed by: PHYSICIAN ASSISTANT

## 2023-07-07 PROCEDURE — 80053 COMPREHEN METABOLIC PANEL: CPT

## 2023-07-07 PROCEDURE — 86480 TB TEST CELL IMMUN MEASURE: CPT | Performed by: PHYSICIAN ASSISTANT

## 2023-07-07 PROCEDURE — 85018 HEMOGLOBIN: CPT

## 2023-07-07 PROCEDURE — 87340 HEPATITIS B SURFACE AG IA: CPT | Performed by: PHYSICIAN ASSISTANT

## 2023-07-07 PROCEDURE — 86704 HEP B CORE ANTIBODY TOTAL: CPT | Performed by: PHYSICIAN ASSISTANT

## 2023-07-07 PROCEDURE — 93010 ELECTROCARDIOGRAM REPORT: CPT | Performed by: INTERNAL MEDICINE

## 2023-07-07 PROCEDURE — 83735 ASSAY OF MAGNESIUM: CPT

## 2023-07-07 PROCEDURE — 99222 1ST HOSP IP/OBS MODERATE 55: CPT | Performed by: INTERNAL MEDICINE

## 2023-07-07 PROCEDURE — 85014 HEMATOCRIT: CPT

## 2023-07-07 RX ORDER — LISINOPRIL 20 MG/1
20 TABLET ORAL DAILY
Status: DISCONTINUED | OUTPATIENT
Start: 2023-07-07 | End: 2023-07-09

## 2023-07-07 RX ORDER — METHYLPREDNISOLONE SODIUM SUCCINATE 40 MG/ML
20 INJECTION, POWDER, LYOPHILIZED, FOR SOLUTION INTRAMUSCULAR; INTRAVENOUS EVERY 8 HOURS SCHEDULED
Status: COMPLETED | OUTPATIENT
Start: 2023-07-07 | End: 2023-07-11

## 2023-07-07 RX ORDER — SUCRALFATE 1 G/1
1 TABLET ORAL
Status: DISCONTINUED | OUTPATIENT
Start: 2023-07-07 | End: 2023-07-11

## 2023-07-07 RX ORDER — ACETAMINOPHEN 325 MG/1
650 TABLET ORAL ONCE
Status: DISCONTINUED | OUTPATIENT
Start: 2023-07-07 | End: 2023-07-10

## 2023-07-07 RX ORDER — HYDROXYZINE HYDROCHLORIDE 25 MG/1
25 TABLET, FILM COATED ORAL
Status: DISCONTINUED | OUTPATIENT
Start: 2023-07-08 | End: 2023-07-11 | Stop reason: HOSPADM

## 2023-07-07 RX ORDER — PANTOPRAZOLE SODIUM 40 MG/1
40 TABLET, DELAYED RELEASE ORAL
Status: DISCONTINUED | OUTPATIENT
Start: 2023-07-07 | End: 2023-07-11

## 2023-07-07 RX ADMIN — MESALAMINE 800 MG: 400 CAPSULE, DELAYED RELEASE ORAL at 21:17

## 2023-07-07 RX ADMIN — PANTOPRAZOLE SODIUM 40 MG: 40 TABLET, DELAYED RELEASE ORAL at 15:41

## 2023-07-07 RX ADMIN — IRON SUCROSE 300 MG: 20 INJECTION, SOLUTION INTRAVENOUS at 08:42

## 2023-07-07 RX ADMIN — SODIUM CHLORIDE 100 ML/HR: 0.9 INJECTION, SOLUTION INTRAVENOUS at 02:35

## 2023-07-07 RX ADMIN — MESALAMINE 800 MG: 400 CAPSULE, DELAYED RELEASE ORAL at 15:46

## 2023-07-07 RX ADMIN — METRONIDAZOLE 500 MG: 500 INJECTION, SOLUTION INTRAVENOUS at 10:32

## 2023-07-07 RX ADMIN — MESALAMINE 800 MG: 400 CAPSULE, DELAYED RELEASE ORAL at 08:36

## 2023-07-07 RX ADMIN — PANTOPRAZOLE SODIUM 40 MG: 40 TABLET, DELAYED RELEASE ORAL at 06:00

## 2023-07-07 RX ADMIN — FOLIC ACID 1 MG: 1 TABLET ORAL at 08:36

## 2023-07-07 RX ADMIN — LISINOPRIL 20 MG: 20 TABLET ORAL at 10:31

## 2023-07-07 RX ADMIN — SODIUM CHLORIDE 100 ML/HR: 0.9 INJECTION, SOLUTION INTRAVENOUS at 15:49

## 2023-07-07 RX ADMIN — METHYLPREDNISOLONE SODIUM SUCCINATE 20 MG: 40 INJECTION, POWDER, FOR SOLUTION INTRAMUSCULAR; INTRAVENOUS at 14:42

## 2023-07-07 RX ADMIN — SUCRALFATE 1 G: 1 TABLET ORAL at 15:41

## 2023-07-07 RX ADMIN — METHYLPREDNISOLONE SODIUM SUCCINATE 20 MG: 40 INJECTION, POWDER, FOR SOLUTION INTRAMUSCULAR; INTRAVENOUS at 21:17

## 2023-07-07 RX ADMIN — MORPHINE SULFATE 1 MG: 2 INJECTION, SOLUTION INTRAMUSCULAR; INTRAVENOUS at 10:58

## 2023-07-07 RX ADMIN — METRONIDAZOLE 500 MG: 500 INJECTION, SOLUTION INTRAVENOUS at 02:35

## 2023-07-07 RX ADMIN — ONDANSETRON 4 MG: 2 INJECTION INTRAMUSCULAR; INTRAVENOUS at 23:47

## 2023-07-07 RX ADMIN — SUCRALFATE 1 G: 1 TABLET ORAL at 21:17

## 2023-07-07 RX ADMIN — MORPHINE SULFATE 1 MG: 2 INJECTION, SOLUTION INTRAMUSCULAR; INTRAVENOUS at 20:12

## 2023-07-07 NOTE — ED PROVIDER NOTES
History  Chief Complaint   Patient presents with   • Abdominal Pain     States hx of ulcerative colitis. Has had diarrhea for a week or more. Took Pepto Bismol, Imodium, Dramamine and eating brat diet. States has pain upper abdomen. • Diarrhea     Patient is a 52-year-old female with a history of ulcerative colitis with complaint of nonbloody diarrhea for approximately 1 week. She states that symptoms began shortly after her chronic prednisone was decreased. No relief with diet changes. She also complains of diffuse abdominal pain. Patient states that she has had approximately 11 pound weight loss in the past month. Differential diagnosis includes but is not limited to acute colitis, diverticulitis, gastroenteritis      History provided by:  Patient   used: No        Prior to Admission Medications   Prescriptions Last Dose Informant Patient Reported? Taking?   aspirin 81 MG tablet 2023 Self Yes Yes   Sig: Take 1 tablet by mouth daily   balsalazide (COLAZAL) 750 mg capsule 2023  No Yes   Sig: TAKE 3 CAPSULES TWICE DAILY   fluticasone (FLONASE) 50 mcg/act nasal spray Not Taking  No No   Si sprays into each nostril daily   Patient not taking: Reported on 6611   folic acid (FOLVITE) 1 mg tablet 2023 Self Yes Yes   Sig: Take 1 mg by mouth daily.    omeprazole (PriLOSEC) 40 MG capsule 2023  No Yes   Sig: TAKE 1 CAPSULE EVERY DAY   predniSONE 2.5 mg tablet 2023  No Yes   Sig: TAKE TWO TABLET DAILY   quinapril (ACCUPRIL) 20 mg tablet 2023  No Yes   Sig: TAKE ONE TABLET DAILY      Facility-Administered Medications: None       Past Medical History:   Diagnosis Date   • Anxiety    • Arthritis    • Atypical chest pain     last assessed: 13   • Bloody diarrhea     last assesed:    • Breast lump     last assessed: 10/10/14   • Candidiasis of esophagus ([address])     last assessed: 16   • Candidiasis of mouth     last assessed: 14   • Choledocholithiasis last assessed: 14   • Cholelithiasis     last assessed: 7/21/15   • Closed fracture of first metatarsal bone of right foot     last assessed: 13   • Costovertebral angle pain     last assessed: 17   • Elevated blood pressure reading     last assessed: 16   • Grief reaction     last assessed: 16   • Herpes zoster     last assessed: 11/3/16   • High risk medication use     last assessed: 16   • Hypokalemia     last assessed: 16   • Hypotension     last assessed: 5/15/15   • Inflammatory disorder of breast     last assessed: 13   • Iron deficiency anemia     last assessed: 13   • Microscopic hematuria     last assessed: 13   • On prednisone therapy     last assessed: 10/19/17   • Opioid use, unspecified, uncomplicated     last assessed: 3/1/17   • Other polyp of sinus     last assessed: 13   • Paronychia of toe     last assessed: 13   • Pharyngoesophageal dysphagia     resolved: 3/1/16   • Tinea corporis     last assessed: 16   • Ulcerative colitis ([address])    • Underweight     last assessed: 3/1/16   • Urinary frequency     resolved: 16   • Viral warts        Past Surgical History:   Procedure Laterality Date   • BREAST SURGERY      left breast lumpectomy   • CARPAL TUNNEL RELEASE Bilateral    •  SECTION N/A    • CHOLECYSTECTOMY     • COLONOSCOPY  2019   • EGD  2019   • ESOPHAGOGASTRODUODENOSCOPY N/A 3/14/2016    Procedure: ESOPHAGOGASTRODUODENOSCOPY (EGD); Surgeon: Cj Bernard MD;  Location: [facility];   Service:    • TONSILLECTOMY N/A        Family History   Problem Relation Age of Onset   • Heart disease Mother    • Stroke Mother    • Heart disease Father         MI   • Colon cancer Maternal Grandfather    • Asthma Son    • Autism Son    • Heart disease Brother         CAD   • Cancer Maternal Grandmother         colon   • Heart disease Brother    • COPD Brother         smoker   • No Known Problems Brother    • No Known Problems Half-Sister      I have reviewed and agree with the history as documented. E-Cigarette/Vaping   • E-Cigarette Use Never User      E-Cigarette/Vaping Substances     Social History     Tobacco Use   • Smoking status: Never   • Smokeless tobacco: Never   Vaping Use   • Vaping Use: Never used   Substance Use Topics   • Alcohol use: Never   • Drug use: Never       Review of Systems   Constitutional: Negative for chills and fever. Respiratory: Negative for cough, chest tightness and shortness of breath. Gastrointestinal: Positive for abdominal pain and diarrhea. Negative for nausea and vomiting. Genitourinary: Negative for dysuria, frequency, hematuria and urgency. Musculoskeletal: Negative for back pain, neck pain and neck stiffness. All other systems reviewed and are negative. Physical Exam  Physical Exam  Vitals and nursing note reviewed. Constitutional:       General: She is not in acute distress. Appearance: She is well-developed. She is not diaphoretic. HENT:      Head: Normocephalic and atraumatic. Eyes:      Conjunctiva/sclera: Conjunctivae normal.      Pupils: Pupils are equal, round, and reactive to light. Cardiovascular:      Rate and Rhythm: Normal rate and regular rhythm. Heart sounds: Normal heart sounds. No murmur heard. Pulmonary:      Effort: Pulmonary effort is normal. No respiratory distress. Breath sounds: Normal breath sounds. Abdominal:      General: Bowel sounds are normal. There is no distension. Palpations: Abdomen is soft. Tenderness: There is no abdominal tenderness. Genitourinary:     Rectum: Guaiac result negative. Comments: Heme-negative brown stool per rectum  Musculoskeletal:         General: No deformity. Normal range of motion. Cervical back: Normal range of motion and neck supple. Skin:     General: Skin is warm and dry. Coloration: Skin is not pale. Findings: No rash.    Neurological:      Mental Status: She is alert. Cranial Nerves: No cranial nerve deficit.    Psychiatric:         Behavior: Behavior normal.         Vital Signs  ED Triage Vitals [07/06/23 1151]   Temperature Pulse Respirations Blood Pressure SpO2   98.1 °F (36.7 °C) 92 18 108/67 99 %      Temp Source Heart Rate Source Patient Position - Orthostatic VS BP Location FiO2 (%)   Tympanic Monitor Sitting Left arm --      Pain Score       8           Vitals:    07/07/23 0308 07/07/23 0738 07/07/23 1549 07/07/23 1945   BP: 144/85 143/82 118/65 118/66   Pulse: 79 81 82 73   Patient Position - Orthostatic VS:  Lying Lying          Visual Acuity      ED Medications  Medications   folic acid (FOLVITE) tablet 1 mg (1 mg Oral Given 7/7/23 0836)   mesalamine (DELZICOL) delayed release capsule 800 mg (800 mg Oral Given 7/7/23 1546)   sodium chloride 0.9 % infusion (100 mL/hr Intravenous New Bag 7/7/23 1549)   ondansetron (ZOFRAN) injection 4 mg (4 mg Intravenous Given 7/6/23 2046)   phenol (CHLORASEPTIC) 1.4 % mucosal liquid 1 spray (has no administration in time range)   acetaminophen (TYLENOL) tablet 650 mg (650 mg Oral Not Given 7/7/23 0611)   iron sucrose (VENOFER) 300 mg in sodium chloride 0.9 % 250 mL IVPB (300 mg Intravenous New Bag 7/7/23 0842)   lisinopril (ZESTRIL) tablet 20 mg (20 mg Oral Given 7/7/23 1031)   morphine injection 1 mg (1 mg Intravenous Given 7/7/23 1058)   methylPREDNISolone sodium succinate (Solu-MEDROL) injection 20 mg (20 mg Intravenous Given 7/7/23 1442)   pantoprazole (PROTONIX) EC tablet 40 mg (40 mg Oral Given 7/7/23 1541)   sucralfate (CARAFATE) tablet 1 g (1 g Oral Given 7/7/23 1541)   sodium chloride 0.9 % bolus 1,000 mL (0 mL Intravenous Stopped 7/6/23 1325)   ondansetron (ZOFRAN) injection 4 mg (4 mg Intravenous Given 7/6/23 1227)   Famotidine (PF) (PEPCID) injection 20 mg (20 mg Intravenous Given 7/6/23 1228)   iohexol (OMNIPAQUE) 350 MG/ML injection (SINGLE-DOSE) 100 mL (100 mL Intravenous Given 7/6/23 1403) LORazepam (ATIVAN) injection 1 mg (1 mg Intravenous Given 7/6/23 1511)   magnesium sulfate 4 g/100 mL IVPB (premix) 4 g (4 g Intravenous New Bag 7/6/23 2013)   potassium chloride 20 mEq IVPB (premix) (20 mEq Intravenous New Bag 7/6/23 2013)     Followed by   potassium chloride 20 mEq IVPB (premix) (20 mEq Intravenous New Bag 7/6/23 2251)   hydrOXYzine HCL (ATARAX) tablet 25 mg (25 mg Oral Given 7/6/23 2228)       Diagnostic Studies  Results Reviewed     Procedure Component Value Units Date/Time    Hepatitis B surface antigen [[id number]]  (Normal) Collected: 07/07/23 0556    Lab Status: Final result Specimen: Blood from Arm, Left Updated: 07/07/23 1118     Hepatitis B Surface Ag Non-reactive    Hepatitis B core antibody, total [[id number]]  (Normal) Collected: 07/07/23 0556    Lab Status: Final result Specimen: Blood from Arm, Left Updated: 07/07/23 1118     Hep B Core Total Ab Non-reactive    Clostridium difficile toxin by PCR with EIA [[id number]]  (Normal) Collected: 07/06/23 1721    Lab Status: Final result Specimen: Stool from Per Rectum Updated: 07/07/23 1044     C.difficile toxin by PCR Negative    Calprotectin,Fecal [[id number]] Collected: 07/07/23 1022    Lab Status:  In process Specimen: Stool from Rectum Updated: 07/07/23 1035    Magnesium [[id number]]  (Normal) Collected: 07/07/23 0556    Lab Status: Final result Specimen: Blood from Arm, Left Updated: 07/07/23 0802     Magnesium 2.7 mg/dL     Comprehensive metabolic panel [[id number]]  (Abnormal) Collected: 07/07/23 0556    Lab Status: Final result Specimen: Blood from Arm, Left Updated: 07/07/23 0802     Sodium 137 mmol/L      Potassium 4.0 mmol/L      Chloride 110 mmol/L      CO2 21 mmol/L      ANION GAP 6 mmol/L      BUN 10 mg/dL      Creatinine 1.14 mg/dL      Glucose 69 mg/dL      Glucose, Fasting 69 mg/dL      Calcium 7.8 mg/dL      Corrected Calcium 8.7 mg/dL      AST 14 U/L      ALT 5 U/L      Alkaline Phosphatase 63 U/L      Total Protein 5.9 g/dL Albumin 2.9 g/dL      Total Bilirubin 0.44 mg/dL      eGFR 52 ml/min/1.73sq m     Narrative:      [place] guidelines for Chronic Kidney Disease (CKD):   •  Stage 1 with normal or high GFR (GFR > 90 mL/min/1.73 square meters)  •  Stage 2 Mild CKD (GFR = 60-89 mL/min/1.73 square meters)  •  Stage 3A Moderate CKD (GFR = 45-59 mL/min/1.73 square meters)  •  Stage 3B Moderate CKD (GFR = 30-44 mL/min/1.73 square meters)  •  Stage 4 Severe CKD (GFR = 15-29 mL/min/1.73 square meters)  •  Stage 5 End Stage CKD (GFR <15 mL/min/1.73 square meters)  Note: GFR calculation is accurate only with a steady state creatinine    CBC and differential [[id number]]  (Abnormal) Collected: 07/07/23 0556    Lab Status: Final result Specimen: Blood from Arm, Left Updated: 07/07/23 0730     WBC 12.53 Thousand/uL      RBC 4.38 Million/uL      Hemoglobin 9.3 g/dL      Hematocrit 32.1 %      MCV 73 fL      MCH 21.2 pg      MCHC 29.0 g/dL      RDW 29.5 %      MPV 8.4 fL      Platelets 051 Thousands/uL      nRBC 0 /100 WBCs      Neutrophils Relative 69 %      Immat GRANS % 0 %      Lymphocytes Relative 18 %      Monocytes Relative 12 %      Eosinophils Relative 1 %      Basophils Relative 0 %      Neutrophils Absolute 8.53 Thousands/µL      Immature Grans Absolute 0.04 Thousand/uL      Lymphocytes Absolute 2.29 Thousands/µL      Monocytes Absolute 1.49 Thousand/µL      Eosinophils Absolute 0.14 Thousand/µL      Basophils Absolute 0.04 Thousands/µL     Quantiferon TB Gold Plus [[id number]] Collected: 07/07/23 0556    Lab Status:  In process Specimen: Blood from Arm, Left Updated: 07/07/23 0605    Stool Enteric Bacterial Panel by PCR [[id number]]  (Normal) Collected: 07/06/23 1721    Lab Status: Final result Specimen: Stool from Per Rectum Updated: 07/07/23 0450     Salmonella sp PCR None Detected     Shigella sp/Enteroinvasive E. coli (EIEC) PCR None Detected     Campylobacter sp (jejuni and coli) PCR None Detected Shiga toxin 1/Shiga toxin 2 genes PCR None Detected    Iron Saturation % [[id number]]  (Abnormal) Collected: 07/06/23 1223    Lab Status: Final result Specimen: Blood from Arm, Right Updated: 07/07/23 0316     Iron Saturation 4 %      TIBC 328 ug/dL      Iron 12 ug/dL     Ferritin [[id number]]  (Abnormal) Collected: 07/06/23 1223    Lab Status: Final result Specimen: Blood from Arm, Right Updated: 07/07/23 0030     Ferritin 2 ng/mL     Ova and parasite examination [[id number]] Collected: 07/06/23 1721    Lab Status:  In process Specimen: Stool from Rectum Updated: 07/06/23 1726    C-reactive protein [[id number]]  (Abnormal) Collected: 07/06/23 1223    Lab Status: Final result Specimen: Blood from Arm, Right Updated: 07/06/23 1724     CRP 5.0 mg/L     Urine Microscopic [[id number]]  (Abnormal) Collected: 07/06/23 1536    Lab Status: Final result Specimen: Urine, Clean Catch Updated: 07/06/23 1616     RBC, UA None Seen /hpf      WBC, UA 0-1 /hpf      Epithelial Cells Occasional /hpf      Bacteria, UA None Seen /hpf      Hyaline Casts, UA 0-1 /lpf     UA w Reflex to Microscopic w Reflex to Culture [[id number]]  (Abnormal) Collected: 07/06/23 1536    Lab Status: Final result Specimen: Urine, Clean Catch Updated: 07/06/23 1606     Color, UA Light Yellow     Clarity, UA Clear     Specific Gravity, UA <=1.005     pH, UA 6.0     Leukocytes, UA Trace     Nitrite, UA Negative     Protein, UA Negative mg/dl      Glucose, UA Negative mg/dl      Ketones, UA Negative mg/dl      Urobilinogen, UA 0.2 E.U./dl      Bilirubin, UA Negative     Occult Blood, UA Small    HS Troponin I 2hr [[id number]]  (Normal) Collected: 07/06/23 1454    Lab Status: Final result Specimen: Blood from Arm, Left Updated: 07/06/23 1522     hs TnI 2hr 3 ng/L      Delta 2hr hsTnI 0 ng/L     HS Troponin I 4hr [[id number]]     Lab Status: No result Specimen: Blood     HS Troponin 0hr (reflex protocol) [[id number]]  (Normal) Collected: 07/06/23 1302    Lab Status: Final result Specimen: Blood from Arm, Right Updated: 07/06/23 1424     hs TnI 0hr 3 ng/L     Lipase [[id number]]  (Normal) Collected: 07/06/23 1223    Lab Status: Final result Specimen: Blood from Arm, Right Updated: 07/06/23 1258     Lipase 14 u/L     Comprehensive metabolic panel [[id number]]  (Abnormal) Collected: 07/06/23 1223    Lab Status: Final result Specimen: Blood from Arm, Right Updated: 07/06/23 1258     Sodium 137 mmol/L      Potassium 3.6 mmol/L      Chloride 107 mmol/L      CO2 22 mmol/L      ANION GAP 8 mmol/L      BUN 17 mg/dL      Creatinine 1.41 mg/dL      Glucose 85 mg/dL      Calcium 8.8 mg/dL      Corrected Calcium 9.5 mg/dL      AST 15 U/L      ALT 6 U/L      Alkaline Phosphatase 65 U/L      Total Protein 6.6 g/dL      Albumin 3.1 g/dL      Total Bilirubin 0.37 mg/dL      eGFR 40 ml/min/1.73sq m     Narrative:      [place] guidelines for Chronic Kidney Disease (CKD):   •  Stage 1 with normal or high GFR (GFR > 90 mL/min/1.73 square meters)  •  Stage 2 Mild CKD (GFR = 60-89 mL/min/1.73 square meters)  •  Stage 3A Moderate CKD (GFR = 45-59 mL/min/1.73 square meters)  •  Stage 3B Moderate CKD (GFR = 30-44 mL/min/1.73 square meters)  •  Stage 4 Severe CKD (GFR = 15-29 mL/min/1.73 square meters)  •  Stage 5 End Stage CKD (GFR <15 mL/min/1.73 square meters)  Note: GFR calculation is accurate only with a steady state creatinine    Magnesium [[id number]]  (Abnormal) Collected: 07/06/23 1223    Lab Status: Final result Specimen: Blood from Arm, Right Updated: 07/06/23 1258     Magnesium 1.7 mg/dL     CBC and differential [[id number]]  (Abnormal) Collected: 07/06/23 1223    Lab Status: Final result Specimen: Blood from Arm, Right Updated: 07/06/23 1234     WBC 16.88 Thousand/uL      RBC 3.58 Million/uL      Hemoglobin 5.9 g/dL      Hematocrit 23.2 %      MCV 65 fL      MCH 16.5 pg      MCHC 25.8 g/dL      RDW 24.2 %      MPV 8.2 fL      Platelets 127 Thousands/uL      nRBC 0 /100 WBCs Neutrophils Relative 82 %      Immat GRANS % 1 %      Lymphocytes Relative 8 %      Monocytes Relative 8 %      Eosinophils Relative 1 %      Basophils Relative 0 %      Neutrophils Absolute 13.99 Thousands/µL      Immature Grans Absolute 0.13 Thousand/uL      Lymphocytes Absolute 1.28 Thousands/µL      Monocytes Absolute 1.26 Thousand/µL      Eosinophils Absolute 0.17 Thousand/µL      Basophils Absolute 0.05 Thousands/µL     Narrative: This is an appended report. These results have been appended to a previously verified report. CT abdomen pelvis with contrast   Final Result by Destiny Miller MD (07/06 1516)      1. There is colonic wall thickening extending from the transverse colon to the rectum with loss of the normal haustral folds. Findings in keeping with colitis, likely related to history of ulcerative colitis with active inflammation. 2. Mild bladder wall thickening may be due to cystitis. Suggest correlation with urinalysis. The study was marked in [facility] for immediate notification.          Workstation performed: [id number]                    Procedures  ECG 12 Lead Documentation Only    Date/Time: 7/6/2023 1:10 PM    Performed by: Keyla Grier DO  Authorized by: Keyla Grier DO    Indications / Diagnosis:  Anemia  ECG reviewed by me, the ED Provider: yes    Patient location:  ED  Previous ECG:     Previous ECG:  Unavailable    Comparison to cardiac monitor: Yes    Interpretation:     Interpretation: normal    Rate:     ECG rate:  78bpm    ECG rate assessment: normal    Rhythm:     Rhythm: sinus rhythm    Ectopy:     Ectopy: none    QRS:     QRS axis:  Normal  Conduction:     Conduction: normal    CriticalCare Time    Date/Time: 7/6/2023 2:00 PM    Performed by: Keyla Grier DO  Authorized by: Keyla Grier DO    Critical care provider statement:     Critical care time (minutes):  65    Critical care time was exclusive of: Separately billable procedures and treating other patients and teaching time    Critical care was necessary to treat or prevent imminent or life-threatening deterioration of the following conditions:  Circulatory failure    Critical care was time spent personally by me on the following activities:  Blood draw for specimens, obtaining history from patient or surrogate, development of treatment plan with patient or surrogate, evaluation of patient's response to treatment, examination of patient, interpretation of cardiac output measurements, ordering and performing treatments and interventions, ordering and review of laboratory studies, ordering and review of radiographic studies, re-evaluation of patient's condition and review of old charts    I assumed direction of critical care for this patient from another provider in my specialty: no               ED Course                               SBIRT 22yo+    Flowsheet Row Most Recent Value   Initial Alcohol Screen: US AUDIT-C     1. How often do you have a drink containing alcohol? 0 Filed at: 07/06/2023 1232   2. How many drinks containing alcohol do you have on a typical day you are drinking? 0 Filed at: 07/06/2023 1232   3b. FEMALE Any Age, or MALE 65+: How often do you have 4 or more drinks on one occassion? 0 Filed at: 07/06/2023 1232   Audit-C Score 0 Filed at: 07/06/2023 1232   VANNA: How many times in the past year have you. .. Used an illegal drug or used a prescription medication for non-medical reasons? Never Filed at: 07/06/2023 1154                    Medical Decision Making  Severe anemia noted. Patient agrees to blood transfusion. CT reviewed-consistent with colitis. Patient will be admitted to hospitalist, who request empiric antibiotics-Rocephin and Flagyl. Amount and/or Complexity of Data Reviewed  Labs: ordered. Radiology: ordered. Risk  Prescription drug management. Decision regarding hospitalization.           Disposition  Final diagnoses:   Symptomatic anemia   Colitis     Time reflects when diagnosis was documented in both MDM as applicable and the Disposition within this note     Time User Action Codes Description Comment    7/6/2023  3:33 PM Adron Purpura Add [D64.9] Symptomatic anemia     7/6/2023  3:33 PM Adron Purpura Add [K52.9] Colitis     7/6/2023  3:39 PM Waldrop, Htet Oo Add [K51.00] Ulcerative (chronic) enterocolitis, without complications ([address])     5/7/4113  3:41 PM Waldrop, Htet Oo Add [K51.90] Ulcerative colitis [facility])       ED Disposition     ED Disposition   Admit    Condition   Stable    Date/Time   Thu Jul 6, 2023 1043    Comment   Case was discussed with Dr Stephanie Worrell and the patient's admission status was agreed to be Admission Status: inpatient status to the service of Dr. Erica Joseph . Follow-up Information    None         Current Discharge Medication List      CONTINUE these medications which have NOT CHANGED    Details   aspirin 81 MG tablet Take 1 tablet by mouth daily      balsalazide (COLAZAL) 750 mg capsule TAKE 3 CAPSULES TWICE DAILY  Qty: 540 capsule, Refills: 5    Associated Diagnoses: Ulcerative colitis with rectal bleeding, unspecified location (HCC)      folic acid (FOLVITE) 1 mg tablet Take 1 mg by mouth daily. omeprazole (PriLOSEC) 40 MG capsule TAKE 1 CAPSULE EVERY DAY  Qty: 90 capsule, Refills: 3    Associated Diagnoses: Ulcerative (chronic) enterocolitis, without complications (HCC)      predniSONE 2.5 mg tablet TAKE TWO TABLET DAILY  Qty: 180 tablet, Refills: 1    Associated Diagnoses: Ulcerative colitis with complication, unspecified location (HCC)      quinapril (ACCUPRIL) 20 mg tablet TAKE ONE TABLET DAILY  Qty: 90 tablet, Refills: 3    Associated Diagnoses: Essential hypertension      fluticasone (FLONASE) 50 mcg/act nasal spray 2 sprays into each nostril daily  Qty: 16 g, Refills: 0    Associated Diagnoses:  Allergic rhinitis, unspecified seasonality, unspecified trigger No discharge procedures on file.     PDMP Review     None          ED Provider  Electronically Signed by           Robert Drummond DO  07/07/23 1953

## 2023-07-07 NOTE — PLAN OF CARE
Problem: PAIN - ADULT  Goal: Verbalizes/displays adequate comfort level or baseline comfort level  Description: Interventions:  - Encourage patient to monitor pain and request assistance  - Assess pain using appropriate pain scale  - Administer analgesics based on type and severity of pain and evaluate response  - Implement non-pharmacological measures as appropriate and evaluate response  - Consider cultural and social influences on pain and pain management  - Notify physician/advanced practitioner if interventions unsuccessful or patient reports new pain  Outcome: Progressing     Problem: INFECTION - ADULT  Goal: Absence or prevention of progression during hospitalization  Description: INTERVENTIONS:  - Assess and monitor for signs and symptoms of infection  - Monitor lab/diagnostic results  - Monitor all insertion sites, i.e. indwelling lines, tubes, and drains  - Monitor endotracheal if appropriate and nasal secretions for changes in amount and color  - [place] appropriate cooling/warming therapies per order  - Administer medications as ordered  - Instruct and encourage patient and family to use good hand hygiene technique  - Identify and instruct in appropriate isolation precautions for identified infection/condition  Outcome: Progressing  Goal: Absence of fever/infection during neutropenic period  Description: INTERVENTIONS:  - Monitor WBC    Outcome: Progressing     Problem: GASTROINTESTINAL - ADULT  Goal: Minimal or absence of nausea and/or vomiting  Description: INTERVENTIONS:  - Administer IV fluids if ordered to ensure adequate hydration  - Maintain NPO status until nausea and vomiting are resolved  - Nasogastric tube if ordered  - Administer ordered antiemetic medications as needed  - Provide nonpharmacologic comfort measures as appropriate  - Advance diet as tolerated, if ordered  - Consider nutrition services referral to assist patient with adequate nutrition and appropriate food choices  Outcome: Progressing  Goal: Maintains or returns to baseline bowel function  Description: INTERVENTIONS:  - Assess bowel function  - Encourage oral fluids to ensure adequate hydration  - Administer IV fluids if ordered to ensure adequate hydration  - Administer ordered medications as needed  - Encourage mobilization and activity  - Consider nutritional services referral to assist patient with adequate nutrition and appropriate food choices  Outcome: Progressing  Goal: Maintains adequate nutritional intake  Description: INTERVENTIONS:  - Monitor percentage of each meal consumed  - Identify factors contributing to decreased intake, treat as appropriate  - Assist with meals as needed  - Monitor I&O, weight, and lab values if indicated  - Obtain nutrition services referral as needed  Outcome: Progressing  Goal: Oral mucous membranes remain intact  Description: INTERVENTIONS  - Assess oral mucosa and hygiene practices  - Implement preventative oral hygiene regimen  - Implement oral medicated treatments as ordered  - Initiate Nutrition services referral as needed  Outcome: Progressing     Problem: METABOLIC, FLUID AND ELECTROLYTES - ADULT  Goal: Electrolytes maintained within normal limits  Description: INTERVENTIONS:  - Monitor labs and assess patient for signs and symptoms of electrolyte imbalances  - Administer electrolyte replacement as ordered  - Monitor response to electrolyte replacements, including repeat lab results as appropriate  - Instruct patient on fluid and nutrition as appropriate  Outcome: Progressing  Goal: Fluid balance maintained  Description: INTERVENTIONS:  - Monitor labs   - Monitor I/O and WT  - Instruct patient on fluid and nutrition as appropriate  - Assess for signs & symptoms of volume excess or deficit  Outcome: Progressing     Problem: HEMATOLOGIC - ADULT  Goal: Maintains hematologic stability  Description: INTERVENTIONS  - Assess for signs and symptoms of bleeding or hemorrhage  - Monitor labs  - Administer supportive blood products/factors as ordered and appropriate  Outcome: Progressing

## 2023-07-07 NOTE — PROGRESS NOTES
Daily Progress Note - [facility] [address] 59 y.o. female MRN: [id number]  Unit/Bed#: [address] [place] [address] Encounter: [id number]  Admitting Physician: Mary Carmen Bui MD   PCP: Tonya Carr DO  Date of Admission:  7/6/2023 11:55 AM    Assessment and Plan    * Symptomatic anemia  Assessment & Plan  · POA Hgb 5.9 a/w SOB, palpitation, lightheadedness. She reported having worsening blood in stool and  diarrhea over the last 2 weeks. · History of ulcerative colitis with GI bleed. Last GI follow-up in 2019  · CT AP: Colonic wall thickening from the transverse colon to the rectum with loss of normal haustral folds  · Likely due to iron deficiency as evidenced by low iron and ferritin    Plan  · Remain n.p.o. and IVF hydration  · Administered 2 units of blood, posttransfusion H&H 9.4  · Venofer 300 mg x 3  · GI consulted, appreciate recommendations    Colitis  Assessment & Plan  As evidenced by worsening bouts of diarrhea, abdominal pain, and colonic wall thickening from the transverse colon to the rectum on imaging  · Infection versus inflammation  · Negative stool enteric panel and C. difficile  · Stool studies pending: Fecal calprotectin, Hep B surface antigen, hep B core antibody, ova and parasite, TB  · Continue Rocephin 1 g daily and Flagyl 500 mg every 8 hours  · Monitor CBC and fever curve    Acute kidney injury ([address])  Assessment & Plan  Improving renal function  · POA Cr 1.41. Baseline appears to be around 0.8-0.9  · No excessive use of NSAIDs  · Likely related to GI volume loss and dehydration  · IV hydration  · Avoid nephrotoxic medications    History of ulcerative colitis  Assessment & Plan  History of ulcerative colitis diagnosed in 1989, been on steroid for the past 20 years.  Home medications include steroid 5 mg daily and balsalazide 750 mg 3 tablets twice daily  · GI consulted, recommended that biologic could be beneficial if infectious etiology is ruled out  · Continue on mesalamine 800 mg 3 times daily  · Holding prednisone    Essential hypertension  Assessment & Plan  Stable BP on admission. Home medication include quinapril 20 mg daily. · On lisinopril 20 mg daily (quinapril nonformulary)    Pharyngoesophageal dysphagia  Assessment & Plan  · Reported mechanical dysphagia over the past couple months. · Will likely need EGD  · GI consulted, appreciate recommendations  · Continue on PPI 40 mg daily      VTE Pharmacologic Prophylaxis: VTE Score: 1 Low Risk (Score 0-2) - Encourage Ambulation. Patient Centered Rounds: I have performed bedside rounds with nursing staff today. Discussions with Specialists or Other Care Team Provider: GI    Education and Discussions with Family / Patient: yes  Patient Information Sharing: With the consent of Ronny Lange , their loved ones (Rosario Quinn son) were notified today by inpatient team of the patient’s condition and current plan. All questions answered. Time Spent for Care: 20 minutes. More than 50% of total time spent on counseling and coordination of care as described above. Current Length of Stay: 0 day(s)    Current Patient Status: Observation   Certification Statement: The patient will continue to require additional inpatient hospital stay due to Symptomatic anemia and colitis    Discharge Plan: 1-2 days    Code Status: Level 1 - Full Code    Subjective:   Patient states that she feels a little better than yesterday, but is still feeling tired. She had about 2-3 bowel movements overnight. Some blood was noted on the toilet paper while wiping. The stool itself is not bloody. patient continues to have 6 out of 10 abdominal pain around the periumbilical area. Patient refused Tylenol for pain management stating that Tylenol makes the pain worse. Otherwise, patient denied any other acute complaints including chest pain, palpitations, shortness of breath.     Objective     Objective:   Vitals:   Temp (24hrs), Av.9 °F (37.2 °C), Min:98.1 °F (36.7 °C), Max:99.4 °F (37.4 °C)    Temp:  [98.1 °F (36.7 °C)-99.4 °F (37.4 °C)] 99.1 °F (37.3 °C)  HR:  [73-84] 81  Resp:  [17-20] 17  BP: (127-159)/(72-94) 143/82  SpO2:  [97 %-100 %] 99 %  Body mass index is 19.89 kg/m². Input and Output Summary (last 24 hours): Intake/Output Summary (Last 24 hours) at 2023 1351  Last data filed at 2023 0007  Gross per 24 hour   Intake 612.5 ml   Output --   Net 612.5 ml       Physical Exam:   Physical Exam  Vitals and nursing note reviewed. Constitutional:       General: She is not in acute distress. Appearance: Normal appearance. She is not ill-appearing. HENT:      Head: Normocephalic and atraumatic. Cardiovascular:      Rate and Rhythm: Normal rate and regular rhythm. Pulses: Normal pulses. Heart sounds: Normal heart sounds. No murmur heard. Pulmonary:      Effort: Pulmonary effort is normal. No respiratory distress. Breath sounds: Normal breath sounds. No wheezing. Abdominal:      General: Bowel sounds are normal.      Palpations: Abdomen is soft. Tenderness: There is abdominal tenderness (periumbilical). Musculoskeletal:      Right lower leg: No edema. Left lower leg: No edema. Skin:     General: Skin is warm and dry. Findings: Lesion (mid back lumbar region, pink scab, no tenderness to touch) present. Neurological:      General: No focal deficit present. Mental Status: She is alert and oriented to person, place, and time.    Psychiatric:         Mood and Affect: Mood normal.         Behavior: Behavior normal.       Additional Data:     Labs:  Results from last 7 days   Lab Units 23  0556   WBC Thousand/uL 12.53*   HEMOGLOBIN g/dL 9.3*   HEMATOCRIT % 32.1*   PLATELETS Thousands/uL 515*   NEUTROS PCT % 69   LYMPHS PCT % 18   MONOS PCT % 12   EOS PCT % 1     Results from last 7 days   Lab Units 23  0556   POTASSIUM mmol/L 4.0   CHLORIDE mmol/L 110* CO2 mmol/L 21   BUN mg/dL 10   CREATININE mg/dL 1.14   CALCIUM mg/dL 7.8*   ALK PHOS U/L 63   ALT U/L 5*   AST U/L 14                   * I Have Reviewed All Lab Data Listed Above. * Additional Pertinent Lab Tests Reviewed: All Labs For Current Hospital Admission Reviewed    Imaging:    Imaging Reports Reviewed Today Include: All [facility] Admission Reviewed    Recent Cultures (last 7 days):     Results from last 7 days   Lab Units 07/06/23  1721   C DIFF TOXIN B BY PCR  Negative       Last 24 Hours Medication List:   Current Facility-Administered Medications   Medication Dose Route Frequency Provider Last Rate   • acetaminophen  650 mg Oral Once Lynn Burgos MD     • cefTRIAXone  1,000 mg Intravenous Q24H Megan Jerry MD 1,000 mg (26/60/37 5142)   • folic acid  1 mg Oral Daily Megan Jerry MD     • iron sucrose  300 mg Intravenous Daily Megan Jerry MD     • lisinopril  20 mg Oral Daily Megan Jerry MD     • mesalamine  800 mg Oral TID Megan Jerry MD     • methylPREDNISolone sodium succinate  20 mg Intravenous Q8H [facility] Tracy Cantu PA-C     • metroNIDAZOLE  500 mg Intravenous Gregory Arthur  mg (07/07/23 1032)   • morphine injection  1 mg Intravenous Q6H PRN Megan Jerry MD     • ondansetron  4 mg Intravenous Q6H PRN Lynn Burgos MD     • pantoprazole  40 mg Oral Early Morning Htet Rejeana Felty, MD     • phenol  1 spray Mouth/Throat Q2H PRN Laura Nichole MD     • sodium chloride  100 mL/hr Intravenous Continuous Megan Jerry  mL/hr (07/07/23 0233)             ** Please Note: Dictation voice to text software may have been used in the creation of this document.  **    Megan Jerry MD  07/07/23  1:51 PM

## 2023-07-07 NOTE — PHYSICAL THERAPY NOTE
Physical Therapy Screen Note       07/07/23 1456   PT Last Visit   PT Visit Date 07/07/23   Note Type   Note type Screen   Additional Comments Pt reports she is IND at baseline with no DME use. Able to IND ambulate withrin room + reports no PT needs. Will D/C at this time. Please reorder if skilled PT is necessary.    Licensure   [place] License Number  Sharlene Wharton [id number]

## 2023-07-07 NOTE — PLAN OF CARE
Problem: PAIN - ADULT  Goal: Verbalizes/displays adequate comfort level or baseline comfort level  Description: Interventions:  - Encourage patient to monitor pain and request assistance  - Assess pain using appropriate pain scale  - Administer analgesics based on type and severity of pain and evaluate response  - Implement non-pharmacological measures as appropriate and evaluate response  - Consider cultural and social influences on pain and pain management  - Notify physician/advanced practitioner if interventions unsuccessful or patient reports new pain  Outcome: Progressing     Problem: INFECTION - ADULT  Goal: Absence or prevention of progression during hospitalization  Description: INTERVENTIONS:  - Assess and monitor for signs and symptoms of infection  - Monitor lab/diagnostic results  - Monitor all insertion sites, i.e. indwelling lines, tubes, and drains  - Monitor endotracheal if appropriate and nasal secretions for changes in amount and color  - [place] appropriate cooling/warming therapies per order  - Administer medications as ordered  - Instruct and encourage patient and family to use good hand hygiene technique  - Identify and instruct in appropriate isolation precautions for identified infection/condition  Outcome: Progressing  Goal: Absence of fever/infection during neutropenic period  Description: INTERVENTIONS:  - Monitor WBC    Outcome: Progressing     Problem: DISCHARGE PLANNING  Goal: Discharge to home or other facility with appropriate resources  Description: INTERVENTIONS:  - Identify barriers to discharge w/patient and caregiver  - Arrange for needed discharge resources and transportation as appropriate  - Identify discharge learning needs (meds, wound care, etc.)  - Arrange for interpretive services to assist at discharge as needed  - Refer to Case Management Department for coordinating discharge planning if the patient needs post-hospital services based on physician/advanced practitioner order or complex needs related to functional status, cognitive ability, or social support system  Outcome: Progressing     Problem: Knowledge Deficit  Goal: Patient/family/caregiver demonstrates understanding of disease process, treatment plan, medications, and discharge instructions  Description: Complete learning assessment and assess knowledge base.   Interventions:  - Provide teaching at level of understanding  - Provide teaching via preferred learning methods  Outcome: Progressing     Problem: GASTROINTESTINAL - ADULT  Goal: Minimal or absence of nausea and/or vomiting  Description: INTERVENTIONS:  - Administer IV fluids if ordered to ensure adequate hydration  - Maintain NPO status until nausea and vomiting are resolved  - Nasogastric tube if ordered  - Administer ordered antiemetic medications as needed  - Provide nonpharmacologic comfort measures as appropriate  - Advance diet as tolerated, if ordered  - Consider nutrition services referral to assist patient with adequate nutrition and appropriate food choices  Outcome: Progressing  Goal: Maintains or returns to baseline bowel function  Description: INTERVENTIONS:  - Assess bowel function  - Encourage oral fluids to ensure adequate hydration  - Administer IV fluids if ordered to ensure adequate hydration  - Administer ordered medications as needed  - Encourage mobilization and activity  - Consider nutritional services referral to assist patient with adequate nutrition and appropriate food choices  Outcome: Progressing  Goal: Maintains adequate nutritional intake  Description: INTERVENTIONS:  - Monitor percentage of each meal consumed  - Identify factors contributing to decreased intake, treat as appropriate  - Assist with meals as needed  - Monitor I&O, weight, and lab values if indicated  - Obtain nutrition services referral as needed  Outcome: Progressing  Goal: Oral mucous membranes remain intact  Description: INTERVENTIONS  - Assess oral mucosa and hygiene practices  - Implement preventative oral hygiene regimen  - Implement oral medicated treatments as ordered  - Initiate Nutrition services referral as needed  Outcome: Progressing     Problem: METABOLIC, FLUID AND ELECTROLYTES - ADULT  Goal: Electrolytes maintained within normal limits  Description: INTERVENTIONS:  - Monitor labs and assess patient for signs and symptoms of electrolyte imbalances  - Administer electrolyte replacement as ordered  - Monitor response to electrolyte replacements, including repeat lab results as appropriate  - Instruct patient on fluid and nutrition as appropriate  Outcome: Progressing  Goal: Fluid balance maintained  Description: INTERVENTIONS:  - Monitor labs   - Monitor I/O and WT  - Instruct patient on fluid and nutrition as appropriate  - Assess for signs & symptoms of volume excess or deficit  Outcome: Progressing     Problem: HEMATOLOGIC - ADULT  Goal: Maintains hematologic stability  Description: INTERVENTIONS  - Assess for signs and symptoms of bleeding or hemorrhage  - Monitor labs  - Administer supportive blood products/factors as ordered and appropriate  Outcome: Progressing     Problem: Nutrition/Hydration-ADULT  Goal: Nutrient/Hydration intake appropriate for improving, restoring or maintaining nutritional needs  Description: Monitor and assess patient's nutrition/hydration status for malnutrition. Collaborate with interdisciplinary team and initiate plan and interventions as ordered. Monitor patient's weight and dietary intake as ordered or per policy. Utilize nutrition screening tool and intervene as necessary. Determine patient's food preferences and provide high-protein, high-caloric foods as appropriate.      INTERVENTIONS:  - Monitor oral intake, urinary output, labs, and treatment plans  - Assess nutrition and hydration status and recommend course of action  - Evaluate amount of meals eaten  - Assist patient with eating if necessary   - Allow adequate time for meals  - Recommend/ encourage appropriate diets, oral nutritional supplements, and vitamin/mineral supplements  - Order, calculate, and assess calorie counts as needed  - Recommend, monitor, and adjust tube feedings and TPN/PPN based on assessed needs  - Assess need for intravenous fluids  - Provide specific nutrition/hydration education as appropriate  - Include patient/family/caregiver in decisions related to nutrition  Outcome: Progressing

## 2023-07-07 NOTE — OCCUPATIONAL THERAPY NOTE
Occupational Therapy Screen       07/07/23 1549   Note Type   Note type Screen   Additional Comments Patient currently independent in all ADLs/mobility at this time.  NO skilled inpatient OT services indicated at this time   [employer] [employer] Number  Gaurav Parents, OTR/L [id number]

## 2023-07-07 NOTE — TELEPHONE ENCOUNTER
----- Message from Ondina Clifton MD sent at 7/7/2023  2:28 PM EDT -----  Regarding: Please schedule patient for Medicare Annual Wellness Visit. Please schedule patient for Medicare Annual Wellness Visit within the next 8 weeks. Also, please make sure patient's e-mail is on the chart and has completed [facility] sign-up.  Thanks, Dr. Daniel Anton

## 2023-07-08 PROBLEM — N17.9 ACUTE KIDNEY INJURY (HCC): Status: RESOLVED | Noted: 2023-07-06 | Resolved: 2023-07-08

## 2023-07-08 LAB
ALBUMIN SERPL BCP-MCNC: 2.7 G/DL (ref 3.5–5)
ALP SERPL-CCNC: 61 U/L (ref 34–104)
ALT SERPL W P-5'-P-CCNC: 8 U/L (ref 7–52)
ANION GAP SERPL CALCULATED.3IONS-SCNC: 8 MMOL/L
ANISOCYTOSIS BLD QL SMEAR: PRESENT
AST SERPL W P-5'-P-CCNC: 19 U/L (ref 13–39)
BASOPHILS # BLD AUTO: 0.01 THOUSANDS/ÂΜL (ref 0–0.1)
BASOPHILS NFR BLD AUTO: 0 % (ref 0–1)
BILIRUB SERPL-MCNC: 0.27 MG/DL (ref 0.2–1)
BUN SERPL-MCNC: 7 MG/DL (ref 5–25)
CALCIUM ALBUM COR SERPL-MCNC: 8.9 MG/DL (ref 8.3–10.1)
CALCIUM SERPL-MCNC: 7.9 MG/DL (ref 8.4–10.2)
CHLORIDE SERPL-SCNC: 110 MMOL/L (ref 96–108)
CO2 SERPL-SCNC: 20 MMOL/L (ref 21–32)
CREAT SERPL-MCNC: 0.84 MG/DL (ref 0.6–1.3)
EOSINOPHIL # BLD AUTO: 0 THOUSAND/ÂΜL (ref 0–0.61)
EOSINOPHIL NFR BLD AUTO: 0 % (ref 0–6)
ERYTHROCYTE [DISTWIDTH] IN BLOOD BY AUTOMATED COUNT: 30.5 % (ref 11.6–15.1)
GFR SERPL CREATININE-BSD FRML MDRD: 76 ML/MIN/1.73SQ M
GLUCOSE SERPL-MCNC: 125 MG/DL (ref 65–140)
HCT VFR BLD AUTO: 30.7 % (ref 34.8–46.1)
HGB BLD-MCNC: 8.9 G/DL (ref 11.5–15.4)
HYPERCHROMIA BLD QL SMEAR: PRESENT
IMM GRANULOCYTES # BLD AUTO: 0.07 THOUSAND/UL (ref 0–0.2)
IMM GRANULOCYTES NFR BLD AUTO: 1 % (ref 0–2)
LYMPHOCYTES # BLD AUTO: 0.91 THOUSANDS/ÂΜL (ref 0.6–4.47)
LYMPHOCYTES NFR BLD AUTO: 7 % (ref 14–44)
MCH RBC QN AUTO: 21.4 PG (ref 26.8–34.3)
MCHC RBC AUTO-ENTMCNC: 29 G/DL (ref 31.4–37.4)
MCV RBC AUTO: 74 FL (ref 82–98)
MICROCYTES BLD QL AUTO: PRESENT
MONOCYTES # BLD AUTO: 0.16 THOUSAND/ÂΜL (ref 0.17–1.22)
MONOCYTES NFR BLD AUTO: 1 % (ref 4–12)
NEUTROPHILS # BLD AUTO: 11.07 THOUSANDS/ÂΜL (ref 1.85–7.62)
NEUTS SEG NFR BLD AUTO: 91 % (ref 43–75)
NRBC BLD AUTO-RTO: 0 /100 WBCS
OVALOCYTES BLD QL SMEAR: PRESENT
PLATELET # BLD AUTO: 470 THOUSANDS/UL (ref 149–390)
PLATELET BLD QL SMEAR: ABNORMAL
PMV BLD AUTO: 8.5 FL (ref 8.9–12.7)
POLYCHROMASIA BLD QL SMEAR: PRESENT
POTASSIUM SERPL-SCNC: 3.9 MMOL/L (ref 3.5–5.3)
PROT SERPL-MCNC: 5.5 G/DL (ref 6.4–8.4)
RBC # BLD AUTO: 4.16 MILLION/UL (ref 3.81–5.12)
RBC MORPH BLD: PRESENT
SODIUM SERPL-SCNC: 138 MMOL/L (ref 135–147)
WBC # BLD AUTO: 12.22 THOUSAND/UL (ref 4.31–10.16)

## 2023-07-08 PROCEDURE — 80053 COMPREHEN METABOLIC PANEL: CPT

## 2023-07-08 PROCEDURE — 99232 SBSQ HOSP IP/OBS MODERATE 35: CPT | Performed by: INTERNAL MEDICINE

## 2023-07-08 PROCEDURE — 99233 SBSQ HOSP IP/OBS HIGH 50: CPT | Performed by: INTERNAL MEDICINE

## 2023-07-08 PROCEDURE — 85025 COMPLETE CBC W/AUTO DIFF WBC: CPT

## 2023-07-08 RX ORDER — ZOLPIDEM TARTRATE 5 MG/1
5 TABLET ORAL ONCE
Status: COMPLETED | OUTPATIENT
Start: 2023-07-08 | End: 2023-07-08

## 2023-07-08 RX ORDER — POLYETHYLENE GLYCOL 3350 17 G/17G
238 POWDER, FOR SOLUTION ORAL ONCE
Status: DISCONTINUED | OUTPATIENT
Start: 2023-07-09 | End: 2023-07-08

## 2023-07-08 RX ORDER — POLYETHYLENE GLYCOL 3350 17 G/17G
238 POWDER, FOR SOLUTION ORAL ONCE
Status: COMPLETED | OUTPATIENT
Start: 2023-07-09 | End: 2023-07-09

## 2023-07-08 RX ORDER — BISACODYL 5 MG/1
10 TABLET, DELAYED RELEASE ORAL ONCE
Status: COMPLETED | OUTPATIENT
Start: 2023-07-09 | End: 2023-07-09

## 2023-07-08 RX ADMIN — MESALAMINE 800 MG: 400 CAPSULE, DELAYED RELEASE ORAL at 08:23

## 2023-07-08 RX ADMIN — HYDROXYZINE HYDROCHLORIDE 25 MG: 25 TABLET ORAL at 00:12

## 2023-07-08 RX ADMIN — MESALAMINE 800 MG: 400 CAPSULE, DELAYED RELEASE ORAL at 21:38

## 2023-07-08 RX ADMIN — SUCRALFATE 1 G: 1 TABLET ORAL at 12:01

## 2023-07-08 RX ADMIN — MORPHINE SULFATE 1 MG: 2 INJECTION, SOLUTION INTRAMUSCULAR; INTRAVENOUS at 06:22

## 2023-07-08 RX ADMIN — MORPHINE SULFATE 1 MG: 2 INJECTION, SOLUTION INTRAMUSCULAR; INTRAVENOUS at 19:51

## 2023-07-08 RX ADMIN — SUCRALFATE 1 G: 1 TABLET ORAL at 06:05

## 2023-07-08 RX ADMIN — FOLIC ACID 1 MG: 1 TABLET ORAL at 08:23

## 2023-07-08 RX ADMIN — SUCRALFATE 1 G: 1 TABLET ORAL at 16:43

## 2023-07-08 RX ADMIN — ONDANSETRON 4 MG: 2 INJECTION INTRAMUSCULAR; INTRAVENOUS at 13:17

## 2023-07-08 RX ADMIN — PANTOPRAZOLE SODIUM 40 MG: 40 TABLET, DELAYED RELEASE ORAL at 16:43

## 2023-07-08 RX ADMIN — IRON SUCROSE 300 MG: 20 INJECTION, SOLUTION INTRAVENOUS at 08:25

## 2023-07-08 RX ADMIN — PANTOPRAZOLE SODIUM 40 MG: 40 TABLET, DELAYED RELEASE ORAL at 06:05

## 2023-07-08 RX ADMIN — METHYLPREDNISOLONE SODIUM SUCCINATE 20 MG: 40 INJECTION, POWDER, FOR SOLUTION INTRAMUSCULAR; INTRAVENOUS at 13:18

## 2023-07-08 RX ADMIN — LISINOPRIL 20 MG: 20 TABLET ORAL at 08:23

## 2023-07-08 RX ADMIN — SODIUM CHLORIDE 100 ML/HR: 0.9 INJECTION, SOLUTION INTRAVENOUS at 05:04

## 2023-07-08 RX ADMIN — SUCRALFATE 1 G: 1 TABLET ORAL at 21:38

## 2023-07-08 RX ADMIN — ZOLPIDEM TARTRATE 5 MG: 5 TABLET ORAL at 01:33

## 2023-07-08 RX ADMIN — METHYLPREDNISOLONE SODIUM SUCCINATE 20 MG: 40 INJECTION, POWDER, FOR SOLUTION INTRAMUSCULAR; INTRAVENOUS at 05:02

## 2023-07-08 RX ADMIN — HYDROXYZINE HYDROCHLORIDE 25 MG: 25 TABLET ORAL at 21:38

## 2023-07-08 RX ADMIN — MESALAMINE 800 MG: 400 CAPSULE, DELAYED RELEASE ORAL at 16:43

## 2023-07-08 RX ADMIN — MORPHINE SULFATE 1 MG: 2 INJECTION, SOLUTION INTRAMUSCULAR; INTRAVENOUS at 13:16

## 2023-07-08 RX ADMIN — METHYLPREDNISOLONE SODIUM SUCCINATE 20 MG: 40 INJECTION, POWDER, FOR SOLUTION INTRAMUSCULAR; INTRAVENOUS at 21:38

## 2023-07-08 NOTE — PLAN OF CARE
Problem: PAIN - ADULT  Goal: Verbalizes/displays adequate comfort level or baseline comfort level  Description: Interventions:  - Encourage patient to monitor pain and request assistance  - Assess pain using appropriate pain scale  - Administer analgesics based on type and severity of pain and evaluate response  - Implement non-pharmacological measures as appropriate and evaluate response  - Consider cultural and social influences on pain and pain management  - Notify physician/advanced practitioner if interventions unsuccessful or patient reports new pain  Outcome: Progressing     Problem: INFECTION - ADULT  Goal: Absence or prevention of progression during hospitalization  Description: INTERVENTIONS:  - Assess and monitor for signs and symptoms of infection  - Monitor lab/diagnostic results  - Monitor all insertion sites, i.e. indwelling lines, tubes, and drains  - Monitor endotracheal if appropriate and nasal secretions for changes in amount and color  - [place] appropriate cooling/warming therapies per order  - Administer medications as ordered  - Instruct and encourage patient and family to use good hand hygiene technique  - Identify and instruct in appropriate isolation precautions for identified infection/condition  Outcome: Progressing  Goal: Absence of fever/infection during neutropenic period  Description: INTERVENTIONS:  - Monitor WBC    Outcome: Progressing     Problem: DISCHARGE PLANNING  Goal: Discharge to home or other facility with appropriate resources  Description: INTERVENTIONS:  - Identify barriers to discharge w/patient and caregiver  - Arrange for needed discharge resources and transportation as appropriate  - Identify discharge learning needs (meds, wound care, etc.)  - Arrange for interpretive services to assist at discharge as needed  - Refer to Case Management Department for coordinating discharge planning if the patient needs post-hospital services based on physician/advanced practitioner order or complex needs related to functional status, cognitive ability, or social support system  Outcome: Progressing     Problem: Knowledge Deficit  Goal: Patient/family/caregiver demonstrates understanding of disease process, treatment plan, medications, and discharge instructions  Description: Complete learning assessment and assess knowledge base.   Interventions:  - Provide teaching at level of understanding  - Provide teaching via preferred learning methods  Outcome: Progressing     Problem: GASTROINTESTINAL - ADULT  Goal: Minimal or absence of nausea and/or vomiting  Description: INTERVENTIONS:  - Administer IV fluids if ordered to ensure adequate hydration  - Maintain NPO status until nausea and vomiting are resolved  - Nasogastric tube if ordered  - Administer ordered antiemetic medications as needed  - Provide nonpharmacologic comfort measures as appropriate  - Advance diet as tolerated, if ordered  - Consider nutrition services referral to assist patient with adequate nutrition and appropriate food choices  Outcome: Progressing  Goal: Maintains or returns to baseline bowel function  Description: INTERVENTIONS:  - Assess bowel function  - Encourage oral fluids to ensure adequate hydration  - Administer IV fluids if ordered to ensure adequate hydration  - Administer ordered medications as needed  - Encourage mobilization and activity  - Consider nutritional services referral to assist patient with adequate nutrition and appropriate food choices  Outcome: Progressing  Goal: Maintains adequate nutritional intake  Description: INTERVENTIONS:  - Monitor percentage of each meal consumed  - Identify factors contributing to decreased intake, treat as appropriate  - Assist with meals as needed  - Monitor I&O, weight, and lab values if indicated  - Obtain nutrition services referral as needed  Outcome: Progressing  Goal: Oral mucous membranes remain intact  Description: INTERVENTIONS  - Assess oral mucosa and hygiene practices  - Implement preventative oral hygiene regimen  - Implement oral medicated treatments as ordered  - Initiate Nutrition services referral as needed  Outcome: Progressing     Problem: METABOLIC, FLUID AND ELECTROLYTES - ADULT  Goal: Electrolytes maintained within normal limits  Description: INTERVENTIONS:  - Monitor labs and assess patient for signs and symptoms of electrolyte imbalances  - Administer electrolyte replacement as ordered  - Monitor response to electrolyte replacements, including repeat lab results as appropriate  - Instruct patient on fluid and nutrition as appropriate  Outcome: Progressing  Goal: Fluid balance maintained  Description: INTERVENTIONS:  - Monitor labs   - Monitor I/O and WT  - Instruct patient on fluid and nutrition as appropriate  - Assess for signs & symptoms of volume excess or deficit  Outcome: Progressing     Problem: HEMATOLOGIC - ADULT  Goal: Maintains hematologic stability  Description: INTERVENTIONS  - Assess for signs and symptoms of bleeding or hemorrhage  - Monitor labs  - Administer supportive blood products/factors as ordered and appropriate  Outcome: Progressing     Problem: Nutrition/Hydration-ADULT  Goal: Nutrient/Hydration intake appropriate for improving, restoring or maintaining nutritional needs  Description: Monitor and assess patient's nutrition/hydration status for malnutrition. Collaborate with interdisciplinary team and initiate plan and interventions as ordered. Monitor patient's weight and dietary intake as ordered or per policy. Utilize nutrition screening tool and intervene as necessary. Determine patient's food preferences and provide high-protein, high-caloric foods as appropriate.      INTERVENTIONS:  - Monitor oral intake, urinary output, labs, and treatment plans  - Assess nutrition and hydration status and recommend course of action  - Evaluate amount of meals eaten  - Assist patient with eating if necessary   - Allow adequate time for meals  - Recommend/ encourage appropriate diets, oral nutritional supplements, and vitamin/mineral supplements  - Order, calculate, and assess calorie counts as needed  - Recommend, monitor, and adjust tube feedings and TPN/PPN based on assessed needs  - Assess need for intravenous fluids  - Provide specific nutrition/hydration education as appropriate  - Include patient/family/caregiver in decisions related to nutrition  Outcome: Progressing

## 2023-07-08 NOTE — PROGRESS NOTES
Progress Note- Emy Escamilla 59 y.o. female MRN: [id number]    Unit/Bed#: [address] [place] [address] Encounter: [id number]      Assessment and Plan:    Patient with a history of ulcerative colitis, imaging suggest inflammation from transverse colon to rectum, also anemia, multiple bowel movements a day,,  incontinence. She has been on steroids low-dose for a while, no close follow-up with GI, clinically no evidence of acute abdomen is obstruction at this time. Just received iron infusion. Plans for colonoscopy EGD on Monday. Will ask Dr. Andreas Blackmon to do this per patient request.  This plan was discussed with patient and Dr. Gen Arevalo the hospitalist.  ______________________________________________________________________    Subjective:     Longstanding history of ulcerative colitis, still has profuse diarrhea multiple bowel movements with urgency, some blood-tinged, has vague abdominal discomfort, occasional nausea, appetite is so-so, may have lost some weight. Denies chest pain shortness of breath fever, lives at home with autistic son, has been on prednisone 10 mg a day, recently decreased to 5 mg. Denies any chest pains dysphagia coughing choking spells occasional reflux. Denies any fever chills rash. No recent travels no new antibiotics medications.     Medication Administration - last 24 hours from 07/07/2023 1034 to 07/08/2023 1034       Date/Time Order Dose Route Action Action by     29/92/4701 9567 EDT folic acid (FOLVITE) tablet 1 mg 1 mg Oral Given Sumit Campos RN     07/08/2023 9683 EDT mesalamine (DELZICOL) delayed release capsule 800 mg 800 mg Oral Given Sumit Campos RN     07/07/2023 2117 EDT mesalamine (DELZICOL) delayed release capsule 800 mg 800 mg Oral Given Miya Kendall RN     07/07/2023 1546 EDT mesalamine (DELZICOL) delayed release capsule 800 mg 800 mg Oral Given Sumit Campos RN     07/08/2023 4027 EDT sodium chloride 0.9 % infusion 100 mL/hr Intravenous New Bag Miya Kendall, RN 07/07/2023 1549 EDT sodium chloride 0.9 % infusion 100 mL/hr Intravenous [address]  [facility] [place]     07/07/2023 2347 EDT ondansetron [facility]) injection 4 mg 4 mg Intravenous Given Tiarra Louise, TYLER     07/08/2023 0825 EDT iron sucrose (VENOFER) 300 mg in sodium chloride 0.9 % 250 mL IVPB 300 mg Intravenous [address]  [facility] [place]     07/08/2023 0183 EDT lisinopril (ZESTRIL) tablet 20 mg 20 mg Oral Given Armando Emmalena, [place]     07/08/2023 5847 EDT morphine injection 1 mg 1 mg Intravenous Given Tiarra Louise, RN     07/07/2023 2012 EDT morphine injection 1 mg 1 mg Intravenous Given Tiarra Louise, TYLER     07/07/2023 1058 EDT morphine injection 1 mg 1 mg Intravenous Given Armando Beasley, RN     07/08/2023 0502 EDT methylPREDNISolone sodium succinate (Solu-MEDROL) injection 20 mg 20 mg Intravenous Given Tiarra Louise RN     07/07/2023 2117 EDT methylPREDNISolone sodium succinate (Solu-MEDROL) injection 20 mg 20 mg Intravenous Given Tiarra Louise RN     07/07/2023 1442 EDT methylPREDNISolone sodium succinate (Solu-MEDROL) injection 20 mg 20 mg Intravenous Given Armando Beasley, RN     07/08/2023 5000 EDT pantoprazole (PROTONIX) EC tablet 40 mg 40 mg Oral Given Tiarra Louise RN     07/07/2023 1541 EDT pantoprazole (PROTONIX) EC tablet 40 mg 40 mg Oral Given Armando Emmalena, [place]     07/08/2023 3404 EDT sucralfate (CARAFATE) tablet 1 g 1 g Oral Given Tiarra Louise RN     07/07/2023 2117 EDT sucralfate (CARAFATE) tablet 1 g 1 g Oral Given Tiarra Louise RN     07/07/2023 1541 EDT sucralfate (CARAFATE) tablet 1 g 1 g Oral Given Armando Beasley, TYLER     07/08/2023 4844 EDT hydrOXYzine HCL (ATARAX) tablet 25 mg 25 mg Oral Given Tiarra Louise RN     07/08/2023 0133 EDT zolpidem (AMBIEN) tablet 5 mg 5 mg Oral Given Tiarra Louise, TYLER          Objective:     Vitals: Blood pressure 119/64, pulse 62, temperature 97.5 °F (36.4 °C), resp. rate 16, height 5' 5" (1.651 m), weight 55.1 kg (121 lb 8 oz), SpO2 99 %. ,Body mass index is 20.22 kg/m².     No intake or output data in the 24 hours ending 07/08/23 1034    Physical Exam:   General Appearance: Awake and alert, in no acute distress  Abdomen: Soft, non-tender, non-distended; bowel sounds normal; no masses or no organomegaly    Invasive Devices     Peripheral Intravenous Line  Duration           Peripheral IV 07/06/23 Right Antecubital 1 day    Peripheral IV 07/06/23 Right;Ventral (anterior) Forearm 1 day                Lab Results:  Admission on 07/06/2023   Component Date Value   • WBC 07/06/2023 16.88 (H)    • RBC 07/06/2023 3.58 (L)    • Hemoglobin 07/06/2023 5.9 (LL)    • Hematocrit 07/06/2023 23.2 (L)    • MCV 07/06/2023 65 (L)    • MCH 07/06/2023 16.5 (L)    • MCHC 07/06/2023 25.8 (L)    • RDW 07/06/2023 24.2 (H)    • MPV 07/06/2023 8.2 (L)    • Platelets 87/96/5144 652 (H)    • nRBC 07/06/2023 0    • Neutrophils Relative 07/06/2023 82 (H)    • Immat GRANS % 07/06/2023 1    • Lymphocytes Relative 07/06/2023 8 (L)    • Monocytes Relative 07/06/2023 8    • Eosinophils Relative 07/06/2023 1    • Basophils Relative 07/06/2023 0    • Neutrophils Absolute 07/06/2023 13.99 (H)    • Immature Grans Absolute 07/06/2023 0.13    • Lymphocytes Absolute 07/06/2023 1.28    • Monocytes Absolute 07/06/2023 1.26 (H)    • Eosinophils Absolute 07/06/2023 0.17    • Basophils Absolute 07/06/2023 0.05    • Sodium 07/06/2023 137    • Potassium 07/06/2023 3.6    • Chloride 07/06/2023 107    • CO2 07/06/2023 22    • ANION GAP 07/06/2023 8    • BUN 07/06/2023 17    • Creatinine 07/06/2023 1.41 (H)    • Glucose 07/06/2023 85    • Calcium 07/06/2023 8.8    • Corrected Calcium 07/06/2023 9.5    • AST 07/06/2023 15    • ALT 07/06/2023 6 (L)    • Alkaline Phosphatase 07/06/2023 65    • Total Protein 07/06/2023 6.6    • Albumin 07/06/2023 3.1 (L)    • Total Bilirubin 07/06/2023 0.37    • eGFR 07/06/2023 40    • Magnesium 07/06/2023 1.7 (L)    • Lipase 07/06/2023 14    • ABO Grouping 07/06/2023 O    • Rh Factor 07/06/2023 Positive    • Antibody Screen 07/06/2023 Negative    • Specimen Expiration Date 07/06/2023 [id number]    • hs TnI 0hr 07/06/2023 3    • Unit Product Code 07/07/2023 [id number]    • Unit Number 07/07/2023 [id number]    • Unit ABO 07/07/2023 O    • Unit [facility] 07/07/2023 POS    • Crossmatch 07/07/2023 Compatible    • Unit Dispense Status 07/07/2023 Presumed Trans    • Unit Product Volume 07/07/2023 350    • Unit Product Code 07/07/2023 [id number]    • Unit Number 07/07/2023 [id number]    • Unit ABO 07/07/2023 O    • Unit [facility] 07/07/2023 POS    • Crossmatch 07/07/2023 Compatible    • Unit Dispense Status 07/07/2023 Presumed Trans    • Unit Product Volume 07/07/2023 350    • hs TnI 2hr 07/06/2023 3    • Delta 2hr hsTnI 07/06/2023 0    • Color, UA 07/06/2023 Light Yellow    • Clarity, UA 07/06/2023 Clear    • Specific Gravity, UA 07/06/2023 <=1.005    • pH, UA 07/06/2023 6.0    • Leukocytes, UA 07/06/2023 Trace (A)    • Nitrite, UA 07/06/2023 Negative    • Protein, UA 07/06/2023 Negative    • Glucose, UA 07/06/2023 Negative    • Ketones, UA 07/06/2023 Negative    • Urobilinogen, UA 07/06/2023 0.2    • Bilirubin, UA 07/06/2023 Negative    • Occult Blood, UA 07/06/2023 Small (A)    • CRP 07/06/2023 5.0 (H)    • Salmonella sp PCR 07/06/2023 None Detected    • Shigella sp/Enteroinvasi* 07/06/2023 None Detected    • Campylobacter sp (jejuni* 07/06/2023 None Detected    • Shiga toxin 1/Shiga toxi* 07/06/2023 None Detected    • C.difficile toxin by PCR 07/06/2023 Negative    • RBC, UA 07/06/2023 None Seen    • WBC, UA 07/06/2023 0-1    • Epithelial Cells 07/06/2023 Occasional    • Bacteria, UA 07/06/2023 None Seen    • Hyaline Casts, UA 07/06/2023 0-1 (A)    • Iron Saturation 07/06/2023 4 (L)    • TIBC 07/06/2023 328    • Iron 07/06/2023 12 (L)    • Ferritin 07/06/2023 2 (L)    • Hemoglobin 07/07/2023 9.4 (L)    • Hematocrit 07/07/2023 33.2 (L)    • Hepatitis B Surface Ag 07/07/2023 Non-reactive    • Hep B Core Total Ab 07/07/2023 Non-reactive    • WBC 07/07/2023 12.53 (H)    • RBC 07/07/2023 4.38    • Hemoglobin 07/07/2023 9.3 (L)    • Hematocrit 07/07/2023 32.1 (L)    • MCV 07/07/2023 73 (L)    • MCH 07/07/2023 21.2 (L)    • MCHC 07/07/2023 29.0 (L)    • RDW 07/07/2023 29.5 (H)    • MPV 07/07/2023 8.4 (L)    • Platelets 84/88/5509 515 (H)    • nRBC 07/07/2023 0    • Neutrophils Relative 07/07/2023 69    • Immat GRANS % 07/07/2023 0    • Lymphocytes Relative 07/07/2023 18    • Monocytes Relative 07/07/2023 12    • Eosinophils Relative 07/07/2023 1    • Basophils Relative 07/07/2023 0    • Neutrophils Absolute 07/07/2023 8.53 (H)    • Immature Grans Absolute 07/07/2023 0.04    • Lymphocytes Absolute 07/07/2023 2.29    • Monocytes Absolute 07/07/2023 1.49 (H)    • Eosinophils Absolute 07/07/2023 0.14    • Basophils Absolute 07/07/2023 0.04    • Magnesium 07/07/2023 2.7    • Sodium 07/07/2023 137    • Potassium 07/07/2023 4.0    • Chloride 07/07/2023 110 (H)    • CO2 07/07/2023 21    • ANION GAP 07/07/2023 6    • BUN 07/07/2023 10    • Creatinine 07/07/2023 1.14    • Glucose 07/07/2023 69    • Glucose, Fasting 07/07/2023 69    • Calcium 07/07/2023 7.8 (L)    • Corrected Calcium 07/07/2023 8.7    • AST 07/07/2023 14    • ALT 07/07/2023 5 (L)    • Alkaline Phosphatase 07/07/2023 63    • Total Protein 07/07/2023 5.9 (L)    • Albumin 07/07/2023 2.9 (L)    • Total Bilirubin 07/07/2023 0.44    • eGFR 07/07/2023 52    • Ventricular Rate 07/06/2023 78    • Atrial Rate 07/06/2023 78    • MT Interval 07/06/2023 138    • QRSD Interval 07/06/2023 90    • QT Interval 07/06/2023 396    • QTC Interval 07/06/2023 451    • P Axis 07/06/2023 -19    • QRS Axis 07/06/2023 39    • T Wave Axis 07/06/2023 80    • WBC 07/08/2023 12.22 (H)    • RBC 07/08/2023 4.16    • Hemoglobin 07/08/2023 8.9 (L)    • Hematocrit 07/08/2023 30.7 (L)    • MCV 07/08/2023 74 (L)    • MCH 07/08/2023 21.4 (L)    • MCHC 07/08/2023 29.0 (L)    • RDW 07/08/2023 30.5 (H)    • MPV 07/08/2023 8.5 (L)    • Platelets 71/73/7193 470 (H)    • nRBC 07/08/2023 0    • Neutrophils Relative 07/08/2023 91 (H)    • Immat GRANS % 07/08/2023 1    • Lymphocytes Relative 07/08/2023 7 (L)    • Monocytes Relative 07/08/2023 1 (L)    • Eosinophils Relative 07/08/2023 0    • Basophils Relative 07/08/2023 0    • Neutrophils Absolute 07/08/2023 11.07 (H)    • Immature Grans Absolute 07/08/2023 0.07    • Lymphocytes Absolute 07/08/2023 0.91    • Monocytes Absolute 07/08/2023 0.16 (L)    • Eosinophils Absolute 07/08/2023 0.00    • Basophils Absolute 07/08/2023 0.01    • Sodium 07/08/2023 138    • Potassium 07/08/2023 3.9    • Chloride 07/08/2023 110 (H)    • CO2 07/08/2023 20 (L)    • ANION GAP 07/08/2023 8    • BUN 07/08/2023 7    • Creatinine 07/08/2023 0.84    • Glucose 07/08/2023 125    • Calcium 07/08/2023 7.9 (L)    • Corrected Calcium 07/08/2023 8.9    • AST 07/08/2023 19    • ALT 07/08/2023 8    • Alkaline Phosphatase 07/08/2023 61    • Total Protein 07/08/2023 5.5 (L)    • Albumin 07/08/2023 2.7 (L)    • Total Bilirubin 07/08/2023 0.27    • eGFR 07/08/2023 76    • RBC Morphology 07/08/2023 Present    • Platelet Estimate 22/75/0827 Increased (A)    • Anisocytosis 07/08/2023 Present    • Hypochromia 07/08/2023 Present    • Microcytes 07/08/2023 Present    • Ovalocytes 07/08/2023 Present    • Polychromasia 07/08/2023 Present        Imaging Studies: I have personally reviewed pertinent imaging studies.

## 2023-07-08 NOTE — PROGRESS NOTES
Daily Progress Note - [facility] [address] 59 y.o. female MRN: [id number]  Unit/Bed#: [address] [place] [address] Encounter: [id number]  Admitting Physician: Kiarra Ontiveros MD   PCP: Antony Perez DO  Date of Admission:  7/6/2023 11:55 AM    Assessment and Plan    * Symptomatic anemia  Assessment & Plan    Hgb today 8.9. Stable. · POA Hgb 5.9 a/w SOB, palpitation, lightheadedness. She reported having worsening blood in stool and  diarrhea over the last 2 weeks. · History of ulcerative colitis with GI bleed. Last GI follow-up in 2019  · CT AP: Colonic wall thickening from the transverse colon to the rectum with loss of normal haustral folds  · Likely due to iron deficiency as evidenced by low iron and ferritin  · Was administered 3 doses of venofer 300 mg  · Was administered 2 units of blood, posttransfusion H&H 9.4  · Monitor CBC a.m.  · Transfuse if hemoglobin under 7    Colitis  Assessment & Plan    As evidenced by worsening bouts of diarrhea, abdominal pain, and colonic wall thickening from the transverse colon to the rectum on imaging. · Infection versus inflammation  · Negative stool enteric panel and C. difficile  · Stool studies pending: Fecal calprotectin, Hep B surface antigen, hep B core antibody, ova and parasite, TB  · Was treated with Rocephin 1 g daily and Flagyl 500 mg every 8 hours; and discontinued once C. difficile stool enteric panels returned negative  · Advance diet as tolerated and per GI recommendation  · N.p.o. at midnight tomorrow  · Plan for EGD and colonoscopy on Monday    History of ulcerative colitis  Assessment & Plan    History of ulcerative colitis diagnosed in 1989, been on steroid for the past 20 years. Home medications include steroid 5 mg daily and balsalazide 750 mg 3 tablets twice daily.     · GI consulted, recommended that biologic could be beneficial if infectious etiology is ruled out  · Continue on mesalamine 800 mg 3 times daily  · Treated with methylprednisolone 20 mg, IV, Q8H   · Advance diet to low residue, lactose restricted  · Monitor for worsening symptoms of nausea, diarrhea, vomiting or abdominal pain    Acute kidney injury (HCC)-resolved as of 7/8/2023  Assessment & Plan    Improving renal function    · POA Cr 1.41. Baseline appears to be around 0.8-0.9. Today 0.84. · No excessive use of NSAIDs  · Likely related to GI volume loss and dehydration  · IV hydration  · Avoid nephrotoxic medications    Pharyngoesophageal dysphagia  Assessment & Plan    · Reported mechanical dysphagia over the past couple months. · GI consulted  · Continue on PPI 40 mg daily  · Continue IV hydration  · Advance diet to low residue lactose restricted  · N.p.o. tomorrow at midnight  · Plan for EGD and colonoscopy inpatient on Monday    Essential hypertension  Assessment & Plan    Stable BP on admission. /64 mmHg, today. Home medication include quinapril 20 mg daily. · On lisinopril 20 mg daily (quinapril nonformulary)    VTE Pharmacologic Prophylaxis: VTE Score: 1 Not recommended - GI bleed. Patient Centered Rounds: I have performed bedside rounds with nursing staff today. Discussions with Specialists or Other Care Team Provider: GI. Time Spent for Care: 30 minutes. More than 50% of total time spent on counseling and coordination of care as described above. Current Length of Stay: 1 day(s)    Current Patient Status: Inpatient   Certification Statement: The patient will continue to require additional inpatient hospital stay due to for management of symptomatic anemia and GI bleed. Discharge Plan: Pending    Code Status: Level 1 - Full Code    Subjective:     Pt was evaluated at bedside. Patient was awake, alert and oriented x3. Patient stated that she was still having some abdominal pain and leakage of brown watery stool. Mentions that she still has some nausea but no vomiting.   Patient also states that she feels a bit tired but feels a little better from admission. She has been tolerating p.o. but she has been having some difficulty swallowing. Did not sleep much throughout the night due to anxiety regarding who is taking care of her son. Also had complaints of symptoms of food reflux and dry mouth. No fever, chills, shortness of breath, or chest pain. Objective     Objective:     Vitals:   Temp (24hrs), Av.1 °F (36.7 °C), Min:97.5 °F (36.4 °C), Max:98.6 °F (37 °C)    Temp:  [97.5 °F (36.4 °C)-98.6 °F (37 °C)] 97.5 °F (36.4 °C)  HR:  [62-82] 62  Resp:  [16-18] 16  BP: (118-119)/(59-66) 119/64  SpO2:  [96 %-99 %] 99 %  Body mass index is 20.22 kg/m². Input and Output Summary (last 24 hours):     No intake or output data in the 24 hours ending 23 0933     Physical Exam  Constitutional:       General: She is not in acute distress. Appearance: Normal appearance. She is normal weight. She is not ill-appearing or toxic-appearing. HENT:      Head: Normocephalic and atraumatic. Right Ear: External ear normal.      Left Ear: External ear normal.      Nose: Nose normal.      Mouth/Throat:      Mouth: Mucous membranes are dry. Eyes:      General: No scleral icterus. Conjunctiva/sclera: Conjunctivae normal.   Cardiovascular:      Rate and Rhythm: Normal rate and regular rhythm. Pulses: Normal pulses. Heart sounds: Normal heart sounds. Pulmonary:      Effort: Pulmonary effort is normal. No respiratory distress. Breath sounds: Normal breath sounds. Abdominal:      General: Abdomen is flat. Bowel sounds are normal. There is no distension. Palpations: Abdomen is soft. Tenderness: There is abdominal tenderness (Epigastric) in the epigastric area. Hernia: No hernia is present. Musculoskeletal:         General: Normal range of motion. Cervical back: Normal range of motion. Right lower leg: No edema. Left lower leg: No edema.    Skin:     General: Skin is warm and dry. Findings: Rash (Back - Picture in media) present. No lesion. Neurological:      Mental Status: She is alert and oriented to person, place, and time. Mental status is at baseline. Psychiatric:         Mood and Affect: Mood normal.         Behavior: Behavior normal.         Thought Content: Thought content normal.         Judgment: Judgment normal.     Additional Data:     Labs:  Results from last 7 days   Lab Units 07/08/23  0501   WBC Thousand/uL 12.22*   HEMOGLOBIN g/dL 8.9*   HEMATOCRIT % 30.7*   PLATELETS Thousands/uL 470*   NEUTROS PCT % 91*   LYMPHS PCT % 7*   MONOS PCT % 1*   EOS PCT % 0     Results from last 7 days   Lab Units 07/08/23  0501   POTASSIUM mmol/L 3.9   CHLORIDE mmol/L 110*   CO2 mmol/L 20*   BUN mg/dL 7   CREATININE mg/dL 0.84   CALCIUM mg/dL 7.9*   ALK PHOS U/L 61   ALT U/L 8   AST U/L 19                    * I Have Reviewed All Lab Data Listed Above. * Additional Pertinent Lab Tests Reviewed: All Labs Within Last 24 Hours Reviewed    Imaging:    Imaging Reports Reviewed Today Include: All  Imaging Personally Reviewed by Myself Includes:   All    Recent Cultures (last 7 days):     Results from last 7 days   Lab Units 07/06/23  1721   C DIFF TOXIN B BY PCR  Negative       Last 24 Hours Medication List:   Current Facility-Administered Medications   Medication Dose Route Frequency Provider Last Rate   • acetaminophen  650 mg Oral Once Devi Perea MD     • folic acid  1 mg Oral Daily [address] Wells MD     • hydrOXYzine HCL  25 mg Oral HS Devi Perea MD     • iron sucrose  300 mg Intravenous Daily Brenda Wells MD     • lisinopril  20 mg Oral Daily Brenda Wells MD     • mesalamine  800 mg Oral TID Brenda Wells MD     • methylPREDNISolone sodium succinate  20 mg Intravenous Q8H [address] Tracy Cantu PA-C     • morphine injection  1 mg Intravenous Q6H PRN Brenda Wells MD     • ondansetron  4 mg Intravenous Q6H PRN Devi Perea MD     • pantoprazole  40 mg Oral BID AC Tracy DELGADO GEORGI Cantu     • phenol  1 spray Mouth/Throat Q2H PRN Micki Wisdom MD     • [START ON 7/9/2023] polyethylene glycol  238 g Oral Once Alden Torres MD     • sodium chloride  100 mL/hr Intravenous Continuous [address] Wells  mL/hr (07/08/23 0504)   • sucralfate  1 g Oral 4x Daily (AC & HS) Tracy Cantu PA-C       ** Please Note: Dictation voice to text software may have been used in the creation of this document.  **    Alden Torres MD  07/08/23  9:33 AM

## 2023-07-08 NOTE — DISCHARGE SUMMARY
Discharge Summary - [address] [place] [address] Medicine Residency     Patient Information: Jas Hairston 59 y.o. female MRN: [id number]  Unit/Bed#: [address] [place] [address] Encounter: [id number]     Admitting Physician: Belkys Pepper MD  Discharging Physician/Practitioner: Evelia Hu MD  PCP: Stormy Quintanilla DO  Admission Date:   Admission Orders (From admission, onward)     Ordered        07/07/23 1404  Inpatient Admission  Once            07/06/23 1534  Place in Observation  Once                      Discharge Date: 07/11/23      Reason for Admission: Diarrhea [R19.7]  Colitis [K52.9]  Ulcerative colitis ([address]) [K51.90]  Abdominal pain [R10.9]  Symptomatic anemia [D64.9]  Ulcerative (chronic) enterocolitis, without complications ([address]) [A90.66]     Discharge Diagnoses:      Principal Problem:    Colitis  Active Problems:    Essential hypertension    History of ulcerative colitis    Sinus bradycardia  Resolved Problems:    Symptomatic anemia    Pharyngoesophageal dysphagia    Acute kidney injury ([address])       Consultations During Hospital Stay:  ·       GI and cardiology     Procedures Performed:   ·       None     Significant Findings / Test Results:   7/11: WBC 17.41, Hgb 9.2, , fecal calprotectin 719  7/10: WBC 20.58, Hb 8.3, Albumin 2.4  7/9: WBC 22.9, K 3.9, Alb 2.5, TSH 0.166, Free T4 0.81  7/8: WBC 12.22, Hg 8.9, Cl 110, Cr 0.84, total prot 5.5, alb 2.7  7/7: HbSAg non-react, HbCAb nonreact, C. difficile negative, , albumin 2.9, WBC 12.53, Hgb 9.3, Tb neg, Stool enteric panel neg, Cdiff PCR neg  7/6: WBC 16.88, Hgb 5.9 > 9.4, , Mg 1.7, CR 1.41, CRP 5, stool enteric panel negative, iron 12, iron sat 4, ferritin 2    · UA: Small occult blood, trace leukocyte, negative nitrates  · CTAP: Colonic wall thickening extending from the transverse colon to the rectum with loss of the normal haustral folds.   Findings is distant with colitis, likely related to history of ulcerative colitis with acute inflammation. Mild bladder wall thickening may be due to cystitis  · EGD shows intrinsic strictures in the upper third of the esophagus, and underwent dilation. Erythematous mucosa with concentric ring and plaque, performed cold biopsy to rule out eosinophilic esophagitis. Localized erythematous mucosa in the antrum consistent with gastritis, biopsy performed to rule out H. Pylori. · Colonoscopy, demonstrated continuous cobblestone ulceration of the left-sided colon. Grade 2 hemorrhoids. 1 sessile inflammatory polyp. Incidental Findings:   ·       None     Test Results Pending at Discharge (will require follow up): ·     Tissue biopsy, Sjogren antibody      Outpatient Tests Requested:  ·       None     Outpatient follow-up Requested:  ·       GI, cardiology    Complications: Things to address at first visit after hospitalization   · Complete the prednisone taper: Starting 40 mg daily x7 days and decrease by 5 mg every week  · Follow-up with Dr. Santiago Lacey, gastroenterologist, outpatient for ulcerative colitis. · Speak with GI doctor about starting biologic agent  · Continue low fiber diet  · For elevated BP, take quinapril 40 mg and amlodipine 5 mg until being assessed by her primary at [facility]. Bring BP log. · Follow-up with cardiology for low heart rate. Will likely need Holter monitoring. · Follow-up pending lab results  · Consider referral to [facility] or [facility]. Patient raised concern about being able to afford medications. Hospital Course:   Emmanuel Reyes is a 59 y.o. female patient with PMH of ulcerative colitis on chronic steroids and anemia presents with worsening bloody diarrhea for 2 weeks. Patient was admitted for symptomatic anemia with hemoglobin 5.9 and colitis as evidenced by CTAP findings. She received 2 units pRBC and 3 doses of Venofer, after which hemoglobin stabilized. Infectious causes of colitis ruled out based on stool testing.   Colonoscopy suggested left-sided colitis from descending colon to the rectum, consistent with ulcerative colitis. Patient was discharged on p.o. prednisone taper and to follow-up with gastroenterologist to possibly start on Humira. She also underwent EGD for dysphagia, and found to have intrinsic stricture s/p dilation. Incidentally, patient was found to have sinus bradycardia, and cardiology recommended follow-up outpatient for Holter monitoring. Her blood pressure continues to be elevated throughout the hospital stay, requiring double the home dose of quinapril and additional amlodipine 5 mg. * Colitis  Assessment & Plan  POA bouts of diarrhea, abdominal pain. CT AP revealing colonic wall thickening from the transverse colon to the rectum. s/p Rocephin and Flagyl, antibiotics were discontinued once C. difficile stool enteric panels returned negative  · Negative ova and parasite, elevated fecal calprotectin. · Negative Hep B surface antigen, hep B core antibody, and TB     Plan  · Testing for Sjogren's ordered due to patient's complaint of dry eyes and mouth  · Continue Balsalazide   · Complete steroid taper (prednisone 40 mg daily, taper down by 5 mg weekly)  · Follow-up GI outpatient  · Follow-up tissue biopsy result  · Continue daily low fiber diet  · Repeat colonoscopy in 1 year    Symptomatic anemia-resolved as of 7/11/2023  Assessment & Plan  Hgb 5.9 on admission, patient presenting with SOB, palpitation, lightheadedness. Reporting having worsening blood in stool and diarrhea X 2 weeks. · S/p 3 doses of Venofer and 2 units PRBCs, hemoglobin has remained stable ranging form 8-9s. · Likely due to iron deficiency as evidenced by low iron and ferritin  · Consider continuing Venofer infusion due to colitis.     Sinus bradycardia  Assessment & Plan  Noted sinus bradycardia with heart rates as low as 37  · EKG showed sinus arrhythmia, PACs and nonspecific ST changes  · Cardiology consulted, plan to follow-up outpatient for possible Holter monitoring    History of ulcerative colitis  Assessment & Plan  History of ulcerative colitis diagnosed in 1989, been on steroid for the past 20 years. Home medications include steroid 5 mg daily and balsalazide 750 mg 3 tablets twice daily. · See "colitis" for further plan     Essential hypertension  Assessment & Plan  Stable BP on admission, have been elevated throughout admission. Home medication include quinapril 20 mg daily. · Discharged on quinapril 40 mg daily and amlodipine 5 mg daily  · Follow-up [facility] in 1 week with BP log  · Reduced BP meds if appropriate    Acute kidney injury (HCC)-resolved as of 7/8/2023  Assessment & Plan  Resolved  · POA Cr 1.41. Baseline appears to be around 0.8-0.9. · Likely related to GI volume loss and dehydration  · IV hydration  · Avoid nephrotoxic medications    Pharyngoesophageal dysphagia-resolved as of 7/11/2023  Assessment & Plan  Reported mechanical dysphagia over the past couple months. · Continue on PPI 40 mg daily  · EGD demonstrated strictures status post dilation. Biopsy results pending to rule out eosinophilic esophagitis and H. Pylori. Condition at Discharge: stable      Discharge Day Visit / Exam:      Vitals: Blood Pressure: 152/82 (07/11/23 1456)  Pulse: 59 (07/11/23 0935)  Temperature: (!) 97.3 °F (36.3 °C) (07/11/23 0253)  Temp Source: Oral (07/11/23 0253)  Respirations: 17 (07/11/23 0253)  Height: 5' 5" (165.1 cm) (07/06/23 1639)  Weight - Scale: 49.7 kg (109 lb 9.6 oz) (07/11/23 0600)  SpO2: 99 % (07/11/23 0935)  Exam:   Physical Exam  Vitals reviewed. Constitutional:       General: She is not in acute distress. Appearance: Normal appearance. She is not ill-appearing. Cardiovascular:      Rate and Rhythm: Normal rate and regular rhythm. Pulses: Normal pulses. Heart sounds: Normal heart sounds. No murmur heard. Pulmonary:      Effort: Pulmonary effort is normal. No respiratory distress.       Breath sounds: Normal breath sounds. No rhonchi or rales. Abdominal:      General: Bowel sounds are normal. There is no distension. Palpations: Abdomen is soft. Tenderness: There is no abdominal tenderness. There is no guarding. Musculoskeletal:      Right lower leg: No edema. Left lower leg: No edema. Skin:     Findings: Rash (back, pruritic. See media ) present. Neurological:      General: No focal deficit present. Mental Status: She is alert and oriented to person, place, and time. Motor: No weakness. Discharge instructions/Information to patient and family:   See after visit summary for information provided to patient and family. Discharge Medications:     Medication List      START taking these medications    • amLODIPine 5 mg tablet; Commonly known as: Alejandra Fergusonuder; Take 1 tablet (5 mg   total) by mouth daily Do not start before July 12, 2023.; Start taking on:   July 12, 2023  • Blood Pressure Monitor/S Cuff Misc; Use in the morning Substitute with   any available cuff covered by the insurance     CHANGE how you take these medications    • balsalazide 750 mg capsule; Commonly known as: COLAZAL; Take 3 capsules   (2,250 mg total) by mouth 2 (two) times a day; What changed: See the new   instructions. • predniSONE 5 mg tablet; Take 8 tablets (40 mg total) by mouth daily for   7 days, THEN 7 tablets (35 mg total) daily for 7 days, THEN 6 tablets (30   mg total) daily for 7 days, THEN 5 tablets (25 mg total) daily for 7 days,   THEN 4 tablets (20 mg total) daily for 7 days, THEN 3 tablets (15 mg   total) daily for 7 days, THEN 3 tablets (15 mg total) daily for 7 days,   THEN 2 tablets (10 mg total) daily for 7 days, THEN 1 tablet (5 mg total)   daily for 7 days. Do not start before July 12, 2023.; Start taking on:   July 12, 2023; What changed: medication strength, See the new   instructions. • quinapril 20 mg tablet; Commonly known as: ACCUPRIL;  Take 2 tablets (40   mg total) by mouth daily; What changed: how much to take     CONTINUE taking these medications    • folic acid 1 mg tablet; Commonly known as: FOLVITE  • omeprazole 40 MG capsule; Commonly known as: PriLOSEC; Take 1 capsule   (40 mg total) by mouth daily     STOP taking these medications    • aspirin 81 MG tablet  • fluticasone 50 mcg/act nasal spray; Commonly known as: FLONASE        Disposition:   Home    Discharge Statement:  I spent 45 minutes discharging the patient. This time was spent on the day of discharge. I had direct contact with the patient on the day of discharge. Greater than 50% of the total time was spent examining patient, answering all patient questions, arranging and discussing plan of care with patient as well as directly providing post-discharge instructions. Additional time then spent on discharge activities.      ** Please Note: This note has been constructed using a voice recognition system **     [address] Wells MD   07/11/23  4:34 PM

## 2023-07-08 NOTE — PLAN OF CARE
Problem: PAIN - ADULT  Goal: Verbalizes/displays adequate comfort level or baseline comfort level  Description: Interventions:  - Encourage patient to monitor pain and request assistance  - Assess pain using appropriate pain scale  - Administer analgesics based on type and severity of pain and evaluate response  - Implement non-pharmacological measures as appropriate and evaluate response  - Consider cultural and social influences on pain and pain management  - Notify physician/advanced practitioner if interventions unsuccessful or patient reports new pain  Outcome: Progressing     Problem: GASTROINTESTINAL - ADULT  Goal: Minimal or absence of nausea and/or vomiting  Description: INTERVENTIONS:  - Administer IV fluids if ordered to ensure adequate hydration  - Maintain NPO status until nausea and vomiting are resolved  - Nasogastric tube if ordered  - Administer ordered antiemetic medications as needed  - Provide nonpharmacologic comfort measures as appropriate  - Advance diet as tolerated, if ordered  - Consider nutrition services referral to assist patient with adequate nutrition and appropriate food choices  Outcome: Progressing  Goal: Maintains or returns to baseline bowel function  Description: INTERVENTIONS:  - Assess bowel function  - Encourage oral fluids to ensure adequate hydration  - Administer IV fluids if ordered to ensure adequate hydration  - Administer ordered medications as needed  - Encourage mobilization and activity  - Consider nutritional services referral to assist patient with adequate nutrition and appropriate food choices  Outcome: Progressing  Goal: Maintains adequate nutritional intake  Description: INTERVENTIONS:  - Monitor percentage of each meal consumed  - Identify factors contributing to decreased intake, treat as appropriate  - Assist with meals as needed  - Monitor I&O, weight, and lab values if indicated  - Obtain nutrition services referral as needed  Outcome: Progressing  Goal: Oral mucous membranes remain intact  Description: INTERVENTIONS  - Assess oral mucosa and hygiene practices  - Implement preventative oral hygiene regimen  - Implement oral medicated treatments as ordered  - Initiate Nutrition services referral as needed  Outcome: Progressing     Problem: HEMATOLOGIC - ADULT  Goal: Maintains hematologic stability  Description: INTERVENTIONS  - Assess for signs and symptoms of bleeding or hemorrhage  - Monitor labs  - Administer supportive blood products/factors as ordered and appropriate  Outcome: Progressing

## 2023-07-09 LAB
ALBUMIN SERPL BCP-MCNC: 2.5 G/DL (ref 3.5–5)
ALP SERPL-CCNC: 64 U/L (ref 34–104)
ALT SERPL W P-5'-P-CCNC: 8 U/L (ref 7–52)
ANION GAP SERPL CALCULATED.3IONS-SCNC: 5 MMOL/L
AST SERPL W P-5'-P-CCNC: 16 U/L (ref 13–39)
BASOPHILS # BLD AUTO: 0.02 THOUSANDS/ÂΜL (ref 0–0.1)
BASOPHILS NFR BLD AUTO: 0 % (ref 0–1)
BILIRUB SERPL-MCNC: 0.24 MG/DL (ref 0.2–1)
BUN SERPL-MCNC: 15 MG/DL (ref 5–25)
CALCIUM ALBUM COR SERPL-MCNC: 9.2 MG/DL (ref 8.3–10.1)
CALCIUM SERPL-MCNC: 8 MG/DL (ref 8.4–10.2)
CHLORIDE SERPL-SCNC: 110 MMOL/L (ref 96–108)
CO2 SERPL-SCNC: 22 MMOL/L (ref 21–32)
CREAT SERPL-MCNC: 0.96 MG/DL (ref 0.6–1.3)
EOSINOPHIL # BLD AUTO: 0 THOUSAND/ÂΜL (ref 0–0.61)
EOSINOPHIL NFR BLD AUTO: 0 % (ref 0–6)
ERYTHROCYTE [DISTWIDTH] IN BLOOD BY AUTOMATED COUNT: 31.4 % (ref 11.6–15.1)
GAMMA INTERFERON BACKGROUND BLD IA-ACNC: 0.02 IU/ML
GFR SERPL CREATININE-BSD FRML MDRD: 64 ML/MIN/1.73SQ M
GLUCOSE SERPL-MCNC: 133 MG/DL (ref 65–140)
HCT VFR BLD AUTO: 30.3 % (ref 34.8–46.1)
HGB BLD-MCNC: 8.6 G/DL (ref 11.5–15.4)
IMM GRANULOCYTES # BLD AUTO: 0.16 THOUSAND/UL (ref 0–0.2)
IMM GRANULOCYTES NFR BLD AUTO: 1 % (ref 0–2)
LYMPHOCYTES # BLD AUTO: 0.91 THOUSANDS/ÂΜL (ref 0.6–4.47)
LYMPHOCYTES NFR BLD AUTO: 4 % (ref 14–44)
M TB IFN-G BLD-IMP: NEGATIVE
M TB IFN-G CD4+ BCKGRND COR BLD-ACNC: 0 IU/ML
M TB IFN-G CD4+ BCKGRND COR BLD-ACNC: 0.01 IU/ML
MAGNESIUM SERPL-MCNC: 2 MG/DL (ref 1.9–2.7)
MCH RBC QN AUTO: 21.4 PG (ref 26.8–34.3)
MCHC RBC AUTO-ENTMCNC: 28.4 G/DL (ref 31.4–37.4)
MCV RBC AUTO: 76 FL (ref 82–98)
MITOGEN IGNF BCKGRD COR BLD-ACNC: 8.53 IU/ML
MONOCYTES # BLD AUTO: 0.83 THOUSAND/ÂΜL (ref 0.17–1.22)
MONOCYTES NFR BLD AUTO: 4 % (ref 4–12)
NEUTROPHILS # BLD AUTO: 20.98 THOUSANDS/ÂΜL (ref 1.85–7.62)
NEUTS SEG NFR BLD AUTO: 91 % (ref 43–75)
NRBC BLD AUTO-RTO: 0 /100 WBCS
PLATELET # BLD AUTO: 439 THOUSANDS/UL (ref 149–390)
PMV BLD AUTO: 9.3 FL (ref 8.9–12.7)
POTASSIUM SERPL-SCNC: 3.9 MMOL/L (ref 3.5–5.3)
PROT SERPL-MCNC: 5.4 G/DL (ref 6.4–8.4)
RBC # BLD AUTO: 4.01 MILLION/UL (ref 3.81–5.12)
SODIUM SERPL-SCNC: 137 MMOL/L (ref 135–147)
T4 FREE SERPL-MCNC: 0.81 NG/DL (ref 0.61–1.12)
TSH SERPL DL<=0.05 MIU/L-ACNC: 0.17 UIU/ML (ref 0.45–4.5)
WBC # BLD AUTO: 22.9 THOUSAND/UL (ref 4.31–10.16)

## 2023-07-09 PROCEDURE — 84439 ASSAY OF FREE THYROXINE: CPT

## 2023-07-09 PROCEDURE — 85025 COMPLETE CBC W/AUTO DIFF WBC: CPT

## 2023-07-09 PROCEDURE — 83735 ASSAY OF MAGNESIUM: CPT

## 2023-07-09 PROCEDURE — 99232 SBSQ HOSP IP/OBS MODERATE 35: CPT | Performed by: INTERNAL MEDICINE

## 2023-07-09 PROCEDURE — 80053 COMPREHEN METABOLIC PANEL: CPT

## 2023-07-09 PROCEDURE — 86235 NUCLEAR ANTIGEN ANTIBODY: CPT

## 2023-07-09 PROCEDURE — 93005 ELECTROCARDIOGRAM TRACING: CPT

## 2023-07-09 PROCEDURE — 84443 ASSAY THYROID STIM HORMONE: CPT

## 2023-07-09 RX ORDER — LISINOPRIL 20 MG/1
20 TABLET ORAL ONCE
Status: COMPLETED | OUTPATIENT
Start: 2023-07-09 | End: 2023-07-09

## 2023-07-09 RX ORDER — POTASSIUM CHLORIDE 20 MEQ/1
20 TABLET, EXTENDED RELEASE ORAL ONCE
Status: COMPLETED | OUTPATIENT
Start: 2023-07-09 | End: 2023-07-09

## 2023-07-09 RX ORDER — LISINOPRIL 20 MG/1
40 TABLET ORAL DAILY
Status: DISCONTINUED | OUTPATIENT
Start: 2023-07-10 | End: 2023-07-11 | Stop reason: HOSPADM

## 2023-07-09 RX ORDER — SODIUM CHLORIDE, SODIUM GLUCONATE, SODIUM ACETATE, POTASSIUM CHLORIDE, MAGNESIUM CHLORIDE, SODIUM PHOSPHATE, DIBASIC, AND POTASSIUM PHOSPHATE .53; .5; .37; .037; .03; .012; .00082 G/100ML; G/100ML; G/100ML; G/100ML; G/100ML; G/100ML; G/100ML
100 INJECTION, SOLUTION INTRAVENOUS CONTINUOUS
Status: DISCONTINUED | OUTPATIENT
Start: 2023-07-09 | End: 2023-07-10

## 2023-07-09 RX ADMIN — SUCRALFATE 1 G: 1 TABLET ORAL at 22:30

## 2023-07-09 RX ADMIN — BISACODYL 10 MG: 5 TABLET, COATED ORAL at 15:36

## 2023-07-09 RX ADMIN — SUCRALFATE 1 G: 1 TABLET ORAL at 15:33

## 2023-07-09 RX ADMIN — POLYETHYLENE GLYCOL 3350 238 G: 17 POWDER, FOR SOLUTION ORAL at 15:33

## 2023-07-09 RX ADMIN — POTASSIUM CHLORIDE 20 MEQ: 1500 TABLET, EXTENDED RELEASE ORAL at 09:11

## 2023-07-09 RX ADMIN — METHYLPREDNISOLONE SODIUM SUCCINATE 20 MG: 40 INJECTION, POWDER, FOR SOLUTION INTRAMUSCULAR; INTRAVENOUS at 15:36

## 2023-07-09 RX ADMIN — PANTOPRAZOLE SODIUM 40 MG: 40 TABLET, DELAYED RELEASE ORAL at 06:01

## 2023-07-09 RX ADMIN — MESALAMINE 800 MG: 400 CAPSULE, DELAYED RELEASE ORAL at 09:11

## 2023-07-09 RX ADMIN — MORPHINE SULFATE 1 MG: 2 INJECTION, SOLUTION INTRAMUSCULAR; INTRAVENOUS at 19:40

## 2023-07-09 RX ADMIN — MESALAMINE 800 MG: 400 CAPSULE, DELAYED RELEASE ORAL at 20:46

## 2023-07-09 RX ADMIN — LISINOPRIL 20 MG: 20 TABLET ORAL at 09:11

## 2023-07-09 RX ADMIN — METHYLPREDNISOLONE SODIUM SUCCINATE 20 MG: 40 INJECTION, POWDER, FOR SOLUTION INTRAMUSCULAR; INTRAVENOUS at 05:29

## 2023-07-09 RX ADMIN — MORPHINE SULFATE 1 MG: 2 INJECTION, SOLUTION INTRAMUSCULAR; INTRAVENOUS at 09:33

## 2023-07-09 RX ADMIN — MORPHINE SULFATE 1 MG: 2 INJECTION, SOLUTION INTRAMUSCULAR; INTRAVENOUS at 03:17

## 2023-07-09 RX ADMIN — IRON SUCROSE 300 MG: 20 INJECTION, SOLUTION INTRAVENOUS at 09:11

## 2023-07-09 RX ADMIN — SUCRALFATE 1 G: 1 TABLET ORAL at 11:50

## 2023-07-09 RX ADMIN — BISACODYL 10 MG: 5 TABLET, COATED ORAL at 22:30

## 2023-07-09 RX ADMIN — SODIUM CHLORIDE, SODIUM GLUCONATE, SODIUM ACETATE, POTASSIUM CHLORIDE, MAGNESIUM CHLORIDE, SODIUM PHOSPHATE, DIBASIC, AND POTASSIUM PHOSPHATE 100 ML/HR: .53; .5; .37; .037; .03; .012; .00082 INJECTION, SOLUTION INTRAVENOUS at 23:42

## 2023-07-09 RX ADMIN — SUCRALFATE 1 G: 1 TABLET ORAL at 06:01

## 2023-07-09 RX ADMIN — LISINOPRIL 20 MG: 20 TABLET ORAL at 19:40

## 2023-07-09 RX ADMIN — PANTOPRAZOLE SODIUM 40 MG: 40 TABLET, DELAYED RELEASE ORAL at 15:33

## 2023-07-09 RX ADMIN — FOLIC ACID 1 MG: 1 TABLET ORAL at 09:11

## 2023-07-09 RX ADMIN — METHYLPREDNISOLONE SODIUM SUCCINATE 20 MG: 40 INJECTION, POWDER, FOR SOLUTION INTRAMUSCULAR; INTRAVENOUS at 22:31

## 2023-07-09 RX ADMIN — HYDROXYZINE HYDROCHLORIDE 25 MG: 25 TABLET ORAL at 22:30

## 2023-07-09 RX ADMIN — MESALAMINE 800 MG: 400 CAPSULE, DELAYED RELEASE ORAL at 15:33

## 2023-07-09 NOTE — PLAN OF CARE
Problem: PAIN - ADULT  Goal: Verbalizes/displays adequate comfort level or baseline comfort level  Description: Interventions:  - Encourage patient to monitor pain and request assistance  - Assess pain using appropriate pain scale  - Administer analgesics based on type and severity of pain and evaluate response  - Implement non-pharmacological measures as appropriate and evaluate response  - Consider cultural and social influences on pain and pain management  - Notify physician/advanced practitioner if interventions unsuccessful or patient reports new pain  Outcome: Progressing     Problem: Knowledge Deficit  Goal: Patient/family/caregiver demonstrates understanding of disease process, treatment plan, medications, and discharge instructions  Description: Complete learning assessment and assess knowledge base.   Interventions:  - Provide teaching at level of understanding  - Provide teaching via preferred learning methods  Outcome: Progressing     Problem: GASTROINTESTINAL - ADULT  Goal: Minimal or absence of nausea and/or vomiting  Description: INTERVENTIONS:  - Administer IV fluids if ordered to ensure adequate hydration  - Maintain NPO status until nausea and vomiting are resolved  - Nasogastric tube if ordered  - Administer ordered antiemetic medications as needed  - Provide nonpharmacologic comfort measures as appropriate  - Advance diet as tolerated, if ordered  - Consider nutrition services referral to assist patient with adequate nutrition and appropriate food choices  Outcome: Progressing

## 2023-07-09 NOTE — PROGRESS NOTES
Daily Progress Note - [facility] [address] 59 y.o. female MRN: [id number]  Unit/Bed#: [address] [place] [address] Encounter: [id number]  Admitting Physician: Dexter Cordova MD  PCP: Dev Gray DO  Date of Admission:  7/6/2023 11:55 AM    Summary:     IVFs:    Diet: Diet NPO  Diet Clear Liquid  VTE:  Pharmacologic VTE Prophylaxis contraindicated due to GI bleed . Mechanical prophylaxis in place. Patient Centered Rounds: I have performed bedside rounds with nursing staff today. Specialists/Other Care Team Provider: GI    LOS: 2 day(s)  Status: Inpatient   Disposition: The patient will continue to require additional inpatient hospital stay due to Colitis requiring EGD/colonoscopy  Code Status: Level 1 - Full Code      Assessment and Plan    * Colitis  Assessment & Plan  POA bouts of diarrhea, abdominal pain. CT AP revealing colonic wall thickening from the transverse colon to the rectum. s/p Rocephin and Flagyl, antibiotics were discontinued once C. difficile stool enteric panels returned negative    · Active Infection versus inflammatory process  · Negative stool enteric panel and C. difficile  · Studies pending: Fecal calprotectin, Hep B surface antigen, hep B core antibody, ova and parasite, TB.  · Clear liquid diet + NPO/bowel prep at midnight for EGD and colonoscopy on 7/10  · Testing for Sjogren's ordered due to patient's complaint of dry eyes and mouth  · Continue mesalamine and steroid  · GI consulted  · Considering initiation of biologic pending egd/colonoscopy results    Symptomatic anemia  Assessment & Plan  Hgb 5.9 on admission, patient presenting with SOB, potation's, lightheadedness. Reporting having worsening blood in stool and diarrhea X 2 weeks. Has history of UC mesalamine and steroids. CTAP showing findings consistent of colitis. · S/p 3 doses of Venofer and 2 units PRBCs, hemoglobin has remained stable ranging form 8-9s.   · Likely due to iron deficiency as evidenced by low iron and ferritin. Cannot rule out active GI bleed. · We will discharge with iron and vitamin C supplementation    History of ulcerative colitis  Assessment & Plan  History of ulcerative colitis diagnosed in , been on steroid for the past 20 years. Home medications include steroid 5 mg daily and balsalazide 750 mg 3 tablets twice daily. · Continue mesalamine and steroids   · See "colitis" for further plan       Essential hypertension  Assessment & Plan  Stable BP on admission, have been elevated throughout admission in the 711A-238Q systolic. Home medication include quinapril 20 mg daily. · On lisinopril 20 mg daily (quinapril nonformulary)  · Consider increasing BP meds      Pharyngoesophageal dysphagia  Assessment & Plan  · Reported mechanical dysphagia over the past couple months. · GI consulted  · Continue on PPI 40 mg daily  · EGD planned for 7/10      Acute kidney injury (HCC)-resolved as of 2023  Assessment & Plan    Improving renal function    · POA Cr 1.41. Baseline appears to be around 0.8-0.9. Today 0.84. · No excessive use of NSAIDs  · Likely related to GI volume loss and dehydration  · IV hydration  · Avoid nephrotoxic medications    Discharge plan    · Continue encouraging oral hydration  · Continue avoiding excessive use of NSAIDs  · Discouraged use of nephrotoxic medications        Subjective:   Patient seen and evaluated at bedside, in no acute distress. She reports epigastric abdominal pain and nausea. Continues to have watery bowel movements but denies any vomiting and was able to tolerate her dinner last night. Is also reporting dry eyes which she uses eyedrops as well as dry mouth. Patient is having difficulty swallowing and will be undergoing EGD tomorrow. This was discussed with her and all her questions were answered.          Objective     Objective:   Vitals:   Temp (24hrs), Av.8 °F (36.6 °C), Min:97.3 °F (36.3 °C), Max:98.2 °F (36.8 °C)    Temp:  [97.3 °F (36.3 °C)-98.2 °F (36.8 °C)] 97.5 °F (36.4 °C)  HR:  [43-74] 74  Resp:  [17-18] 18  BP: (135-168)/(80-99) 168/92  SpO2:  [96 %-100 %] 98 %  Body mass index is 20.22 kg/m². Input and Output Summary (last 24 hours): Intake/Output Summary (Last 24 hours) at 7/9/2023 1227  Last data filed at 7/8/2023 1501  Gross per 24 hour   Intake --   Output 260 ml   Net -260 ml       Physical Exam:   Physical Exam  Vitals reviewed. Constitutional:       General: She is not in acute distress. Appearance: Normal appearance. HENT:      Mouth/Throat:      Comments: Dry lips  Cardiovascular:      Rate and Rhythm: Normal rate and regular rhythm. Heart sounds: Normal heart sounds. No murmur heard. Pulmonary:      Effort: Pulmonary effort is normal. No respiratory distress. Breath sounds: Normal breath sounds. No rhonchi or rales. Abdominal:      General: Bowel sounds are normal. There is no distension. Palpations: Abdomen is soft. Tenderness: There is abdominal tenderness (Epigastric). There is no guarding. Musculoskeletal:      Right lower leg: No edema. Left lower leg: No edema. Skin:     Findings: Rash (back, pruritic. See media ) present. Neurological:      Mental Status: She is alert and oriented to person, place, and time. Additional Data:     Labs:  Results from last 7 days   Lab Units 07/09/23  0446   WBC Thousand/uL 22.90*   HEMOGLOBIN g/dL 8.6*   HEMATOCRIT % 30.3*   PLATELETS Thousands/uL 439*   NEUTROS PCT % 91*   LYMPHS PCT % 4*   MONOS PCT % 4   EOS PCT % 0     Results from last 7 days   Lab Units 07/09/23  0446   POTASSIUM mmol/L 3.9   CHLORIDE mmol/L 110*   CO2 mmol/L 22   BUN mg/dL 15   CREATININE mg/dL 0.96   CALCIUM mg/dL 8.0*   ALK PHOS U/L 64   ALT U/L 8   AST U/L 16                   * I Have Reviewed All Lab Data Listed Above. * Additional Pertinent Lab Tests Reviewed:  All Labs Within Last 24 Hours Reviewed    Imaging:    Imaging Reports Reviewed Today Include: n/a  Imaging Personally Reviewed by Myself Includes:  n/a    Recent Cultures (last 7 days):     Results from last 7 days   Lab Units 07/06/23  1721   C DIFF TOXIN B BY PCR  Negative       Last 24 Hours Medication List:   Current Facility-Administered Medications   Medication Dose Route Frequency Provider Last Rate   • acetaminophen  650 mg Oral Once Kamlesh Don MD     • bisacodyl  10 mg Oral Once KAROLINA Gómez     • bisacodyl  10 mg Oral Once KAROLINA Gómez     • folic acid  1 mg Oral Daily [address] Adan Wells MD     • hydrOXYzine HCL  25 mg Oral HS Kamlesh Don MD     • lisinopril  20 mg Oral Daily [address] Adan Wells MD     • mesalamine  800 mg Oral  Adan Wells MD     • methylPREDNISolone sodium succinate  20 mg Intravenous Q8H [address]  Tracy Cantu PA-C     • morphine injection  1 mg Intravenous Q6H PRN Brenda Wells MD     • ondansetron  4 mg Intravenous Q6H PRN Kamlesh Don MD     • pantoprazole  40 mg Oral BID  Tracy Cantu PA-C     • phenol  1 spray Mouth/Throat Q2H PRN Brandon Anderson MD     • polyethylene glycol  238 g Oral Once KAROLINA Gómez     • sucralfate  1 g Oral 4x Daily (AC & HS) Brian Muniz [address] [facility], KAROLINA               Patient Information Sharing: The patient Jas Hairston, did not wish to have their loved ones notified of her condition and current plan. Time Spent for Care: 30 minutes. More than 50% of total time spent on counseling and coordination of care as described above. ** Please Note: Dictation voice to text software may have been used in the creation of this document.  [address] Arnoldo Hunter MD  07/09/23  12:27 PM

## 2023-07-10 ENCOUNTER — APPOINTMENT (OUTPATIENT)
Dept: PERIOP | Facility: HOSPITAL | Age: 58
DRG: 386 | End: 2023-07-10
Payer: MEDICARE

## 2023-07-10 ENCOUNTER — ANESTHESIA (INPATIENT)
Dept: PERIOP | Facility: HOSPITAL | Age: 58
DRG: 386 | End: 2023-07-10
Payer: MEDICARE

## 2023-07-10 ENCOUNTER — ANESTHESIA EVENT (INPATIENT)
Dept: PERIOP | Facility: HOSPITAL | Age: 58
DRG: 386 | End: 2023-07-10
Payer: MEDICARE

## 2023-07-10 PROBLEM — R00.1 SINUS BRADYCARDIA: Status: ACTIVE | Noted: 2023-07-10

## 2023-07-10 LAB
ACANTHOCYTES BLD QL SMEAR: PRESENT
ALBUMIN SERPL BCP-MCNC: 2.4 G/DL (ref 3.5–5)
ALP SERPL-CCNC: 52 U/L (ref 34–104)
ALT SERPL W P-5'-P-CCNC: 8 U/L (ref 7–52)
ANION GAP SERPL CALCULATED.3IONS-SCNC: 9 MMOL/L
ANISOCYTOSIS BLD QL SMEAR: PRESENT
AST SERPL W P-5'-P-CCNC: 16 U/L (ref 13–39)
BASOPHILS # BLD MANUAL: 0 THOUSAND/UL (ref 0–0.1)
BASOPHILS NFR MAR MANUAL: 0 % (ref 0–1)
BILIRUB SERPL-MCNC: 0.23 MG/DL (ref 0.2–1)
BUN SERPL-MCNC: 12 MG/DL (ref 5–25)
CALCIUM ALBUM COR SERPL-MCNC: 8.8 MG/DL (ref 8.3–10.1)
CALCIUM SERPL-MCNC: 7.5 MG/DL (ref 8.4–10.2)
CHLORIDE SERPL-SCNC: 109 MMOL/L (ref 96–108)
CO2 SERPL-SCNC: 21 MMOL/L (ref 21–32)
CREAT SERPL-MCNC: 0.73 MG/DL (ref 0.6–1.3)
EOSINOPHIL # BLD MANUAL: 0 THOUSAND/UL (ref 0–0.4)
EOSINOPHIL NFR BLD MANUAL: 0 % (ref 0–6)
ERYTHROCYTE [DISTWIDTH] IN BLOOD BY AUTOMATED COUNT: 32 % (ref 11.6–15.1)
GFR SERPL CREATININE-BSD FRML MDRD: 90 ML/MIN/1.73SQ M
GLUCOSE SERPL-MCNC: 102 MG/DL (ref 65–140)
HCT VFR BLD AUTO: 29.8 % (ref 34.8–46.1)
HGB BLD-MCNC: 8.3 G/DL (ref 11.5–15.4)
LYMPHOCYTES # BLD AUTO: 0.41 THOUSAND/UL (ref 0.6–4.47)
LYMPHOCYTES # BLD AUTO: 2 % (ref 14–44)
MCH RBC QN AUTO: 21.2 PG (ref 26.8–34.3)
MCHC RBC AUTO-ENTMCNC: 27.9 G/DL (ref 31.4–37.4)
MCV RBC AUTO: 76 FL (ref 82–98)
MONOCYTES # BLD AUTO: 0.62 THOUSAND/UL (ref 0–1.22)
MONOCYTES NFR BLD: 3 % (ref 4–12)
NEUTROPHILS # BLD MANUAL: 19.55 THOUSAND/UL (ref 1.85–7.62)
NEUTS BAND NFR BLD MANUAL: 1 % (ref 0–8)
NEUTS SEG NFR BLD AUTO: 94 % (ref 43–75)
OVALOCYTES BLD QL SMEAR: PRESENT
PLATELET # BLD AUTO: 313 THOUSANDS/UL (ref 149–390)
PLATELET BLD QL SMEAR: ADEQUATE
PMV BLD AUTO: 9.3 FL (ref 8.9–12.7)
POLYCHROMASIA BLD QL SMEAR: PRESENT
POTASSIUM SERPL-SCNC: 3.8 MMOL/L (ref 3.5–5.3)
PROT SERPL-MCNC: 4.8 G/DL (ref 6.4–8.4)
RBC # BLD AUTO: 3.92 MILLION/UL (ref 3.81–5.12)
RBC MORPH BLD: PRESENT
SODIUM SERPL-SCNC: 139 MMOL/L (ref 135–147)
WBC # BLD AUTO: 20.58 THOUSAND/UL (ref 4.31–10.16)

## 2023-07-10 PROCEDURE — 85027 COMPLETE CBC AUTOMATED: CPT

## 2023-07-10 PROCEDURE — 0DB38ZX EXCISION OF LOWER ESOPHAGUS, VIA NATURAL OR ARTIFICIAL OPENING ENDOSCOPIC, DIAGNOSTIC: ICD-10-PCS | Performed by: INTERNAL MEDICINE

## 2023-07-10 PROCEDURE — 88305 TISSUE EXAM BY PATHOLOGIST: CPT | Performed by: PATHOLOGY

## 2023-07-10 PROCEDURE — 0DBP8ZZ EXCISION OF RECTUM, VIA NATURAL OR ARTIFICIAL OPENING ENDOSCOPIC: ICD-10-PCS | Performed by: INTERNAL MEDICINE

## 2023-07-10 PROCEDURE — 0DB78ZX EXCISION OF STOMACH, PYLORUS, VIA NATURAL OR ARTIFICIAL OPENING ENDOSCOPIC, DIAGNOSTIC: ICD-10-PCS | Performed by: INTERNAL MEDICINE

## 2023-07-10 PROCEDURE — 88342 IMHCHEM/IMCYTCHM 1ST ANTB: CPT | Performed by: PATHOLOGY

## 2023-07-10 PROCEDURE — 0DBK8ZX EXCISION OF ASCENDING COLON, VIA NATURAL OR ARTIFICIAL OPENING ENDOSCOPIC, DIAGNOSTIC: ICD-10-PCS | Performed by: INTERNAL MEDICINE

## 2023-07-10 PROCEDURE — 99223 1ST HOSP IP/OBS HIGH 75: CPT | Performed by: INTERNAL MEDICINE

## 2023-07-10 PROCEDURE — 43239 EGD BIOPSY SINGLE/MULTIPLE: CPT | Performed by: INTERNAL MEDICINE

## 2023-07-10 PROCEDURE — 45385 COLONOSCOPY W/LESION REMOVAL: CPT | Performed by: INTERNAL MEDICINE

## 2023-07-10 PROCEDURE — 0D718ZZ DILATION OF UPPER ESOPHAGUS, VIA NATURAL OR ARTIFICIAL OPENING ENDOSCOPIC: ICD-10-PCS | Performed by: INTERNAL MEDICINE

## 2023-07-10 PROCEDURE — 0DBN8ZX EXCISION OF SIGMOID COLON, VIA NATURAL OR ARTIFICIAL OPENING ENDOSCOPIC, DIAGNOSTIC: ICD-10-PCS | Performed by: INTERNAL MEDICINE

## 2023-07-10 PROCEDURE — 85007 BL SMEAR W/DIFF WBC COUNT: CPT

## 2023-07-10 PROCEDURE — 88341 IMHCHEM/IMCYTCHM EA ADD ANTB: CPT | Performed by: PATHOLOGY

## 2023-07-10 PROCEDURE — 93005 ELECTROCARDIOGRAM TRACING: CPT

## 2023-07-10 PROCEDURE — 0DB18ZX EXCISION OF UPPER ESOPHAGUS, VIA NATURAL OR ARTIFICIAL OPENING ENDOSCOPIC, DIAGNOSTIC: ICD-10-PCS | Performed by: INTERNAL MEDICINE

## 2023-07-10 PROCEDURE — 99232 SBSQ HOSP IP/OBS MODERATE 35: CPT | Performed by: INTERNAL MEDICINE

## 2023-07-10 PROCEDURE — 45380 COLONOSCOPY AND BIOPSY: CPT | Performed by: INTERNAL MEDICINE

## 2023-07-10 PROCEDURE — 80053 COMPREHEN METABOLIC PANEL: CPT

## 2023-07-10 RX ORDER — SODIUM CHLORIDE, SODIUM LACTATE, POTASSIUM CHLORIDE, CALCIUM CHLORIDE 600; 310; 30; 20 MG/100ML; MG/100ML; MG/100ML; MG/100ML
INJECTION, SOLUTION INTRAVENOUS CONTINUOUS PRN
Status: DISCONTINUED | OUTPATIENT
Start: 2023-07-10 | End: 2023-07-10

## 2023-07-10 RX ORDER — LIDOCAINE 50 MG/G
1 PATCH TOPICAL DAILY
Status: DISCONTINUED | OUTPATIENT
Start: 2023-07-11 | End: 2023-07-10

## 2023-07-10 RX ORDER — POTASSIUM CHLORIDE 20 MEQ/1
20 TABLET, EXTENDED RELEASE ORAL ONCE
Status: COMPLETED | OUTPATIENT
Start: 2023-07-10 | End: 2023-07-10

## 2023-07-10 RX ORDER — HYDRALAZINE HYDROCHLORIDE 20 MG/ML
5 INJECTION INTRAMUSCULAR; INTRAVENOUS EVERY 6 HOURS PRN
Status: DISCONTINUED | OUTPATIENT
Start: 2023-07-10 | End: 2023-07-10

## 2023-07-10 RX ORDER — GLYCOPYRROLATE 0.2 MG/ML
INJECTION INTRAMUSCULAR; INTRAVENOUS AS NEEDED
Status: DISCONTINUED | OUTPATIENT
Start: 2023-07-10 | End: 2023-07-10

## 2023-07-10 RX ORDER — ZOLPIDEM TARTRATE 5 MG/1
5 TABLET ORAL ONCE
Status: COMPLETED | OUTPATIENT
Start: 2023-07-10 | End: 2023-07-10

## 2023-07-10 RX ORDER — LIDOCAINE 50 MG/G
1 PATCH TOPICAL DAILY
Status: DISCONTINUED | OUTPATIENT
Start: 2023-07-10 | End: 2023-07-11 | Stop reason: HOSPADM

## 2023-07-10 RX ORDER — PROPOFOL 10 MG/ML
INJECTION, EMULSION INTRAVENOUS CONTINUOUS PRN
Status: DISCONTINUED | OUTPATIENT
Start: 2023-07-10 | End: 2023-07-10

## 2023-07-10 RX ORDER — LIDOCAINE HYDROCHLORIDE 10 MG/ML
INJECTION, SOLUTION EPIDURAL; INFILTRATION; INTRACAUDAL; PERINEURAL AS NEEDED
Status: DISCONTINUED | OUTPATIENT
Start: 2023-07-10 | End: 2023-07-10

## 2023-07-10 RX ORDER — PROPOFOL 10 MG/ML
INJECTION, EMULSION INTRAVENOUS AS NEEDED
Status: DISCONTINUED | OUTPATIENT
Start: 2023-07-10 | End: 2023-07-10

## 2023-07-10 RX ORDER — HYDRALAZINE HYDROCHLORIDE 20 MG/ML
5 INJECTION INTRAMUSCULAR; INTRAVENOUS EVERY 6 HOURS PRN
Status: DISCONTINUED | OUTPATIENT
Start: 2023-07-10 | End: 2023-07-11 | Stop reason: HOSPADM

## 2023-07-10 RX ADMIN — SUCRALFATE 1 G: 1 TABLET ORAL at 15:03

## 2023-07-10 RX ADMIN — Medication 1 SPRAY: at 14:53

## 2023-07-10 RX ADMIN — PROPOFOL 100 MG: 10 INJECTION, EMULSION INTRAVENOUS at 13:15

## 2023-07-10 RX ADMIN — METHYLPREDNISOLONE SODIUM SUCCINATE 20 MG: 40 INJECTION, POWDER, FOR SOLUTION INTRAMUSCULAR; INTRAVENOUS at 21:35

## 2023-07-10 RX ADMIN — MESALAMINE 800 MG: 400 CAPSULE, DELAYED RELEASE ORAL at 15:03

## 2023-07-10 RX ADMIN — MORPHINE SULFATE 1 MG: 2 INJECTION, SOLUTION INTRAMUSCULAR; INTRAVENOUS at 06:00

## 2023-07-10 RX ADMIN — MESALAMINE 800 MG: 400 CAPSULE, DELAYED RELEASE ORAL at 08:55

## 2023-07-10 RX ADMIN — SUCRALFATE 1 G: 1 TABLET ORAL at 21:35

## 2023-07-10 RX ADMIN — MESALAMINE 800 MG: 400 CAPSULE, DELAYED RELEASE ORAL at 21:36

## 2023-07-10 RX ADMIN — LISINOPRIL 40 MG: 20 TABLET ORAL at 08:55

## 2023-07-10 RX ADMIN — MORPHINE SULFATE 1 MG: 2 INJECTION, SOLUTION INTRAMUSCULAR; INTRAVENOUS at 21:32

## 2023-07-10 RX ADMIN — SUCRALFATE 1 G: 1 TABLET ORAL at 06:01

## 2023-07-10 RX ADMIN — LABETALOL HYDROCHLORIDE 2 MG/MIN: 5 INJECTION, SOLUTION INTRAVENOUS at 13:34

## 2023-07-10 RX ADMIN — HYDROXYZINE HYDROCHLORIDE 25 MG: 25 TABLET ORAL at 21:36

## 2023-07-10 RX ADMIN — PANTOPRAZOLE SODIUM 40 MG: 40 TABLET, DELAYED RELEASE ORAL at 15:03

## 2023-07-10 RX ADMIN — METHYLPREDNISOLONE SODIUM SUCCINATE 20 MG: 40 INJECTION, POWDER, FOR SOLUTION INTRAMUSCULAR; INTRAVENOUS at 06:01

## 2023-07-10 RX ADMIN — LIDOCAINE 1 PATCH: 50 PATCH CUTANEOUS at 18:40

## 2023-07-10 RX ADMIN — MORPHINE SULFATE 1 MG: 2 INJECTION, SOLUTION INTRAMUSCULAR; INTRAVENOUS at 14:53

## 2023-07-10 RX ADMIN — PANTOPRAZOLE SODIUM 40 MG: 40 TABLET, DELAYED RELEASE ORAL at 06:01

## 2023-07-10 RX ADMIN — SODIUM CHLORIDE, SODIUM GLUCONATE, SODIUM ACETATE, POTASSIUM CHLORIDE, MAGNESIUM CHLORIDE, SODIUM PHOSPHATE, DIBASIC, AND POTASSIUM PHOSPHATE 100 ML/HR: .53; .5; .37; .037; .03; .012; .00082 INJECTION, SOLUTION INTRAVENOUS at 10:56

## 2023-07-10 RX ADMIN — ZOLPIDEM TARTRATE 5 MG: 5 TABLET ORAL at 02:24

## 2023-07-10 RX ADMIN — ZOLPIDEM TARTRATE 5 MG: 5 TABLET ORAL at 23:17

## 2023-07-10 RX ADMIN — PROPOFOL 30 MG: 10 INJECTION, EMULSION INTRAVENOUS at 13:23

## 2023-07-10 RX ADMIN — GLYCOPYRROLATE 0.2 MCG: 0.2 INJECTION, SOLUTION INTRAMUSCULAR; INTRAVENOUS at 13:12

## 2023-07-10 RX ADMIN — PROPOFOL 50 MG: 10 INJECTION, EMULSION INTRAVENOUS at 13:19

## 2023-07-10 RX ADMIN — METHYLPREDNISOLONE SODIUM SUCCINATE 20 MG: 40 INJECTION, POWDER, FOR SOLUTION INTRAMUSCULAR; INTRAVENOUS at 15:03

## 2023-07-10 RX ADMIN — SODIUM CHLORIDE, SODIUM LACTATE, POTASSIUM CHLORIDE, AND CALCIUM CHLORIDE: .6; .31; .03; .02 INJECTION, SOLUTION INTRAVENOUS at 13:01

## 2023-07-10 RX ADMIN — PROPOFOL 100 MCG/KG/MIN: 10 INJECTION, EMULSION INTRAVENOUS at 13:15

## 2023-07-10 RX ADMIN — PROPOFOL 50 MG: 10 INJECTION, EMULSION INTRAVENOUS at 13:27

## 2023-07-10 RX ADMIN — LIDOCAINE HYDROCHLORIDE 50 MG: 10 INJECTION, SOLUTION EPIDURAL; INFILTRATION; INTRACAUDAL; PERINEURAL at 13:15

## 2023-07-10 RX ADMIN — POTASSIUM CHLORIDE 20 MEQ: 1500 TABLET, EXTENDED RELEASE ORAL at 08:55

## 2023-07-10 RX ADMIN — HYDRALAZINE HYDROCHLORIDE 5 MG: 20 INJECTION INTRAMUSCULAR; INTRAVENOUS at 18:39

## 2023-07-10 RX ADMIN — FOLIC ACID 1 MG: 1 TABLET ORAL at 08:55

## 2023-07-10 NOTE — PROGRESS NOTES
Daily Progress Note - [place] [address] 59 y.o. female MRN: [id number]  Unit/Bed#: [address] [place] [address] Encounter: [id number]  Admitting Physician: Kiarra Ontiveros MD   PCP: Antony Perez DO  Date of Admission:  7/6/2023 11:55 AM    Assessment and Plan    * Colitis  Assessment & Plan  POA bouts of diarrhea, abdominal pain. CT AP revealing colonic wall thickening from the transverse colon to the rectum. s/p Rocephin and Flagyl, antibiotics were discontinued once C. difficile stool enteric panels returned negative    · Likely inflammatory process, in setting of ulcerative colitis  · Studies pending: Fecal calprotectin, ova and parasite. · Hep B surface antigen, hep B core antibody, and TB negative  · Testing for Sjogren's ordered due to patient's complaint of dry eyes and mouth  · Continue mesalamine and steroid  · GI consulted  · EGD shows intrinsic strictures in the upper third of the esophagus, and underwent dilation. Erythematous mucosa with concentric ring and plaque, performed cold biopsy to rule out eosinophilic esophagitis. Localized erythematous mucosa in the antrum consistent with gastritis, biopsy performed to rule out H. Pylori. · Colonoscopy, demonstrated continuous cobblestone ulceration of the left-sided colon. Grade 2 hemorrhoids. 1 sessile inflammatory polyp. Symptomatic anemia  Assessment & Plan  Hgb 5.9 on admission, patient presenting with SOB, palpitation, lightheadedness. Reporting having worsening blood in stool and diarrhea X 2 weeks. Has history of UC mesalamine and steroids. CTAP showing findings consistent of colitis. · S/p 3 doses of Venofer and 2 units PRBCs, hemoglobin has remained stable ranging form 8-9s. · Likely due to iron deficiency as evidenced by low iron and ferritin. Cannot rule out active GI bleed.   · We will discharge with iron and vitamin C supplementation    Sinus bradycardia  Assessment & Plan  · Noted sinus bradycardia with heart rates as low as 37  · EKG showed sinus arrhythmia, PACs and nonspecific ST changes  · Cardiology consulted, plan to follow-up outpatient for possible Holter monitoring    History of ulcerative colitis  Assessment & Plan  History of ulcerative colitis diagnosed in 1989, been on steroid for the past 20 years. Home medications include steroid 5 mg daily and balsalazide 750 mg 3 tablets twice daily. · Continue mesalamine and steroids   · See "colitis" for further plan     Essential hypertension  Assessment & Plan  Stable BP on admission, have been elevated throughout admission in the 090L-833V systolic. Home medication include quinapril 20 mg daily. · On lisinopril 20 mg daily (quinapril nonformulary)    Pharyngoesophageal dysphagia  Assessment & Plan  · Reported mechanical dysphagia over the past couple months. · GI consulted  · Continue on PPI 40 mg daily  · EGD demonstrated strictures status post dilation. Biopsy done to rule out eosinophilic esophagitis and H. Pylori. Acute kidney injury (HCC)-resolved as of 7/8/2023  Assessment & Plan  Improving renal function  · POA Cr 1.41. Baseline appears to be around 0.8-0.9. · Likely related to GI volume loss and dehydration  · IV hydration  · Avoid nephrotoxic medications      VTE Pharmacologic Prophylaxis: VTE Score: 1 Low Risk (Score 0-2) - Encourage Ambulation. Patient Centered Rounds: I have performed bedside rounds with nursing staff today. Discussions with Specialists or Other Care Team Provider: GI and cardiology    Education and Discussions with Family / Patient: Yes  Patient Information Sharing: With the consent of Carri Fountain , their loved ones rTip Steven) were notified today by inpatient team of the patient’s condition and current plan. All questions answered. Time Spent for Care: 20 minutes. More than 50% of total time spent on counseling and coordination of care as described above.     Current Length of Stay: 3 day(s)    Current Patient Status: Inpatient   Certification Statement: The patient will continue to require additional inpatient hospital stay due to Colitis, undergoing EGD and colonoscopy    Discharge Plan: 1-2 days    Code Status: Level 1 - Full Code    Subjective:   Patient continues to feel tired and have abdominal pain managed by IV morphine injection. Patient denies any chest pain, SOB, palpitation, lightheadedness, dizziness, blurry vision. She finished her bowel prep yesterday in preparation for EGD and colonoscopy today. Objective     Objective:   Vitals:   Temp (24hrs), Av.7 °F (36.5 °C), Min:97.4 °F (36.3 °C), Max:98 °F (36.7 °C)    Temp:  [97.4 °F (36.3 °C)-98 °F (36.7 °C)] 97.4 °F (36.3 °C)  HR:  [37-74] 69  Resp:  [16-20] 18  BP: (156-182)/(72-98) 181/96  SpO2:  [96 %-100 %] 98 %  Body mass index is 20.18 kg/m². Input and Output Summary (last 24 hours): Intake/Output Summary (Last 24 hours) at 7/10/2023 1535  Last data filed at 7/10/2023 1337  Gross per 24 hour   Intake 200 ml   Output --   Net 200 ml       Physical Exam:   Physical Exam  Vitals reviewed. Constitutional:       General: She is not in acute distress. Appearance: Normal appearance. She is not ill-appearing. Cardiovascular:      Rate and Rhythm: Regular rhythm. Bradycardia present. Heart sounds: Normal heart sounds. No murmur heard. Pulmonary:      Effort: Pulmonary effort is normal. No respiratory distress. Breath sounds: Normal breath sounds. No rhonchi or rales. Abdominal:      General: Bowel sounds are normal. There is no distension. Palpations: Abdomen is soft. Tenderness: There is abdominal tenderness (Epigastric). There is no guarding. Musculoskeletal:      Right lower leg: No edema. Left lower leg: No edema. Skin:     Findings: Rash (back, pruritic. See media ) present. Neurological:      General: No focal deficit present.       Mental Status: She is alert and oriented to person, place, and time. Motor: No weakness. Additional Data:     Labs:  Results from last 7 days   Lab Units 07/10/23  0512 07/09/23  0446   WBC Thousand/uL 20.58* 22.90*   HEMOGLOBIN g/dL 8.3* 8.6*   HEMATOCRIT % 29.8* 30.3*   PLATELETS Thousands/uL 313 439*   NEUTROS PCT %  --  91*   LYMPHS PCT %  --  4*   LYMPHO PCT % 2*  --    MONOS PCT %  --  4   MONO PCT % 3*  --    EOS PCT % 0 0     Results from last 7 days   Lab Units 07/10/23  0512   POTASSIUM mmol/L 3.8   CHLORIDE mmol/L 109*   CO2 mmol/L 21   BUN mg/dL 12   CREATININE mg/dL 0.73   CALCIUM mg/dL 7.5*   ALK PHOS U/L 52   ALT U/L 8   AST U/L 16                   * I Have Reviewed All Lab Data Listed Above. * Additional Pertinent Lab Tests Reviewed: All Labs For Current Hospital Admission Reviewed    Imaging:    Imaging Reports Reviewed Today Include:  All [address] [place] [address] Admission Reviewed    Recent Cultures (last 7 days):     Results from last 7 days   Lab Units 07/06/23  1721   C DIFF TOXIN B BY PCR  Negative       Last 24 Hours Medication List:   Current Facility-Administered Medications   Medication Dose Route Frequency Provider Last Rate   • folic acid  1 mg Oral Daily Kyle Han MD     • hydrOXYzine HCL  25 mg Oral HS Radha Scott MD     • lisinopril  40 mg Oral Daily Radha Scott MD     • mesalamine  800 mg Oral TID Radha Scott MD     • methylPREDNISolone sodium succinate  20 mg Intravenous Q8H 1200 Wilbert Han MD     • morphine injection  1 mg Intravenous Q6H PRN Radha Scott MD     • multi-electrolyte  100 mL/hr Intravenous Continuous Radha Scott  mL/hr (07/10/23 1056)   • ondansetron  4 mg Intravenous Q6H PRN Radha Scott MD     • pantoprazole  40 mg Oral BID AC Kyle Han MD     • phenol  1 spray Mouth/Throat Q2H PRN Kyle Han MD     • sucralfate  1 g Oral 4x Daily (AC & HS) Kyle Stubbs Nikia Cruz MD               ** Please Note: Dictation voice to text software may have been used in the creation of this document.  **    [address] Wells MD  07/10/23  3:35 PM

## 2023-07-10 NOTE — ANESTHESIA PREPROCEDURE EVALUATION
Review of Systems/Medical History  Patient summary reviewed. Cardiovascular  Exercise tolerance (METS): good. ,  , NYHA Classification: I ,   Comment: H/O HTN. stoped meds for one month. BP is high now.,  Pulmonary  Negative pulmonary ROS        GI/Hepatic            Endo/Other  Negative endo/other ROS      GYN       Hematology   Musculoskeletal       Neurology   Psychology           Physical Exam    Airway    Mallampati score: I  TM Distance: >3 FB  Neck ROM: full     Dental   Comment: Some missing, no loose.,     Cardiovascular  Rhythm: regular, Rate: normal,     Pulmonary  Breath sounds clear to auscultation,     Other Findings        Anesthesia Plan  ASA Score- 2     Anesthesia Type- IV sedation with anesthesia with ASA Monitors. Additional Monitors:   Airway Plan:           Plan Factors-Exercise tolerance (METS): good. Patient summary reviewed. Induction- intravenous. Postoperative Plan-     Informed Consent- Anesthetic plan and risks discussed with patient. I personally reviewed this patient with the CRNA. Discussed and agreed on the Anesthesia Plan with the CRNA. Michaela Gannon

## 2023-07-10 NOTE — ASSESSMENT & PLAN NOTE
Noted sinus bradycardia with heart rates as low as 37  · EKG showed sinus arrhythmia, PACs and nonspecific ST changes  · Cardiology consulted, plan to follow-up outpatient for possible Holter monitoring

## 2023-07-10 NOTE — CONSULTS
Consultation - Cardiology   [address] [facility]     Tre Guido 59 y.o. female MRN: [id number]  : 1965  Unit/Bed#: 2 [place] [address] Encounter: [id number]      Assessment & Plan   1. Bradycardia. - Cardiology consulted on 7/10/2023 for concern for bradycardia noted on telemetry. - Telemetry reviewed: Currently sinus bradycardia, 48 bpm.  Lowest documented heart rate 37 bpm overnight, while patient was asleep. No evidence of atrial fibrillation/atrial flutter or underlying nate arrhythmia.   - 23: Normal sinus rhythm, 70 bpm.  - Recommend patient have extended Holter monitor on discharge.  - Recommend against beta blocker therapy. 2.  Ulcerative colitis. - 23 T abdomen and pelvis with IV contrast: There is colonic wall thickening extending from the transverse colon to the rectum with loss of the normal haustral folds. Findings in keeping with colitis, likely related to history of ulcerative colitis with active inflammation.  - Pending EGD and colonoscopy. - GI following. 3. Symptomatic anemia. - Presented on 23 evaluation for worsening bloody diarrhea over the past 2 weeks. On presentation found to be anemic, 23 H/H 5.9/23.2.   - Received 3 doses of Venna fear and 2 units of PRBCs.  - H/H has remained stable. - Continue to trend H/H.   - Pending EGD and colonoscopy. - Per primary team.    4. Hypertension.     - Review of vital signs note patient has had elevated BP readings in the past 24 hours. -182. - Patient is on Accupril 20 mg daily at home. - Currently on lisinopril 40 mg daily, increased from 20 mg daily on 7/10/23.  - Continue to monitor BP. Summary of Recommendations: Thank you for your consultation. Physician Requesting Consult: Jeb Beatty, *    Reason for Consult / Principal Problem: Bradycardia.     Consults    HPI: Tre Guido is a 59y.o. year old female with PMHX of ulcerative colitis and hypertension who presented on 7/06/23 evaluation for worsening bloody diarrhea over the past 2 weeks. On presentation found to be anemic, 7/06/23 H/H 5.9/23.2. Currently being treated for ulcerative colitis. Cardiology consulted on 7/10/2023 for concern for bradycardia noted on telemetry. Patient reports she has been told she has a slower heart rate in the past.  She currently denies chest pain, palpitations, shortness of breath, lightheadedness, dizziness, nausea, vomiting or headache. Review of Systems   Constitutional: Positive for fatigue. Negative for activity change and unexpected weight change. Respiratory: Negative for chest tightness, shortness of breath and wheezing. Cardiovascular: Negative for chest pain, palpitations and leg swelling. Gastrointestinal: Positive for abdominal pain, blood in stool, diarrhea and nausea. Negative for abdominal distention and vomiting. Skin: Negative for color change. Neurological: Positive for dizziness, syncope, weakness and light-headedness. Negative for facial asymmetry, numbness and headaches.        Historical Information   Past Medical History:   Diagnosis Date   • Anxiety    • Arthritis    • Atypical chest pain     last assessed: 12/6/13   • Bloody diarrhea     last assesed: 58/23/16   • Breast lump     last assessed: 10/10/14   • Candidiasis of esophagus ([address])     last assessed: 2/24/16   • Candidiasis of mouth     last assessed: 8/7/14   • Choledocholithiasis     last assessed: 6/5/14   • Cholelithiasis     last assessed: 7/21/15   • Closed fracture of first metatarsal bone of right foot     last assessed: 12/11/13   • Costovertebral angle pain     last assessed: 5/4/17   • Elevated blood pressure reading     last assessed: 7/29/16   • Grief reaction     last assessed: 4/4/16   • Herpes zoster     last assessed: 11/3/16   • High risk medication use     last assessed: 12/23/16   • Hypokalemia     last assessed: 4/12/16   • Hypotension last assessed: 5/15/15   • Inflammatory disorder of breast     last assessed: 13   • Iron deficiency anemia     last assessed: 13   • Microscopic hematuria     last assessed: 13   • On prednisone therapy     last assessed: 10/19/17   • Opioid use, unspecified, uncomplicated     last assessed: 3/1/17   • Other polyp of sinus     last assessed: 13   • Paronychia of toe     last assessed: 13   • Pharyngoesophageal dysphagia     resolved: 3/1/16   • Tinea corporis     last assessed: 16   • Ulcerative colitis ([address])    • Underweight     last assessed: 3/1/16   • Urinary frequency     resolved: 16   • Viral warts      Past Surgical History:   Procedure Laterality Date   • BREAST SURGERY      left breast lumpectomy   • CARPAL TUNNEL RELEASE Bilateral    •  SECTION N/A    • CHOLECYSTECTOMY     • COLONOSCOPY  2019   • EGD  2019   • ESOPHAGOGASTRODUODENOSCOPY N/A 3/14/2016    Procedure: ESOPHAGOGASTRODUODENOSCOPY (EGD); Surgeon: Betty Robb MD;  Location: [facility];   Service:    • TONSILLECTOMY N/A      Social History     Substance and Sexual Activity   Alcohol Use Never     Social History     Substance and Sexual Activity   Drug Use Never     Social History     Tobacco Use   Smoking Status Never   Smokeless Tobacco Never     Family History:   Family History   Problem Relation Age of Onset   • Heart disease Mother    • Stroke Mother    • Heart disease Father         MI   • Colon cancer Maternal Grandfather    • Asthma Son    • Autism Son    • Heart disease Brother         CAD   • Cancer Maternal Grandmother         colon   • Heart disease Brother    • COPD Brother         smoker   • No Known Problems Brother    • No Known Problems Half-Sister        Meds/Allergies    PTA meds:    Medications Prior to Admission   Medication   • aspirin 81 MG tablet   • balsalazide (COLAZAL) 750 mg capsule   • folic acid (FOLVITE) 1 mg tablet   • omeprazole (PriLOSEC) 40 MG capsule • predniSONE 2.5 mg tablet   • quinapril (ACCUPRIL) 20 mg tablet   • fluticasone (FLONASE) 50 mcg/act nasal spray      Allergies   Allergen Reactions   • Dairy Aid [Tilactase] GI Intolerance   • Levaquin [Levofloxacin In D5w] Other (See Comments)     Redness at injection site   • Mercaptopurine      Causes weakness in legs   • Mesalamine GI Intolerance   • Other Other (See Comments)     6 mp. Causes weakness in legs. Current Facility-Administered Medications:   •  folic acid (FOLVITE) tablet 1 mg, 1 mg, Oral, Daily, et Anand Dorsey MD, 1 mg at 07/10/23 3144  •  hydrOXYzine HCL (ATARAX) tablet 25 mg, 25 mg, Oral, HS, Eulalio Nugent MD, 25 mg at 07/09/23 2230  •  lisinopril (ZESTRIL) tablet 40 mg, 40 mg, Oral, Daily, Austin Vasquez MD, 40 mg at 07/10/23 0855  •  mesalamine (DELZICOL) delayed release capsule 800 mg, 800 mg, Oral, TID, 535 Adan Wells MD, 800 mg at 07/10/23 3555  •  methylPREDNISolone sodium succinate (Solu-MEDROL) injection 20 mg, 20 mg, Intravenous, Q8H [address], Tracy Cantu PA-C, 20 mg at 07/10/23 0601  •  morphine injection 1 mg, 1 mg, Intravenous, Q6H PRN, Gaurav Waldrop MD, 1 mg at 07/10/23 0600  •  multi-electrolyte (PLASMALYTE-A/ISOLYTE-S PH 7.4) IV solution, 100 mL/hr, Intravenous, Continuous, Lang Garcia MD, Last Rate: 100 mL/hr at 07/10/23 1056, 100 mL/hr at 07/10/23 1056  •  ondansetron (ZOFRAN) injection 4 mg, 4 mg, Intravenous, Q6H PRN, Eulalio Nugent MD, 4 mg at 07/08/23 1317  •  pantoprazole (PROTONIX) EC tablet 40 mg, 40 mg, Oral, BID AC, Tracy Cantu PA-C, 40 mg at 07/10/23 0601  •  phenol (CHLORASEPTIC) 1.4 % mucosal liquid 1 spray, 1 spray, Mouth/Throat, Q2H PRN, Lang Garcia MD  •  sucralfate (CARAFATE) tablet 1 g, 1 g, Oral, 4x Daily (AC & HS), KAROLINA May, 1 g at 07/10/23 0601    VTE Pharmacologic Prophylaxis: none. Objective:   Vitals: Blood pressure (!) 177/86, pulse (!) 38, temperature 98 °F (36.7 °C), resp.  rate 18, height 5' 5" (1.651 m), weight 55 kg (121 lb 4.1 oz), SpO2 97 %. Body mass index is 20.18 kg/m². Wt Readings from Last 3 Encounters:   07/10/23 55 kg (121 lb 4.1 oz)   07/01/22 53.7 kg (118 lb 6.4 oz)   03/17/21 54.6 kg (120 lb 6.4 oz)     BP Readings from Last 3 Encounters:   07/10/23 (!) 177/86   07/01/22 144/90   03/17/21 136/80     Orthostatic Blood Pressures    Flowsheet Row Most Recent Value   Blood Pressure 177/86 filed at 07/10/2023 1111   Patient Position - Orthostatic VS Lying filed at 07/09/2023 5946        No intake or output data in the 24 hours ending 07/10/23 1245    Invasive Devices     Peripheral Intravenous Line  Duration           Peripheral IV 07/09/23 Distal;Right;Ventral (anterior) Forearm 1 day                  Physical Exam:   Physical Exam  Vitals reviewed. Constitutional:       General: She is not in acute distress. Cardiovascular:      Rate and Rhythm: Regular rhythm. Bradycardia present. Pulses: Normal pulses. Heart sounds: Murmur heard. Pulmonary:      Effort: Pulmonary effort is normal. No respiratory distress. Breath sounds: Normal breath sounds. Abdominal:      General: Abdomen is flat. There is no distension. Palpations: Abdomen is soft. Tenderness: There is no abdominal tenderness. Musculoskeletal:      Right lower leg: No edema. Left lower leg: No edema. Skin:     General: Skin is warm and dry. Neurological:      Mental Status: She is alert and oriented to person, place, and time.              Labs:   Troponins:  Results from last 7 days   Lab Units 07/06/23  1454   HSTNI D2 ng/L 0       CBC with diff:   Results from last 7 days   Lab Units 07/10/23  0512 07/09/23  0446 07/08/23  0501 07/07/23  0556 07/07/23  0235 07/06/23  1223   WBC Thousand/uL 20.58* 22.90* 12.22* 12.53*  --  16.88*   HEMOGLOBIN g/dL 8.3* 8.6* 8.9* 9.3* 9.4* 5.9*   HEMATOCRIT % 29.8* 30.3* 30.7* 32.1* 33.2* 23.2*   MCV fL 76* 76* 74* 73*  --  65*   PLATELETS Thousands/uL 313 439* 470* 515*  --  652*   RBC Million/uL 3.92 4.01 4.16 4.38  --  3.58*   MCH pg 21.2* 21.4* 21.4* 21.2*  --  16.5*   MCHC g/dL 27.9* 28.4* 29.0* 29.0*  --  25.8*   RDW % 32.0* 31.4* 30.5* 29.5*  --  24.2*   MPV fL 9.3 9.3 8.5* 8.4*  --  8.2*   NRBC AUTO /100 WBCs  --  0 0 0  --  0       CMP:   Results from last 7 days   Lab Units 07/10/23  0512 07/09/23  0446 07/08/23  0501 07/07/23  0556 07/06/23  1223   SODIUM mmol/L 139 137 138 137 137   POTASSIUM mmol/L 3.8 3.9 3.9 4.0 3.6   CHLORIDE mmol/L 109* 110* 110* 110* 107   CO2 mmol/L 21 22 20* 21 22   ANION GAP mmol/L 9 5 8 6 8   BUN mg/dL 12 15 7 10 17   CREATININE mg/dL 0.73 0.96 0.84 1.14 1.41*   GLUCOSE FASTING mg/dL  --   --   --  69  --    CALCIUM mg/dL 7.5* 8.0* 7.9* 7.8* 8.8   AST U/L 16 16 19 14 15   ALT U/L 8 8 8 5* 6*   ALK PHOS U/L 52 64 61 63 65   TOTAL PROTEIN g/dL 4.8* 5.4* 5.5* 5.9* 6.6   ALBUMIN g/dL 2.4* 2.5* 2.7* 2.9* 3.1*   TOTAL BILIRUBIN mg/dL 0.23 0.24 0.27 0.44 0.37   EGFR ml/min/1.73sq m 90 64 76 52 40   GLUCOSE RANDOM mg/dL 102 133 125 69 85       Magnesium:   Results from last 7 days   Lab Units 07/09/23  0446 07/07/23  0556 07/06/23  1223   MAGNESIUM mg/dL 2.0 2.7 1.7*     Coags:     TSH:    Results from last 7 days   Lab Units 07/09/23  0446   TSH 3RD GENERATON uIU/mL 0.166*     No components found for: "TSH3"  Lipid Profile:     Lipid Profile:   Lab Results   Component Value Date    HDL 47 01/04/2016    LDLCALC 99 01/04/2016    TRIG 193 01/04/2016     Hgb A1c:     NT-proBNP: No results for input(s): "NTBNP" in the last 72 hours. Imaging & Testing     Cardiac testing:   No results found for this or any previous visit. Imaging: I have personally reviewed pertinent reports. CT abdomen pelvis with contrast    Result Date: 7/6/2023    Impression: 1. There is colonic wall thickening extending from the transverse colon to the rectum with loss of the normal haustral folds.  Findings in keeping with colitis, likely related to history of ulcerative colitis with active inflammation. 2. Mild bladder wall thickening may be due to cystitis. Suggest correlation with urinalysis. EKG/ Monitor: Personally reviewed. Telemetry reviewed: Currently sinus bradycardia, 48 bpm.  Lowest documented heart rate 37 bpm overnight, while patient was asleep. No evidence of atrial fibrillation/atrial flutter or underlying nate arrhythmia.        Code Status: Level 1 - Full Code  Advance Directive and Living Will:      POLST:        Brandno Gonsalez PA-C

## 2023-07-10 NOTE — PROGRESS NOTES
Daily Progress Note - [facility] [address] 59 y.o. female MRN: [id number]  Unit/Bed#: [address] [place] [address] Encounter: [id number]  Admitting Physician: Ilene Holloway MD   PCP: Dev Gray DO  Date of Admission:  7/6/2023 11:55 AM    Assessment and Plan    Symptomatic anemia  Assessment & Plan  Hgb 5.9 on admission, patient presenting with SOB, potation's, lightheadedness. Reporting having worsening blood in stool and diarrhea X 2 weeks. Has history of UC mesalamine and steroids. CTAP showing findings consistent of colitis. · S/p 3 doses of Venofer and 2 units PRBCs, hemoglobin has remained stable ranging form 8-9s. · Likely due to iron deficiency as evidenced by low iron and ferritin. Cannot rule out active GI bleed. · We will discharge with iron and vitamin C supplementation    * Colitis  Assessment & Plan  POA bouts of diarrhea, abdominal pain. CT AP revealing colonic wall thickening from the transverse colon to the rectum. s/p Rocephin and Flagyl, antibiotics were discontinued once C. difficile stool enteric panels returned negative    · Active Infection versus inflammatory process  · Negative stool enteric panel and C. difficile  · Studies pending: Fecal calprotectin, Hep B surface antigen, hep B core antibody, ova and parasite, TB.  · Clear liquid diet + NPO/bowel prep at midnight for EGD and colonoscopy on 7/10  · Testing for Sjogren's ordered due to patient's complaint of dry eyes and mouth  · Continue mesalamine and steroid  · GI consulted  · Considering initiation of biologic pending egd/colonoscopy results    History of ulcerative colitis  Assessment & Plan  History of ulcerative colitis diagnosed in 1989, been on steroid for the past 20 years. Home medications include steroid 5 mg daily and balsalazide 750 mg 3 tablets twice daily.     · Continue mesalamine and steroids   · See "colitis" for further plan       Essential hypertension  Assessment & Plan  Stable BP on admission, have been elevated throughout admission in the 389S-706R systolic. Home medication include quinapril 20 mg daily. · On lisinopril 20 mg daily (quinapril nonformulary)  · Consider increasing BP meds      Pharyngoesophageal dysphagia  Assessment & Plan  · Reported mechanical dysphagia over the past couple months. · GI consulted  · Continue on PPI 40 mg daily  · EGD planned for 7/10      Acute kidney injury (HCC)-resolved as of 2023  Assessment & Plan    Improving renal function    · POA Cr 1.41. Baseline appears to be around 0.8-0.9. Today 0.84. · No excessive use of NSAIDs  · Likely related to GI volume loss and dehydration  · IV hydration  · Avoid nephrotoxic medications    Discharge plan    · Continue encouraging oral hydration  · Continue avoiding excessive use of NSAIDs  · Discouraged use of nephrotoxic medications        VTE Pharmacologic Prophylaxis: VTE Score: 1 {VTE Risk:42486}    Patient Centered Rounds: {Patient Centered Rounds:71524}    Discussions with Specialists or Other Care Team Provider: ***    Education and Discussions with Family / Patient: ***  Patient Information Sharing: With the consent of Liza Pickett , their loved ones (insert name(s) here***) were notified today by inpatient team of the patient’s condition and current plan. All questions answered. *** Rubia Christine does not wish inpatient team to notify their loved ones of their condition and current plan. ***    Time Spent for Care: {Time; Time 15 min - 1 hour:29589}. More than 50% of total time spent on counseling and coordination of care as described above.     Current Length of Stay: 3 day(s)    Current Patient Status: Inpatient   Certification Statement: {Certification HPCQIRWTQ:18020}    Discharge Plan: ***    Code Status: Level 1 - Full Code    Subjective:   ***    Objective     Objective:   Vitals:   Temp (24hrs), Av.7 °F (36.5 °C), Min:97.4 °F (36.3 °C), Max:98 °F (36.7 °C)    Temp:  [97.4 °F (36.3 °C)-98 °F (36.7 °C)] 98 °F (36.7 °C)  HR:  [37-74] 43  Resp:  [18-20] 18  BP: (158-182)/(77-94) 174/84  SpO2:  [97 %-100 %] 99 %  Body mass index is 20.18 kg/m². Input and Output Summary (last 24 hours):     No intake or output data in the 24 hours ending 07/10/23 0837    Physical Exam:   Physical Exam  ( *** Be Sure to Include Physical Exam: Delete this entire line when you have entered your exam)    Additional Data:     Labs:  Results from last 7 days   Lab Units 07/10/23  0512 07/09/23  0446   WBC Thousand/uL 20.58* 22.90*   HEMOGLOBIN g/dL 8.3* 8.6*   HEMATOCRIT % 29.8* 30.3*   PLATELETS Thousands/uL 313 439*   NEUTROS PCT %  --  91*   LYMPHS PCT %  --  4*   LYMPHO PCT % 2*  --    MONOS PCT %  --  4   MONO PCT % 3*  --    EOS PCT % 0 0     Results from last 7 days   Lab Units 07/10/23  0512   POTASSIUM mmol/L 3.8   CHLORIDE mmol/L 109*   CO2 mmol/L 21   BUN mg/dL 12   CREATININE mg/dL 0.73   CALCIUM mg/dL 7.5*   ALK PHOS U/L 52   ALT U/L 8   AST U/L 16                   * I Have Reviewed All Lab Data Listed Above. * Additional Pertinent Lab Tests Reviewed: {Labreview:40492}    Imaging:    Imaging Reports Reviewed Today Include:  All [address] [place] [address] Admission Reviewed    Recent Cultures (last 7 days):     Results from last 7 days   Lab Units 07/06/23  1721   C DIFF TOXIN B BY PCR  Negative       Last 24 Hours Medication List:   Current Facility-Administered Medications   Medication Dose Route Frequency Provider Last Rate   • folic acid  1 mg Oral Daily 535 Adan Wells MD     • hydrOXYzine HCL  25 mg Oral HS Darrel Carney MD     • lisinopril  40 mg Oral Daily Dave Patricia MD     • mesalamine  800 mg Oral  Adan Wells MD     • methylPREDNISolone sodium succinate  20 mg Intravenous Q8H [address] Tracy Cantu PA-C     • morphine injection  1 mg Intravenous Q6H  Colisepayal eWlls MD     • multi-electrolyte  100 mL/hr Intravenous Continuous Sergey Collier MD 100 mL/hr (07/09/23 2342)   • ondansetron  4 mg Intravenous Q6H PRN Ignacio Juarez MD     • pantoprazole  40 mg Oral BID AC Tracy Cantu PA-C     • phenol  1 spray Mouth/Throat Q2H PRN Jules Glass MD     • potassium chloride  20 mEq Oral Once 535 Adan Wells MD     • sucralfate  1 g Oral 4x Daily (AC & HS) KAROLINA Su               ** Please Note: Dictation voice to text software may have been used in the creation of this document.  **    535 Adan Wells MD  07/10/23  8:37 AM

## 2023-07-10 NOTE — PLAN OF CARE
Problem: PAIN - ADULT  Goal: Verbalizes/displays adequate comfort level or baseline comfort level  Description: Interventions:  - Encourage patient to monitor pain and request assistance  - Assess pain using appropriate pain scale  - Administer analgesics based on type and severity of pain and evaluate response  - Implement non-pharmacological measures as appropriate and evaluate response  - Consider cultural and social influences on pain and pain management  - Notify physician/advanced practitioner if interventions unsuccessful or patient reports new pain  Outcome: Progressing     Problem: INFECTION - ADULT  Goal: Absence or prevention of progression during hospitalization  Description: INTERVENTIONS:  - Assess and monitor for signs and symptoms of infection  - Monitor lab/diagnostic results  - Monitor all insertion sites, i.e. indwelling lines, tubes, and drains  - Monitor endotracheal if appropriate and nasal secretions for changes in amount and color  - Gem appropriate cooling/warming therapies per order  - Administer medications as ordered  - Instruct and encourage patient and family to use good hand hygiene technique  - Identify and instruct in appropriate isolation precautions for identified infection/condition  Outcome: Progressing  Goal: Absence of fever/infection during neutropenic period  Description: INTERVENTIONS:  - Monitor WBC    Outcome: Progressing     Problem: DISCHARGE PLANNING  Goal: Discharge to home or other facility with appropriate resources  Description: INTERVENTIONS:  - Identify barriers to discharge w/patient and caregiver  - Arrange for needed discharge resources and transportation as appropriate  - Identify discharge learning needs (meds, wound care, etc.)  - Arrange for interpretive services to assist at discharge as needed  - Refer to Case Management Department for coordinating discharge planning if the patient needs post-hospital services based on physician/advanced practitioner order or complex needs related to functional status, cognitive ability, or social support system  Outcome: Progressing     Problem: Knowledge Deficit  Goal: Patient/family/caregiver demonstrates understanding of disease process, treatment plan, medications, and discharge instructions  Description: Complete learning assessment and assess knowledge base.   Interventions:  - Provide teaching at level of understanding  - Provide teaching via preferred learning methods  Outcome: Progressing     Problem: GASTROINTESTINAL - ADULT  Goal: Minimal or absence of nausea and/or vomiting  Description: INTERVENTIONS:  - Administer IV fluids if ordered to ensure adequate hydration  - Maintain NPO status until nausea and vomiting are resolved  - Nasogastric tube if ordered  - Administer ordered antiemetic medications as needed  - Provide nonpharmacologic comfort measures as appropriate  - Advance diet as tolerated, if ordered  - Consider nutrition services referral to assist patient with adequate nutrition and appropriate food choices  Outcome: Progressing  Goal: Maintains or returns to baseline bowel function  Description: INTERVENTIONS:  - Assess bowel function  - Encourage oral fluids to ensure adequate hydration  - Administer IV fluids if ordered to ensure adequate hydration  - Administer ordered medications as needed  - Encourage mobilization and activity  - Consider nutritional services referral to assist patient with adequate nutrition and appropriate food choices  Outcome: Progressing  Goal: Maintains adequate nutritional intake  Description: INTERVENTIONS:  - Monitor percentage of each meal consumed  - Identify factors contributing to decreased intake, treat as appropriate  - Assist with meals as needed  - Monitor I&O, weight, and lab values if indicated  - Obtain nutrition services referral as needed  Outcome: Progressing  Goal: Oral mucous membranes remain intact  Description: INTERVENTIONS  - Assess oral mucosa and hygiene practices  - Implement preventative oral hygiene regimen  - Implement oral medicated treatments as ordered  - Initiate Nutrition services referral as needed  Outcome: Progressing     Problem: METABOLIC, FLUID AND ELECTROLYTES - ADULT  Goal: Electrolytes maintained within normal limits  Description: INTERVENTIONS:  - Monitor labs and assess patient for signs and symptoms of electrolyte imbalances  - Administer electrolyte replacement as ordered  - Monitor response to electrolyte replacements, including repeat lab results as appropriate  - Instruct patient on fluid and nutrition as appropriate  Outcome: Progressing  Goal: Fluid balance maintained  Description: INTERVENTIONS:  - Monitor labs   - Monitor I/O and WT  - Instruct patient on fluid and nutrition as appropriate  - Assess for signs & symptoms of volume excess or deficit  Outcome: Progressing

## 2023-07-10 NOTE — PLAN OF CARE
Problem: PAIN - ADULT  Goal: Verbalizes/displays adequate comfort level or baseline comfort level  Description: Interventions:  - Encourage patient to monitor pain and request assistance  - Assess pain using appropriate pain scale  - Administer analgesics based on type and severity of pain and evaluate response  - Implement non-pharmacological measures as appropriate and evaluate response  - Consider cultural and social influences on pain and pain management  - Notify physician/advanced practitioner if interventions unsuccessful or patient reports new pain  Outcome: Progressing     Problem: INFECTION - ADULT  Goal: Absence or prevention of progression during hospitalization  Description: INTERVENTIONS:  - Assess and monitor for signs and symptoms of infection  - Monitor lab/diagnostic results  - Monitor all insertion sites, i.e. indwelling lines, tubes, and drains  - Monitor endotracheal if appropriate and nasal secretions for changes in amount and color  - Lawrence appropriate cooling/warming therapies per order  - Administer medications as ordered  - Instruct and encourage patient and family to use good hand hygiene technique  - Identify and instruct in appropriate isolation precautions for identified infection/condition  Outcome: Progressing     Problem: GASTROINTESTINAL - ADULT  Goal: Minimal or absence of nausea and/or vomiting  Description: INTERVENTIONS:  - Administer IV fluids if ordered to ensure adequate hydration  - Maintain NPO status until nausea and vomiting are resolved  - Nasogastric tube if ordered  - Administer ordered antiemetic medications as needed  - Provide nonpharmacologic comfort measures as appropriate  - Advance diet as tolerated, if ordered  - Consider nutrition services referral to assist patient with adequate nutrition and appropriate food choices  Outcome: Progressing     Problem: Knowledge Deficit  Goal: Patient/family/caregiver demonstrates understanding of disease process, treatment plan, medications, and discharge instructions  Description: Complete learning assessment and assess knowledge base.   Interventions:  - Provide teaching at level of understanding  - Provide teaching via preferred learning methods  Outcome: Progressing

## 2023-07-10 NOTE — ANESTHESIA POSTPROCEDURE EVALUATION
Post-Op Assessment Note    CV Status:  Stable  Pain Score: 0    Pain management: adequate     Mental Status:  Sleepy and arousable   Hydration Status:  Stable   PONV Controlled:  None   Airway Patency:  Patent and adequate      Post Op Vitals Reviewed: Yes      Staff: CRNA         No notable events documented.     BP   177/98   Temp      Pulse 78   Resp   18   SpO2   100

## 2023-07-10 NOTE — QUICK NOTE
Was alerted by RN that patient was concerned about her heart rate being low. Upon chart review, patient has been on telemetry throughout the day with low heart rates in the 40s to 50s. Telemetry was again reviewed prior to evaluation of the patient, showing multiple events for bradycardia. Patient interviewed at bedside. She states she is annoyed that her heart monitor keeps going off due to her low heart rate and is worried. Patient reports she is anxious about all the events that are going on with her hospital course, including the procedures scheduled for tomorrow, and is tearful when mentioning she has an autistic child at home. Patient currently denies chest pain, shortness of breath, lightheadedness, dizziness, vision changes. Review of EKG on admission showed some T wave abnormalities in the anterior leads. Repeat EKG's were performed at bedside which shows sinus bradyarrhythmia with some changes noted in anterior leads. Some irregularly irregular rhythm and flattening of some p waves, possibly suggesting early onset of A-fib. [id number] score: 2. Low to moderate risk. Given patient's low hemoglobin on admission of 5.9, and hemoglobin of 8.6 today, will hold off on anticoagulation at this time. Additionally patient is getting EGD and colonoscopy tomorrow in the morning. Given low heart rate, would be unable to give rate control with beta-blockers. TSH also noted to be low at 0.166, although free T4 pending. Upon literature review, bradyarrhythmia could be a feature of subclinical hyperthyroidism. For the same reasons discussed above, would be unable to give beta blocker if it is hyperthyroidism. Antithyroid medication could cause agranulocytosis, which would complicate patient's course given patient is immunocompromised on steroids. Physical Exam  Vitals reviewed. Constitutional:       Appearance: Normal appearance.       Comments: Lying in bed in no acute distress, tearful at times   Cardiovascular:      Rate and Rhythm: Regular rhythm. Bradycardia present. Heart sounds: Normal heart sounds. No murmur heard. Pulmonary:      Effort: No respiratory distress. Breath sounds: Normal breath sounds. No wheezing. Abdominal:      Palpations: Abdomen is soft. Tenderness: There is no abdominal tenderness. There is no guarding. Musculoskeletal:         General: No swelling or tenderness. Skin:     General: Skin is warm and dry. Neurological:      Mental Status: She is alert. Psychiatric:         Mood and Affect: Mood normal.       Assessment/Plan  Bradycardia  EKG's also reviewed with ED physician and SLIM AP. We will give patient bolus of IV fluids. Continue to monitor overnight. Will consult cardiology in the morning for further workup given persistent bradycardia.      To be co-signed by Dr. Nahum Pierre  07/09/23  11:18 PM

## 2023-07-11 VITALS
RESPIRATION RATE: 17 BRPM | BODY MASS INDEX: 18.26 KG/M2 | HEIGHT: 65 IN | DIASTOLIC BLOOD PRESSURE: 82 MMHG | TEMPERATURE: 97.3 F | WEIGHT: 109.6 LBS | OXYGEN SATURATION: 99 % | HEART RATE: 59 BPM | SYSTOLIC BLOOD PRESSURE: 152 MMHG

## 2023-07-11 PROBLEM — D64.9 SYMPTOMATIC ANEMIA: Status: RESOLVED | Noted: 2023-07-06 | Resolved: 2023-07-11

## 2023-07-11 LAB
ALBUMIN SERPL BCP-MCNC: 2.9 G/DL (ref 3.5–5)
ALP SERPL-CCNC: 64 U/L (ref 34–104)
ALT SERPL W P-5'-P-CCNC: 12 U/L (ref 7–52)
ANION GAP SERPL CALCULATED.3IONS-SCNC: 5 MMOL/L
AST SERPL W P-5'-P-CCNC: 15 U/L (ref 13–39)
ATRIAL RATE: 34 BPM
ATRIAL RATE: 41 BPM
ATRIAL RATE: 43 BPM
BILIRUB SERPL-MCNC: 0.31 MG/DL (ref 0.2–1)
BUN SERPL-MCNC: 12 MG/DL (ref 5–25)
CALCIUM ALBUM COR SERPL-MCNC: 9.3 MG/DL (ref 8.3–10.1)
CALCIUM SERPL-MCNC: 8.4 MG/DL (ref 8.4–10.2)
CALPROTECTIN STL-MCNT: 719 UG/G (ref 0–120)
CHLORIDE SERPL-SCNC: 106 MMOL/L (ref 96–108)
CO2 SERPL-SCNC: 26 MMOL/L (ref 21–32)
CREAT SERPL-MCNC: 0.75 MG/DL (ref 0.6–1.3)
ENA SS-A AB SER-ACNC: <0.2 AI (ref 0–0.9)
ENA SS-B AB SER-ACNC: <0.2 AI (ref 0–0.9)
ERYTHROCYTE [DISTWIDTH] IN BLOOD BY AUTOMATED COUNT: 32.3 % (ref 11.6–15.1)
GFR SERPL CREATININE-BSD FRML MDRD: 87 ML/MIN/1.73SQ M
GLUCOSE SERPL-MCNC: 123 MG/DL (ref 65–140)
HCT VFR BLD AUTO: 31.8 % (ref 34.8–46.1)
HGB BLD-MCNC: 9.2 G/DL (ref 11.5–15.4)
MCH RBC QN AUTO: 21.4 PG (ref 26.8–34.3)
MCHC RBC AUTO-ENTMCNC: 28.9 G/DL (ref 31.4–37.4)
MCV RBC AUTO: 74 FL (ref 82–98)
NRBC BLD AUTO-RTO: 0 /100 WBCS
P AXIS: -23 DEGREES
P AXIS: 56 DEGREES
PLATELET # BLD AUTO: 396 THOUSANDS/UL (ref 149–390)
PMV BLD AUTO: 8.6 FL (ref 8.9–12.7)
POTASSIUM SERPL-SCNC: 3.5 MMOL/L (ref 3.5–5.3)
PR INTERVAL: 124 MS
PR INTERVAL: 142 MS
PROT SERPL-MCNC: 5.8 G/DL (ref 6.4–8.4)
QRS AXIS: 29 DEGREES
QRS AXIS: 29 DEGREES
QRS AXIS: 59 DEGREES
QRSD INTERVAL: 82 MS
QRSD INTERVAL: 88 MS
QRSD INTERVAL: 88 MS
QT INTERVAL: 514 MS
QT INTERVAL: 532 MS
QT INTERVAL: 538 MS
QTC INTERVAL: 434 MS
QTC INTERVAL: 443 MS
QTC INTERVAL: 460 MS
RBC # BLD AUTO: 4.29 MILLION/UL (ref 3.81–5.12)
SODIUM SERPL-SCNC: 137 MMOL/L (ref 135–147)
T WAVE AXIS: 55 DEGREES
T WAVE AXIS: 56 DEGREES
T WAVE AXIS: 77 DEGREES
VENTRICULAR RATE: 41 BPM
VENTRICULAR RATE: 43 BPM
VENTRICULAR RATE: 45 BPM
WBC # BLD AUTO: 17.41 THOUSAND/UL (ref 4.31–10.16)

## 2023-07-11 PROCEDURE — 99239 HOSP IP/OBS DSCHRG MGMT >30: CPT | Performed by: INTERNAL MEDICINE

## 2023-07-11 PROCEDURE — 80053 COMPREHEN METABOLIC PANEL: CPT | Performed by: INTERNAL MEDICINE

## 2023-07-11 PROCEDURE — 85027 COMPLETE CBC AUTOMATED: CPT | Performed by: INTERNAL MEDICINE

## 2023-07-11 PROCEDURE — 99232 SBSQ HOSP IP/OBS MODERATE 35: CPT | Performed by: INTERNAL MEDICINE

## 2023-07-11 PROCEDURE — 93010 ELECTROCARDIOGRAM REPORT: CPT | Performed by: INTERNAL MEDICINE

## 2023-07-11 RX ORDER — AMLODIPINE BESYLATE 5 MG/1
5 TABLET ORAL DAILY
Status: DISCONTINUED | OUTPATIENT
Start: 2023-07-11 | End: 2023-07-11 | Stop reason: HOSPADM

## 2023-07-11 RX ORDER — WEIGH SCALE
MISCELLANEOUS MISCELLANEOUS DAILY
Qty: 1 EACH | Refills: 0 | Status: SHIPPED | OUTPATIENT
Start: 2023-07-11 | End: 2023-07-13

## 2023-07-11 RX ORDER — PREDNISONE 20 MG/1
40 TABLET ORAL DAILY
Status: DISCONTINUED | OUTPATIENT
Start: 2023-07-12 | End: 2023-07-11 | Stop reason: HOSPADM

## 2023-07-11 RX ORDER — QUINAPRIL 20 MG/1
40 TABLET ORAL DAILY
Qty: 60 TABLET | Refills: 0 | Status: SHIPPED | OUTPATIENT
Start: 2023-07-11 | End: 2023-07-12 | Stop reason: SDUPTHER

## 2023-07-11 RX ORDER — AMLODIPINE BESYLATE 5 MG/1
5 TABLET ORAL DAILY
Qty: 30 TABLET | Refills: 0 | Status: SHIPPED | OUTPATIENT
Start: 2023-07-12

## 2023-07-11 RX ORDER — PREDNISONE 5 MG/1
TABLET ORAL
Qty: 273 TABLET | Refills: 0 | Status: SHIPPED | OUTPATIENT
Start: 2023-07-12 | End: 2023-09-13

## 2023-07-11 RX ORDER — BALSALAZIDE DISODIUM 750 MG/1
2250 CAPSULE ORAL 2 TIMES DAILY
Qty: 150 CAPSULE | Refills: 0 | Status: SHIPPED | OUTPATIENT
Start: 2023-07-11 | End: 2023-07-14 | Stop reason: SDUPTHER

## 2023-07-11 RX ORDER — POTASSIUM CHLORIDE 20 MEQ/1
40 TABLET, EXTENDED RELEASE ORAL ONCE
Status: COMPLETED | OUTPATIENT
Start: 2023-07-11 | End: 2023-07-11

## 2023-07-11 RX ORDER — OMEPRAZOLE 40 MG/1
40 CAPSULE, DELAYED RELEASE ORAL DAILY
Qty: 30 CAPSULE | Refills: 0 | Status: SHIPPED | OUTPATIENT
Start: 2023-07-11

## 2023-07-11 RX ORDER — ONDANSETRON 2 MG/ML
4 INJECTION INTRAMUSCULAR; INTRAVENOUS ONCE AS NEEDED
Status: DISCONTINUED | OUTPATIENT
Start: 2023-07-11 | End: 2023-07-11 | Stop reason: HOSPADM

## 2023-07-11 RX ORDER — LIDOCAINE 50 MG/G
1 PATCH TOPICAL DAILY
Qty: 15 PATCH | Refills: 0 | Status: CANCELLED | OUTPATIENT
Start: 2023-07-12

## 2023-07-11 RX ORDER — PANTOPRAZOLE SODIUM 40 MG/1
40 TABLET, DELAYED RELEASE ORAL
Status: DISCONTINUED | OUTPATIENT
Start: 2023-07-12 | End: 2023-07-11 | Stop reason: HOSPADM

## 2023-07-11 RX ADMIN — LIDOCAINE 1 PATCH: 50 PATCH CUTANEOUS at 08:23

## 2023-07-11 RX ADMIN — AMLODIPINE BESYLATE 5 MG: 5 TABLET ORAL at 08:24

## 2023-07-11 RX ADMIN — LISINOPRIL 40 MG: 20 TABLET ORAL at 08:23

## 2023-07-11 RX ADMIN — HYDRALAZINE HYDROCHLORIDE 5 MG: 20 INJECTION INTRAMUSCULAR; INTRAVENOUS at 03:13

## 2023-07-11 RX ADMIN — FOLIC ACID 1 MG: 1 TABLET ORAL at 08:23

## 2023-07-11 RX ADMIN — HYDRALAZINE HYDROCHLORIDE 5 MG: 20 INJECTION INTRAMUSCULAR; INTRAVENOUS at 10:20

## 2023-07-11 RX ADMIN — SUCRALFATE 1 G: 1 TABLET ORAL at 06:00

## 2023-07-11 RX ADMIN — MESALAMINE 800 MG: 400 CAPSULE, DELAYED RELEASE ORAL at 08:23

## 2023-07-11 RX ADMIN — METHYLPREDNISOLONE SODIUM SUCCINATE 20 MG: 40 INJECTION, POWDER, FOR SOLUTION INTRAMUSCULAR; INTRAVENOUS at 14:48

## 2023-07-11 RX ADMIN — METHYLPREDNISOLONE SODIUM SUCCINATE 20 MG: 40 INJECTION, POWDER, FOR SOLUTION INTRAMUSCULAR; INTRAVENOUS at 05:50

## 2023-07-11 RX ADMIN — MORPHINE SULFATE 1 MG: 2 INJECTION, SOLUTION INTRAMUSCULAR; INTRAVENOUS at 05:50

## 2023-07-11 RX ADMIN — MORPHINE SULFATE 1 MG: 2 INJECTION, SOLUTION INTRAMUSCULAR; INTRAVENOUS at 14:51

## 2023-07-11 RX ADMIN — PANTOPRAZOLE SODIUM 40 MG: 40 TABLET, DELAYED RELEASE ORAL at 06:00

## 2023-07-11 RX ADMIN — POTASSIUM CHLORIDE 40 MEQ: 1500 TABLET, EXTENDED RELEASE ORAL at 09:25

## 2023-07-11 NOTE — PROGRESS NOTES
Progress Note- Jon Adhikari 59 y.o. female MRN: [id number]    Unit/Bed#: [facility] [place] Encounter: [id number]      Assessment and Plan:    1. Ulcerative colitis: Pt presented with abdominal pain/bloody diarrhea with CT suggesting inflammation from the transverse colon to the rectum. Labs remarkable for severe anemia with Hgb 5.9 and CRP mildly elevated at 5. Enteric, C.diff negative. She has adamantly refused biologic therapy in the past due to concerns regarding side effects and hasn't followed up with GI since 2019. Reports when she dropped to Prednisone 10mg outpatient her symptoms worsened. Colonoscopy performed yesterday showed colitis from the descending colon to the rectum consistent with UC. 2 medium hemorrhoids. 1 polyp removed. She denies any diarrhea today.    -Follow up pathology results  -Continue low fiber diet  -Follow up with Dr. Karina Matthew outpatient to start Humira. Hep B and TB testing negative.  -Ok to transition to Prednisone 40 mg daily tomorrow and taper by 5 mg weekly  -Continue mesalamine  -Repeat colonoscopy in 1 year due to IBD, inadequate prep        2. Dysphagia: Secondary to intrinsic stricture in the upper third of the esophagus status post dilation yesterday during EGD. Small type I hiatal hernia and mild gastritis also noted and biopsied. Patient reports throat is slightly sore however dysphagia has improved. -Diet as tolerated  -Await pathology results  -Continue pantoprazole daily    ______________________________________________________________________    Subjective:     Pt sitting in bed with son at bedside. Denies any diarrhea or rectal bleeding. Tolerating diet.  Has some throat discomfort but swallowing is improved    Medication Administration - last 24 hours from 07/10/2023 1421 to 07/11/2023 1421       Date/Time Order Dose Route Action Action by     46/63/0498 1364 EDT folic acid (FOLVITE) tablet 1 mg 1 mg Oral Given Stephani Dodson     07/11/2023 7851 EDT mesalamine (DELZICOL) delayed release capsule 800 mg 800 mg Oral Given Stephanigladys Dodson     07/10/2023 2136 EDT mesalamine (DELZICOL) delayed release capsule 800 mg 800 mg Oral Given Kika Malik RN     07/10/2023 1503 EDT mesalamine (DELZICOL) delayed release capsule 800 mg 800 mg Oral Given Roc Faust RN     07/10/2023 1453 EDT phenol (CHLORASEPTIC) 1.4 % mucosal liquid 1 spray 1 spray Mouth/Throat Given Roc Faust, TYLER     07/11/2023 0550 EDT morphine injection 1 mg 1 mg Intravenous Given Kika Malik, TYLER     07/10/2023 2132 EDT morphine injection 1 mg 1 mg Intravenous Given Kika Malik RN     07/10/2023 1453 EDT morphine injection 1 mg 1 mg Intravenous Given Roc Faust RN     07/11/2023 0550 EDT methylPREDNISolone sodium succinate (Solu-MEDROL) injection 20 mg 20 mg Intravenous Given Kika Malik RN     07/10/2023 2135 EDT methylPREDNISolone sodium succinate (Solu-MEDROL) injection 20 mg 20 mg Intravenous Given Kika Malik RN     07/10/2023 1503 EDT methylPREDNISolone sodium succinate (Solu-MEDROL) injection 20 mg 20 mg Intravenous Given Roc Faust RN     07/11/2023 0600 EDT pantoprazole (PROTONIX) EC tablet 40 mg 40 mg Oral Given Kika Malik RN     07/10/2023 1503 EDT pantoprazole (PROTONIX) EC tablet 40 mg 40 mg Oral Given Roc Faust RN     07/11/2023 0600 EDT sucralfate (CARAFATE) tablet 1 g 1 g Oral Given Kika Malik RN     07/10/2023 2135 EDT sucralfate (CARAFATE) tablet 1 g 1 g Oral Given Kika Malik RN     07/10/2023 1503 EDT sucralfate (CARAFATE) tablet 1 g 1 g Oral Given Roc Faust RN     07/10/2023 2136 EDT hydrOXYzine HCL (ATARAX) tablet 25 mg 25 mg Oral Given Kika Malik RN     07/11/2023 0944 EDT lisinopril (ZESTRIL) tablet 40 mg 40 mg Oral Given Stephani Dodson     07/10/2023 1645 EDT multi-electrolyte (PLASMALYTE-A/ISOLYTE-S PH 7.4) IV solution 0 mL/hr Intravenous Stopped Augustus Prater, RN     07/11/2023 1020 EDT hydrALAZINE (APRESOLINE) injection 5 mg 5 mg Intravenous Given Stephani Rosata     07/11/2023 0313 EDT hydrALAZINE (APRESOLINE) injection 5 mg 5 mg Intravenous Given Patel Comment, RN     07/10/2023 1839 EDT hydrALAZINE (APRESOLINE) injection 5 mg 5 mg Intravenous Given Earla Labrador, RN     07/11/2023 6614 EDT lidocaine (LIDODERM) 5 % patch 1 patch 1 patch Topical Medication Applied Stephani Rosata     07/11/2023 0600 EDT lidocaine (LIDODERM) 5 % patch 1 patch 1 patch Topical Patch Removed Patel Comment, RN     07/10/2023 1840 EDT lidocaine (LIDODERM) 5 % patch 1 patch 1 patch Topical Medication Applied Earla Labrador, RN     07/10/2023 2317 EDT zolpidem (AMBIEN) tablet 5 mg 5 mg Oral Given Amanda Comment, RN     07/11/2023 9795 EDT amLODIPine (NORVASC) tablet 5 mg 5 mg Oral Given Stephani Rosata     07/11/2023 0925 EDT potassium chloride (K-DUR,KLOR-CON) CR tablet 40 mEq 40 mEq Oral Given Stephani Rosata          Objective:     Vitals: Blood pressure 154/78, pulse 59, temperature (!) 97.3 °F (36.3 °C), temperature source Oral, resp. rate 17, height 5' 5" (1.651 m), weight 49.7 kg (109 lb 9.6 oz), SpO2 99 %. ,Body mass index is 18.24 kg/m². No intake or output data in the 24 hours ending 07/11/23 1421    Physical Exam:   General Appearance: Awake and alert, in no acute distress  Chest: clear to auscultation, no rales or rhonchi  Cardiac: S1 & S2 normal, no murmur or gallop  Abdomen: Soft, non-tender, non-distended; bowel sounds normal; no masses or no organomegaly    Invasive Devices     Peripheral Intravenous Line  Duration           Peripheral IV 07/10/23 Dorsal (posterior); Right Forearm <1 day                Lab Results:  No results displayed because visit has over 200 results. Imaging Studies: I have personally reviewed pertinent imaging studies.

## 2023-07-11 NOTE — DISCHARGE INSTR - AVS FIRST PAGE
Follow-up with [facility] family practice within 1 weeks    Follow-up with Dr. Hilary Cade, gastroenterologist, outpatient for ulcerative colitis. Complete the prednisone taper: Starting 40 mg daily x7 days and decrease by 5 mg every week    Continue low fiber diet and Balsalazide    Will need to repeat colonoscopy in 1 year. Monitor your BP daily, and bring the log to [facility]. For BP, take quinapril 40 mg and amlodipine 5 mg until being assessed by her primary at [facility]. Follow-up with cardiology for low heart rate. Will likely need Holter monitoring.

## 2023-07-11 NOTE — CASE MANAGEMENT
Case Management Assessment & Discharge Planning Note    Patient name Yuridia Shannon  Location [address] [place] [address] [address]/ Tyson Riley MRN [id number]  : 1965 Date 2023       Current Admission Date: 2023  Current Admission Diagnosis:Colitis   Patient Active Problem List    Diagnosis Date Noted   • Sinus bradycardia 07/10/2023   • Symptomatic anemia 2023   • Colitis 2023   • Screening mammogram, encounter for 2020   • Breast cancer screening by mammogram 2019   • Need for diphtheria-tetanus-pertussis (Tdap) vaccine 2019   • Hematuria 2019   • Encounter for immunization 2019   • Low back pain without sciatica 2019   • History of ulcerative colitis 2019   • Seborrheic keratoses 08/15/2019   • Pain in both feet 2019   • Tinea pedis of both feet 2019   • Onychomycosis 2019   • Paronychia of toenail 2019   • Onychia of toe of right foot 2019   • Epigastric pain 2019   • Gastroesophageal reflux disease without esophagitis 2019   • Underweight 2019   • Paronychia of great toe, right 2019   • Dry eyes 11/15/2018   • Dry mouth 11/15/2018   • Dermatitis due to unknown cause 11/15/2018   • Skin lesion, superficial 2018   • Fungal dermatosis 2018   • Other viral warts 2018   • Essential hypertension 2018   • Atypical pigmented skin lesion 2018   • Ulcerative colitis without complications ([address])    • External hemorrhoids 2018   • Yeast infection 2018   • Pansinusitis 2018   • Ulcerative colitis with rectal bleeding ([address])    • Uncomplicated opioid dependence ([address]) 2017   • Pelvic pain in female 2017   • Lipoma of abdominal wall 2017   • Shingles 2016   • Pharyngoesophageal dysphagia 2016   • Osteopenia 2016   • Pyelonephritis 2015   • Pancreatic cyst 2015   • Fibroid, uterine 2015   • Oral thrush 08/07/2014   • 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, [address] [place] [address]  Phone: [phone] Fax: [phone]    [facility] - [place], [address] [address] [address]  Phone: [phone] Fax: [phone]    Primary Care Provider: Patricio Shirley DO    Primary Insurance: MEDICARE  Secondary Insurance: [employer]    ASSESSMENT:  Skip Proxies    There are no active Health Care Proxies on file. Readmission Root Cause  30 Day Readmission: No    Patient Information  Admitted from[de-identified] Home  Mental Status: Alert  During Assessment patient was accompanied by:  Son  Assessment information provided by[de-identified] Patient  Primary Caregiver: Self  Support Systems: Self, Gopi Sahu of Residence: [address] [place] [address] do you live in?: [place]  Living Arrangements: Lives w/ Son    Activities of Daily Living Prior to Admission  Functional Status: Independent  Completes ADLs independently?: Yes  Ambulates independently?: Yes  Does the patient have a history of Short-Term Rehab?: No  Does patient have a history of [facility]?: No  Does patient currently have [facility]?: No     Patient Information Continued  Does patient have prescription coverage?: Yes  Within the past 12 months, you worried that your food would run out before you got the money to buy more.: Never true  Within the past 12 months, the food you bought just didn't last and you didn't have money to get more.: Never true  Food insecurity resource given?: N/A  Does patient receive dialysis treatments?: No     Means of Transportation  Means of Transport to Appts[de-identified] Drives Self  In the past 12 months, has lack of transportation kept you from medical appointments or from getting medications?: No  In the past 12 months, has lack of transportation kept you from meetings, work, or from getting things needed for daily living?: No  Was application for public transport provided?: N/A    DISCHARGE DETAILS:    [address]         Is the patient interested in [address] [facility] at discharge?: No     Other Referral/Resources/Interventions Provided:  Interventions: None Indicated  Referral Comments: Cm met with patient and son at bedside, introduced self, role, complete assessment and discuss discharge needs if any. Patient stated she lives with her son and did state that she was upset and stressed due her 's behavior and not caring for their son while she is in hospital. CM asked if CM could be of any help and if she needed any assistance. Patient denied and stated she will be fine, and just needs to get home. She also complained of having upper abdominal pain. RN Ra Farias made aware of the same via TT. Patient stater her car is in the parking lot and will be driving home at discharge. No CM needs indicated at this time. Transport at Discharge : Self      ETA of Transport (Date): 07/11/23      IMM Given (Date):: 07/11/23 (IMM reviewed with patient. Patient verbalized understanding of IMM and agreeable with discharge determination.  Patient's signature obtained on IMM and a copy provided to patient and a copy placed in scan bin for chart.)  IMM Given to[de-identified] Patient

## 2023-07-11 NOTE — PLAN OF CARE
Problem: PAIN - ADULT  Goal: Verbalizes/displays adequate comfort level or baseline comfort level  Description: Interventions:  - Encourage patient to monitor pain and request assistance  - Assess pain using appropriate pain scale  - Administer analgesics based on type and severity of pain and evaluate response  - Implement non-pharmacological measures as appropriate and evaluate response  - Consider cultural and social influences on pain and pain management  - Notify physician/advanced practitioner if interventions unsuccessful or patient reports new pain  Outcome: Progressing     Problem: INFECTION - ADULT  Goal: Absence or prevention of progression during hospitalization  Description: INTERVENTIONS:  - Assess and monitor for signs and symptoms of infection  - Monitor lab/diagnostic results  - Monitor all insertion sites, i.e. indwelling lines, tubes, and drains  - Monitor endotracheal if appropriate and nasal secretions for changes in amount and color  - Arnold appropriate cooling/warming therapies per order  - Administer medications as ordered  - Instruct and encourage patient and family to use good hand hygiene technique  - Identify and instruct in appropriate isolation precautions for identified infection/condition  Outcome: Progressing  Goal: Absence of fever/infection during neutropenic period  Description: INTERVENTIONS:  - Monitor WBC    Outcome: Progressing     Problem: DISCHARGE PLANNING  Goal: Discharge to home or other facility with appropriate resources  Description: INTERVENTIONS:  - Identify barriers to discharge w/patient and caregiver  - Arrange for needed discharge resources and transportation as appropriate  - Identify discharge learning needs (meds, wound care, etc.)  - Arrange for interpretive services to assist at discharge as needed  - Refer to Case Management Department for coordinating discharge planning if the patient needs post-hospital services based on physician/advanced practitioner order or complex needs related to functional status, cognitive ability, or social support system  Outcome: Progressing     Problem: Knowledge Deficit  Goal: Patient/family/caregiver demonstrates understanding of disease process, treatment plan, medications, and discharge instructions  Description: Complete learning assessment and assess knowledge base.   Interventions:  - Provide teaching at level of understanding  - Provide teaching via preferred learning methods  Outcome: Progressing     Problem: GASTROINTESTINAL - ADULT  Goal: Minimal or absence of nausea and/or vomiting  Description: INTERVENTIONS:  - Administer IV fluids if ordered to ensure adequate hydration  - Maintain NPO status until nausea and vomiting are resolved  - Nasogastric tube if ordered  - Administer ordered antiemetic medications as needed  - Provide nonpharmacologic comfort measures as appropriate  - Advance diet as tolerated, if ordered  - Consider nutrition services referral to assist patient with adequate nutrition and appropriate food choices  Outcome: Progressing  Goal: Maintains or returns to baseline bowel function  Description: INTERVENTIONS:  - Assess bowel function  - Encourage oral fluids to ensure adequate hydration  - Administer IV fluids if ordered to ensure adequate hydration  - Administer ordered medications as needed  - Encourage mobilization and activity  - Consider nutritional services referral to assist patient with adequate nutrition and appropriate food choices  Outcome: Progressing  Goal: Maintains adequate nutritional intake  Description: INTERVENTIONS:  - Monitor percentage of each meal consumed  - Identify factors contributing to decreased intake, treat as appropriate  - Assist with meals as needed  - Monitor I&O, weight, and lab values if indicated  - Obtain nutrition services referral as needed  Outcome: Progressing  Goal: Oral mucous membranes remain intact  Description: INTERVENTIONS  - Assess oral mucosa and hygiene practices  - Implement preventative oral hygiene regimen  - Implement oral medicated treatments as ordered  - Initiate Nutrition services referral as needed  Outcome: Progressing     Problem: METABOLIC, FLUID AND ELECTROLYTES - ADULT  Goal: Electrolytes maintained within normal limits  Description: INTERVENTIONS:  - Monitor labs and assess patient for signs and symptoms of electrolyte imbalances  - Administer electrolyte replacement as ordered  - Monitor response to electrolyte replacements, including repeat lab results as appropriate  - Instruct patient on fluid and nutrition as appropriate  Outcome: Progressing  Goal: Fluid balance maintained  Description: INTERVENTIONS:  - Monitor labs   - Monitor I/O and WT  - Instruct patient on fluid and nutrition as appropriate  - Assess for signs & symptoms of volume excess or deficit  Outcome: Progressing     Problem: HEMATOLOGIC - ADULT  Goal: Maintains hematologic stability  Description: INTERVENTIONS  - Assess for signs and symptoms of bleeding or hemorrhage  - Monitor labs  - Administer supportive blood products/factors as ordered and appropriate  Outcome: Progressing     Problem: Nutrition/Hydration-ADULT  Goal: Nutrient/Hydration intake appropriate for improving, restoring or maintaining nutritional needs  Description: Monitor and assess patient's nutrition/hydration status for malnutrition. Collaborate with interdisciplinary team and initiate plan and interventions as ordered. Monitor patient's weight and dietary intake as ordered or per policy. Utilize nutrition screening tool and intervene as necessary. Determine patient's food preferences and provide high-protein, high-caloric foods as appropriate.      INTERVENTIONS:  - Monitor oral intake, urinary output, labs, and treatment plans  - Assess nutrition and hydration status and recommend course of action  - Evaluate amount of meals eaten  - Assist patient with eating if necessary   - Allow adequate time for meals  - Recommend/ encourage appropriate diets, oral nutritional supplements, and vitamin/mineral supplements  - Order, calculate, and assess calorie counts as needed  - Recommend, monitor, and adjust tube feedings and TPN/PPN based on assessed needs  - Assess need for intravenous fluids  - Provide specific nutrition/hydration education as appropriate  - Include patient/family/caregiver in decisions related to nutrition  Outcome: Progressing

## 2023-07-11 NOTE — NURSING NOTE
Pt discharged to home. Discharge instructions discussed with patient. Pt verbalized understanding. No c/o pain or discomfort. IV discontinued from arm, pressure dressing in place. Pt stable at time of discharge.

## 2023-07-12 ENCOUNTER — TRANSITIONAL CARE MANAGEMENT (OUTPATIENT)
Dept: FAMILY MEDICINE CLINIC | Facility: CLINIC | Age: 58
End: 2023-07-12

## 2023-07-12 ENCOUNTER — PATIENT OUTREACH (OUTPATIENT)
Dept: FAMILY MEDICINE CLINIC | Facility: CLINIC | Age: 58
End: 2023-07-12

## 2023-07-12 ENCOUNTER — OFFICE VISIT (OUTPATIENT)
Dept: FAMILY MEDICINE CLINIC | Facility: CLINIC | Age: 58
End: 2023-07-12
Payer: MEDICARE

## 2023-07-12 VITALS
OXYGEN SATURATION: 100 % | HEART RATE: 73 BPM | HEIGHT: 65 IN | SYSTOLIC BLOOD PRESSURE: 185 MMHG | RESPIRATION RATE: 16 BRPM | DIASTOLIC BLOOD PRESSURE: 91 MMHG | TEMPERATURE: 97.4 F | BODY MASS INDEX: 18.16 KG/M2 | WEIGHT: 109 LBS

## 2023-07-12 DIAGNOSIS — F41.8 ANXIETY ABOUT HEALTH: ICD-10-CM

## 2023-07-12 DIAGNOSIS — I10 ESSENTIAL HYPERTENSION: ICD-10-CM

## 2023-07-12 DIAGNOSIS — I10 ESSENTIAL HYPERTENSION: Primary | ICD-10-CM

## 2023-07-12 DIAGNOSIS — S21.209A: ICD-10-CM

## 2023-07-12 DIAGNOSIS — K51.919 ULCERATIVE COLITIS WITH COMPLICATION, UNSPECIFIED LOCATION (HCC): ICD-10-CM

## 2023-07-12 PROBLEM — E44.1 MILD PROTEIN-CALORIE MALNUTRITION (HCC): Status: ACTIVE | Noted: 2023-07-12

## 2023-07-12 PROCEDURE — 99214 OFFICE O/P EST MOD 30 MIN: CPT | Performed by: NURSE PRACTITIONER

## 2023-07-12 PROCEDURE — 88305 TISSUE EXAM BY PATHOLOGIST: CPT | Performed by: PATHOLOGY

## 2023-07-12 PROCEDURE — 88341 IMHCHEM/IMCYTCHM EA ADD ANTB: CPT | Performed by: PATHOLOGY

## 2023-07-12 PROCEDURE — 88342 IMHCHEM/IMCYTCHM 1ST ANTB: CPT | Performed by: PATHOLOGY

## 2023-07-12 RX ORDER — HYDROXYZINE HYDROCHLORIDE 10 MG/1
10 TABLET, FILM COATED ORAL EVERY 6 HOURS PRN
Qty: 30 TABLET | Refills: 0 | Status: SHIPPED | OUTPATIENT
Start: 2023-07-12

## 2023-07-12 NOTE — PROGRESS NOTES
Name: Jayla Muhammad      : 1965      MRN: [id number]  Encounter Provider: jayneKAROLINA Ball  Encounter Date: 2023   Encounter department: [address]     1. Essential hypertension       -      Continue to have BP monitored       -continue BP medication    2. Anxiety about health  -     hydrOXYzine HCL (ATARAX) 10 mg tablet; Take 1 tablet (10 mg total) by mouth every 6 (six) hours as needed for itching    3. Wound of back, initial encounter  -     Ambulatory Referral to Wound Care; Future    4. Ulcerative colitis with complication, unspecified location ([address] )        -continue dc medications from hospital        - report to office any fever, rectal bleed or acute abdominal pain      BMI Counseling: Body mass index is 18.14 kg/m². Follow-up plan was not completed due to patient being in urgent or emergent medical situation. Patient too anxious to discuss        Subjective      Patient presented today for BP follow up. She was discharged yesterday from the hospital and started amlodipine today for an elevated BP. She is on quinapril at home which she continues to take as well. She continues on a prednisone taper that she was discharged on after treatment for colitis. Her BP is elevated but she is very anxious so reading not accurate. Optifoam removed from her back where she stated she has a rash and it is a wound on her spine. Referred to [facility]. She immediately became concenred the wound was cancerous and I tried to assuage her fears on that. She states the wound is chronic. And usually dry and she picks at it. Danielle Holbrook RN spoke to patient regarding financial concerns while in office. Review of Systems   Constitutional: Negative. HENT: Negative. Eyes: Negative. Respiratory: Negative. Cardiovascular: Negative. Gastrointestinal: Negative. Endocrine: Negative. Genitourinary: Negative. Musculoskeletal: Negative.     Skin: Positive for rash. Allergic/Immunologic: Negative. Neurological: Negative. Hematological: Negative. Psychiatric/Behavioral:        Anxiety       Current Outpatient Medications on File Prior to Visit   Medication Sig   • amLODIPine (NORVASC) 5 mg tablet Take 1 tablet (5 mg total) by mouth daily Do not start before July 12, 2023. • balsalazide (COLAZAL) 750 mg capsule Take 3 capsules (2,250 mg total) by mouth 2 (two) times a day   • folic acid (FOLVITE) 1 mg tablet Take 1 mg by mouth daily. • omeprazole (PriLOSEC) 40 MG capsule Take 1 capsule (40 mg total) by mouth daily   • predniSONE 5 mg tablet Take 8 tablets (40 mg total) by mouth daily for 7 days, THEN 7 tablets (35 mg total) daily for 7 days, THEN 6 tablets (30 mg total) daily for 7 days, THEN 5 tablets (25 mg total) daily for 7 days, THEN 4 tablets (20 mg total) daily for 7 days, THEN 3 tablets (15 mg total) daily for 7 days, THEN 3 tablets (15 mg total) daily for 7 days, THEN 2 tablets (10 mg total) daily for 7 days, THEN 1 tablet (5 mg total) daily for 7 days. Do not start before July 12, 2023. • quinapril (ACCUPRIL) 20 mg tablet Take 2 tablets (40 mg total) by mouth daily   • [DISCONTINUED] Blood Pressure Monitoring (Blood Pressure Monitor/S Cuff) MISC Use in the morning Substitute with any available cuff covered by the insurance (Patient not taking: Reported on 7/12/2023)       Objective     BP (!) 185/91 (BP Location: Left arm, Patient Position: Sitting, Cuff Size: Standard)   Pulse 73   Temp (!) 97.4 °F (36.3 °C) (Tympanic)   Resp 16   Ht 5' 5" (1.651 m)   Wt 49.4 kg (109 lb)   SpO2 100%   BMI 18.14 kg/m²     Physical Exam  Constitutional:       General: She is not in acute distress. Appearance: Normal appearance. She is not ill-appearing, toxic-appearing or diaphoretic. HENT:      Head: Normocephalic and atraumatic.       Right Ear: External ear normal.      Left Ear: External ear normal.      Nose: Nose normal. Mouth/Throat:      Mouth: Mucous membranes are moist.   Eyes:      General:         Right eye: No discharge. Left eye: No discharge. Conjunctiva/sclera: Conjunctivae normal.   Cardiovascular:      Rate and Rhythm: Normal rate and regular rhythm. Heart sounds: Normal heart sounds. Pulmonary:      Effort: Pulmonary effort is normal. No respiratory distress. Breath sounds: Normal breath sounds. No wheezing, rhonchi or rales. Abdominal:      General: Abdomen is flat. Bowel sounds are normal.      Palpations: Abdomen is soft. Tenderness: There is no guarding. Musculoskeletal:         General: Normal range of motion. Cervical back: Normal range of motion. No rigidity. Right lower leg: No edema. Left lower leg: No edema. Skin:     General: Skin is warm and dry. Findings: Erythema and wound present. No lesion or rash. Comments: Skin lesion on mid spine with some slough-stated been there over a year   Neurological:      General: No focal deficit present. Mental Status: She is alert and oriented to person, place, and time.       Gait: Gait normal.   Psychiatric:         Behavior: Behavior normal.         Judgment: Judgment normal.      Comments: Very anxious regarding blood pressure       Tuesday KAROLINA Rousseau

## 2023-07-13 ENCOUNTER — TELEPHONE (OUTPATIENT)
Dept: GASTROENTEROLOGY | Facility: CLINIC | Age: 58
End: 2023-07-13

## 2023-07-13 ENCOUNTER — CLINICAL SUPPORT (OUTPATIENT)
Dept: FAMILY MEDICINE CLINIC | Facility: CLINIC | Age: 58
End: 2023-07-13

## 2023-07-13 VITALS — OXYGEN SATURATION: 100 % | HEART RATE: 70 BPM | SYSTOLIC BLOOD PRESSURE: 149 MMHG | DIASTOLIC BLOOD PRESSURE: 75 MMHG

## 2023-07-13 DIAGNOSIS — K51.511 LEFT SIDED ULCERATIVE COLITIS WITH RECTAL BLEEDING (HCC): Primary | ICD-10-CM

## 2023-07-13 DIAGNOSIS — I10 ESSENTIAL HYPERTENSION: Primary | ICD-10-CM

## 2023-07-13 PROBLEM — K51.919 ULCERATIVE COLITIS WITH COMPLICATION (HCC): Status: ACTIVE | Noted: 2018-06-08

## 2023-07-13 PROBLEM — R45.89 ANXIETY ABOUT HEALTH: Status: ACTIVE | Noted: 2023-07-13

## 2023-07-13 PROBLEM — F41.8 ANXIETY ABOUT HEALTH: Status: ACTIVE | Noted: 2023-07-13

## 2023-07-13 PROBLEM — S21.209A: Status: ACTIVE | Noted: 2023-07-13

## 2023-07-13 RX ORDER — QUINAPRIL 20 MG/1
40 TABLET ORAL DAILY
Qty: 60 TABLET | Refills: 3 | Status: SHIPPED | OUTPATIENT
Start: 2023-07-13 | End: 2023-07-14

## 2023-07-13 NOTE — TELEPHONE ENCOUNTER
From: KAROLINA Villa   Sent: 7/12/2023   5:24 PM EDT   To: Tej Lowery MD; Armando Quezada   Subject: Jennyfer                                           Pt with UC w/ recent admission at [facility]. She was discharged yesterday on a prednisone taper. Please start auth for Humira. She should follow up with Dr. Rina Prajapati in the office in 2-4 weeks. Thank you!

## 2023-07-14 ENCOUNTER — TELEPHONE (OUTPATIENT)
Age: 58
End: 2023-07-14

## 2023-07-14 ENCOUNTER — OFFICE VISIT (OUTPATIENT)
Dept: FAMILY MEDICINE CLINIC | Facility: CLINIC | Age: 58
End: 2023-07-14
Payer: MEDICARE

## 2023-07-14 VITALS
RESPIRATION RATE: 16 BRPM | HEIGHT: 65 IN | OXYGEN SATURATION: 98 % | SYSTOLIC BLOOD PRESSURE: 148 MMHG | HEART RATE: 70 BPM | BODY MASS INDEX: 17.66 KG/M2 | TEMPERATURE: 97.9 F | WEIGHT: 106 LBS | DIASTOLIC BLOOD PRESSURE: 72 MMHG

## 2023-07-14 DIAGNOSIS — I10 ESSENTIAL HYPERTENSION: ICD-10-CM

## 2023-07-14 DIAGNOSIS — Z76.89 ENCOUNTER FOR SUPPORT AND COORDINATION OF TRANSITION OF CARE: Primary | ICD-10-CM

## 2023-07-14 DIAGNOSIS — K51.911 ULCERATIVE COLITIS WITH RECTAL BLEEDING, UNSPECIFIED LOCATION (HCC): ICD-10-CM

## 2023-07-14 DIAGNOSIS — M85.89 OSTEOPENIA OF MULTIPLE SITES: ICD-10-CM

## 2023-07-14 PROCEDURE — 99495 TRANSJ CARE MGMT MOD F2F 14D: CPT | Performed by: FAMILY MEDICINE

## 2023-07-14 RX ORDER — BALSALAZIDE DISODIUM 750 MG/1
2250 CAPSULE ORAL 2 TIMES DAILY
Qty: 150 CAPSULE | Refills: 0 | Status: CANCELLED | OUTPATIENT
Start: 2023-07-14

## 2023-07-14 RX ORDER — LOSARTAN POTASSIUM 50 MG/1
50 TABLET ORAL DAILY
Qty: 90 TABLET | Refills: 0 | Status: SHIPPED | OUTPATIENT
Start: 2023-07-14 | End: 2023-07-28

## 2023-07-14 RX ORDER — BLOOD PRESSURE TEST KIT
KIT MISCELLANEOUS 2 TIMES DAILY
Qty: 1 KIT | Refills: 0 | Status: SHIPPED | OUTPATIENT
Start: 2023-07-14 | End: 2023-07-28

## 2023-07-14 RX ORDER — BALSALAZIDE DISODIUM 750 MG/1
2250 CAPSULE ORAL 2 TIMES DAILY
Qty: 180 CAPSULE | Refills: 2 | Status: SHIPPED | OUTPATIENT
Start: 2023-07-14 | End: 2023-10-12

## 2023-07-14 NOTE — PROGRESS NOTES
Transition of Care Follow-up After Hospitalization  [facility]  Assessment/Plan:     1. Encounter for support and coordination of transition of care    2. Ulcerative colitis with rectal bleeding, unspecified location (HCC)  -     balsalazide (COLAZAL) 750 mg capsule; Take 3 capsules (2,250 mg total) by mouth 2 (two) times a day  -     CBC and differential; Future; Expected date: 07/28/2023    3. Essential hypertension  -     losartan (COZAAR) 50 mg tablet; Take 1 tablet (50 mg total) by mouth daily  -     Blood Pressure Monitor KIT; Use 2 (two) times a day    4. Osteopenia of multiple sites  -     DXA body comp analysis; Future; Expected date: 07/14/2023          Return in about 2 weeks (around 7/28/2023) for Recheck, f/u on blood and b/p. HPI:    Lolita Chairez is a 59 y.o. female that presents to clinic for follow-up on recent hospitalization. Patient was recently hospitalized for 2 weeks of bloody diarrhea and anemia requiring 2 units transfused. Patient also received colonoscopy and EGD while admitted. Since discharge patient states that she is feeling okay without any weakness, dizziness, lightheadedness, paleness, chest pain, palpitations, shortness of breath, fever, chills, nausea, vomiting. As far as patient's blood pressure, patient's blood pressure has been not well controlled however improving. Patient was on ACE inhibitor with a reoccurring cough however reported the prescription is no longer formulary or available at her pharmacy. Patient reports she is willing to trial new medication. As far as patient's ulcerative colitis patient is following up with GI and has a appointment scheduled. Patient is currently on prednisone taper and will talk with GI for further management including immunotherapy/biological medications. Patient denies any repeat bleeding and stools have returned to normal however still loose.     For patient's depression and anxiety patient reported she is using the Atarax as prescribed. Patient reported she is still having baseline anxiety and depression however is unwilling at this time to take a long-acting/daily medication such as SSRI    Of note patient also has a chronic closed wound on patient's back. Patient has a dermatology referral on prior office visit. Patient denies any shortness of breath, palpitations, chest pain, nausea, vomiting, fever or chills. The following portions of the patient's history were reviewed and updated as appropriate: allergies, current medications, past family history, past medical history, past social history, past surgical history and problem list.     Review of Systems  Please see HPI for ROS. Physical Exam:  /72   Pulse 70   Temp 97.9 °F (36.6 °C) (Tympanic)   Resp 16   Ht 5' 5" (1.651 m)   Wt 48.1 kg (106 lb)   SpO2 98%   BMI 17.64 kg/m²      Physical Exam  Constitutional:       General: She is not in acute distress. Appearance: Normal appearance. She is not ill-appearing, toxic-appearing or diaphoretic. HENT:      Head: Normocephalic and atraumatic. Right Ear: External ear normal.      Left Ear: External ear normal.      Nose: Nose normal.      Mouth/Throat:      Mouth: Mucous membranes are moist.   Eyes:      General:         Right eye: No discharge. Left eye: No discharge. Conjunctiva/sclera: Conjunctivae normal.   Cardiovascular:      Rate and Rhythm: Normal rate and regular rhythm. Heart sounds: Normal heart sounds. Pulmonary:      Effort: Pulmonary effort is normal. No respiratory distress. Breath sounds: Normal breath sounds. No wheezing, rhonchi or rales. Abdominal:      General: Abdomen is flat. Bowel sounds are normal.      Palpations: Abdomen is soft. Tenderness: There is no guarding. Musculoskeletal:         General: Normal range of motion. Cervical back: Normal range of motion. No rigidity. Right lower leg: No edema.       Left lower leg: No edema. Skin:     General: Skin is warm and dry. Findings: Erythema and wound present. No lesion or rash. Comments: Mid spine   Neurological:      General: No focal deficit present. Mental Status: She is alert and oriented to person, place, and time. Gait: Gait normal.   Psychiatric:         Behavior: Behavior normal.         Judgment: Judgment normal.      Comments: Notably anxious regarding blood pressure          Labs/Images: I have reviewed pertinent labs and images completed during the hospital stay    TCM Call     Date and time call was made  7/12/2023  1:28 PM    Hospital care reviewed  Records reviewed; Other diagnostic studies pending    Patient was hospitialized at  [address] [place] [address]    Date of Admission  07/06/23    Date of discharge  07/11/23    Diagnosis  Colitis    Disposition  Home    Were the patients medications reviewed and updated  Yes    Current Symptoms  Chest pain    Chest pain severity  Moderate    Chest pain onset  Ongoing      TCM Call     Post hospital issues  None    Should patient be enrolled in anticoag monitoring? No    Scheduled for follow up? Yes    Did you obtain your prescribed medications  Yes    Do you need help managing your prescriptions or medications  No    Is transportation to your appointment needed  No    I have advised the patient to call PCP with any new or worsening symptoms  Bryan Lozoya[address]   None; Family    Are you recieving any outpatient services  No    Are you recieving home care services  No    Are you using any community resources  No    Current waiver services  No    Have you fallen in the last 12 months  No    Interperter language line needed  No    Counseling  Patient    Counseling topics  Diagnostic results; Activities of daily living; instructions for management; patient and family education        Hospital records were reviewed.  Medications upon discharge reviewed/updated.    Discharge Disposition: Stable  Follow up visits with other specialists: GI, Derm, Cardio       ** Please Note: This note has been constructed using a voice recognition system **     Óscar Deluna MD  07/15/23  12:06 AM

## 2023-07-14 NOTE — TELEPHONE ENCOUNTER
Patients GI provider:  Dr. Chico Odell    Number to return call: [phone]    Reason for call: Pt calling to schedule with Dr. Chcio Odell. Pt was in the ED and was told to schedule an OV with doc in the next 2 to 4 weeks, however, my earliest with him is not until 08.30.23. I booked that and added her to the wait list but Pt requested I also reach out to the office as she is concerned because she was told this must be scheduled sooner.    Please call at above number to advise    Scheduled procedure/appointment date if applicable: [phone]

## 2023-07-18 RX ORDER — ADALIMUMAB 80MG/0.8ML
KIT SUBCUTANEOUS
Qty: 3 EACH | Refills: 0 | Status: SHIPPED | OUTPATIENT
Start: 2023-07-18

## 2023-07-18 RX ORDER — ADALIMUMAB 80MG/0.8ML
KIT SUBCUTANEOUS
Qty: 3 EACH | Refills: 0 | Status: SHIPPED | OUTPATIENT
Start: 2023-07-18 | End: 2023-07-18 | Stop reason: SDUPTHER

## 2023-07-18 RX ORDER — ADALIMUMAB 40MG/0.4ML
KIT SUBCUTANEOUS
Qty: 2 EACH | Refills: 11 | Status: SHIPPED | OUTPATIENT
Start: 2023-07-18

## 2023-07-18 NOTE — TELEPHONE ENCOUNTER
Connected with the patient via phone. Provided education on Humira. Patient is apprehensive on starting Humira and states, "I have been on Prednisone 20 years and I don't know why I have to be changed." We went over the negatives of long term steroid use. Patient was concerned about side effects. I reviewed side effects from Humira in depth. Patient feels most comfortable with waiting until her appointment to start Humira. We spoke about nurse injection training and how she can utilize the web site to get in person, virtual, or over the phone injection training. She voiced concern about unavailable BP medication but, she has about a month's supply, and how she wants to get her BP taken often due elevation. I asked her to call her PCP's office regarding BP concerns patient said that she had but reference that the PCP thought it had to due with anxiety. We went over what she is eating. I suggested to avoid trigger foods. Patient feels most comfortable to wait for next appointment with a Gastroenterologist prior to deciding to start Humira. I said that we will send the medication in, if her mind changes.         Length of phone encounter: 41 minutes

## 2023-07-18 NOTE — TELEPHONE ENCOUNTER
Would you please send in Humira loading and maintenance to [facility]. Thank you!

## 2023-07-20 DIAGNOSIS — R21 RASH OF BACK: Primary | ICD-10-CM

## 2023-07-24 ENCOUNTER — OFFICE VISIT (OUTPATIENT)
Dept: CARDIOLOGY CLINIC | Facility: CLINIC | Age: 58
End: 2023-07-24
Payer: MEDICARE

## 2023-07-24 ENCOUNTER — RA CDI HCC (OUTPATIENT)
Dept: OTHER | Facility: HOSPITAL | Age: 58
End: 2023-07-24

## 2023-07-24 VITALS
HEIGHT: 65 IN | BODY MASS INDEX: 17.49 KG/M2 | SYSTOLIC BLOOD PRESSURE: 122 MMHG | HEART RATE: 66 BPM | WEIGHT: 105 LBS | OXYGEN SATURATION: 100 % | DIASTOLIC BLOOD PRESSURE: 70 MMHG

## 2023-07-24 DIAGNOSIS — I11.9 HYPERTENSIVE HEART DISEASE WITHOUT HEART FAILURE: ICD-10-CM

## 2023-07-24 DIAGNOSIS — R00.1 SINUS BRADYCARDIA: Primary | ICD-10-CM

## 2023-07-24 DIAGNOSIS — I10 ESSENTIAL HYPERTENSION: ICD-10-CM

## 2023-07-24 DIAGNOSIS — E78.00 HYPERCHOLESTEROLEMIA: ICD-10-CM

## 2023-07-24 PROCEDURE — 93000 ELECTROCARDIOGRAM COMPLETE: CPT | Performed by: NURSE PRACTITIONER

## 2023-07-24 PROCEDURE — 99213 OFFICE O/P EST LOW 20 MIN: CPT | Performed by: NURSE PRACTITIONER

## 2023-07-24 PROCEDURE — 93246 EXT ECG>7D<15D RECORDING: CPT | Performed by: NURSE PRACTITIONER

## 2023-07-24 RX ORDER — QUINAPRIL 20 MG/1
40 TABLET ORAL DAILY
COMMUNITY
End: 2023-07-28

## 2023-07-24 NOTE — PROGRESS NOTES
Progress Note - Cardiology Office  [address] [facility]    Marion Christine 59 y.o. female MRN: [id number]  : 1965  Encounter: [id number]      Assessment:    1. Sinus bradycardia: Heart rates today noted to be in the 60s,    -   Patient does note intermittent palpitations at times    -   Currently not on AV dawit blocking medications    -   Recommend extended ambulatory monitoring in the outpatient setting to evaluate heart rate with and without activity    2. Hypertension: Blood pressure is currently stable on amlodipine and quinapril    -   Appears due to shortage of quinapril, her PCP is going to be switching her to losartan    -   Discussed with patient obtaining blood pressure cuff to monitor blood pressure at home instead of using cuff at the [facility] from quinapril to losartan    -   Check 2D echocardiogram to evaluate cardiac function, structure wall motion    3. Abnormal EKG: Inferior lateral changes seen on EKG in office today    -   Patient does admit to intermittent chest discomfort, but is been pain-free over the last 48 hours    -   We will obtain nuclear stress test at this time to evaluate for ischemia    -   Instructed patient should chest pain recur or worsen to go to the emergency room    4. Ulcerative colitis: Managed per primary team, patient is currently on high-dose steroids    1. Sinus bradycardia    2. Essential hypertension    3. Hypercholesterolemia        Discussion Summary and Plan:      Patient / Hector Call was advised and educated to call our office  immediately if  patient has any new symptoms of chest pain/shortness of breath, near-syncope, syncope, light headedness sustained palpitations  or any other cardiovascular symptoms before their scheduled follow-up appointment. Office number was provided #[phone]. Please call [phone] if any questions. Counseling :  A description of the counseling. Goals and Barriers.   Patient's ability to self care: Yes  Medication side effect reviewed with patient in detail and all their questions answered to their satisfaction. HPI :     Pattie Kwong is a 59y.o. year old female who came for follow up. She was recently admitted to the hospital with a flare of ulcerative colitis. Hospitalization she was on telemetry and she was noted to have asymptomatic bradycardia with heart rates dropping as low as 38 bpm.  She denies any lightheadedness or dizziness. She cares for her 24-year-old son who has autism and states that she is fairly active. Prior to her recent admission, she did note some shortness of breath and chest tightness when performing care. She has been attributing this to anxiety. She does have family history for coronary artery disease in her siblings, but she does note that they are heavy smokers. Twelve-lead EKG performed today demonstrates sinus rhythm at a rate of 66, patient had ST depression with T wave inversion seen in leads through V6, when compared to EKGs performed in hospital this is a significant change. Patient has never had any cardiac work-up. Review of Systems   Constitutional: Positive for activity change. Negative for fatigue and fever. HENT: Negative. Negative for congestion, facial swelling, sinus pain and tinnitus. Eyes: Negative. Negative for photophobia and visual disturbance. Respiratory: Positive for chest tightness. Negative for shortness of breath. Cardiovascular: Positive for palpitations. Negative for chest pain and leg swelling. Gastrointestinal: Negative. Negative for abdominal distention, diarrhea, nausea and vomiting. Endocrine: Negative. Negative for polydipsia, polyphagia and polyuria. Genitourinary: Negative. Negative for difficulty urinating. Musculoskeletal: Negative. Skin: Negative. Neurological: Negative. Negative for dizziness, tremors, light-headedness and headaches. Hematological: Negative. Psychiatric/Behavioral: Negative. Historical Information   Past Medical History:   Diagnosis Date   • Anxiety    • Arthritis    • Atypical chest pain     last assessed: 13   • Bloody diarrhea     last assesed:    • Breast lump     last assessed: 10/10/14   • Candidiasis of esophagus ([address])     last assessed: 16   • Candidiasis of mouth     last assessed: 14   • Choledocholithiasis     last assessed: 14   • Cholelithiasis     last assessed: 7/21/15   • Closed fracture of first metatarsal bone of right foot     last assessed: 13   • Costovertebral angle pain     last assessed: 17   • Elevated blood pressure reading     last assessed: 16   • Grief reaction     last assessed: 16   • Herpes zoster     last assessed: 11/3/16   • High risk medication use     last assessed: 16   • Hypokalemia     last assessed: 16   • Hypotension     last assessed: 5/15/15   • Inflammatory disorder of breast     last assessed: 13   • Iron deficiency anemia     last assessed: 13   • Microscopic hematuria     last assessed: 13   • On prednisone therapy     last assessed: 10/19/17   • Opioid use, unspecified, uncomplicated     last assessed: 3/1/17   • Other polyp of sinus     last assessed: 13   • Paronychia of toe     last assessed: 13   • Pharyngoesophageal dysphagia     resolved: 3/1/16   • Tinea corporis     last assessed: 16   • Ulcerative colitis ([address])    • Underweight     last assessed: 3/1/16   • Urinary frequency     resolved: 16   • Viral warts      Past Surgical History:   Procedure Laterality Date   • BREAST SURGERY      left breast lumpectomy   • CARPAL TUNNEL RELEASE Bilateral    •  SECTION N/A    • CHOLECYSTECTOMY     • COLONOSCOPY  2019   • EGD  2019   • ESOPHAGOGASTRODUODENOSCOPY N/A 3/14/2016    Procedure: ESOPHAGOGASTRODUODENOSCOPY (EGD); Surgeon: Mariela Masterson MD;  Location: [facility];   Service:    • TONSILLECTOMY N/A      Social History     Substance and Sexual Activity   Alcohol Use Never     Social History     Substance and Sexual Activity   Drug Use Never     Social History     Tobacco Use   Smoking Status Never   Smokeless Tobacco Never     Family History:   Family History   Problem Relation Age of Onset   • Heart disease Mother    • Stroke Mother    • Heart disease Father         MI   • Colon cancer Maternal Grandfather    • Asthma Son    • Autism Son    • Heart disease Brother         CAD   • Cancer Maternal Grandmother         colon   • Heart disease Brother    • COPD Brother         smoker   • No Known Problems Brother    • No Known Problems Half-Sister        Meds/Allergies     Allergies   Allergen Reactions   • Dairy Aid [Tilactase] GI Intolerance   • Levaquin [Levofloxacin In D5w] Other (See Comments)     Redness at injection site   • Mercaptopurine      Causes weakness in legs   • Mesalamine GI Intolerance   • Other Other (See Comments)     6 mp. Causes weakness in legs. Current Outpatient Medications:   •  amLODIPine (NORVASC) 5 mg tablet, Take 1 tablet (5 mg total) by mouth daily Do not start before July 12, 2023., Disp: 30 tablet, Rfl: 0  •  balsalazide (COLAZAL) 750 mg capsule, Take 3 capsules (2,250 mg total) by mouth 2 (two) times a day, Disp: 180 capsule, Rfl: 2  •  Blood Pressure Monitor KIT, Use 2 (two) times a day, Disp: 1 kit, Rfl: 0  •  folic acid (FOLVITE) 1 mg tablet, Take 1 mg by mouth daily. , Disp: , Rfl:   •  hydrOXYzine HCL (ATARAX) 10 mg tablet, Take 1 tablet (10 mg total) by mouth every 6 (six) hours as needed for itching, Disp: 30 tablet, Rfl: 0  •  omeprazole (PriLOSEC) 40 MG capsule, Take 1 capsule (40 mg total) by mouth daily, Disp: 30 capsule, Rfl: 0  •  predniSONE 5 mg tablet, Take 8 tablets (40 mg total) by mouth daily for 7 days, THEN 7 tablets (35 mg total) daily for 7 days, THEN 6 tablets (30 mg total) daily for 7 days, THEN 5 tablets (25 mg total) daily for 7 days, THEN 4 tablets (20 mg total) daily for 7 days, THEN 3 tablets (15 mg total) daily for 7 days, THEN 3 tablets (15 mg total) daily for 7 days, THEN 2 tablets (10 mg total) daily for 7 days, THEN 1 tablet (5 mg total) daily for 7 days. Do not start before July 12, 2023., Disp: 273 tablet, Rfl: 0  •  quinapril (ACCUPRIL) 20 mg tablet, Take 40 mg by mouth daily, Disp: , Rfl:   •  Adalimumab (Humira Pen) 80 MG/0.8ML PNKT, Inject 2 pens under the skin on day 1, then inject 1 pen under the skin on day 15. (Patient not taking: Reported on 7/24/2023), Disp: 3 each, Rfl: 0  •  adalimumab (Humira) 40 MG/0.4ML, Inject 40mg under skin every 14 days (Patient not taking: Reported on 7/24/2023), Disp: 2 each, Rfl: 11  •  losartan (COZAAR) 50 mg tablet, Take 1 tablet (50 mg total) by mouth daily (Patient not taking: Reported on 7/24/2023), Disp: 90 tablet, Rfl: 0    Vitals: Height 5' 5" (1.651 m), weight 47.6 kg (105 lb). Body mass index is 17.47 kg/m². Wt Readings from Last 3 Encounters:   07/24/23 47.6 kg (105 lb)   07/14/23 48.1 kg (106 lb)   07/12/23 49.4 kg (109 lb)     Vitals:    07/24/23 0935   Weight: 47.6 kg (105 lb)     BP Readings from Last 3 Encounters:   07/14/23 148/72   07/13/23 149/75   07/12/23 (!) 185/91       Physical Exam:  Physical Exam  Vitals and nursing note reviewed. Constitutional:       General: She is not in acute distress. Appearance: Normal appearance. She is normal weight. HENT:      Right Ear: External ear normal.      Left Ear: External ear normal.      Nose: Nose normal.   Eyes:      General: No scleral icterus. Right eye: No discharge. Left eye: No discharge. Cardiovascular:      Rate and Rhythm: Normal rate and regular rhythm. Pulses: Normal pulses. Heart sounds: Normal heart sounds. No murmur heard. Pulmonary:      Effort: Pulmonary effort is normal. No respiratory distress. Breath sounds: Normal breath sounds. No wheezing, rhonchi or rales. Abdominal:      General: Bowel sounds are normal. There is no distension. Palpations: Abdomen is soft. Musculoskeletal:      Right lower leg: No edema. Left lower leg: No edema. Skin:     General: Skin is warm and dry. Capillary Refill: Capillary refill takes less than 2 seconds. Neurological:      General: No focal deficit present. Mental Status: She is alert and oriented to person, place, and time. Mental status is at baseline.    Psychiatric:         Mood and Affect: Mood normal.           Diagnostic Studies Review Cardio:      EKG:  Sinus rhythm at a rate of 66, ST depression with inverted T waves seen in inferior leads which is changed when compared to previous EKGs    Lab Review   Lab Results   Component Value Date    WBC 17.41 (H) 07/11/2023    HGB 9.2 (L) 07/11/2023    HCT 31.8 (L) 07/11/2023    MCV 74 (L) 07/11/2023    RDW 32.3 (H) 07/11/2023     (H) 07/11/2023     BMP:  Lab Results   Component Value Date    SODIUM 137 07/11/2023    K 3.5 07/11/2023     07/11/2023    CO2 26 07/11/2023    ANIONGAP 17.2 01/04/2016    BUN 12 07/11/2023    CREATININE 0.75 07/11/2023    GLUC 123 07/11/2023    GLUF 69 07/07/2023    CALCIUM 8.4 07/11/2023    CORRECTEDCA 9.3 07/11/2023    EGFR 87 07/11/2023    MG 2.0 07/09/2023     Troponins:    LFT:  Lab Results   Component Value Date    AST 15 07/11/2023    ALT 12 07/11/2023    ALKPHOS 64 07/11/2023    TP 5.8 (L) 07/11/2023    ALB 2.9 (L) 07/11/2023      No components found for: "TSH3"  Lab Results   Component Value Date    IXN4YFMFJPJI 0.166 (L) 07/09/2023     No results found for: "HGBA1C"  Lipid Profile:   Lab Results   Component Value Date    HDL 47 01/04/2016    LDLCALC 99 01/04/2016    TRIG 193 01/04/2016     No results found for: "CHOLESTEROL"  Lab Results   Component Value Date    TROPONINI 0.04 01/04/2016     No results found for: "NTBNP"   Recent Results (from the past 672 hour(s))   CBC and differential    Collection Time: 07/06/23 12:23 PM   Result Value Ref Range    WBC 16.88 (H) 4.31 - 10.16 Thousand/uL    RBC 3.58 (L) 3.81 - 5.12 Million/uL    Hemoglobin 5.9 (LL) 11.5 - 15.4 g/dL    Hematocrit 23.2 (L) 34.8 - 46.1 %    MCV 65 (L) 82 - 98 fL    MCH 16.5 (L) 26.8 - 34.3 pg    MCHC 25.8 (L) 31.4 - 37.4 g/dL    RDW 24.2 (H) 11.6 - 15.1 %    MPV 8.2 (L) 8.9 - 12.7 fL    Platelets 352 (H) 077 - 390 Thousands/uL    nRBC 0 /100 WBCs    Neutrophils Relative 82 (H) 43 - 75 %    Immat GRANS % 1 0 - 2 %    Lymphocytes Relative 8 (L) 14 - 44 %    Monocytes Relative 8 4 - 12 %    Eosinophils Relative 1 0 - 6 %    Basophils Relative 0 0 - 1 %    Neutrophils Absolute 13.99 (H) 1.85 - 7.62 Thousands/µL    Immature Grans Absolute 0.13 0.00 - 0.20 Thousand/uL    Lymphocytes Absolute 1.28 0.60 - 4.47 Thousands/µL    Monocytes Absolute 1.26 (H) 0.17 - 1.22 Thousand/µL    Eosinophils Absolute 0.17 0.00 - 0.61 Thousand/µL    Basophils Absolute 0.05 0.00 - 0.10 Thousands/µL   Comprehensive metabolic panel    Collection Time: 07/06/23 12:23 PM   Result Value Ref Range    Sodium 137 135 - 147 mmol/L    Potassium 3.6 3.5 - 5.3 mmol/L    Chloride 107 96 - 108 mmol/L    CO2 22 21 - 32 mmol/L    ANION GAP 8 mmol/L    BUN 17 5 - 25 mg/dL    Creatinine 1.41 (H) 0.60 - 1.30 mg/dL    Glucose 85 65 - 140 mg/dL    Calcium 8.8 8.4 - 10.2 mg/dL    Corrected Calcium 9.5 8.3 - 10.1 mg/dL    AST 15 13 - 39 U/L    ALT 6 (L) 7 - 52 U/L    Alkaline Phosphatase 65 34 - 104 U/L    Total Protein 6.6 6.4 - 8.4 g/dL    Albumin 3.1 (L) 3.5 - 5.0 g/dL    Total Bilirubin 0.37 0.20 - 1.00 mg/dL    eGFR 40 ml/min/1.73sq m   Magnesium    Collection Time: 07/06/23 12:23 PM   Result Value Ref Range    Magnesium 1.7 (L) 1.9 - 2.7 mg/dL   Lipase    Collection Time: 07/06/23 12:23 PM   Result Value Ref Range    Lipase 14 11 - 82 u/L   C-reactive protein    Collection Time: 07/06/23 12:23 PM   Result Value Ref Range    CRP 5.0 (H) <3.0 mg/L   Iron Saturation %    Collection Time: 07/06/23 12:23 PM   Result Value Ref Range    Iron Saturation 4 (L) 15 - 50 %    TIBC 328 250 - 450 ug/dL    Iron 12 (L) 50 - 170 ug/dL   Ferritin    Collection Time: 07/06/23 12:23 PM   Result Value Ref Range    Ferritin 2 (L) 11 - 307 ng/mL   Type and screen    Collection Time: 07/06/23  1:02 PM   Result Value Ref Range    ABO Grouping O     Rh Factor Positive     Antibody Screen Negative     Specimen Expiration Date [id number]    HS Troponin 0hr (reflex protocol)    Collection Time: 07/06/23  1:02 PM   Result Value Ref Range    hs TnI 0hr 3 "Refer to ACS Flowchart"- see link ng/L   ECG 12 lead    Collection Time: 07/06/23  1:08 PM   Result Value Ref Range    Ventricular Rate 78 BPM    Atrial Rate 78 BPM    LA Interval 138 ms    QRSD Interval 90 ms    QT Interval 396 ms    QTC Interval 451 ms    P Axis -19 degrees    QRS Axis 39 degrees    T Wave [place] 80 degrees   HS Troponin I 2hr    Collection Time: 07/06/23  2:54 PM   Result Value Ref Range    hs TnI 2hr 3 "Refer to ACS Flowchart"- see link ng/L    Delta 2hr hsTnI 0 <20 ng/L   UA w Reflex to Microscopic w Reflex to Culture    Collection Time: 07/06/23  3:36 PM    Specimen: Urine, Clean Catch   Result Value Ref Range    Color, UA Light Yellow     Clarity, UA Clear     Specific Gravity, UA <=1.005 1.000 - 1.030    pH, UA 6.0 5.0, 5.5, 6.0, 6.5, 7.0, 7.5, 8.0, 8.5, 9.0    Leukocytes, UA Trace (A) Negative    Nitrite, UA Negative Negative    Protein, UA Negative Negative mg/dl    Glucose, UA Negative Negative mg/dl    Ketones, UA Negative Negative mg/dl    Urobilinogen, UA 0.2 0.2, 1.0 E.U./dl E.U./dl    Bilirubin, UA Negative Negative    Occult Blood, UA Small (A) Negative   Urine Microscopic    Collection Time: 07/06/23  3:36 PM   Result Value Ref Range    RBC, UA None Seen None Seen, 0-1, 1-2, 2-4, 0-5 /hpf    WBC, UA 0-1 None Seen, 0-1, 1-2, 0-5, 2-4 /hpf    Epithelial Cells Occasional None Seen, Occasional /hpf    Bacteria, UA None Seen None Seen, Occasional /hpf Hyaline Casts, UA 0-1 (A) (none) /lpf   Stool Enteric Bacterial Panel by PCR    Collection Time: 07/06/23  5:21 PM    Specimen: Per Rectum; Stool   Result Value Ref Range    Salmonella sp PCR None Detected None Detected    Shigella sp/Enteroinvasive E. coli (EIEC) PCR None Detected None Detected    Campylobacter sp (jejuni and coli) PCR None Detected None Detected    Shiga toxin 1/Shiga toxin 2 genes PCR None Detected None Detected   Clostridium difficile toxin by PCR with EIA    Collection Time: 07/06/23  5:21 PM    Specimen: Per Rectum; Stool   Result Value Ref Range    C.difficile toxin by PCR Negative Negative   Hemoglobin and hematocrit, blood    Collection Time: 07/07/23  2:35 AM   Result Value Ref Range    Hemoglobin 9.4 (L) 11.5 - 15.4 g/dL    Hematocrit 33.2 (L) 34.8 - 46.1 %   Quantiferon TB Gold Plus    Collection Time: 07/07/23  5:56 AM   Result Value Ref Range    QFT Nil 0.02 0 - 8.0 IU/ml    QFT TB1-NIL 0.00 IU/ml    QFT TB2-NIL 0.01 IU/ml    QFT Mitogen-NIL 8.53 IU/ml    QFT Final Interpretation Negative Negative   Hepatitis B surface antigen    Collection Time: 07/07/23  5:56 AM   Result Value Ref Range    Hepatitis B Surface Ag Non-reactive Non-Reactive   Hepatitis B core antibody, total    Collection Time: 07/07/23  5:56 AM   Result Value Ref Range    Hep B Core Total Ab Non-reactive Non-Reactive   CBC and differential    Collection Time: 07/07/23  5:56 AM   Result Value Ref Range    WBC 12.53 (H) 4.31 - 10.16 Thousand/uL    RBC 4.38 3.81 - 5.12 Million/uL    Hemoglobin 9.3 (L) 11.5 - 15.4 g/dL    Hematocrit 32.1 (L) 34.8 - 46.1 %    MCV 73 (L) 82 - 98 fL    MCH 21.2 (L) 26.8 - 34.3 pg    MCHC 29.0 (L) 31.4 - 37.4 g/dL    RDW 29.5 (H) 11.6 - 15.1 %    MPV 8.4 (L) 8.9 - 12.7 fL    Platelets 193 (H) 105 - 390 Thousands/uL    nRBC 0 /100 WBCs    Neutrophils Relative 69 43 - 75 %    Immat GRANS % 0 0 - 2 %    Lymphocytes Relative 18 14 - 44 %    Monocytes Relative 12 4 - 12 %    Eosinophils Relative 1 0 - 6 %    Basophils Relative 0 0 - 1 %    Neutrophils Absolute 8.53 (H) 1.85 - 7.62 Thousands/µL    Immature Grans Absolute 0.04 0.00 - 0.20 Thousand/uL    Lymphocytes Absolute 2.29 0.60 - 4.47 Thousands/µL    Monocytes Absolute 1.49 (H) 0.17 - 1.22 Thousand/µL    Eosinophils Absolute 0.14 0.00 - 0.61 Thousand/µL    Basophils Absolute 0.04 0.00 - 0.10 Thousands/µL   Magnesium    Collection Time: 07/07/23  5:56 AM   Result Value Ref Range    Magnesium 2.7 1.9 - 2.7 mg/dL   Comprehensive metabolic panel    Collection Time: 07/07/23  5:56 AM   Result Value Ref Range    Sodium 137 135 - 147 mmol/L    Potassium 4.0 3.5 - 5.3 mmol/L    Chloride 110 (H) 96 - 108 mmol/L    CO2 21 21 - 32 mmol/L    ANION GAP 6 mmol/L    BUN 10 5 - 25 mg/dL    Creatinine 1.14 0.60 - 1.30 mg/dL    Glucose 69 65 - 140 mg/dL    Glucose, Fasting 69 65 - 99 mg/dL    Calcium 7.8 (L) 8.4 - 10.2 mg/dL    Corrected Calcium 8.7 8.3 - 10.1 mg/dL    AST 14 13 - 39 U/L    ALT 5 (L) 7 - 52 U/L    Alkaline Phosphatase 63 34 - 104 U/L    Total Protein 5.9 (L) 6.4 - 8.4 g/dL    Albumin 2.9 (L) 3.5 - 5.0 g/dL    Total Bilirubin 0.44 0.20 - 1.00 mg/dL    eGFR 52 ml/min/1.73sq m   Prepare Leukoreduced RBC: 2 Units    Collection Time: 07/07/23  6:15 AM   Result Value Ref Range    Unit Product Code D0926J54     Unit Number [id number]     Unit ABO O     Unit [facility] POS     Crossmatch Compatible     Unit Dispense Status Presumed Trans     Unit Product Volume 350 mL    Unit Product Code U7262Y89     Unit Number [id number]     Unit ABO O     Unit [facility] POS     Crossmatch Compatible     Unit Dispense Status Presumed Trans     Unit Product Volume 350 mL   Calprotectin,Fecal    Collection Time: 07/07/23 10:22 AM   Result Value Ref Range    Calprotectin 719 (H) 0 - 120 ug/g   CBC and differential    Collection Time: 07/08/23  5:01 AM   Result Value Ref Range    WBC 12.22 (H) 4.31 - 10.16 Thousand/uL    RBC 4.16 3.81 - 5.12 Million/uL    Hemoglobin 8.9 (L) 11.5 - 15.4 g/dL    Hematocrit 30.7 (L) 34.8 - 46.1 %    MCV 74 (L) 82 - 98 fL    MCH 21.4 (L) 26.8 - 34.3 pg    MCHC 29.0 (L) 31.4 - 37.4 g/dL    RDW 30.5 (H) 11.6 - 15.1 %    MPV 8.5 (L) 8.9 - 12.7 fL    Platelets 466 (H) 131 - 390 Thousands/uL    nRBC 0 /100 WBCs    Neutrophils Relative 91 (H) 43 - 75 %    Immat GRANS % 1 0 - 2 %    Lymphocytes Relative 7 (L) 14 - 44 %    Monocytes Relative 1 (L) 4 - 12 %    Eosinophils Relative 0 0 - 6 %    Basophils Relative 0 0 - 1 %    Neutrophils Absolute 11.07 (H) 1.85 - 7.62 Thousands/µL    Immature Grans Absolute 0.07 0.00 - 0.20 Thousand/uL    Lymphocytes Absolute 0.91 0.60 - 4.47 Thousands/µL    Monocytes Absolute 0.16 (L) 0.17 - 1.22 Thousand/µL    Eosinophils Absolute 0.00 0.00 - 0.61 Thousand/µL    Basophils Absolute 0.01 0.00 - 0.10 Thousands/µL   Comprehensive metabolic panel    Collection Time: 07/08/23  5:01 AM   Result Value Ref Range    Sodium 138 135 - 147 mmol/L    Potassium 3.9 3.5 - 5.3 mmol/L    Chloride 110 (H) 96 - 108 mmol/L    CO2 20 (L) 21 - 32 mmol/L    ANION GAP 8 mmol/L    BUN 7 5 - 25 mg/dL    Creatinine 0.84 0.60 - 1.30 mg/dL    Glucose 125 65 - 140 mg/dL    Calcium 7.9 (L) 8.4 - 10.2 mg/dL    Corrected Calcium 8.9 8.3 - 10.1 mg/dL    AST 19 13 - 39 U/L    ALT 8 7 - 52 U/L    Alkaline Phosphatase 61 34 - 104 U/L    Total Protein 5.5 (L) 6.4 - 8.4 g/dL    Albumin 2.7 (L) 3.5 - 5.0 g/dL    Total Bilirubin 0.27 0.20 - 1.00 mg/dL    eGFR 76 ml/min/1.73sq m   Smear Review(Phlebs Do Not Order)    Collection Time: 07/08/23  5:01 AM   Result Value Ref Range    RBC Morphology Present     Platelet Estimate Increased (A) Adequate    Anisocytosis Present     Hypochromia Present     Microcytes Present     Ovalocytes Present     Polychromasia Present    Comprehensive metabolic panel    Collection Time: 07/09/23  4:46 AM   Result Value Ref Range    Sodium 137 135 - 147 mmol/L    Potassium 3.9 3.5 - 5.3 mmol/L    Chloride 110 (H) 96 - 108 mmol/L    CO2 22 21 - 32 mmol/L ANION GAP 5 mmol/L    BUN 15 5 - 25 mg/dL    Creatinine 0.96 0.60 - 1.30 mg/dL    Glucose 133 65 - 140 mg/dL    Calcium 8.0 (L) 8.4 - 10.2 mg/dL    Corrected Calcium 9.2 8.3 - 10.1 mg/dL    AST 16 13 - 39 U/L    ALT 8 7 - 52 U/L    Alkaline Phosphatase 64 34 - 104 U/L    Total Protein 5.4 (L) 6.4 - 8.4 g/dL    Albumin 2.5 (L) 3.5 - 5.0 g/dL    Total Bilirubin 0.24 0.20 - 1.00 mg/dL    eGFR 64 ml/min/1.73sq m   CBC and differential    Collection Time: 07/09/23  4:46 AM   Result Value Ref Range    WBC 22.90 (H) 4.31 - 10.16 Thousand/uL    RBC 4.01 3.81 - 5.12 Million/uL    Hemoglobin 8.6 (L) 11.5 - 15.4 g/dL    Hematocrit 30.3 (L) 34.8 - 46.1 %    MCV 76 (L) 82 - 98 fL    MCH 21.4 (L) 26.8 - 34.3 pg    MCHC 28.4 (L) 31.4 - 37.4 g/dL    RDW 31.4 (H) 11.6 - 15.1 %    MPV 9.3 8.9 - 12.7 fL    Platelets 718 (H) 729 - 390 Thousands/uL    nRBC 0 /100 WBCs    Neutrophils Relative 91 (H) 43 - 75 %    Immat GRANS % 1 0 - 2 %    Lymphocytes Relative 4 (L) 14 - 44 %    Monocytes Relative 4 4 - 12 %    Eosinophils Relative 0 0 - 6 %    Basophils Relative 0 0 - 1 %    Neutrophils Absolute 20.98 (H) 1.85 - 7.62 Thousands/µL    Immature Grans Absolute 0.16 0.00 - 0.20 Thousand/uL    Lymphocytes Absolute 0.91 0.60 - 4.47 Thousands/µL    Monocytes Absolute 0.83 0.17 - 1.22 Thousand/µL    Eosinophils Absolute 0.00 0.00 - 0.61 Thousand/µL    Basophils Absolute 0.02 0.00 - 0.10 Thousands/µL   Magnesium    Collection Time: 07/09/23  4:46 AM   Result Value Ref Range    Magnesium 2.0 1.9 - 2.7 mg/dL   TSH, 3rd generation with Free T4 reflex    Collection Time: 07/09/23  4:46 AM   Result Value Ref Range    TSH 3RD GENERATON 0.166 (L) 0.450 - 4.500 uIU/mL   T4, free    Collection Time: 07/09/23  4:46 AM   Result Value Ref Range    Free T4 0.81 0.61 - 1.12 ng/dL   Sjogren's Antibodies    Collection Time: 07/09/23 11:27 AM   Result Value Ref Range    SS-A (RO) Ab <0.2 0.0 - 0.9 AI    SS-B (LA) Ab <0.2 0.0 - 0.9 AI   ECG 12 lead    Collection Time: 07/09/23 10:43 PM   Result Value Ref Range    Ventricular Rate 43 BPM    Atrial Rate 43 BPM    CA Interval 124 ms    QRSD Interval 82 ms    QT Interval 514 ms    QTC Interval 434 ms    P Axis -23 degrees    QRS Axis 29 degrees    T Wave Axis 56 degrees   ECG 12 lead    Collection Time: 07/09/23 10:43 PM   Result Value Ref Range    Ventricular Rate 45 BPM    Atrial Rate 34 BPM    CA Interval  ms    QRSD Interval 88 ms    QT Interval 532 ms    QTC Interval 460 ms    P Axis  degrees    QRS Axis 29 degrees    T Wave Axis 55 degrees   Comprehensive metabolic panel    Collection Time: 07/10/23  5:12 AM   Result Value Ref Range    Sodium 139 135 - 147 mmol/L    Potassium 3.8 3.5 - 5.3 mmol/L    Chloride 109 (H) 96 - 108 mmol/L    CO2 21 21 - 32 mmol/L    ANION GAP 9 mmol/L    BUN 12 5 - 25 mg/dL    Creatinine 0.73 0.60 - 1.30 mg/dL    Glucose 102 65 - 140 mg/dL    Calcium 7.5 (L) 8.4 - 10.2 mg/dL    Corrected Calcium 8.8 8.3 - 10.1 mg/dL    AST 16 13 - 39 U/L    ALT 8 7 - 52 U/L    Alkaline Phosphatase 52 34 - 104 U/L    Total Protein 4.8 (L) 6.4 - 8.4 g/dL    Albumin 2.4 (L) 3.5 - 5.0 g/dL    Total Bilirubin 0.23 0.20 - 1.00 mg/dL    eGFR 90 ml/min/1.73sq m   CBC and differential    Collection Time: 07/10/23  5:12 AM   Result Value Ref Range    WBC 20.58 (H) 4.31 - 10.16 Thousand/uL    RBC 3.92 3.81 - 5.12 Million/uL    Hemoglobin 8.3 (L) 11.5 - 15.4 g/dL    Hematocrit 29.8 (L) 34.8 - 46.1 %    MCV 76 (L) 82 - 98 fL    MCH 21.2 (L) 26.8 - 34.3 pg    MCHC 27.9 (L) 31.4 - 37.4 g/dL    RDW 32.0 (H) 11.6 - 15.1 %    MPV 9.3 8.9 - 12.7 fL    Platelets 245 318 - 631 Thousands/uL   Manual Differential(PHLEBS Do Not Order)    Collection Time: 07/10/23  5:12 AM   Result Value Ref Range    Segmented % 94 (H) 43 - 75 %    Bands % 1 0 - 8 %    Lymphocytes % 2 (L) 14 - 44 %    Monocytes % 3 (L) 4 - 12 %    Eosinophils, % 0 0 - 6 %    Basophils % 0 0 - 1 %    Absolute Neutrophils 19.55 (H) 1.85 - 7.62 Thousand/uL    Lymphocytes Absolute 0.41 (L) 0.60 - 4.47 Thousand/uL    Monocytes Absolute 0.62 0.00 - 1.22 Thousand/uL    Eosinophils Absolute 0.00 0.00 - 0.40 Thousand/uL    Basophils Absolute 0.00 0.00 - 0.10 Thousand/uL    Total Counted      RBC Morphology Present     Platelet Estimate Adequate Adequate    Acanthocytes Present     Anisocytosis Present     Ovalocytes Present     Polychromasia Present    ECG 12 lead    Collection Time: 07/10/23  9:56 AM   Result Value Ref Range    Ventricular Rate 41 BPM    Atrial Rate 41 BPM    MS Interval 142 ms    QRSD Interval 88 ms    QT Interval 538 ms    QTC Interval 443 ms    P Brooks 56 degrees    QRS Axis 59 degrees    T Wave Axis 77 degrees   Tissue Exam    Collection Time: 07/10/23  1:16 PM   Result Value Ref Range    Case Report       Surgical Pathology Report                         Case: [id number]                                   Authorizing Provider:  Emmie Branham MD Collected:           07/10/2023 1316              Ordering Location:     [address] Received:            07/10/2023 1700 [facility]                                                                      Pathologist:           Geraldine Weldon MD                                                           Specimens:   A) - Stomach, Antrum                                                                                B) - Esophagus, Lower esophagus r/o Reflux                                                          C) - Esophagus, Upper esophagus r/o EOE                                                             D) - Large Intestine, Right/Ascending Colon, Ascending colon r/o colitis                            E) - Large Intestine, Sigmoid Colon, Sigmoid colon bx- Ulcerative colitis                            F) - Large Intestine, Sigmoid Colon, rectal sigmoid bx r/o colitis                                  G) - Polyp, Colorectal, Rectal polyp- hot snare Final Diagnosis       A. Stomach, "Stomach antrum," Biopsy:  - Antral mucosa with mild chronic inactive gastritis  - Negative for intestinal metaplasia  - Negative for curvilinear Helicobacter microorganisms, supported by immunohistochemistry    B. Esophagus, "Lower esophagus," Biopsy:  - Benign squamous mucosa with nonspecific regenerative & chronic inflammatory changes  - Negative for intestinal metaplasia  - Eosinophils are fewer than 3 cells per high power field in squamous epithelium, negative for eosinophilic esophagitis  - No epithelial dysplasia and no evidence of malignancy    C. Esophagus, "Upper esophagus," Biopsy:  - Benign squamous mucosa with nonspecific regenerative & chronic inflammatory changes  - Negative for intestinal metaplasia  - Eosinophils are fewer than 3 cells per high power field in squamous epithelium, negative for eosinophilic esophagitis  - No epithelial dysplasia and no evidence of malignancy    D. Colon, "Ascending colon," Biopsy:  - Benign colonic mucosa with intact glandular architecture  - Negative for lymphocytic, collagenous, or active colitis  - Negative for acute colitis  - Negative for granulomas, dysplasia, or carcinoma    E. Colon, "Sigmoid colon," Biopsy:  - Mild chronic active colitis  - Negative for granulomas, dysplasia, or carcinoma    F. Colon, "Rectosigmoid colon," Biopsy:  - Mild chronic active colitis  - Negative for granulomas, dysplasia, or carcinoma  - Additional levels examined    H. Colon, “Rectal polyp,” Biopsy:  - Inflammatory polyp  - Negative for dysplasia or carcinoma        Note       Immunohistochemistry was performed on block A1 with adequate controls. AE1/AE3 highlights normal glandular architecture. Immunohistochemistry was performed on block F1 with adequate controls. P53 does not show aberrant expression.                  Additional Information       All reported additional testing was performed with appropriately reactive controls. These tests were developed and their performance characteristics determined by [facility] or appropriate performing facility, though some tests may be performed on tissues which have not been validated for performance characteristics (such as staining performed on alcohol exposed cell blocks and decalcified tissues). Results should be interpreted with caution and in the context of the patients’ clinical condition. These tests may not be cleared or approved by the U.S. Food and Drug Administration, though the FDA has determined that such clearance or approval is not necessary. These tests are used for clinical purposes and they should not be regarded as investigational or for research. This laboratory has been approved by [place] 88, designated as a high-complexity laboratory and is qualified to perform these tests. .Interpretation performed at [facility], [address] [place] [address] - GI - G          :    Other      Gross Description        A. The specimen is received in formalin, labeled with the patient's name and hospital number, and is designated " stomach antrum". The specimen consists of 1 tan soft tissue fragment measuring 0.3 cm in greatest dimension. Entirely submitted. One screened cassette. B. The specimen is received in formalin, labeled with the patient's name and hospital number, and is designated " lower esophagus". The specimen consists of 2 colorless soft tissue fragments measuring 0.4 and 0.5 cm in greatest dimension. Entirely submitted. One screened cassette. C. The specimen is received in formalin, labeled with the patient's name and hospital number, and is designated " upper esophagus". The specimen consists of 2 colorless soft tissue fragments both measuring 0.3 cm in greatest dimension. Entirely submitted. One screened cassette. D.  The specimen is received in formalin, labeled with the patient's name and hospital number, and is designated " ascending colon". The specimen consists of 2 tan soft tissue fragments measuring 0.2 and 0.4 cm in greatest dimension. Entirely submitted. One screened cassette. Specimen E. The specimen is received in formalin, labeled with the patient's name and hospital number, and is designated " sigmoid colon". The specimen consists of 4 tan soft tissue fragments measuring range from 0.2 to 0.5 cm in greatest dimension. Entirely submitted. One screened cassette. F. The specimen is received in formalin, labeled with the patient's name and hospital number, and is designated " rectosigmoid colon". The specimen consists of 4 tan soft tissue fragments measuring range from 0.2 to 0.4 cm in greatest dimension. Entirely submitted. One screened cassette. G. The specimen is received in formalin, labeled with the patient's name and hospital number, and is designated " rectal polyp". The specimen consists of 1 tan soft tissue fragment measuring 0.7 cm in greatest dimension. Entirely submitted. One screened cassette. Note: The estimated total formalin fixation time based upon information provided by the submitting clinician and the standard processing schedule is under 72 hours. [facility]      Clinical Information       · Benign-appearing intrinsic stricture (traversable) in the upper third of the esophagus; dilated. Dilation caused mucosal tears, disruption of ring, improved passage of the scope and improved lumen appearance; post-dilation mucosal tears were superficial. Upper esophageal stricture which was dilated with passing the scope.   · Mild, patchy erythematous mucosa with concentric rings and plaque, consistent with eosinophilic esophagitis; performed cold forceps biopsy to rule out eosinophilic esophagitis  · Small sliding hiatal hernia (type I hiatal hernia) without Jackqueline Brandon lesions present  · Mild, localized erythematous mucosa in the antrum, consistent with gastritis; performed cold forceps biopsy to rule out H. pylori  · The duodenal bulb, 1st part of the duodenum and 2nd part of the duodenum appeared normal.  · Mild, continuous cobblestone, edematous, erythematous and ulcerated mucosa in the descending colon, sigmoid colon, rectosigmoid and rectum, consistent with ulcerative colitis;  performed cold forceps biopsy for dysplasia screening  · Liquid stool throughout the right colon, prep was suboptimal, right colonic mucosa and transverse colon mucosa appeared normal.  Multiple random right colon biopsies were done  · Few small localized diverticula of mild severity with no inflammation in the proximal descending colon  · Two left lateral and right posterior internal medium (grade 2) hemorrhoids observed during digital rectal exam; no bleeding was identified  · One sessile, inflammatory polyp measuring 10-19 mm in the proximal rectum; completely removed en bloc by hot snare and retrieved specimen   Comprehensive metabolic panel    Collection Time: 07/11/23  5:56 AM   Result Value Ref Range    Sodium 137 135 - 147 mmol/L    Potassium 3.5 3.5 - 5.3 mmol/L    Chloride 106 96 - 108 mmol/L    CO2 26 21 - 32 mmol/L    ANION GAP 5 mmol/L    BUN 12 5 - 25 mg/dL    Creatinine 0.75 0.60 - 1.30 mg/dL    Glucose 123 65 - 140 mg/dL    Calcium 8.4 8.4 - 10.2 mg/dL    Corrected Calcium 9.3 8.3 - 10.1 mg/dL    AST 15 13 - 39 U/L    ALT 12 7 - 52 U/L    Alkaline Phosphatase 64 34 - 104 U/L    Total Protein 5.8 (L) 6.4 - 8.4 g/dL    Albumin 2.9 (L) 3.5 - 5.0 g/dL    Total Bilirubin 0.31 0.20 - 1.00 mg/dL    eGFR 87 ml/min/1.73sq m   CBC and differential    Collection Time: 07/11/23  5:56 AM   Result Value Ref Range    WBC 17.41 (H) 4.31 - 10.16 Thousand/uL    RBC 4.29 3.81 - 5.12 Million/uL    Hemoglobin 9.2 (L) 11.5 - 15.4 g/dL    Hematocrit 31.8 (L) 34.8 - 46.1 %    MCV 74 (L) 82 - 98 fL    MCH 21.4 (L) 26.8 - 34.3 pg    MCHC 28.9 (L) 31.4 - 37.4 g/dL    RDW 32.3 (H) 11.6 - 15.1 %    MPV 8.6 (L) 8.9 - 12.7 fL    Platelets 039 (H) 331 - 390 Thousands/uL    nRBC 0 /100 WBCs             [facility]        "This note was completed in part utilizing [employer] direct voice recognition software. Grammatical errors, random word insertion, spelling mistakes, and incomplete sentences may be an occasional consequence of the system secondary to software limitations, ambient noise and hardware issues. Please read the chart carefully and recognize, using context, where substitutions have occurred.   If you have any questions or concerns about the context, text or information contained within the body of this dictation, please contact myself, the provider, for further clarification."

## 2023-07-28 ENCOUNTER — APPOINTMENT (OUTPATIENT)
Dept: LAB | Facility: HOSPITAL | Age: 58
End: 2023-07-28
Payer: MEDICARE

## 2023-07-28 ENCOUNTER — OFFICE VISIT (OUTPATIENT)
Dept: FAMILY MEDICINE CLINIC | Facility: CLINIC | Age: 58
End: 2023-07-28
Payer: MEDICARE

## 2023-07-28 VITALS
SYSTOLIC BLOOD PRESSURE: 138 MMHG | OXYGEN SATURATION: 99 % | WEIGHT: 107 LBS | TEMPERATURE: 97.4 F | BODY MASS INDEX: 17.83 KG/M2 | RESPIRATION RATE: 16 BRPM | HEART RATE: 67 BPM | HEIGHT: 65 IN | DIASTOLIC BLOOD PRESSURE: 86 MMHG

## 2023-07-28 DIAGNOSIS — E44.1 MILD PROTEIN-CALORIE MALNUTRITION (HCC): ICD-10-CM

## 2023-07-28 DIAGNOSIS — D64.9 ANEMIA, UNSPECIFIED TYPE: ICD-10-CM

## 2023-07-28 DIAGNOSIS — R63.6 UNDERWEIGHT DUE TO INADEQUATE CALORIC INTAKE: ICD-10-CM

## 2023-07-28 DIAGNOSIS — Z11.4 SCREENING FOR HIV (HUMAN IMMUNODEFICIENCY VIRUS): ICD-10-CM

## 2023-07-28 DIAGNOSIS — I10 ESSENTIAL HYPERTENSION: Primary | ICD-10-CM

## 2023-07-28 LAB
ACANTHOCYTES BLD QL SMEAR: PRESENT
ALBUMIN SERPL BCP-MCNC: 3.7 G/DL (ref 3.5–5)
ALP SERPL-CCNC: 81 U/L (ref 34–104)
ALT SERPL W P-5'-P-CCNC: 18 U/L (ref 7–52)
ANION GAP SERPL CALCULATED.3IONS-SCNC: 6 MMOL/L
ANISOCYTOSIS BLD QL SMEAR: PRESENT
AST SERPL W P-5'-P-CCNC: 22 U/L (ref 13–39)
BASOPHILS # BLD AUTO: 0.02 THOUSANDS/ÂΜL (ref 0–0.1)
BASOPHILS NFR BLD AUTO: 0 % (ref 0–1)
BILIRUB SERPL-MCNC: 0.47 MG/DL (ref 0.2–1)
BUN SERPL-MCNC: 26 MG/DL (ref 5–25)
CALCIUM SERPL-MCNC: 9.5 MG/DL (ref 8.4–10.2)
CHLORIDE SERPL-SCNC: 103 MMOL/L (ref 96–108)
CO2 SERPL-SCNC: 31 MMOL/L (ref 21–32)
CREAT SERPL-MCNC: 0.92 MG/DL (ref 0.6–1.3)
DACRYOCYTES BLD QL SMEAR: PRESENT
EOSINOPHIL # BLD AUTO: 0 THOUSAND/ÂΜL (ref 0–0.61)
EOSINOPHIL NFR BLD AUTO: 0 % (ref 0–6)
GFR SERPL CREATININE-BSD FRML MDRD: 68 ML/MIN/1.73SQ M
GLUCOSE SERPL-MCNC: 100 MG/DL (ref 65–140)
HCT VFR BLD AUTO: 43.3 % (ref 34.8–46.1)
HGB BLD-MCNC: 12.9 G/DL (ref 11.5–15.4)
HYPERCHROMIA BLD QL SMEAR: PRESENT
IMM GRANULOCYTES # BLD AUTO: 0.12 THOUSAND/UL (ref 0–0.2)
IMM GRANULOCYTES NFR BLD AUTO: 1 % (ref 0–2)
LYMPHOCYTES # BLD AUTO: 0.57 THOUSANDS/ÂΜL (ref 0.6–4.47)
LYMPHOCYTES NFR BLD AUTO: 4 % (ref 14–44)
MCH RBC QN AUTO: 25.1 PG (ref 26.8–34.3)
MCHC RBC AUTO-ENTMCNC: 29.8 G/DL (ref 31.4–37.4)
MCV RBC AUTO: 84 FL (ref 82–98)
MONOCYTES # BLD AUTO: 0.22 THOUSAND/ÂΜL (ref 0.17–1.22)
MONOCYTES NFR BLD AUTO: 2 % (ref 4–12)
NEUTROPHILS # BLD AUTO: 13.94 THOUSANDS/ÂΜL (ref 1.85–7.62)
NEUTS SEG NFR BLD AUTO: 93 % (ref 43–75)
NRBC BLD AUTO-RTO: 0 /100 WBCS
PLATELET # BLD AUTO: 357 THOUSANDS/UL (ref 149–390)
PLATELET BLD QL SMEAR: ADEQUATE
PMV BLD AUTO: 8.7 FL (ref 8.9–12.7)
POIKILOCYTOSIS BLD QL SMEAR: PRESENT
POLYCHROMASIA BLD QL SMEAR: PRESENT
POTASSIUM SERPL-SCNC: 4.3 MMOL/L (ref 3.5–5.3)
PROT SERPL-MCNC: 6.6 G/DL (ref 6.4–8.4)
RBC # BLD AUTO: 5.14 MILLION/UL (ref 3.81–5.12)
RBC MORPH BLD: PRESENT
SCHISTOCYTES BLD QL SMEAR: PRESENT
SODIUM SERPL-SCNC: 140 MMOL/L (ref 135–147)
WBC # BLD AUTO: 14.87 THOUSAND/UL (ref 4.31–10.16)

## 2023-07-28 PROCEDURE — 36415 COLL VENOUS BLD VENIPUNCTURE: CPT

## 2023-07-28 PROCEDURE — 80053 COMPREHEN METABOLIC PANEL: CPT

## 2023-07-28 PROCEDURE — 99213 OFFICE O/P EST LOW 20 MIN: CPT | Performed by: FAMILY MEDICINE

## 2023-07-28 PROCEDURE — 85025 COMPLETE CBC W/AUTO DIFF WBC: CPT

## 2023-07-28 PROCEDURE — 87389 HIV-1 AG W/HIV-1&-2 AB AG IA: CPT

## 2023-07-28 RX ORDER — PHENOL 1.4 %
600 AEROSOL, SPRAY (ML) MUCOUS MEMBRANE 2 TIMES DAILY WITH MEALS
Qty: 30 TABLET | Refills: 2 | Status: SHIPPED | OUTPATIENT
Start: 2023-07-28

## 2023-07-28 RX ORDER — LISINOPRIL 20 MG/1
20 TABLET ORAL DAILY
Qty: 30 TABLET | Refills: 2 | Status: SHIPPED | OUTPATIENT
Start: 2023-07-28

## 2023-07-28 RX ORDER — LISINOPRIL 20 MG/1
20 TABLET ORAL DAILY
Qty: 10 TABLET | Refills: 0 | Status: SHIPPED | OUTPATIENT
Start: 2023-07-28 | End: 2023-08-04 | Stop reason: SDUPTHER

## 2023-07-28 RX ORDER — BLOOD PRESSURE TEST KIT
KIT MISCELLANEOUS 2 TIMES DAILY
Qty: 1 KIT | Refills: 0 | Status: SHIPPED | OUTPATIENT
Start: 2023-07-28

## 2023-07-28 RX ORDER — MELATONIN
2000 DAILY
Qty: 60 TABLET | Refills: 2 | Status: SHIPPED | OUTPATIENT
Start: 2023-07-28

## 2023-07-29 LAB
HIV 1+2 AB+HIV1 P24 AG SERPL QL IA: NORMAL
HIV 2 AB SERPL QL IA: NORMAL
HIV1 AB SERPL QL IA: NORMAL
HIV1 P24 AG SERPL QL IA: NORMAL

## 2023-08-02 DIAGNOSIS — I10 ESSENTIAL HYPERTENSION: ICD-10-CM

## 2023-08-02 DIAGNOSIS — K51.911 ULCERATIVE COLITIS WITH RECTAL BLEEDING, UNSPECIFIED LOCATION (HCC): ICD-10-CM

## 2023-08-02 DIAGNOSIS — K51.00 ULCERATIVE (CHRONIC) ENTEROCOLITIS, WITHOUT COMPLICATIONS (HCC): ICD-10-CM

## 2023-08-02 NOTE — TELEPHONE ENCOUNTER
Patient is requesting a refill for amLODIPine 5mg tablet and omeprazole 40mg tablets. Patient has enough for the next few days. Preferred pharmacy:[facility].

## 2023-08-04 ENCOUNTER — OFFICE VISIT (OUTPATIENT)
Dept: FAMILY MEDICINE CLINIC | Facility: CLINIC | Age: 58
End: 2023-08-04
Payer: MEDICARE

## 2023-08-04 VITALS
TEMPERATURE: 96.4 F | HEIGHT: 65 IN | WEIGHT: 106.9 LBS | SYSTOLIC BLOOD PRESSURE: 120 MMHG | BODY MASS INDEX: 17.81 KG/M2 | RESPIRATION RATE: 18 BRPM | HEART RATE: 69 BPM | OXYGEN SATURATION: 99 % | DIASTOLIC BLOOD PRESSURE: 70 MMHG

## 2023-08-04 DIAGNOSIS — K51.918 ULCERATIVE COLITIS WITH OTHER COMPLICATION, UNSPECIFIED LOCATION (HCC): ICD-10-CM

## 2023-08-04 DIAGNOSIS — S21.209S: Primary | ICD-10-CM

## 2023-08-04 DIAGNOSIS — B07.9 VERRUCA: ICD-10-CM

## 2023-08-04 PROCEDURE — 99214 OFFICE O/P EST MOD 30 MIN: CPT | Performed by: FAMILY MEDICINE

## 2023-08-04 NOTE — ASSESSMENT & PLAN NOTE
PMHx of Ulcerative Colitis chronically on immunomodulator therapies (Currently on 5mg Prednisone phase of taper). On PE wound is 3in x 2in, erythematous with mild purulent discharge, described as burning and itching, well define borders. Patient denies fever or pain.   ·   · Seen at wound care and was told that wound is not suitable for debridement  · F/U appointment with dermatologist 10/16/23  · Ambulatory referral to rheumatologist

## 2023-08-04 NOTE — ASSESSMENT & PLAN NOTE
Follows GI for UC.  Chronic pruritic rash on mid back possibly pyoderma gangrenosum (photos added to media for reference)  · Currently uncomfortable with starting Humira due to side effect panel  · Steroid taper: currently on prednisone 5mg

## 2023-08-04 NOTE — PROGRESS NOTES
Name: Keya Bates      : 1965      MRN: [id number]  Encounter Provider: Toan Anthony MD  Encounter Date: 2023   Encounter department: [address]     1. Wound of back, sequela  Assessment & Plan:  PMHx of Ulcerative Colitis chronically on immunomodulator therapies (Currently on 5mg Prednisone phase of taper). On PE wound is 3in x 2in, erythematous with mild purulent discharge, described as burning and itching, well define borders. Patient denies fever or pain. ·   · Seen at wound care and was told that wound is not suitable for debridement  · F/U appointment with dermatologist 10/16/23  · Ambulatory referral to rheumatologist    Orders:  -     Ambulatory Referral to Rheumatology; Future    2. Ulcerative colitis with other complication, unspecified location [facility])  Assessment & Plan:  Follows GI for UC. Chronic pruritic rash on mid back possibly pyoderma gangrenosum (photos added to media for reference)  · Currently uncomfortable with starting Humira due to side effect panel  · Steroid taper: currently on prednisone 5mg       3. [place]  Assessment & Plan:  Present in 3 clusters on mid-shin of R leg. · Plan for cryotherapy on follow-up visit. [address] [facility], 29BR F with PMHx significant     Review of Systems   Constitutional: Negative for chills and fever. HENT: Negative for ear pain and sore throat. Eyes: Negative for pain and visual disturbance. Respiratory: Negative for cough and shortness of breath. Cardiovascular: Negative for chest pain and palpitations. Gastrointestinal: Negative for abdominal pain and vomiting. Genitourinary: Negative for dysuria and hematuria. Musculoskeletal: Negative for arthralgias and back pain. Skin: Positive for rash (Midback, erythematous, pruritic well defined margins [id number]). Negative for color change. Neurological: Negative for seizures and syncope.    All other systems reviewed and are negative. Current Outpatient Medications on File Prior to Visit   Medication Sig   • amLODIPine (NORVASC) 5 mg tablet Take 1 tablet (5 mg total) by mouth daily Do not start before July 12, 2023. • balsalazide (COLAZAL) 750 mg capsule Take 3 capsules (2,250 mg total) by mouth 2 (two) times a day   • calcium carbonate (OS-BUTCH) 600 MG tablet Take 1 tablet (600 mg total) by mouth 2 (two) times a day with meals   • folic acid (FOLVITE) 1 mg tablet Take 1 mg by mouth daily. • lisinopril (ZESTRIL) 20 mg tablet Take 1 tablet (20 mg total) by mouth daily   • omeprazole (PriLOSEC) 40 MG capsule Take 1 capsule (40 mg total) by mouth daily   • predniSONE 5 mg tablet Take 8 tablets (40 mg total) by mouth daily for 7 days, THEN 7 tablets (35 mg total) daily for 7 days, THEN 6 tablets (30 mg total) daily for 7 days, THEN 5 tablets (25 mg total) daily for 7 days, THEN 4 tablets (20 mg total) daily for 7 days, THEN 3 tablets (15 mg total) daily for 7 days, THEN 3 tablets (15 mg total) daily for 7 days, THEN 2 tablets (10 mg total) daily for 7 days, THEN 1 tablet (5 mg total) daily for 7 days. Do not start before July 12, 2023. • Adalimumab (Humira Pen) 80 MG/0.8ML PNKT Inject 2 pens under the skin on day 1, then inject 1 pen under the skin on day 15.  (Patient not taking: Reported on 7/24/2023)   • adalimumab (Humira) 40 MG/0.4ML Inject 40mg under skin every 14 days (Patient not taking: Reported on 7/24/2023)   • Blood Pressure KIT Use 2 (two) times a day   • cholecalciferol (VITAMIN D3) 1,000 units tablet Take 2 tablets (2,000 Units total) by mouth daily (Patient not taking: Reported on 8/4/2023)   • hydrOXYzine HCL (ATARAX) 10 mg tablet Take 1 tablet (10 mg total) by mouth every 6 (six) hours as needed for itching (Patient not taking: Reported on 8/4/2023)   • [DISCONTINUED] lisinopril (ZESTRIL) 20 mg tablet Take 1 tablet (20 mg total) by mouth daily       Objective     /70 (BP Location: Left arm, Patient Position: Sitting, Cuff Size: Large)   Pulse 69   Temp (!) 96.4 °F (35.8 °C) (Tympanic Core)   Resp 18   Ht 5' 5" (1.651 m)   Wt 48.5 kg (106 lb 14.4 oz)   SpO2 99%   BMI 17.79 kg/m²     Physical Exam  Constitutional:       Appearance: Normal appearance. HENT:      Mouth/Throat:      Mouth: Mucous membranes are moist.      Pharynx: Oropharynx is clear. Eyes:      Conjunctiva/sclera: Conjunctivae normal.   Cardiovascular:      Rate and Rhythm: Normal rate and regular rhythm. Pulses: Normal pulses. Heart sounds: Normal heart sounds. Pulmonary:      Effort: Pulmonary effort is normal.      Breath sounds: Normal breath sounds. Abdominal:      General: Abdomen is flat. Bowel sounds are normal.      Palpations: Abdomen is soft. Skin:     Capillary Refill: Capillary refill takes less than 2 seconds. Findings: Rash (midback) present. Neurological:      General: No focal deficit present. Mental Status: She is alert and oriented to person, place, and time.    Psychiatric:         Mood and Affect: Mood normal.       Yasir Gil MD

## 2023-08-07 ENCOUNTER — TELEPHONE (OUTPATIENT)
Dept: FAMILY MEDICINE CLINIC | Facility: CLINIC | Age: 58
End: 2023-08-07

## 2023-08-07 NOTE — TELEPHONE ENCOUNTER
Patient is calling is requesting a call back to confirm if  she should continue taking Amlodipine 5 mg. She was seen in the office on 8/4/23. Lisinopril 20 mg has been added to her medication list. Patient states she has 2 doses remaining of Amlodipine w/ NO refills. Patient can be reached at the number listed below;    [phone]

## 2023-08-08 ENCOUNTER — TELEPHONE (OUTPATIENT)
Dept: FAMILY MEDICINE CLINIC | Facility: CLINIC | Age: 58
End: 2023-08-08

## 2023-08-08 DIAGNOSIS — F41.8 ANXIETY ABOUT HEALTH: ICD-10-CM

## 2023-08-08 RX ORDER — OMEPRAZOLE 40 MG/1
40 CAPSULE, DELAYED RELEASE ORAL DAILY
Qty: 30 CAPSULE | Refills: 0 | Status: SHIPPED | OUTPATIENT
Start: 2023-08-08

## 2023-08-08 RX ORDER — AMLODIPINE BESYLATE 5 MG/1
5 TABLET ORAL DAILY
Qty: 30 TABLET | Refills: 0 | Status: SHIPPED | OUTPATIENT
Start: 2023-08-08

## 2023-08-08 RX ORDER — HYDROXYZINE HYDROCHLORIDE 10 MG/1
10 TABLET, FILM COATED ORAL EVERY 6 HOURS PRN
Qty: 30 TABLET | Refills: 0 | Status: CANCELLED | OUTPATIENT
Start: 2023-08-08

## 2023-08-08 NOTE — TELEPHONE ENCOUNTER
Please call and advise the patient that we have refilled the two medications. She may continue taking both BP meds (Amlodipine 5 PO QD and Lisinopril 20mg PO QD) as this regimen has had her BP well controlled. Thank you!

## 2023-08-09 ENCOUNTER — HOSPITAL ENCOUNTER (OUTPATIENT)
Dept: RADIOLOGY | Facility: HOSPITAL | Age: 58
Discharge: HOME | End: 2023-08-09
Payer: MEDICARE

## 2023-08-09 ENCOUNTER — HOSPITAL ENCOUNTER (OUTPATIENT)
Dept: NON INVASIVE DIAGNOSTICS | Facility: HOSPITAL | Age: 58
Discharge: HOME | End: 2023-08-09
Payer: MEDICARE

## 2023-08-09 ENCOUNTER — TELEPHONE (OUTPATIENT)
Dept: CARDIOLOGY CLINIC | Facility: CLINIC | Age: 58
End: 2023-08-09

## 2023-08-09 VITALS
DIASTOLIC BLOOD PRESSURE: 74 MMHG | SYSTOLIC BLOOD PRESSURE: 126 MMHG | HEART RATE: 69 BPM | BODY MASS INDEX: 17.66 KG/M2 | WEIGHT: 106 LBS | HEIGHT: 65 IN

## 2023-08-09 DIAGNOSIS — E78.00 HYPERCHOLESTEROLEMIA: ICD-10-CM

## 2023-08-09 DIAGNOSIS — I10 ESSENTIAL HYPERTENSION: ICD-10-CM

## 2023-08-09 DIAGNOSIS — I77.810 ASCENDING AORTA DILATATION (HCC): ICD-10-CM

## 2023-08-09 DIAGNOSIS — I10 ESSENTIAL HYPERTENSION: Primary | ICD-10-CM

## 2023-08-09 DIAGNOSIS — F41.8 ANXIETY ABOUT HEALTH: ICD-10-CM

## 2023-08-09 DIAGNOSIS — R00.1 SINUS BRADYCARDIA: ICD-10-CM

## 2023-08-09 DIAGNOSIS — I11.9 HYPERTENSIVE HEART DISEASE WITHOUT HEART FAILURE: ICD-10-CM

## 2023-08-09 LAB
AORTIC ROOT: 3.1 CM
APICAL FOUR CHAMBER EJECTION FRACTION: 44 %
AV LVOT PEAK GRADIENT: 6 MMHG
AV PEAK GRADIENT: 7 MMHG
CHEST PAIN STATEMENT: NORMAL
DOP CALC LVOT AREA: 2.83 CM2
DOP CALC LVOT DIAMETER: 1.9 CM
E WAVE DECELERATION TIME: 190 MS
FRACTIONAL SHORTENING: 37 % (ref 28–44)
INTERVENTRICULAR SEPTUM IN DIASTOLE (PARASTERNAL SHORT AXIS VIEW): 1.1 CM
INTERVENTRICULAR SEPTUM: 1.1 CM (ref 0.6–1.1)
LAAS-AP2: 15.7 CM2
LAAS-AP4: 17.4 CM2
LEFT ATRIUM AREA SYSTOLE SINGLE PLANE A4C: 17.3 CM2
LEFT ATRIUM SIZE: 3.6 CM
LEFT ATRIUM VOLUME (MOD BIPLANE): 44 ML
LEFT INTERNAL DIMENSION IN SYSTOLE: 2.7 CM (ref 2.1–4)
LEFT VENTRICULAR INTERNAL DIMENSION IN DIASTOLE: 4.3 CM (ref 3.5–6)
LEFT VENTRICULAR POSTERIOR WALL IN END DIASTOLE: 1.1 CM
LEFT VENTRICULAR STROKE VOLUME: 56 ML
LVSV (TEICH): 56 ML
MAX DIASTOLIC BP: 70 MMHG
MAX HEART RATE: 142 BPM
MAX HR PERCENT: 88 %
MAX HR: 142 BPM
MAX PREDICTED HEART RATE: 161 BPM
MAX. SYSTOLIC BP: 160 MMHG
MV E'TISSUE VEL-LAT: 8 CM/S
MV E'TISSUE VEL-SEP: 8 CM/S
MV PEAK A VEL: 0.74 M/S
MV PEAK E VEL: 76 CM/S
MV STENOSIS PRESSURE HALF TIME: 55 MS
MV VALVE AREA P 1/2 METHOD: 4 CM2
PROTOCOL NAME: NORMAL
PV PEAK GRADIENT: 4 MMHG
RATE PRESSURE PRODUCT: NORMAL
REASON FOR TERMINATION: NORMAL
RIGHT ATRIUM AREA SYSTOLE A4C: 12.9 CM2
RIGHT VENTRICLE ID DIMENSION: 2.5 CM
SL CV LEFT ATRIUM LENGTH A2C: 4.9 CM
SL CV PED ECHO LEFT VENTRICLE DIASTOLIC VOLUME (MOD BIPLANE) 2D: 84 ML
SL CV PED ECHO LEFT VENTRICLE SYSTOLIC VOLUME (MOD BIPLANE) 2D: 28 ML
SL CV REST NUCLEAR ISOTOPE DOSE: 10.2 MCI
SL CV STRESS NUCLEAR ISOTOPE DOSE: 32.8 MCI
SL CV STRESS RECOVERY BP: NORMAL MMHG
SL CV STRESS RECOVERY HR: 77 BPM
SL CV STRESS RECOVERY O2 SAT: 100 %
SL CV STRESS STAGE REACHED: 3
STRESS ANGINA INDEX: 0
STRESS BASELINE BP: NORMAL MMHG
STRESS BASELINE HR: 56 BPM
STRESS O2 SAT REST: 99 %
STRESS PEAK HR: 142 BPM
STRESS POST ESTIMATED WORKLOAD: 10.1 METS
STRESS POST EXERCISE DUR MIN: 7 MIN
STRESS POST EXERCISE DUR SEC: 11 SEC
STRESS POST O2 SAT PEAK: 100 %
STRESS POST PEAK BP: 160 MMHG
TARGET HR FORMULA: NORMAL
TEST INDICATION: NORMAL
TIME IN EXERCISE PHASE: NORMAL
TR MAX PG: 19 MMHG
TR PEAK VELOCITY: 2.2 M/S
TRICUSPID ANNULAR PLANE SYSTOLIC EXCURSION: 2.1 CM
TRICUSPID VALVE PEAK REGURGITATION VELOCITY: 2.19 M/S

## 2023-08-09 PROCEDURE — 93017 CV STRESS TEST TRACING ONLY: CPT

## 2023-08-09 PROCEDURE — 93306 TTE W/DOPPLER COMPLETE: CPT

## 2023-08-09 PROCEDURE — G1004 CDSM NDSC: HCPCS

## 2023-08-09 PROCEDURE — A9502 TC99M TETROFOSMIN: HCPCS

## 2023-08-09 PROCEDURE — 78452 HT MUSCLE IMAGE SPECT MULT: CPT | Performed by: INTERNAL MEDICINE

## 2023-08-09 PROCEDURE — 93018 CV STRESS TEST I&R ONLY: CPT | Performed by: INTERNAL MEDICINE

## 2023-08-09 PROCEDURE — 93016 CV STRESS TEST SUPVJ ONLY: CPT | Performed by: INTERNAL MEDICINE

## 2023-08-09 PROCEDURE — 78452 HT MUSCLE IMAGE SPECT MULT: CPT

## 2023-08-09 PROCEDURE — 93306 TTE W/DOPPLER COMPLETE: CPT | Performed by: INTERNAL MEDICINE

## 2023-08-09 RX ORDER — HYDROXYZINE HYDROCHLORIDE 10 MG/1
10 TABLET, FILM COATED ORAL EVERY 6 HOURS PRN
Qty: 30 TABLET | Refills: 0 | Status: SHIPPED | OUTPATIENT
Start: 2023-08-09

## 2023-08-09 NOTE — TELEPHONE ENCOUNTER
Refill request for hydroxyzine to be sent to [facility].   Not sure if patient is still taking as her list states no longer taking

## 2023-08-09 NOTE — TELEPHONE ENCOUNTER
----- Message from Juan Jose Kelly sent at 8/9/2023  3:49 PM EDT -----  Please let patient know that her echo demonstrates normal EF and heart/valve function.   On Echo, question of dilation of Aorta, but sometimes imaging not clear,  Discussed with Dr Aureliano Hester, recommend CT of chest for further evaluation

## 2023-08-09 NOTE — TELEPHONE ENCOUNTER
----- Message from Hermelinda Hemphill, [address] [place] [address] sent at 8/9/2023  1:39 PM EDT -----  Please let patient know that her stress test looked very good,  It doesn't appear any portion on the heart with decreased blood flow.   Awaiting final report on Echo

## 2023-08-10 ENCOUNTER — TELEPHONE (OUTPATIENT)
Dept: FAMILY MEDICINE CLINIC | Facility: CLINIC | Age: 58
End: 2023-08-10

## 2023-08-10 NOTE — TELEPHONE ENCOUNTER
Pt called her Omperazole and Norvasc were went for mail delivery however she is wondering if she can get some sent to her pharmacy before her mail delivery arrives since she will  be out of her medications tomorrow. Confirmed - [facility]. Also, she said that she received Losartan 50mg - she said that the bottle says that it was order by Dr. Leena Zuniga. She stated that she has not taken it. I do not see this on her list of medications, can you review when you have a moment?

## 2023-08-11 ENCOUNTER — CLINICAL SUPPORT (OUTPATIENT)
Dept: CARDIOLOGY CLINIC | Facility: CLINIC | Age: 58
End: 2023-08-11
Payer: MEDICARE

## 2023-08-11 DIAGNOSIS — I10 ESSENTIAL HYPERTENSION: ICD-10-CM

## 2023-08-11 PROCEDURE — 93248 EXT ECG>7D<15D REV&INTERPJ: CPT | Performed by: NURSE PRACTITIONER

## 2023-08-11 NOTE — TELEPHONE ENCOUNTER
Called and spoke with patient on the phone. Advised patient to safely dispose of the Losartan as she should only be taking Amlodipine 5mg and Lisinopril 20mg daily. Patient has a few pills left which should be sufficient to last until her mail order refill is delivered.

## 2023-08-16 NOTE — PROGRESS NOTES
Assessment and Plan:   Ms. Omar Vidal is a 64-year-old female with history significant for ulcerative colitis diagnosed in 1998 and osteoporosis likely related to chronic steroid use who presents for an evaluation of a skin rash. She is currently on prednisone 20 mg once daily. She is referred by Dr. Jud Casas for a rheumatology consult. Nathan Arzola presents today for an evaluation of a skin rash occurring over the past year on her mid back region. Given the history of ulcerative colitis this may be suggestive of pyoderma gangrenosum. To confirm the diagnosis, assess if a skin biopsy is required for alternate etiologies and for management I recommend she establish with dermatology. She has an appointment scheduled in October and I will contact their office for a sooner visit. - She will follow-up with gastroenterology for continued management of the ulcerative colitis. She is aware of the side effects related to chronic steroid use and is following up with her PCP for monitoring and management of osteoporosis. This should be addressed as soon as possible. Plan:  Diagnoses and all orders for this visit:    Skin rash  -     Ambulatory Referral to Rheumatology    Ulcerative colitis without complications, unspecified location [facility])  Comments:  1998    Current chronic use of systemic steroids    Age-related osteoporosis without current pathological fracture    Iron deficiency      I have personally reviewed pertinent films in PACS of the CT abdomen which does not show sacroiliitis. Activities as tolerated. Continue other medications as prescribed by PCP and other specialists. RTC PRN. HPI  Ms. Omar Vidal is a 64-year-old female with history significant for ulcerative colitis diagnosed in 1998 and osteoporosis likely related to chronic steroid use who presents for an evaluation of a skin rash. She is currently on prednisone 20 mg once daily.   She is referred by Dr. Jud Casas for a rheumatology consult. Patient reports she was diagnosed with ulcerative colitis in approximately 1998 and since then has been on chronic steroid therapy at varying doses. She reports for the most part the chronic dose has remained at 10 mg once daily but she will be prescribed a higher dose for flareups. She mentions at one point of time she was started on 6-MP but this led to a paralysis type situation affecting her legs. Other than this she denies being on immunosuppressive treatment. She mentions most recently her gastroenterologist recommended adalimumab injections but she is very hesitant to start this due to potential side effects. She is also following with her primary care physician for monitoring and management of osteoporosis. She is currently not on treatment. She is primarily referred today due to a skin rash that has been occurring on her back for the past 1+ year. She mentions it is red, flaky and can get itchy as well as ooze. She has seen the wound care clinic for this and also applied topical cortisone without any benefit. There has been no improvement with oral steroids either. She is scheduled for a dermatology evaluation in October. No skin rashes elsewhere. The following portions of the patient's history were reviewed and updated as appropriate: allergies, current medications, past family history, past medical history, past social history, past surgical history and problem list.      Review of Systems  Constitutional: Negative for weight change, fevers, chills, night sweats, fatigue. ENT/Mouth: Negative for hearing changes, ear pain, nasal congestion, sinus pain, hoarseness, sore throat, rhinorrhea, swallowing difficulty. Eyes: Negative for pain, redness, discharge, vision changes. Cardiovascular: Negative for chest pain, SOB, palpitations. Respiratory: Negative for cough, sputum, wheezing, dyspnea.    Gastrointestinal: Negative for nausea, vomiting, diarrhea, constipation, pain, heartburn. Genitourinary: Negative for dysuria, urinary frequency, hematuria. Musculoskeletal: As per HPI. Skin: Negative for color changes. Positive for skin rash. Neuro: Negative for weakness, numbness, tingling, loss of consciousness. Psych: Negative for anxiety, depression. Heme/Lymph: Negative for easy bruising, bleeding, lymphadenopathy.         Past Medical History:   Diagnosis Date   • Anxiety    • Arthritis    • Atypical chest pain     last assessed: 12/6/13   • Bloody diarrhea     last assesed: 58/23/16   • Breast lump     last assessed: 10/10/14   • Candidiasis of esophagus (HCC)     last assessed: 2/24/16   • Candidiasis of mouth     last assessed: 8/7/14   • Choledocholithiasis     last assessed: 6/5/14   • Cholelithiasis     last assessed: 7/21/15   • Closed fracture of first metatarsal bone of right foot     last assessed: 12/11/13   • Costovertebral angle pain     last assessed: 5/4/17   • Elevated blood pressure reading     last assessed: 7/29/16   • Grief reaction     last assessed: 4/4/16   • Herpes zoster     last assessed: 11/3/16   • High risk medication use     last assessed: 12/23/16   • Hypokalemia     last assessed: 4/12/16   • Hypotension     last assessed: 5/15/15   • Inflammatory disorder of breast     last assessed: 12/6/13   • Iron deficiency anemia     last assessed: 12/6/13   • Microscopic hematuria     last assessed: 12/6/13   • On prednisone therapy     last assessed: 10/19/17   • Opioid use, unspecified, uncomplicated     last assessed: 3/1/17   • Other polyp of sinus     last assessed: 12/6/13   • Paronychia of toe     last assessed: 12/6/13   • Pharyngoesophageal dysphagia     resolved: 3/1/16   • Tinea corporis     last assessed: 8/25/16   • Ulcerative colitis ([address])    • Underweight     last assessed: 3/1/16   • Urinary frequency     resolved: 7/29/16   • Viral warts        Past Surgical History:   Procedure Laterality Date   • BREAST SURGERY      left breast lumpectomy   • CARPAL TUNNEL RELEASE Bilateral    •  SECTION N/A    • CHOLECYSTECTOMY     • COLONOSCOPY  2019   • EGD  2019   • ESOPHAGOGASTRODUODENOSCOPY N/A 3/14/2016    Procedure: ESOPHAGOGASTRODUODENOSCOPY (EGD); Surgeon: Axel Rondon MD;  Location: [facility]; Service:    • TONSILLECTOMY N/A        Social History     Socioeconomic History   • Marital status: Single     Spouse name: Not on file   • Number of children: Not on file   • Years of education: Not on file   • Highest education level: Not on file   Occupational History   • Not on file   Tobacco Use   • Smoking status: Never   • Smokeless tobacco: Never   Vaping Use   • Vaping Use: Never used   Substance and Sexual Activity   • Alcohol use: Never   • Drug use: Never   • Sexual activity: Not on file   Other Topics Concern   • Not on file   Social History Narrative   • Not on file     Social Determinants of Health     Financial Resource Strain: Not on file   Food Insecurity: No Food Insecurity (2023)    Hunger Vital Sign    • Worried About Running Out of Food in the Last Year: Never true    • Ran Out of Food in the Last Year: Never true   Transportation Needs: No Transportation Needs (2023)    PRAPARE - Transportation    • Lack of Transportation (Medical): No    • Lack of Transportation (Non-Medical):  No   Physical Activity: Not on file   Stress: Not on file   Social Connections: Not on file   Intimate Partner Violence: Not on file   Housing Stability: Not on file       Family History   Problem Relation Age of Onset   • Heart disease Mother    • Stroke Mother    • Heart disease Father         MI   • Colon cancer Maternal Grandfather    • Asthma Son    • Autism Son    • Heart disease Brother         CAD   • Cancer Maternal Grandmother         colon   • Heart disease Brother    • COPD Brother         smoker   • No Known Problems Brother    • No Known Problems Half-Sister        Allergies   Allergen Reactions   • Dairy Aid [Tilactase] GI Intolerance   • Levaquin [Levofloxacin In D5w] Other (See Comments)     Redness at injection site   • Mercaptopurine      Causes weakness in legs   • Mesalamine GI Intolerance   • Other Other (See Comments)     6 mp. Causes weakness in legs. Current Outpatient Medications:   •  Adalimumab (Humira Pen) 80 MG/0.8ML PNKT, Inject 2 pens under the skin on day 1, then inject 1 pen under the skin on day 15. (Patient not taking: Reported on 7/24/2023), Disp: 3 each, Rfl: 0  •  adalimumab (Humira) 40 MG/0.4ML, Inject 40mg under skin every 14 days (Patient not taking: Reported on 7/24/2023), Disp: 2 each, Rfl: 11  •  amLODIPine (NORVASC) 5 mg tablet, Take 1 tablet (5 mg total) by mouth daily, Disp: 30 tablet, Rfl: 0  •  balsalazide (COLAZAL) 750 mg capsule, Take 3 capsules (2,250 mg total) by mouth 2 (two) times a day, Disp: 180 capsule, Rfl: 2  •  Blood Pressure KIT, Use 2 (two) times a day, Disp: 1 kit, Rfl: 0  •  calcium carbonate (OS-BUTCH) 600 MG tablet, Take 1 tablet (600 mg total) by mouth 2 (two) times a day with meals, Disp: 30 tablet, Rfl: 2  •  cholecalciferol (VITAMIN D3) 1,000 units tablet, Take 2 tablets (2,000 Units total) by mouth daily (Patient not taking: Reported on 8/4/2023), Disp: 60 tablet, Rfl: 2  •  folic acid (FOLVITE) 1 mg tablet, Take 1 mg by mouth daily. , Disp: , Rfl:   •  hydrOXYzine HCL (ATARAX) 10 mg tablet, Take 1 tablet (10 mg total) by mouth every 6 (six) hours as needed for itching, Disp: 30 tablet, Rfl: 0  •  lisinopril (ZESTRIL) 20 mg tablet, Take 1 tablet (20 mg total) by mouth daily, Disp: 30 tablet, Rfl: 2  •  omeprazole (PriLOSEC) 40 MG capsule, Take 1 capsule (40 mg total) by mouth daily, Disp: 30 capsule, Rfl: 0  •  predniSONE 5 mg tablet, Take 8 tablets (40 mg total) by mouth daily for 7 days, THEN 7 tablets (35 mg total) daily for 7 days, THEN 6 tablets (30 mg total) daily for 7 days, THEN 5 tablets (25 mg total) daily for 7 days, THEN 4 tablets (20 mg total) daily for 7 days, THEN 3 tablets (15 mg total) daily for 7 days, THEN 3 tablets (15 mg total) daily for 7 days, THEN 2 tablets (10 mg total) daily for 7 days, THEN 1 tablet (5 mg total) daily for 7 days. Do not start before July 12, 2023., Disp: 273 tablet, Rfl: 0      Objective:    Vitals:    08/17/23 1306   BP: 128/78   Pulse: 60       Physical Exam  General: Well appearing, well nourished, in no distress. Oriented x 3, normal mood and affect. Ambulating without difficulty. Skin: Good turgor. On her mid back region there is an approximately 3 x 3 inch erythematous rash present with a scaly appearance and sites of oozing. Razor burn noted on her bilateral lower extremities. Bruises noted scattered on her bilateral upper extremities. Hair: Normal texture and distribution. Nails: Normal color, no deformities. HEENT:  Head: Normocephalic, atraumatic. Eyes: Conjunctiva clear, sclera non-icteric, EOM intact. Extremities: No amputations or deformities, cyanosis, edema. Neurologic: Alert and oriented. No focal neurological deficits appreciated. Psychiatric: Normal mood and affect. Trinidad Pickens M.D.   Rheumatology

## 2023-08-17 ENCOUNTER — HOSPITAL ENCOUNTER (OUTPATIENT)
Dept: RADIOLOGY | Facility: HOSPITAL | Age: 58
Discharge: HOME | End: 2023-08-17
Payer: MEDICARE

## 2023-08-17 ENCOUNTER — OFFICE VISIT (OUTPATIENT)
Dept: RHEUMATOLOGY | Facility: CLINIC | Age: 58
End: 2023-08-17
Payer: MEDICARE

## 2023-08-17 VITALS — SYSTOLIC BLOOD PRESSURE: 128 MMHG | DIASTOLIC BLOOD PRESSURE: 78 MMHG | HEART RATE: 60 BPM

## 2023-08-17 DIAGNOSIS — R21 SKIN RASH: Primary | ICD-10-CM

## 2023-08-17 DIAGNOSIS — I77.810 ASCENDING AORTA DILATATION (HCC): ICD-10-CM

## 2023-08-17 DIAGNOSIS — M81.0 AGE-RELATED OSTEOPOROSIS WITHOUT CURRENT PATHOLOGICAL FRACTURE: ICD-10-CM

## 2023-08-17 DIAGNOSIS — K51.90 ULCERATIVE COLITIS WITHOUT COMPLICATIONS, UNSPECIFIED LOCATION (HCC): ICD-10-CM

## 2023-08-17 DIAGNOSIS — I10 ESSENTIAL HYPERTENSION: ICD-10-CM

## 2023-08-17 DIAGNOSIS — E61.1 IRON DEFICIENCY: ICD-10-CM

## 2023-08-17 DIAGNOSIS — Z79.52 CURRENT CHRONIC USE OF SYSTEMIC STEROIDS: ICD-10-CM

## 2023-08-17 PROCEDURE — 99204 OFFICE O/P NEW MOD 45 MIN: CPT | Performed by: INTERNAL MEDICINE

## 2023-08-17 PROCEDURE — 71250 CT THORAX DX C-: CPT

## 2023-08-17 PROCEDURE — G1004 CDSM NDSC: HCPCS

## 2023-08-18 ENCOUNTER — OFFICE VISIT (OUTPATIENT)
Dept: FAMILY MEDICINE CLINIC | Facility: CLINIC | Age: 58
End: 2023-08-18
Payer: MEDICARE

## 2023-08-18 ENCOUNTER — APPOINTMENT (OUTPATIENT)
Dept: LAB | Facility: HOSPITAL | Age: 58
End: 2023-08-18
Attending: FAMILY MEDICINE
Payer: MEDICARE

## 2023-08-18 ENCOUNTER — TELEPHONE (OUTPATIENT)
Age: 58
End: 2023-08-18

## 2023-08-18 ENCOUNTER — TELEPHONE (OUTPATIENT)
Dept: CARDIOLOGY CLINIC | Facility: CLINIC | Age: 58
End: 2023-08-18

## 2023-08-18 VITALS
OXYGEN SATURATION: 99 % | DIASTOLIC BLOOD PRESSURE: 90 MMHG | BODY MASS INDEX: 17.91 KG/M2 | HEART RATE: 70 BPM | WEIGHT: 107.5 LBS | RESPIRATION RATE: 21 BRPM | HEIGHT: 65 IN | SYSTOLIC BLOOD PRESSURE: 150 MMHG | TEMPERATURE: 97.4 F

## 2023-08-18 DIAGNOSIS — R63.6 UNDERWEIGHT: ICD-10-CM

## 2023-08-18 DIAGNOSIS — D72.829 LEUKOCYTOSIS, UNSPECIFIED TYPE: ICD-10-CM

## 2023-08-18 DIAGNOSIS — E44.1 MILD PROTEIN-CALORIE MALNUTRITION (HCC): ICD-10-CM

## 2023-08-18 DIAGNOSIS — I10 ESSENTIAL HYPERTENSION: ICD-10-CM

## 2023-08-18 DIAGNOSIS — L30.9 DERMATITIS DUE TO UNKNOWN CAUSE: ICD-10-CM

## 2023-08-18 DIAGNOSIS — M81.0 OSTEOPOROSIS, UNSPECIFIED OSTEOPOROSIS TYPE, UNSPECIFIED PATHOLOGICAL FRACTURE PRESENCE: Primary | ICD-10-CM

## 2023-08-18 DIAGNOSIS — K51.90 ULCERATIVE COLITIS WITHOUT COMPLICATIONS, UNSPECIFIED LOCATION (HCC): ICD-10-CM

## 2023-08-18 LAB
ACANTHOCYTES BLD QL SMEAR: PRESENT
ANISOCYTOSIS BLD QL SMEAR: PRESENT
BASOPHILS # BLD MANUAL: 0 THOUSAND/UL (ref 0–0.1)
BASOPHILS NFR MAR MANUAL: 0 % (ref 0–1)
DACRYOCYTES BLD QL SMEAR: PRESENT
EOSINOPHIL # BLD MANUAL: 0 THOUSAND/UL (ref 0–0.4)
EOSINOPHIL NFR BLD MANUAL: 0 % (ref 0–6)
HCT VFR BLD AUTO: 46.2 % (ref 34.8–46.1)
HGB BLD-MCNC: 14.2 G/DL (ref 11.5–15.4)
HYPERCHROMIA BLD QL SMEAR: PRESENT
LYMPHOCYTES # BLD AUTO: 0.43 THOUSAND/UL (ref 0.6–4.47)
LYMPHOCYTES # BLD AUTO: 2 % (ref 14–44)
MCH RBC QN AUTO: 28.5 PG (ref 26.8–34.3)
MCHC RBC AUTO-ENTMCNC: 30.7 G/DL (ref 31.4–37.4)
MCV RBC AUTO: 93 FL (ref 82–98)
MONOCYTES # BLD AUTO: 0.43 THOUSAND/UL (ref 0–1.22)
MONOCYTES NFR BLD: 3 % (ref 4–12)
MYELOCYTES NFR BLD MANUAL: 2 % (ref 0–1)
NEUTROPHILS # BLD MANUAL: 13.33 THOUSAND/UL (ref 1.85–7.62)
NEUTS BAND NFR BLD MANUAL: 1 % (ref 0–8)
NEUTS SEG NFR BLD AUTO: 91 % (ref 43–75)
OVALOCYTES BLD QL SMEAR: PRESENT
PLATELET # BLD AUTO: 314 THOUSANDS/UL (ref 149–390)
PLATELET BLD QL SMEAR: ADEQUATE
PMV BLD AUTO: 8.8 FL (ref 8.9–12.7)
POIKILOCYTOSIS BLD QL SMEAR: PRESENT
RBC # BLD AUTO: 4.99 MILLION/UL (ref 3.81–5.12)
RBC MORPH BLD: PRESENT
TOXIC GRANULES BLD QL SMEAR: PRESENT
VARIANT LYMPHS # BLD AUTO: 1 %
WBC # BLD AUTO: 14.49 THOUSAND/UL (ref 4.31–10.16)

## 2023-08-18 PROCEDURE — 99214 OFFICE O/P EST MOD 30 MIN: CPT | Performed by: FAMILY MEDICINE

## 2023-08-18 PROCEDURE — 85027 COMPLETE CBC AUTOMATED: CPT

## 2023-08-18 PROCEDURE — 85007 BL SMEAR W/DIFF WBC COUNT: CPT

## 2023-08-18 PROCEDURE — 36415 COLL VENOUS BLD VENIPUNCTURE: CPT

## 2023-08-18 RX ORDER — UBIDECARENONE 75 MG
CAPSULE ORAL DAILY
COMMUNITY

## 2023-08-18 NOTE — TELEPHONE ENCOUNTER
----- Message from Yeimi Valverde, [address] [place] [address] sent at 8/18/2023  3:39 PM EDT -----  Please let the patient know that her Monitor was stable.   How was she feeling when wearing the monitor

## 2023-08-18 NOTE — TELEPHONE ENCOUNTER
Pt called in regards of severe rash of concern on back, was referred by Rheumatology. Pt has been scheduled for Wednesday, 8/23, at 3:10 pm and she is a new patient. This seems to be an urgent issue which is why she was placed in an open spot, but let me know if there is an issue. Pt has an October appt, but Rheumatology wanted her in asa. I did not cancel the 10/6 appoint, in the event she needed to be seen in Oct as a f/u. Thank you.

## 2023-08-19 NOTE — PATIENT INSTRUCTIONS
This patient has a longstanding history of ulcerative colitis. She is on a long prednisone taper to treat the ulcerative colitis and hopefully eventually get Humira to treat and stabilize this problem. She has follow-up appointments with GI in the future. She also has follow-up appointments with cardiology secondary to bradycardia when she is in the hospital.  Her anemia has resolved. The patient does have a history of leukocytosis most likely secondary to prednisone. The patient was given an order for repeat CBC and differential to be done in about 2 weeks as she continues on the prednisone taper. The patient does have an appointment with a dermatologist in October because of the lesion on her back. She may be seen earlier if she can get in with a cancellation appointment. The patient most likely needs a biopsy of this lesion to determine its etiology. The patient was encouraged to eat as much as possible because of her underweight status. The patient will need follow-up in the future for osteoporosis because of her longstanding use of steroids. The steroids also may be contributing to her hypertension. Patient should follow-up with me in 3 months because of her chronic multiple medical problems.   She does need to schedule a screening mammogram.  She is also due for a cervical cancer screening exam.

## 2023-08-19 NOTE — PROGRESS NOTES
Name: Carri Fountain      : 1965      MRN: [id number]  Encounter Provider: Padmini De La Cruz DO  Encounter Date: 2023   Encounter department: [address]     1. Osteoporosis, unspecified osteoporosis type, unspecified pathological fracture presence  -     DXA bone density spine hip and pelvis; Future; Expected date: 2023    2. Leukocytosis, unspecified type  -     CBC and differential; Future; Expected date: 2023    3. Essential hypertension    4. Ulcerative colitis without complications, unspecified location ([address])    5. Dermatitis due to unknown cause    6. Mild protein-calorie malnutrition ([address])    7. Underweight           Subjective      This is a 49-year-old female who is following up for multiple medical problems. Back in July she was hospitalized for flareup of her ulcerative colitis. She had weakness and also anemia secondary to long-term use of steroids to treat her ulcerative colitis. The patient has been on a weaning dose of prednisone since discharge. Eventually the patient needs to get off steroids because of the multiple side effects attributed to this medication. The plan for this patient is to be treated with Humira eventually for her control of her ulcerative colitis. The patient also has a lesion on her back that was evaluated by the wound care center, and rheumatology. She has been referred to dermatology because of longstanding unresolving nature of this lesion. The patient also has a longstanding history of hypertension which could also be related to her prednisone use. Her medicines recently has been adjusted to treat the hypertension. Review of Systems   Constitutional: Negative. HENT: Negative. Eyes: Negative. Respiratory: Negative. Mild wheezes   Cardiovascular: Negative. Gastrointestinal: Negative. Endocrine: Negative. Genitourinary: Negative. Musculoskeletal: Negative.     Skin: Negative. Allergic/Immunologic: Negative. Neurological: Negative. Hematological: Negative. Psychiatric/Behavioral: Negative. Current Outpatient Medications on File Prior to Visit   Medication Sig   • amLODIPine (NORVASC) 5 mg tablet Take 1 tablet (5 mg total) by mouth daily   • balsalazide (COLAZAL) 750 mg capsule Take 3 capsules (2,250 mg total) by mouth 2 (two) times a day   • Blood Pressure KIT Use 2 (two) times a day   • calcium carbonate (OS-BUTCH) 600 MG tablet Take 1 tablet (600 mg total) by mouth 2 (two) times a day with meals   • cholecalciferol (VITAMIN D3) 1,000 units tablet Take 2 tablets (2,000 Units total) by mouth daily   • cyanocobalamin (VITAMIN B-12) 100 mcg tablet Take by mouth daily   • folic acid (FOLVITE) 1 mg tablet Take 1 mg by mouth daily. • hydrOXYzine HCL (ATARAX) 10 mg tablet Take 1 tablet (10 mg total) by mouth every 6 (six) hours as needed for itching   • lisinopril (ZESTRIL) 20 mg tablet Take 1 tablet (20 mg total) by mouth daily   • omeprazole (PriLOSEC) 40 MG capsule Take 1 capsule (40 mg total) by mouth daily   • predniSONE 5 mg tablet Take 8 tablets (40 mg total) by mouth daily for 7 days, THEN 7 tablets (35 mg total) daily for 7 days, THEN 6 tablets (30 mg total) daily for 7 days, THEN 5 tablets (25 mg total) daily for 7 days, THEN 4 tablets (20 mg total) daily for 7 days, THEN 3 tablets (15 mg total) daily for 7 days, THEN 3 tablets (15 mg total) daily for 7 days, THEN 2 tablets (10 mg total) daily for 7 days, THEN 1 tablet (5 mg total) daily for 7 days. Do not start before July 12, 2023. • Adalimumab (Humira Pen) 80 MG/0.8ML PNKT Inject 2 pens under the skin on day 1, then inject 1 pen under the skin on day 15.  (Patient not taking: Reported on 7/24/2023)   • adalimumab (Humira) 40 MG/0.4ML Inject 40mg under skin every 14 days (Patient not taking: Reported on 7/24/2023)       Objective     /90   Pulse 70   Temp (!) 97.4 °F (36.3 °C) (Temporal)   Resp 21   Ht 5' 5" (1.651 m)   Wt 48.8 kg (107 lb 8 oz)   SpO2 99%   BMI 17.89 kg/m²     Physical Exam  Constitutional:       Comments: The patient is underweight with a BMI of 17.89   Cardiovascular:      Rate and Rhythm: Normal rate and regular rhythm. Heart sounds: Normal heart sounds. Pulmonary:      Effort: Pulmonary effort is normal.      Breath sounds: Normal breath sounds. Musculoskeletal:         General: Normal range of motion. Skin:     Comments: Approximately 2 x 2 centimeter erythematous lesion in the midportion of her back. Neurological:      General: No focal deficit present. Mental Status: She is alert. Psychiatric:         Mood and Affect: Mood normal.         Behavior: Behavior normal.         Thought Content:  Thought content normal.       Bud Forde, DO

## 2023-08-22 ENCOUNTER — OFFICE VISIT (OUTPATIENT)
Dept: CARDIOLOGY CLINIC | Facility: CLINIC | Age: 58
End: 2023-08-22
Payer: MEDICARE

## 2023-08-22 VITALS
BODY MASS INDEX: 17.33 KG/M2 | OXYGEN SATURATION: 100 % | HEART RATE: 60 BPM | SYSTOLIC BLOOD PRESSURE: 134 MMHG | HEIGHT: 65 IN | WEIGHT: 104 LBS | DIASTOLIC BLOOD PRESSURE: 72 MMHG

## 2023-08-22 DIAGNOSIS — E78.00 HYPERCHOLESTEROLEMIA: ICD-10-CM

## 2023-08-22 DIAGNOSIS — I10 ESSENTIAL HYPERTENSION: Primary | ICD-10-CM

## 2023-08-22 DIAGNOSIS — R00.1 SINUS BRADYCARDIA: ICD-10-CM

## 2023-08-22 PROCEDURE — 99214 OFFICE O/P EST MOD 30 MIN: CPT | Performed by: NURSE PRACTITIONER

## 2023-08-22 NOTE — PROGRESS NOTES
Progress Note - Cardiology Office  [facility]    Kay Whitmore 59 y.o. female MRN: [id number]  : 1965  Encounter: [id number]      Assessment:     1. Essential hypertension    2. Sinus bradycardia    3. Hypercholesterolemia        Discussion Summary and Plan:  1. Hypertension: Blood pressures currently stable    -   Continue losartan 50 mg daily    2. Bradycardia: No complaints of lightheadedness or dizziness,    -   2-week ZIO monitor demonstrates average heart rate of 65 bpm    -   Brief episodes of SVT and NSVT, less than 8 beats    -   Patient asymptomatic, would hold beta-blocker    -   Should she become symptomatic would recommend evaluation by electrophysiology    3. 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 Office number was provided #[phone]. Please call [phone] if any questions. Counseling :  A description of the counseling. Goals and Barriers. Patient's ability to self care: Yes  Medication side effect reviewed with patient in detail and all their questions answered to their satisfaction. HPI :     Kay Whitmore is a 59y.o. year old female who came for test results follow-up. Patient was seen for complaints of bradycardia and concern for coronary artery disease. Her nuclear stress test was nonischemic and her echocardiogram demonstrated an EF visibly estimated at 55 to 60% with no regional wall motion abnormalities and no valvular dysfunction. Patient also wore a 2-week ZIO monitor which she noted kept falling off and review of strips there was some artifact.   Patient was documented to have average heart rate of 65 ( bpm) and there were 3 episodes of ventricular tachycardia the longest being 8 beats. She also was noted to have brief short runs of SVT of 4-6 beats. Patient noted no complaints of palpitations during the monitoring. Patient cares for her 25-year-old son who is autistic and keeps her active. She denies any further chest pain, pressure or palpitations. There is strong family history for coronary artery disease in her siblings but she does note that they are heavy smokers. Review of Systems   Constitutional: Negative. Negative for activity change and fatigue. HENT: Negative. Negative for congestion, ear discharge, nosebleeds and sore throat. Eyes: Negative. Negative for photophobia and visual disturbance. Respiratory: Negative. Negative for apnea, cough, chest tightness and shortness of breath. Cardiovascular: Negative. Negative for chest pain, palpitations and leg swelling. Gastrointestinal: Negative. Negative for abdominal distention, constipation, diarrhea, nausea and vomiting. Endocrine: Negative. Negative for polydipsia, polyphagia and polyuria. Genitourinary: Negative. Negative for difficulty urinating. Musculoskeletal: Negative. Skin: Positive for wound. Neurological: Negative. Negative for dizziness, syncope, weakness and light-headedness. Hematological: Negative. Psychiatric/Behavioral: Negative.         Historical Information   Past Medical History:   Diagnosis Date   • Anxiety    • Arthritis    • Atypical chest pain     last assessed: 12/6/13   • Bloody diarrhea     last assesed: 58/23/16   • Breast lump     last assessed: 10/10/14   • Candidiasis of esophagus ([address])     last assessed: 2/24/16   • Candidiasis of mouth     last assessed: 8/7/14   • Choledocholithiasis     last assessed: 6/5/14   • Cholelithiasis     last assessed: 7/21/15   • Closed fracture of first metatarsal bone of right foot     last assessed: 12/11/13   • Costovertebral angle pain     last assessed: 5/4/17   • Elevated blood pressure reading     last assessed: 16   • Grief reaction     last assessed: 16   • Herpes zoster     last assessed: 11/3/16   • High risk medication use     last assessed: 16   • Hypokalemia     last assessed: 16   • Hypotension     last assessed: 5/15/15   • Inflammatory disorder of breast     last assessed: 13   • Iron deficiency anemia     last assessed: 13   • Microscopic hematuria     last assessed: 13   • On prednisone therapy     last assessed: 10/19/17   • Opioid use, unspecified, uncomplicated     last assessed: 3/1/17   • Other polyp of sinus     last assessed: 13   • Paronychia of toe     last assessed: 13   • Pharyngoesophageal dysphagia     resolved: 3/1/16   • Tinea corporis     last assessed: 16   • Ulcerative colitis ([address])    • Underweight     last assessed: 3/1/16   • Urinary frequency     resolved: 16   • Viral warts      Past Surgical History:   Procedure Laterality Date   • BREAST SURGERY      left breast lumpectomy   • CARPAL TUNNEL RELEASE Bilateral    •  SECTION N/A    • CHOLECYSTECTOMY     • COLONOSCOPY  2019   • EGD  2019   • ESOPHAGOGASTRODUODENOSCOPY N/A 3/14/2016    Procedure: ESOPHAGOGASTRODUODENOSCOPY (EGD); Surgeon: Meliton Solorzano MD;  Location: [facility];   Service:    • TONSILLECTOMY N/A      Social History     Substance and Sexual Activity   Alcohol Use Never     Social History     Substance and Sexual Activity   Drug Use Never     Social History     Tobacco Use   Smoking Status Never   Smokeless Tobacco Never     Family History:   Family History   Problem Relation Age of Onset   • Heart disease Mother    • Stroke Mother    • Heart disease Father         MI   • Colon cancer Maternal Grandfather    • Asthma Son    • Autism Son    • Heart disease Brother         CAD   • Cancer Maternal Grandmother         colon   • Heart disease Brother    • COPD Brother         smoker   • No Known Problems Brother    • No Known Problems Half-Sister        Meds/Allergies     Allergies   Allergen Reactions   • Dairy Aid [Tilactase] GI Intolerance   • Levaquin [Levofloxacin In D5w] Other (See Comments)     Redness at injection site   • Mercaptopurine      Causes weakness in legs   • Mesalamine GI Intolerance   • Other Other (See Comments)     6 mp. Causes weakness in legs. Current Outpatient Medications:   •  amLODIPine (NORVASC) 5 mg tablet, Take 1 tablet (5 mg total) by mouth daily, Disp: 30 tablet, Rfl: 0  •  balsalazide (COLAZAL) 750 mg capsule, Take 3 capsules (2,250 mg total) by mouth 2 (two) times a day, Disp: 180 capsule, Rfl: 2  •  Blood Pressure KIT, Use 2 (two) times a day, Disp: 1 kit, Rfl: 0  •  calcium carbonate (OS-BUTCH) 600 MG tablet, Take 1 tablet (600 mg total) by mouth 2 (two) times a day with meals, Disp: 30 tablet, Rfl: 2  •  cholecalciferol (VITAMIN D3) 1,000 units tablet, Take 2 tablets (2,000 Units total) by mouth daily, Disp: 60 tablet, Rfl: 2  •  cyanocobalamin (VITAMIN B-12) 100 mcg tablet, Take by mouth daily, Disp: , Rfl:   •  folic acid (FOLVITE) 1 mg tablet, Take 1 mg by mouth daily. , Disp: , Rfl:   •  hydrOXYzine HCL (ATARAX) 10 mg tablet, Take 1 tablet (10 mg total) by mouth every 6 (six) hours as needed for itching, Disp: 30 tablet, Rfl: 0  •  lisinopril (ZESTRIL) 20 mg tablet, Take 1 tablet (20 mg total) by mouth daily, Disp: 30 tablet, Rfl: 2  •  omeprazole (PriLOSEC) 40 MG capsule, Take 1 capsule (40 mg total) by mouth daily, Disp: 30 capsule, Rfl: 0  •  predniSONE 5 mg tablet, Take 8 tablets (40 mg total) by mouth daily for 7 days, THEN 7 tablets (35 mg total) daily for 7 days, THEN 6 tablets (30 mg total) daily for 7 days, THEN 5 tablets (25 mg total) daily for 7 days, THEN 4 tablets (20 mg total) daily for 7 days, THEN 3 tablets (15 mg total) daily for 7 days, THEN 3 tablets (15 mg total) daily for 7 days, THEN 2 tablets (10 mg total) daily for 7 days, THEN 1 tablet (5 mg total) daily for 7 days. Do not start before July 12, 2023., Disp: 273 tablet, Rfl: 0  •  Adalimumab (Humira Pen) 80 MG/0.8ML PNKT, Inject 2 pens under the skin on day 1, then inject 1 pen under the skin on day 15. (Patient not taking: Reported on 7/24/2023), Disp: 3 each, Rfl: 0  •  adalimumab (Humira) 40 MG/0.4ML, Inject 40mg under skin every 14 days (Patient not taking: Reported on 7/24/2023), Disp: 2 each, Rfl: 11    Vitals: Blood pressure 134/72, pulse 60, height 5' 5" (1.651 m), weight 47.2 kg (104 lb), SpO2 100 %. Body mass index is 17.31 kg/m². Wt Readings from Last 3 Encounters:   08/22/23 47.2 kg (104 lb)   08/18/23 48.8 kg (107 lb 8 oz)   08/09/23 48.1 kg (106 lb)     Vitals:    08/22/23 1254   Weight: 47.2 kg (104 lb)     BP Readings from Last 3 Encounters:   08/22/23 134/72   08/18/23 150/90   08/17/23 128/78       Physical Exam:  Physical Exam  Vitals and nursing note reviewed. Constitutional:       General: She is not in acute distress. Appearance: Normal appearance. She is normal weight. HENT:      Right Ear: External ear normal.      Left Ear: External ear normal.   Eyes:      General: No scleral icterus. Right eye: No discharge. Left eye: No discharge. Cardiovascular:      Rate and Rhythm: Normal rate and regular rhythm. Pulses: Normal pulses. Heart sounds: Normal heart sounds. Pulmonary:      Effort: Pulmonary effort is normal. No respiratory distress. Breath sounds: Normal breath sounds. No wheezing, rhonchi or rales. Abdominal:      General: Bowel sounds are normal. There is no distension. Palpations: Abdomen is soft. Musculoskeletal:      Right lower leg: No edema. Left lower leg: No edema. Skin:     General: Skin is warm and dry. Capillary Refill: Capillary refill takes less than 2 seconds. Neurological:      General: No focal deficit present. Mental Status: She is alert and oriented to person, place, and time.  Mental status is at baseline. Psychiatric:         Mood and Affect: Mood normal.                     [facility]  Cardiology        "This note was completed in part utilizing [employer] direct voice recognition software. Grammatical errors, random word insertion, spelling mistakes, and incomplete sentences may be an occasional consequence of the system secondary to software limitations, ambient noise and hardware issues. Please read the chart carefully and recognize, using context, where substitutions have occurred.   If you have any questions or concerns about the context, text or information contained within the body of this dictation, please contact myself, the provider, for further clarification."

## 2023-08-23 ENCOUNTER — TELEPHONE (OUTPATIENT)
Dept: FAMILY MEDICINE CLINIC | Facility: CLINIC | Age: 58
End: 2023-08-23

## 2023-08-23 ENCOUNTER — OFFICE VISIT (OUTPATIENT)
Age: 58
End: 2023-08-23
Payer: MEDICARE

## 2023-08-23 VITALS — HEIGHT: 65 IN | BODY MASS INDEX: 17.66 KG/M2 | WEIGHT: 106 LBS | TEMPERATURE: 98.6 F

## 2023-08-23 DIAGNOSIS — L82.1 SEBORRHEIC KERATOSIS: ICD-10-CM

## 2023-08-23 DIAGNOSIS — D48.5 NEOPLASM OF UNCERTAIN BEHAVIOR OF SKIN: ICD-10-CM

## 2023-08-23 DIAGNOSIS — L98.9 SKIN LESION, SUPERFICIAL: Primary | ICD-10-CM

## 2023-08-23 PROCEDURE — 88305 TISSUE EXAM BY PATHOLOGIST: CPT | Performed by: PATHOLOGY

## 2023-08-23 PROCEDURE — 99204 OFFICE O/P NEW MOD 45 MIN: CPT | Performed by: DERMATOLOGY

## 2023-08-23 PROCEDURE — 11103 TANGNTL BX SKIN EA SEP/ADDL: CPT | Performed by: DERMATOLOGY

## 2023-08-23 PROCEDURE — 11102 TANGNTL BX SKIN SINGLE LES: CPT | Performed by: DERMATOLOGY

## 2023-08-23 NOTE — PATIENT INSTRUCTIONS
III. POST-PROCEDURAL CARE (WHAT YOU WILL NEED TO DO "AFTER THE BIOPSY" TO OPTIMIZE HEALING)    Keep the area clean and dry. Try NOT to remove the bandage or get it wet for the first 24 hours. Gently clean the area and apply surgical ointment (such as Vaseline petrolatum ointment, which is available "over the counter" and not a prescription) to the biopsy site for up to 2 weeks straight. This acts to protect the wound from the outside world. Sutures are not usually placed in this procedure. If any sutures were placed, return for suture removal as instructed (generally 1 week for the face, 2 weeks for the body). Take Acetaminophen (Tylenol) for discomfort, if no contraindications. Ibuprofen or aspirin could make bleeding worse. Call our office immediately for signs of infection: fever, chills, increased redness, warmth, tenderness, discomfort/pain, or pus or foul smell coming from the wound. WHAT TO DO IF THERE IS ANY BLEEDING? If a small amount of bleeding is noticed, place a clean cloth over the area and apply firm pressure for ten minutes. Check the wound after 10 minutes of direct pressure. If bleeding persists, try one more time for an additional 10 minutes of direct pressure on the area. If the bleeding becomes heavier or does not stop after the second attempt, or if you have any other questions about this procedure, then please call your [facility] ke's Dermatologist by calling [phone] (SKIN). I hereby acknowledge that I have reviewed and verified the site with my Dermatologist and have requested and authorized my Dermatologist to proceed with the procedure.       SEBORRHEIC KERATOSIS; NON-INFLAMED    Assessment and Plan:  Based on a thorough discussion of this condition and the management approach to it (including a comprehensive discussion of the known risks, side effects and potential benefits of treatment), the patient (family) agrees to implement the following specific plan:  Reassured benign   Removal discussed if bothersome $20 today or $150 for up to 10 lesions     Seborrheic Keratosis  A seborrheic keratosis is a harmless warty spot that appears during adult life as a common sign of skin aging. Seborrheic keratoses can arise on any area of skin, covered or uncovered, with the usual exception of the palms and soles. They do not arise from mucous membranes. Seborrheic keratoses can have highly variable appearance. Seborrheic keratoses are extremely common. It has been estimated that over 90% of adults over the age of 61 years have one or more of them. They occur in males and females of all races, typically beginning to erupt in the 35s or 45s. They are uncommon under the age of 21 years. The precise cause of seborrhoeic keratoses is not known. Seborrhoeic keratoses are considered degenerative in nature. As time goes by, seborrheic keratoses tend to become more numerous. Some people inherit a tendency to develop a very large number of them; some people may have hundreds of them.

## 2023-08-23 NOTE — TELEPHONE ENCOUNTER
Pt called to give an update to Dr. Eileen Browning. They did a biopsy on the rash they do believe that the rash could be skin cancer, they sent the biopsy out. She is unable to have the cryotherapy done in their office for her leg as they do not do it there.

## 2023-08-23 NOTE — PROGRESS NOTES
[facility] Note     Patient Name: Jax Tony  Encounter Date: 08/23/2023     Have you been cared for by a [facility]ologist in the last 3 years and, if so, which description applies to you? NO. I am considered a "new" patient and must complete all patient intake questions. I am FEMALE/of child-bearing potential.    REVIEW OF SYSTEMS:  Have you recently had or currently have any of the following? Recent fever or chills? No  Any non-healing wound? YES, on back   Are you pregnant or planning to become pregnant? No  Are you currently or planning to be nursing or breast feeding? No   PAST MEDICAL HISTORY:  Have you personally ever had or currently have any of the following? If "YES," then please provide more detail. Skin cancer (such as Melanoma, Basal Cell Carcinoma, Squamous Cell Carcinoma? No  Tuberculosis, HIV/AIDS, Hepatitis B or C: No  Systemic Immunosuppression such as Diabetes, Biologic or Immunotherapy, Chemotherapy, Organ Transplantation, Bone Marrow Transplantation No  Radiation Treatment No   FAMILY HISTORY:  Any "first degree relatives" (parent, brother, sister, or child) with the following? Skin Cancer, Pancreatic or Other Cancer? No   PATIENT EXPERIENCE:    Do you want the Dermatologist to perform a COMPLETE skin exam today including a clinical examination under the "bra and underwear" areas? NO  If necessary, do we have your permission to call and leave a detailed message on your Preferred Phone number that includes your specific medical information? Yes      Allergies   Allergen Reactions   • Dairy Aid [Tilactase] GI Intolerance   • Levaquin [Levofloxacin In D5w] Other (See Comments)     Redness at injection site   • Mercaptopurine      Causes weakness in legs   • Mesalamine GI Intolerance   • Other Other (See Comments)     6 mp. Causes weakness in legs.       Current Outpatient Medications:   •  Adalimumab (Humira Pen) 80 MG/0.8ML PNKT, Inject 2 pens under the skin on day 1, then inject 1 pen under the skin on day 15. (Patient not taking: Reported on 7/24/2023), Disp: 3 each, Rfl: 0  •  adalimumab (Humira) 40 MG/0.4ML, Inject 40mg under skin every 14 days (Patient not taking: Reported on 7/24/2023), Disp: 2 each, Rfl: 11  •  amLODIPine (NORVASC) 5 mg tablet, Take 1 tablet (5 mg total) by mouth daily, Disp: 30 tablet, Rfl: 0  •  balsalazide (COLAZAL) 750 mg capsule, Take 3 capsules (2,250 mg total) by mouth 2 (two) times a day, Disp: 180 capsule, Rfl: 2  •  Blood Pressure KIT, Use 2 (two) times a day, Disp: 1 kit, Rfl: 0  •  calcium carbonate (OS-BUTCH) 600 MG tablet, Take 1 tablet (600 mg total) by mouth 2 (two) times a day with meals, Disp: 30 tablet, Rfl: 2  •  cholecalciferol (VITAMIN D3) 1,000 units tablet, Take 2 tablets (2,000 Units total) by mouth daily, Disp: 60 tablet, Rfl: 2  •  cyanocobalamin (VITAMIN B-12) 100 mcg tablet, Take by mouth daily, Disp: , Rfl:   •  folic acid (FOLVITE) 1 mg tablet, Take 1 mg by mouth daily. , Disp: , Rfl:   •  hydrOXYzine HCL (ATARAX) 10 mg tablet, Take 1 tablet (10 mg total) by mouth every 6 (six) hours as needed for itching, Disp: 30 tablet, Rfl: 0  •  lisinopril (ZESTRIL) 20 mg tablet, Take 1 tablet (20 mg total) by mouth daily, Disp: 30 tablet, Rfl: 2  •  omeprazole (PriLOSEC) 40 MG capsule, Take 1 capsule (40 mg total) by mouth daily, Disp: 30 capsule, Rfl: 0  •  predniSONE 5 mg tablet, Take 8 tablets (40 mg total) by mouth daily for 7 days, THEN 7 tablets (35 mg total) daily for 7 days, THEN 6 tablets (30 mg total) daily for 7 days, THEN 5 tablets (25 mg total) daily for 7 days, THEN 4 tablets (20 mg total) daily for 7 days, THEN 3 tablets (15 mg total) daily for 7 days, THEN 3 tablets (15 mg total) daily for 7 days, THEN 2 tablets (10 mg total) daily for 7 days, THEN 1 tablet (5 mg total) daily for 7 days.  Do not start before July 12, 2023., Disp: 273 tablet, Rfl: 0          Whom besides the patient is providing clinical information about today's encounter? NO ADDITIONAL HISTORIAN (patient alone provided history)    Physical Exam and Assessment/Plan by Diagnosis:      NEOPLASM OF UNCERTAIN BEHAVIOR OF SKIN    Physical Exam:  (Anatomic Location); (Size and Morphological Description); (Differential Diagnosis):  Specimen A: Mid back; 7 x 5 cm pink scaly plaque; (diffdx) bcc versus scc   Specimen B: Right temple;  1 cm pearly pink papule (diffdx) bcc vs seborrheic keratosis   Pertinent Positives:  Pertinent Negatives: Additional History of Present Condition:  She states when it started it was a small red ball. She stated it was itchy and burning. She applied neosporin which seems to have been helping. She states it is still itchy and burning but no longer oozing. She went to see a wound care doctor but was told since it is not oozing there was nothing they can do. She went to see a rheumatologist and was told she should follow up with dermatologist     Assessment and Plan:  I have discussed with the patient that a sample of skin via a "skin biopsy” would be potentially helpful to further make a specific diagnosis under the microscope. Based on a thorough discussion of this condition and the management approach to it (including a comprehensive discussion of the known risks, side effects and potential benefits of treatment), the patient (family) agrees to implement the following specific plan:    Procedure:  Skin Biopsy. After a thorough discussion of treatment options and risk/benefits/alternatives (including but not limited to local pain, scarring, dyspigmentation, blistering, possible superinfection, and inability to confirm a diagnosis via histopathology), verbal and written consent were obtained and portion of the rash was biopsied for tissue sample. See below for consent that was obtained from patient and subsequent Procedure Note.     PROCEDURE TANGENTIAL (SHAVE) BIOPSY NOTE:    Performing Physician:   Anatomic Location; Clinical Description with size (cm); Pre-Op Diagnosis:   Specimen A: Mid back; 7 x 5 cm pink scaly plaque; (diffdx) bcc versus scc     Post-op diagnosis: Same     Local anesthesia: 1% xylocaine with epi      Topical anesthesia: None    Hemostasis:  Aluminum chloride   PROCEDURE TANGENTIAL (SHAVE) BIOPSY NOTE:    Performing Physician:   Anatomic Location; Clinical Description with size (cm); Pre-Op Diagnosis:   Specimen B: Right temple;  1 cm pearly pink papule (diffdx) bcc vs seborrheic keratosis  Post-op diagnosis: Same     Local anesthesia: 1% xylocaine with epi      Topical anesthesia: None    Hemostasis:Aluminum chloride     After obtaining informed consent  at which time there was a discussion about the purpose of biopsy  and low risks of infection and bleeding. The area was prepped and draped in the usual fashion. Anesthesia was obtained with 1% lidocaine with epinephrine. A shave biopsy to an appropriate sampling depth was obtained by Shave (Dermablade or 15 blade) The resulting wound was covered with surgical ointment and bandaged appropriately. The patient tolerated the procedure well without complications and was without signs of functional compromise. Specimen has been sent for review by Dermatopathology. Standard post-procedure care has been explained and has been included in written form within the patient's copy of Informed Consent. INFORMED CONSENT DISCUSSION AND POST-OPERATIVE INSTRUCTIONS FOR PATIENT    I.  RATIONALE FOR PROCEDURE  I understand that a skin biopsy allows the Dermatologist to test a lesion or rash under the microscope to obtain a diagnosis. It usually involves numbing the area with numbing medication and removing a small piece of skin; sometimes the area will be closed with sutures. In this specific procedure, sutures are not usually needed. If any sutures are placed, then they are usually need to be removed in 2 weeks or less.     I understand that my Dermatologist recommends that a skin "shave" biopsy be performed today. A local anesthetic, similar to the kind that a dentist uses when filling a cavity, will be injected with a very small needle into the skin area to be sampled. The injected skin and tissue underneath "will go to sleep” and become numb so no pain should be felt afterwards. An instrument shaped like a tiny "razor blade" (shave biopsy instrument) will be used to cut a small piece of tissue and skin from the area so that a sample of tissue can be taken and examined more closely under the microscope. A slight amount of bleeding will occur, but it will be stopped with direct pressure and a pressure bandage and any other appropriate methods. I understands that a scar will form where the wound was created. Surgical ointment will be applied to help protect the wound. Sutures are not usually needed. II.  RISKS AND POTENTIAL COMPLICATIONS   I understand the risks and potential complications of a skin biopsy include but are not limited to the following:  Bleeding  Infection  Pain  Scar/keloid  Skin discoloration  Incomplete Removal  Recurrence  Nerve Damage/Numbness/Loss of Function  Allergic Reaction to Anesthesia  Biopsies are diagnostic procedures and based on findings additional treatment or evaluation may be required  Loss or destruction of specimen resulting in no additional findings    My Dermatologist has explained to me the nature of the condition, the nature of the procedure, and the benefits to be reasonably expected compared with alternative approaches. My Dermatologist has discussed the likelihood of major risks or complications of this procedure including the specific risks listed above, such as bleeding, infection, and scarring/keloid. I understand that a scar is expected after this procedure.   I understand that my physician cannot predict if the scar will form a "keloid," which extends beyond the borders of the wound that is created. A keloid is a thick, painful, and bumpy scar. A keloid can be difficult to treat, as it does not always respond well to therapy, which includes injecting cortisone directly into the keloid every few weeks. While this usually reduces the pain and size of the scar, it does not eliminate it. I understand that photographs may be taken before and after the procedure. These will be maintained as part of the medical providers confidential records and may not be made available to me. I further authorize the medical provider to use the photographs for teaching purposes or to illustrate scientific papers, books, or lectures if in his/her judgment, medical research, education, or science may benefit from its use. I have had an opportunity to fully inquire about the risks and benefits of this procedure and its alternatives. I have been given ample time and opportunity to ask questions and to seek a second opinion if I wished to do so. I acknowledge that there have specifically been no guarantees as to the cosmetic results from the procedure. I am aware that with any procedure there is always the possibility of an unexpected complication. III. POST-PROCEDURAL CARE (WHAT YOU WILL NEED TO DO "AFTER THE BIOPSY" TO OPTIMIZE HEALING)    Keep the area clean and dry. Try NOT to remove the bandage or get it wet for the first 24 hours. Gently clean the area and apply surgical ointment (such as Vaseline petrolatum ointment, which is available "over the counter" and not a prescription) to the biopsy site for up to 2 weeks straight. This acts to protect the wound from the outside world. Sutures are not usually placed in this procedure. If any sutures were placed, return for suture removal as instructed (generally 1 week for the face, 2 weeks for the body). Take Acetaminophen (Tylenol) for discomfort, if no contraindications. Ibuprofen or aspirin could make bleeding worse.     Call our office immediately for signs of infection: fever, chills, increased redness, warmth, tenderness, discomfort/pain, or pus or foul smell coming from the wound. WHAT TO DO IF THERE IS ANY BLEEDING? If a small amount of bleeding is noticed, place a clean cloth over the area and apply firm pressure for ten minutes. Check the wound after 10 minutes of direct pressure. If bleeding persists, try one more time for an additional 10 minutes of direct pressure on the area. If the bleeding becomes heavier or does not stop after the second attempt, or if you have any other questions about this procedure, then please call your [facility]'s Dermatologist by calling [phone] (SKIN). I hereby acknowledge that I have reviewed and verified the site with my Dermatologist and have requested and authorized my Dermatologist to proceed with the procedure. SEBORRHEIC KERATOSIS; NON-INFLAMED    Physical Exam:  Anatomic Location Affected:  Right lower leg   Morphological Description:  Flat and raised, waxy, smooth to warty textured, yellow to brownish-grey to dark brown to blackish, discrete, "stuck-on" appearing papules. Pertinent Positives:  Pertinent Negatives: Additional History of Present Condition:  Patient reports new bumps on the skin. Denies itch, burn, pain, bleeding or ulceration. Present constantly; nothing seems to make it worse or better. No prior treatment. Assessment and Plan:  Based on a thorough discussion of this condition and the management approach to it (including a comprehensive discussion of the known risks, side effects and potential benefits of treatment), the patient (family) agrees to implement the following specific plan:  Reassured benign   Removal discussed if bothersome $20 today or $150 for up to 10 lesions     Seborrheic Keratosis  A seborrheic keratosis is a harmless warty spot that appears during adult life as a common sign of skin aging.   Seborrheic keratoses can arise on any area of skin, covered or uncovered, with the usual exception of the palms and soles. They do not arise from mucous membranes. Seborrheic keratoses can have highly variable appearance. Seborrheic keratoses are extremely common. It has been estimated that over 90% of adults over the age of 61 years have one or more of them. They occur in males and females of all races, typically beginning to erupt in the 35s or 45s. They are uncommon under the age of 21 years. The precise cause of seborrhoeic keratoses is not known. Seborrhoeic keratoses are considered degenerative in nature. As time goes by, seborrheic keratoses tend to become more numerous. Some people inherit a tendency to develop a very large number of them; some people may have hundreds of them.         Scribe Attestation    I,:  Perla Putnam am acting as a scribe while in the presence of the attending physician.:       I,:  Mary Ann Cárdenas MD personally performed the services described in this documentation    as scribed in my presence.:

## 2023-08-28 PROCEDURE — 88305 TISSUE EXAM BY PATHOLOGIST: CPT | Performed by: PATHOLOGY

## 2023-08-29 DIAGNOSIS — C44.310 BASAL CELL CARCINOMA (BCC) OF SKIN OF FACE, UNSPECIFIED PART OF FACE: Primary | ICD-10-CM

## 2023-08-29 NOTE — RESULT ENCOUNTER NOTE
Tissue Exam: [id number]  Order: [id number]  Status: Final result     Visible to patient: Yes (not seen)     Dx: Neoplasm of uncertain behavior of skin     0 Result Notes     Component   Case Report  Surgical Pathology Report                         Case: [id number]                                   Authorizing Provider: Gemma Hairston MD     Collected:           08/23/2023 1506              Ordering Location:     [facility]      Received:            08/23/2023 [address] [place] [address]                                                                   Pathologist:           Linda Cano MD                                                             Specimens:   A) - Skin, Other, Specimen A: mid back                                                              B) - Skin, Other, Specimen B: right temple                                               Final Diagnosis  A. Skin, mid back, shave biopsy:  Basal cell carcinoma, superficial type.        B. Skin, right temple, shave biopsy:  Basal cell carcinoma, nodular type.     Electronically signed by Linda Cano MD on 8/28/2023 at  3:28 PM      DERMATOPATHOLOGY RESULT NOTE    Results reviewed by ordering physician.         Instructions for Clinical Derm Team:   (remember to route Result Note to appropriate staff):    Call patient and schedule for the below procedures    Result & Plan by Specimen:    Specimen A: malignant  Plan: excision in resident excision clinic      Specimen B: malignant  Plan: [facility]

## 2023-08-30 ENCOUNTER — OFFICE VISIT (OUTPATIENT)
Dept: GASTROENTEROLOGY | Facility: CLINIC | Age: 58
End: 2023-08-30
Payer: MEDICARE

## 2023-08-30 ENCOUNTER — TELEPHONE (OUTPATIENT)
Age: 58
End: 2023-08-30

## 2023-08-30 ENCOUNTER — TELEPHONE (OUTPATIENT)
Dept: FAMILY MEDICINE CLINIC | Facility: CLINIC | Age: 58
End: 2023-08-30

## 2023-08-30 ENCOUNTER — TELEPHONE (OUTPATIENT)
Dept: CARDIOLOGY CLINIC | Facility: CLINIC | Age: 58
End: 2023-08-30

## 2023-08-30 VITALS
HEART RATE: 67 BPM | DIASTOLIC BLOOD PRESSURE: 80 MMHG | WEIGHT: 110 LBS | SYSTOLIC BLOOD PRESSURE: 142 MMHG | HEIGHT: 65 IN | BODY MASS INDEX: 18.33 KG/M2

## 2023-08-30 DIAGNOSIS — K51.30 ULCERATIVE RECTOSIGMOIDITIS WITHOUT COMPLICATION (HCC): Primary | ICD-10-CM

## 2023-08-30 DIAGNOSIS — Z79.52 CURRENT CHRONIC USE OF SYSTEMIC STEROIDS: ICD-10-CM

## 2023-08-30 DIAGNOSIS — K21.9 GASTROESOPHAGEAL REFLUX DISEASE WITHOUT ESOPHAGITIS: ICD-10-CM

## 2023-08-30 DIAGNOSIS — M81.6 LOCALIZED OSTEOPOROSIS WITHOUT CURRENT PATHOLOGICAL FRACTURE: ICD-10-CM

## 2023-08-30 DIAGNOSIS — C44.91 SKIN CANCER, BASAL CELL: ICD-10-CM

## 2023-08-30 PROCEDURE — 99214 OFFICE O/P EST MOD 30 MIN: CPT | Performed by: INTERNAL MEDICINE

## 2023-08-30 RX ORDER — PREDNISONE 10 MG/1
10 TABLET ORAL DAILY
Qty: 90 TABLET | Refills: 1 | Status: SHIPPED | OUTPATIENT
Start: 2023-08-30 | End: 2023-09-05 | Stop reason: SDUPTHER

## 2023-08-30 NOTE — TELEPHONE ENCOUNTER
Pre- operative Mohs Telephone Scheduling Note    Do you have a pacemaker, defibrillator, spinal or brain stimulator? no    Do you take antibiotics before skin or dental procedures? no  If yes, will likely require pre-operative antibiotics. Ask  the patient why they take the antibiotics (usually because of joint replacement). Do you have a history of a joint replacements within the past 2 years? no   If yes, will likely require pre-operative antibiotics. Ask if orthopaedic surgeon has prescribed pre-operative antibiotics to take before procedures/dental work? Do you take any OTC medications that thin your blood (Aspirin, Aleve, Ibuprofen) or supplements that thin your blood (fish oil, garlic, vitamin E, Ginko Biloba)? no    Do you take any prescribed medications that thin your blood (Coumadin, Plavix, Xarelto, Eliquis or another prescribed blood thinner)? no    Do you have an allergy to lidocaine or epinephrine? no    Do you have allergies to Iodine? no    Do you wear a lidocaine patch? no    Have you ever been diagnosed with HIV, AIDS, Hep B and Hep C? no    Do you use a cane, walker or wheelchair? no    Is the patient from a nursing home? no If yes, Is there any special accommodations that is needed for patient no    Do you smoke? no      If yes,  patient to try and stop 2 days before surgery and 7 days after the surgery. Minimizing smoking as much as possible during this time will improve healing and the cosmetic result after surgery. Do you use supplemental oxygen? If so, how many liters and can you be off it for a short period of time? no    Date scheduled: 10/9/2023 @ 1130 with Dr. Angela Patel patient will need 3 slots for this tumor. Coordination of Care with other provider (Oculoplastics, Plastics, ENT) required? no   IF YES, PLEASE FORWARD TO APPROPRIATE PERSONNEL TO HELP COORDINATE. Are there remaining tumors to be scheduled? no    Was Prior Authorization obtained?  No (please use .[facility] to document prior auth)

## 2023-08-30 NOTE — TELEPHONE ENCOUNTER
----- Message from Antonio Blair LPN sent at 0/98/7316  2:56 PM EDT -----    ----- Message -----  From: Robert Correia  Sent: 8/29/2023  12:41 PM EDT  To: Antonio Blair LPN    Please let patient know that CT chest did demonstrate a fusiform ectasia of her ascending thoracic aorta measuring 4.2 cm. Recommend follow up with yearly CT of the chest and she, at her convenience should make an appointment to establish care with vascular surgery for her routine surveillance.

## 2023-08-30 NOTE — PROGRESS NOTES
Answers for HPI/ROS submitted by the patient on 8/25/2023  When you are not experiencing symptoms of your inflammatory bowel disease, how many bowel movements do you typically have each day?: 0-1  Over the last 3 days, what is the maximum number of bowel movements that you had in a single day?: 3  Over the last 3 days, have you had any bowel movements where you passed blood without stool?: No  Since your last visit, have you received any vaccinations?: No  Since the last visit, have you had an infection?: Yes  Did the infection require hospitalization?: No  Did the infection require antibiotics?: No  Is there any possibility that you may be pregnant?: No  In the past three months, have you used tobacco in any form?: No  During the last year, how many days have you been hospitalized because of your inflammatory bowel disease?: 5  During the last year, how many days have you visited a hospital emergency department because of your inflammatory bowel disease?: 1  During the last month, have you taken narcotic pain medications (such as Percocet, oxycodone, Oxycontin, morphine, Vicodin, Dilaudid, MS Contin) for your inflammatory bowel disease?: No  During the last month, have you awoken at night to move your bowels?: Yes  During the last month, have you had leakage of your stool while sleeping?: Yes  During the last month, have you had incontinence of stool while you were awake?: Yes  During the last month have you unintentionally lost weight?: Yes  During the last 3 days, have you had a fever?: No  During the last 3 days, have you had eye irritation?: No  During the last 3 days, have you had mouth sores?: No  During the last 3 days, have you had a sore throat?: No  During the last 3 days, have you had chest pain?: No  During the last 3 days, have you had shortness of breath?: No  During the last 3 days, have you had numbness or tingling in your hands or feet?: No  During the last 3 days, have you had a skin rash?: Yes  During the last 3 days, have you had pain or swelling in your joints?: No  During the last 3 days, have you had bruising or bleeding?: No  During the last 3 days, have you felt depressed or blue?: No    [facility] - Outpatient Follow-up Note  Chelsie Araya 59 y.o. female MRN: [id number]  Encounter: [id number]          ASSESSMENT AND PLAN:      1. Ulcerative rectosigmoiditis without complication [facility])  [id number] female with long history of ulcerative colitis, currently on prednisone along with the balsalazide now come for follow-up, she was recently hospitalized, colonoscopy was done which shows left-sided colitis and inflammatory polyp, she is on prednisone for last 20 years, during hospital stay we discussed about starting on Humira, Humira is approved and she got injection but she is not comfortable about starting any new medication, she is concerned about all the side effects with Humira. I reassured the patient and discussed about risk and benefit and efficacy of biologic agent. She want to wait until November to start on Humira after her surgery, she was diagnosed with basal cell skin cancer and she is scheduled surgery with dermatologist.  We will refill prednisone, she has severe osteoporosis, currently only taking calcium and vitamin D, will refer to endocrinologist to start on Prolia injection  - predniSONE 10 mg tablet; Take 1 tablet (10 mg total) by mouth daily  Dispense: 90 tablet; Refill: 1    2. Localized osteoporosis without current pathological fracture  Discussed in detail about starting on Prolia injection, will refer to endocrinologist  - Ambulatory Referral to Endocrinology; Future    3. Current chronic use of systemic steroids  She is taking prednisone 10 mg p.o. daily for last 20 years for ulcerative colitis, recently dose was lowered to 5 mg and she is having flareup of symptoms.   She denying any rectal bleeding but having multiple bowel movement throughout the day. I advised her to go back on 10 mg prednisone until she start Humira    4. Skin cancer, basal cell  She is scheduled for surgery with dermatologist, once skin surgery finished she will call give us call to schedule appointment for Humira teaching    5. Gastroesophageal reflux disease without esophagitis  Continue with omeprazole, s/p recent EGD which came back okay, biopsy result was reviewed with the patient    ______________________________________________________________________    SUBJECTIVE: Patient seen and examined, she come for follow-up, she come for follow-up after recent hospital discharge, she was admitted with abdominal pain and bloody diarrhea, history of ulcerative colitis on chronic prednisone, she also use balsalazide 2.25 g twice a day, her prednisone dose was lowered to 5 mg which lead to flareup of symptoms, currently she is on 5 mg and its not working, patient is here to discuss about increasing prednisone dose, while she was in the hospital we discussed about starting on Humira, her TB test and hepatitis test came back negative, insurance approved Humira and prescription was sent, she received Humira first induction dose but did not start it yet, she is here to discuss about all the concerns she has about Humira. She is concerned about all the side effect with Humira and she want to wait until skin cancer surgery done and then set up the appointment for Humira teaching, she denying any smoking, no chronic alcohol use, she denying any abdominal pain, she denying any rectal bleeding, she complained about 3-4 loose bowel movement throughout the day      REVIEW OF SYSTEMS IS OTHERWISE NEGATIVE.       Historical Information   Past Medical History:   Diagnosis Date   • Anxiety    • Arthritis    • Atypical chest pain     last assessed: 12/6/13   • Bloody diarrhea     last assesed: 58/23/16   • Breast lump     last assessed: 10/10/14   • Candidiasis of esophagus (HCC)     last assessed: 16   • Candidiasis of mouth     last assessed: 14   • Choledocholithiasis     last assessed: 14   • Cholelithiasis     last assessed: 7/21/15   • Closed fracture of first metatarsal bone of right foot     last assessed: 13   • Costovertebral angle pain     last assessed: 17   • Elevated blood pressure reading     last assessed: 16   • Grief reaction     last assessed: 16   • Herpes zoster     last assessed: 11/3/16   • High risk medication use     last assessed: 16   • Hypokalemia     last assessed: 16   • Hypotension     last assessed: 5/15/15   • Inflammatory disorder of breast     last assessed: 13   • Iron deficiency anemia     last assessed: 13   • Microscopic hematuria     last assessed: 13   • On prednisone therapy     last assessed: 10/19/17   • Opioid use, unspecified, uncomplicated     last assessed: 3/1/17   • Other polyp of sinus     last assessed: 13   • Paronychia of toe     last assessed: 13   • Pharyngoesophageal dysphagia     resolved: 3/1/16   • Tinea corporis     last assessed: 16   • Ulcerative colitis ([address])    • Underweight     last assessed: 3/1/16   • Urinary frequency     resolved: 16   • Viral warts      Past Surgical History:   Procedure Laterality Date   • BREAST SURGERY      left breast lumpectomy   • CARPAL TUNNEL RELEASE Bilateral    •  SECTION N/A    • CHOLECYSTECTOMY     • COLONOSCOPY  2019   • EGD  2019   • ESOPHAGOGASTRODUODENOSCOPY N/A 3/14/2016    Procedure: ESOPHAGOGASTRODUODENOSCOPY (EGD); Surgeon: Jonathan Moore MD;  Location: [facility];   Service:    • TONSILLECTOMY N/A      Social History   Social History     Substance and Sexual Activity   Alcohol Use Never     Social History     Substance and Sexual Activity   Drug Use Never     Social History     Tobacco Use   Smoking Status Never   Smokeless Tobacco Never     Family History   Problem Relation Age of Onset   • Heart disease Mother    • Stroke Mother    • Heart disease Father         MI   • Colon cancer Maternal Grandfather    • Asthma Son    • Autism Son    • Heart disease Brother         CAD   • Cancer Maternal Grandmother         colon   • Heart disease Brother    • COPD Brother         smoker   • No Known Problems Brother    • No Known Problems Half-Sister        Meds/Allergies       Current Outpatient Medications:   •  amLODIPine (NORVASC) 5 mg tablet  •  balsalazide (COLAZAL) 750 mg capsule  •  Blood Pressure KIT  •  calcium carbonate (OS-BUTCH) 600 MG tablet  •  cholecalciferol (VITAMIN D3) 1,000 units tablet  •  cyanocobalamin (VITAMIN B-12) 100 mcg tablet  •  folic acid (FOLVITE) 1 mg tablet  •  hydrOXYzine HCL (ATARAX) 10 mg tablet  •  lisinopril (ZESTRIL) 20 mg tablet  •  omeprazole (PriLOSEC) 40 MG capsule  •  predniSONE 10 mg tablet  •  predniSONE 5 mg tablet  •  Adalimumab (Humira Pen) 80 MG/0.8ML PNKT  •  adalimumab (Humira) 40 MG/0.4ML    Allergies   Allergen Reactions   • Dairy Aid [Tilactase] GI Intolerance   • Levaquin [Levofloxacin In D5w] Other (See Comments)     Redness at injection site   • Mercaptopurine      Causes weakness in legs   • Mesalamine GI Intolerance   • Other Other (See Comments)     6 mp. Causes weakness in legs. Objective     Blood pressure 142/80, pulse 67, height 5' 5" (1.651 m), weight 49.9 kg (110 lb). Body mass index is 18.3 kg/m². PHYSICAL EXAM:      General Appearance:   Alert, cooperative, no distress   HEENT:   Normocephalic, atraumatic, anicteric.     Neck:  Supple, symmetrical, trachea midline   Lungs:   Clear to auscultation bilaterally; no rales, rhonchi or wheezing; respirations unlabored    Heart[de-identified]   Regular rate and rhythm; no murmur, rub, or gallop.    Abdomen:   Soft, non-tender, non-distended; normal bowel sounds; no masses, no organomegaly    Genitalia:   Deferred    Rectal:   Deferred    Extremities:  No cyanosis, clubbing or edema    Pulses:  2+ and symmetric    Skin:  No jaundice, rashes, or lesions    Lymph nodes:  No palpable cervical lymphadenopathy        Lab Results:   No visits with results within 1 Day(s) from this visit. Latest known visit with results is:   Office Visit on 08/23/2023   Component Date Value   • Case Report 08/23/2023                      Value:Surgical Pathology Report                         Case: [id number]                                   Authorizing Provider:  Desiree Cespedes MD     Collected:           08/23/2023 1506              Ordering Location:     [facility]      Received:            08/23/2023 [address] [place] [address]                                                                   Pathologist:           Percy Craig MD                                                             Specimens:   A) - Skin, Other, Specimen A: mid back                                                              B) - Skin, Other, Specimen B: right temple                                                • Final Diagnosis 08/23/2023                      Value: This result contains rich text formatting which cannot be displayed here. • Additional Information 08/23/2023                      Value: This result contains rich text formatting which cannot be displayed here. • Gross Description 08/23/2023                      Value: This result contains rich text formatting which cannot be displayed here. • Clinical Information 08/23/2023                      Value:Specimen A: Mid back; skin; shave; 59year old female with 7 x 5 cm pink scaly plaque; (diffdx) bcc versus scc   Specimen B: Right temple;  skin; shave; 59year old female with 1 cm pearly pink papule (diffdx) bcc vs seborrheic keratosis         Radiology Results:   CT chest wo contrast    Result Date: 8/26/2023  Narrative: CT CHEST WITHOUT IV CONTRAST INDICATION:   I10: Essential (primary) hypertension I77.810: Thoracic aortic ectasia.  COMPARISON: None. TECHNIQUE: CT examination of the chest was performed without intravenous contrast. Multiplanar 2D reformatted images were created from the source data. This examination, like all CT scans performed in the [facility], was performed utilizing techniques to minimize radiation dose exposure, including the use of iterative reconstruction and automated exposure control. Radiation dose length product (DLP) for this visit:  158.98 mGy-cm FINDINGS: LUNGS: Mild biapical pleural-parenchymal fibronodular scarring is present. The lungs appear otherwise clear. The central airways are patent. PLEURA:  Unremarkable. HEART/GREAT VESSELS: Heart is normal in size. Trace pericardial thickening and/or fluid noted. There is fusiform ectasia of the ascending thoracic aorta measuring up to 42 mm. Recommendation is for follow-up low radiation dose chest CT in one year. MEDIASTINUM AND SANDRA:  Unremarkable. CHEST WALL AND LOWER NECK:  Unremarkable. VISUALIZED STRUCTURES IN THE UPPER ABDOMEN: Nonobstructing 2 mm left upper pole renal calculus. Small bilateral renal cysts, the largest of which appears simple in the upper pole the right kidney measuring 3.3 cm with several other subcentimeter cysts some of which demonstrate high attenuation likely reflecting hemorrhagic and/or proteinaceous contents. Scattered diverticula noted in the visualized colon. OSSEOUS STRUCTURES:  No acute fracture or destructive osseous lesion. Impression: 1. Mild ascending aortic ectasia at 4.2 cm. Recommend follow-up low-dose unenhanced chest CT in 1 year. 2. Clear lungs. 3. Partially imaged renal cysts some of which appear complex. 5. Diverticulosis. The study was marked in EPIC for significant notification.  Workstation performed: [id number]     Stress strip    Result Date: 8/9/2023  Narrative: Confirmed by Roni Chan ([id number]),  Tuyet Croft ([id number]) on 8/9/2023 4:06:33 PM    Echo complete w/ contrast if indicated    Result Date: 8/9/2023  Narrative: •  Left Ventricle: Left ventricular cavity size is normal. Wall thickness is mildly increased. There is mild concentric hypertrophy. Systolic function is normal.  Estimated ejection fraction is 55 to 60%. Although no diagnostic regional wall motion abnormality was identified, this possibility cannot be completely excluded on the basis of this study. Diastolic function is normal. •  Right Ventricle: Systolic function is normal. •  Mitral Valve: There is trace regurgitation. •  Tricuspid Valve: There is trace regurgitation. NM myocardial perfusion spect (stress and/or rest)    Result Date: 8/9/2023  Narrative: •  There is no evidence of significant reversible ischemia. Summed differential score is 0. Calculated ejection fraction is 73% •  The ECG was not diagnostic due to resting ST-T abnormalities. •  Perfusion: There are no perfusion defects. Summed differential score is 0 •  Stress Function: Left ventricular function post-stress is normal.  Calculated ejection fraction is 73% •  Perfusion Defect Conclusion: There is no evidence of transient ischemic dilation (TID).

## 2023-08-30 NOTE — TELEPHONE ENCOUNTER
Spoke w/ pt and scheduled an appt, as we looked at her test we don't see the ascending aorta this needs to go to cardio thoracic surgery. We will call pt to advise that someone will call from your office to advise her who to see.

## 2023-08-31 ENCOUNTER — TELEPHONE (OUTPATIENT)
Dept: CARDIOLOGY CLINIC | Facility: CLINIC | Age: 58
End: 2023-08-31

## 2023-08-31 DIAGNOSIS — I71.20 THORACIC AORTIC ANEURYSM WITHOUT RUPTURE, UNSPECIFIED PART (HCC): Primary | ICD-10-CM

## 2023-09-01 ENCOUNTER — TELEPHONE (OUTPATIENT)
Dept: CARDIAC SURGERY | Facility: CLINIC | Age: 58
End: 2023-09-01

## 2023-09-01 DIAGNOSIS — I71.20 THORACIC AORTIC ANEURYSM WITHOUT RUPTURE, UNSPECIFIED PART (HCC): Primary | ICD-10-CM

## 2023-09-01 NOTE — TELEPHONE ENCOUNTER
Patient saw Doyle Davis is on the recall list to come back to see you.  Doyle Davis is off today patient needs a referral placed for Dr Quenten Bamberger

## 2023-09-01 NOTE — TELEPHONE ENCOUNTER
I called patient to offer her an appt.  Patient was referred by Dr. Dayami Scott  for her 4.2 cm aneurysm, patient declined and stated she will f/up with her cardiologist.

## 2023-09-05 DIAGNOSIS — I10 ESSENTIAL HYPERTENSION: ICD-10-CM

## 2023-09-05 DIAGNOSIS — K51.30 ULCERATIVE RECTOSIGMOIDITIS WITHOUT COMPLICATION (HCC): ICD-10-CM

## 2023-09-05 DIAGNOSIS — K51.00 ULCERATIVE (CHRONIC) ENTEROCOLITIS, WITHOUT COMPLICATIONS (HCC): ICD-10-CM

## 2023-09-05 RX ORDER — OMEPRAZOLE 40 MG/1
40 CAPSULE, DELAYED RELEASE ORAL DAILY
Qty: 30 CAPSULE | Refills: 0 | Status: SHIPPED | OUTPATIENT
Start: 2023-09-05 | End: 2023-09-12 | Stop reason: SDUPTHER

## 2023-09-05 RX ORDER — PREDNISONE 10 MG/1
10 TABLET ORAL DAILY
Qty: 90 TABLET | Refills: 1 | Status: SHIPPED | OUTPATIENT
Start: 2023-09-05

## 2023-09-05 RX ORDER — AMLODIPINE BESYLATE 5 MG/1
5 TABLET ORAL DAILY
Qty: 30 TABLET | Refills: 0 | Status: SHIPPED | OUTPATIENT
Start: 2023-09-05 | End: 2023-09-12 | Stop reason: SDUPTHER

## 2023-09-05 NOTE — TELEPHONE ENCOUNTER
Received fax refill request for patient's Prednisone 10 mg be sent to [address] [place] [address]     Thank you

## 2023-09-06 DIAGNOSIS — I10 ESSENTIAL HYPERTENSION: ICD-10-CM

## 2023-09-06 RX ORDER — LISINOPRIL 20 MG/1
20 TABLET ORAL DAILY
Qty: 30 TABLET | Refills: 5 | Status: SHIPPED | OUTPATIENT
Start: 2023-09-06

## 2023-09-07 ENCOUNTER — OFFICE VISIT (OUTPATIENT)
Dept: CARDIOLOGY CLINIC | Facility: CLINIC | Age: 58
End: 2023-09-07
Payer: MEDICARE

## 2023-09-07 VITALS
DIASTOLIC BLOOD PRESSURE: 70 MMHG | WEIGHT: 107 LBS | OXYGEN SATURATION: 100 % | BODY MASS INDEX: 17.83 KG/M2 | HEART RATE: 78 BPM | SYSTOLIC BLOOD PRESSURE: 122 MMHG | HEIGHT: 65 IN

## 2023-09-07 DIAGNOSIS — I10 ESSENTIAL HYPERTENSION: ICD-10-CM

## 2023-09-07 DIAGNOSIS — R00.1 SINUS BRADYCARDIA: ICD-10-CM

## 2023-09-07 DIAGNOSIS — I71.21 ANEURYSM OF ASCENDING AORTA WITHOUT RUPTURE (HCC): Primary | ICD-10-CM

## 2023-09-07 PROCEDURE — 99213 OFFICE O/P EST LOW 20 MIN: CPT | Performed by: INTERNAL MEDICINE

## 2023-09-07 NOTE — PROGRESS NOTES
Cardiology   Maria Marcial DO, Jermaine Hameed MD, Jinny Danielle MD, Lord Franci MD, [facility] - [place]  -------------------------------------------------------------------  [facility] [place] [facility]  [address] [facility], [address] [place] [address], [address] [place] [address], [address] [facility]  [phone]    Cardiology Follow Up  Lucy Small  1965  [id number]          Assessment/Plan:    1. Aneurysm of ascending aorta without rupture ([address])    2. Essential hypertension    3. Sinus bradycardia      -Reviewed pathophysiology of aneurysm with patient. Instructions for management discussed with her including refraining from smoking, ensuring adequate blood pressure control and restricting lifting less than 40 pounds.  -Will have follow-up CT scan scheduled for 6 months.  -Follow-up afterwards. She was offered evaluation by CT surgery but she does not wish to go at this time. I have spent a total time of 25 minutes on 09/07/23 in caring for this patient including Diagnostic results, Prognosis, Instructions for management, Risk factor reductions, Impressions, Documenting in the medical record, Reviewing / ordering tests, medicine, procedures   and Obtaining or reviewing history  . Interval History:     Lucy Small is 59 y.o. female here for followup of recent CT scan and hypertension. The patient recently underwent CT chest and was found to have a 4.2 cm ascending thoracic aneurysm. She has no previous chest CT available for comparison. Results were discussed with her last week but she has been incredibly anxious and scheduled appointment for evaluation. She denies any chest pain or shortness of breath. Blood pressure controlled with lisinopril and amlodipine. She has medical history of hypertension and palpitations. She was on a beta-blocker in the past but was stopped due to bradycardia. She has a family history of CAD. No recent lipid panel available.        The following portions of the patient's history were reviewed and updated as appropriate: allergies, current medications, past family history, past medical history, past social history, past surgical history, and problem list.       Current Outpatient Medications:   •  amLODIPine (NORVASC) 5 mg tablet, Take 1 tablet (5 mg total) by mouth daily, Disp: 30 tablet, Rfl: 0  •  balsalazide (COLAZAL) 750 mg capsule, Take 3 capsules (2,250 mg total) by mouth 2 (two) times a day, Disp: 180 capsule, Rfl: 2  •  Blood Pressure KIT, Use 2 (two) times a day, Disp: 1 kit, Rfl: 0  •  calcium carbonate (OS-BUTCH) 600 MG tablet, Take 1 tablet (600 mg total) by mouth 2 (two) times a day with meals, Disp: 30 tablet, Rfl: 2  •  cholecalciferol (VITAMIN D3) 1,000 units tablet, Take 2 tablets (2,000 Units total) by mouth daily, Disp: 60 tablet, Rfl: 2  •  cyanocobalamin (VITAMIN B-12) 100 mcg tablet, Take by mouth daily, Disp: , Rfl:   •  folic acid (FOLVITE) 1 mg tablet, Take 1 mg by mouth daily. , Disp: , Rfl:   •  hydrOXYzine HCL (ATARAX) 10 mg tablet, Take 1 tablet (10 mg total) by mouth every 6 (six) hours as needed for itching, Disp: 30 tablet, Rfl: 0  •  lisinopril (ZESTRIL) 20 mg tablet, Take 1 tablet (20 mg total) by mouth daily, Disp: 30 tablet, Rfl: 5  •  omeprazole (PriLOSEC) 40 MG capsule, Take 1 capsule (40 mg total) by mouth daily, Disp: 30 capsule, Rfl: 0  •  predniSONE 10 mg tablet, Take 1 tablet (10 mg total) by mouth daily, Disp: 90 tablet, Rfl: 1  •  Adalimumab (Humira Pen) 80 MG/0.8ML PNKT, Inject 2 pens under the skin on day 1, then inject 1 pen under the skin on day 15. (Patient not taking: Reported on 7/24/2023), Disp: 3 each, Rfl: 0  •  adalimumab (Humira) 40 MG/0.4ML, Inject 40mg under skin every 14 days (Patient not taking: Reported on 7/24/2023), Disp: 2 each, Rfl: 11        Review of Systems:  Review of Systems   Respiratory: Negative for shortness of breath.     Cardiovascular: Negative for chest pain, palpitations and leg swelling. Musculoskeletal: Positive for arthralgias. Psychiatric/Behavioral: The patient is nervous/anxious. All other systems reviewed and are negative. Physical Exam:  Vitals:  Vitals:    09/07/23 0851   BP: 122/70   BP Location: Right arm   Patient Position: Sitting   Cuff Size: Standard   Pulse: 78   SpO2: 100%   Weight: 48.5 kg (107 lb)   Height: 5' 5" (1.651 m)     Physical Exam   Constitutional: She appears healthy. No distress. Eyes: Pupils are equal, round, and reactive to light. Conjunctivae are normal.   Neck: No JVD present. Cardiovascular: Normal rate, regular rhythm and normal heart sounds. Exam reveals no gallop and no friction rub. No murmur heard. Pulmonary/Chest: Effort normal and breath sounds normal. She has no wheezes. She has no rales. Musculoskeletal:         General: No tenderness, deformity or edema. Cervical back: Normal range of motion and neck supple. Neurological: She is alert and oriented to person, place, and time. Skin: Skin is warm and dry. Cardiographics:  Last known EF: 55%    This note was completed in part utilizing [employer] Software. Grammatical errors, random word insertions, spelling mistakes, and incomplete sentences can be an occasional consequence of this system secondary to software limitations, ambient noise, and hardware issues. If you have any questions or concerns about the content, text, or information contained within the body of this dictation, please contact the provider for clarification.

## 2023-09-12 DIAGNOSIS — I10 ESSENTIAL HYPERTENSION: ICD-10-CM

## 2023-09-12 DIAGNOSIS — K51.00 ULCERATIVE (CHRONIC) ENTEROCOLITIS, WITHOUT COMPLICATIONS (HCC): ICD-10-CM

## 2023-09-12 RX ORDER — OMEPRAZOLE 40 MG/1
40 CAPSULE, DELAYED RELEASE ORAL DAILY
Qty: 30 CAPSULE | Refills: 0 | Status: SHIPPED | OUTPATIENT
Start: 2023-09-12 | End: 2023-09-12

## 2023-09-12 RX ORDER — OMEPRAZOLE 40 MG/1
40 CAPSULE, DELAYED RELEASE ORAL DAILY
Qty: 30 CAPSULE | Refills: 0 | Status: SHIPPED | OUTPATIENT
Start: 2023-09-12

## 2023-09-12 RX ORDER — AMLODIPINE BESYLATE 5 MG/1
5 TABLET ORAL DAILY
Qty: 30 TABLET | Refills: 0 | Status: SHIPPED | OUTPATIENT
Start: 2023-09-12

## 2023-09-12 RX ORDER — AMLODIPINE BESYLATE 5 MG/1
5 TABLET ORAL DAILY
Qty: 30 TABLET | Refills: 0 | Status: SHIPPED | OUTPATIENT
Start: 2023-09-12 | End: 2023-09-12

## 2023-09-12 NOTE — TELEPHONE ENCOUNTER
Patient is in the office asking for this to be done asap and she needs medication for tomorrow and the mail will no not come in in time today.      Patient is asking for a couple day supply to be sent to [facility]

## 2023-09-12 NOTE — TELEPHONE ENCOUNTER
Patient needs short supply sent to local pharmacy, as prescriptions through [facility] is taking longer than expected, and patient only had 2 days of mediation left.

## 2023-09-14 ENCOUNTER — TELEPHONE (OUTPATIENT)
Age: 58
End: 2023-09-14

## 2023-09-14 NOTE — TELEPHONE ENCOUNTER
Called patient to remind her of her New patient katrin she has scheduled for 10/16 with Dr. Juana Worrell that was scheduled back in July. She stated that was the first appt they made but she was able to get in sooner. Reassured her I will cancel that upcoming appt but her next appt should be in 6 months after her mohs procedure is completed.    Patient understood and will call in the new year

## 2023-10-04 ENCOUNTER — CLINICAL SUPPORT (OUTPATIENT)
Dept: FAMILY MEDICINE CLINIC | Facility: CLINIC | Age: 58
End: 2023-10-04
Payer: MEDICARE

## 2023-10-04 DIAGNOSIS — Z23 ENCOUNTER FOR IMMUNIZATION: Primary | ICD-10-CM

## 2023-10-04 PROCEDURE — 90686 IIV4 VACC NO PRSV 0.5 ML IM: CPT

## 2023-10-04 PROCEDURE — G0008 ADMIN INFLUENZA VIRUS VAC: HCPCS

## 2023-10-09 ENCOUNTER — PROCEDURE VISIT (OUTPATIENT)
Dept: DERMATOLOGY | Facility: CLINIC | Age: 58
End: 2023-10-09
Payer: MEDICARE

## 2023-10-09 VITALS
HEART RATE: 60 BPM | SYSTOLIC BLOOD PRESSURE: 138 MMHG | OXYGEN SATURATION: 99 % | DIASTOLIC BLOOD PRESSURE: 78 MMHG | TEMPERATURE: 97.1 F | BODY MASS INDEX: 18.89 KG/M2 | WEIGHT: 113.4 LBS | HEIGHT: 65 IN

## 2023-10-09 DIAGNOSIS — C44.310 BASAL CELL CARCINOMA (BCC) OF SKIN OF FACE, UNSPECIFIED PART OF FACE: ICD-10-CM

## 2023-10-09 PROCEDURE — 12052 INTMD RPR FACE/MM 2.6-5.0 CM: CPT | Performed by: DERMATOLOGY

## 2023-10-09 PROCEDURE — 17311 MOHS 1 STAGE H/N/HF/G: CPT | Performed by: DERMATOLOGY

## 2023-10-09 NOTE — PROGRESS NOTES
MOHS Procedure Note    Patient: Roopa Salmeron  : 1965  MRN: [id number]  Date: 10/9/2023    History of Present Illness: The patient is a 59 y.o. female who presents with complaints of Basal cell carcinoma nodular type of the right temple.      Past Medical History:   Diagnosis Date    Anxiety     Arthritis     Atypical chest pain     last assessed: 13    Basal cell carcinoma 2023    right temple    Bloody diarrhea     last assesed:     Breast lump     last assessed: 10/10/14    Candidiasis of esophagus ([address])     last assessed: 16    Candidiasis of mouth     last assessed: 14    Choledocholithiasis     last assessed: 14    Cholelithiasis     last assessed: 7/21/15    Closed fracture of first metatarsal bone of right foot     last assessed: 13    Costovertebral angle pain     last assessed: 17    Elevated blood pressure reading     last assessed: 16    Grief reaction     last assessed: 16    Herpes zoster     last assessed: 11/3/16    High risk medication use     last assessed: 16    Hypokalemia     last assessed: 16    Hypotension     last assessed: 5/15/15    Inflammatory disorder of breast     last assessed: 13    Iron deficiency anemia     last assessed: 13    Microscopic hematuria     last assessed: 13    On prednisone therapy     last assessed: 10/19/17    Opioid use, unspecified, uncomplicated     last assessed: 3/1/17    Other polyp of sinus     last assessed: 13    Paronychia of toe     last assessed: 13    Pharyngoesophageal dysphagia     resolved: 3/1/16    Tinea corporis     last assessed: 16    Ulcerative colitis ([address])     Underweight     last assessed: 3/1/16    Urinary frequency     resolved: 16    Viral warts        Past Surgical History:   Procedure Laterality Date    BREAST SURGERY      left breast lumpectomy    CARPAL TUNNEL RELEASE Bilateral      SECTION N/A     CHOLECYSTECTOMY COLONOSCOPY  09/18/2019    EGD  09/18/2019    ESOPHAGOGASTRODUODENOSCOPY N/A 03/14/2016    Procedure: ESOPHAGOGASTRODUODENOSCOPY (EGD); Surgeon: Jamie Glynn MD;  Location: [facility] GI LAB; Service:     MOHS SURGERY Right [facility]09[facility]2023    [facility]    TONSILLECTOMY N/A          Current Outpatient Medications:     amLODIPine (NORVASC) 5 mg tablet, Take 1 tablet (5 mg total) by mouth daily, Disp: 30 tablet, Rfl: 0    balsalazide (COLAZAL) 750 mg capsule, Take 3 capsules (2,250 mg total) by mouth 2 (two) times a day, Disp: 180 capsule, Rfl: 2    Blood Pressure KIT, Use 2 (two) times a day, Disp: 1 kit, Rfl: 0    calcium carbonate (OS-BUTCH) 600 MG tablet, Take 1 tablet (600 mg total) by mouth 2 (two) times a day with meals, Disp: 30 tablet, Rfl: 2    cholecalciferol (VITAMIN D3) 1,000 units tablet, Take 2 tablets (2,000 Units total) by mouth daily, Disp: 60 tablet, Rfl: 2    cyanocobalamin (VITAMIN B-12) 100 mcg tablet, Take by mouth daily, Disp: , Rfl:     folic acid (FOLVITE) 1 mg tablet, Take 1 mg by mouth daily. , Disp: , Rfl:     hydrOXYzine HCL (ATARAX) 10 mg tablet, Take 1 tablet (10 mg total) by mouth every 6 (six) hours as needed for itching, Disp: 30 tablet, Rfl: 0    lisinopril (ZESTRIL) 20 mg tablet, Take 1 tablet (20 mg total) by mouth daily, Disp: 30 tablet, Rfl: 5    omeprazole (PriLOSEC) 40 MG capsule, Take 1 capsule (40 mg total) by mouth daily, Disp: 30 capsule, Rfl: 0    predniSONE 10 mg tablet, Take 1 tablet (10 mg total) by mouth daily, Disp: 90 tablet, Rfl: 1    Adalimumab (Humira Pen) 80 MG/0.8ML PNKT, Inject 2 pens under the skin on day 1, then inject 1 pen under the skin on day 15.  (Patient not taking: Reported on 7/24/2023), Disp: 3 each, Rfl: 0    adalimumab (Humira) 40 MG/0.4ML, Inject 40mg under skin every 14 days (Patient not taking: Reported on 7/24/2023), Disp: 2 each, Rfl: 11    Allergies   Allergen Reactions    Dairy Aid [Tilactase] GI Intolerance    Levaquin [Levofloxacin In D5w] Other (See Comments)     Redness at injection site    Mercaptopurine      Causes weakness in legs    Mesalamine GI Intolerance    Other Other (See Comments)     6 mp. Causes weakness in legs. Physical Exam:   Vitals:    10/09/23 1041   BP: 138/78   Pulse: 60   Temp: (!) 97.1 °F (36.2 °C)   SpO2: 99%     General: Awake, Alert, Oriented x 3, Mood and affect appropriate  Respiratory: Respirations even and unlabored  Cardiovascular: Peripheral pulses intact; no edema  Musculoskeletal Exam: n/a    Skin: 2 cm x 1.4 cm pink patch. Assessment: Biopsy confirmed basal cell carcinoma of the right temple. Plan: Mohs    Time of H&P Completion:1048    MOHS Procedure Timeout      Flowsheet Row Most Recent Value   Timeout: 1050   Patient Identity Verified: Yes   Correct Site Verified: Yes   Correct Procedure Verified: Yes            MOHS Diagnosis/Indication/Location/ID      Flowsheet Row Most Recent Value   Pathology Type Basal cell carcinoma   Anatomic Site right temple   Indications for MOHS tumor location   MOHS ID [id number]            MOHS Site/Accession/Pre-Post      Flowsheet Row Most Recent Value   Original Site Identified (as submitted by referring clinician) Photo, Referral   Biopsy Accession/Specimen # (as submitted by referring clincian) [id number]   Pre-MOHS Size Length (cm) 2   Pre-MOHS Size Width (cm) 1.4   Post-MOHS Size-Length (cm) 2   Post MOHS Size-Width (cm) 2.2   Repair Type Intermediate layered closure   Suture Type Prolene, Vicryl   Prolene Suture Size 5   Vicryl Suture Size 5   Final repair length (cm): 4.5   Anesthetic Used 1% Lidocaine with epinephrine  [9.6cc]            MOHS Tumor Stage 1 Information      Flowsheet Row Most Recent Value   Tissue Sections (blocks) 2   Microscopic Exam Section 1: No tumor identified in section. Microscopic Exam Section 2: No tumor identified in section. Tumor Clear After Stage I?  Yes                        Patient identified, procedure verified, site identified and verified. Time out completed. Surgical removal of the lesion discussed with the patient (risks and benefits, including possibility of scarring, infection, recurrence or potential for further treatment)  I have specifically identified the site with the patient. I have discussed the fact that the patient will have a scar after the procedure regardless of granulation or repair with sutures. I have discussed that the repair options can range from granulation in some cases to linear or curvilinear closures to larger flaps or grafts. There are sometimes flaps or grafts used that require multiples stages of surgery and will not be completed today, rather be completed over a series of appointments. I have discussed that occasionally due to location, size or depth of the lesion I may recommend consultation with and transfer of care for further removal or the reconstruction to another provider such as ophthalmology surgery, plastic surgery, ENT surgery, or surgical oncology. There are cases in which other testing such as imaging with MRI or CT scan or testing of lymph nodes is recommended because of the nature/depth/location of tumor seen during the removal. There is a risk of injury to nerves causing temporary or permanent numbness or the inability to move muscles full such as the inability to lift eyebrows. Questions answered and verbal and written consent was obtained. The tumor qualifies for Mohs based on AUC criteria. Dr. Torri Santos served as the surgeon and pathologist during the procedure. With the patient in the supine position and under adequate local anesthesia with 1% lidocaine with epinephrine 1:100,000, the defect was scrubbed with Chlorhexidine. Sterile drapes were placed from the sterile tray. Because of the location of the surgical defect, an intermediate closure was judged to give the best possible cosmetic and functional result.   The edges of the defect were carefully debrided removing any dead or coagulated tissue. Hemostasis was obtained by pinpoint electrocoagulation. Careful planning of removal of redundant tissue at either end of the defect was drawn out so that the suture lines would fall in the optimal orientation with regard to the relaxed skin tension lines. These were then removed with a #15 blade scalpel. The wound was then approximated by a deep layer of buried vertical mattress sutures and the cutaneous margins were approximated and closed by superficial sutures as noted above. Estimated blood loss was less than 5 mL. The patient tolerated the procedure well. The wound was dressed with petrolatum, a non-stick pad, and a compression dressing. Alonso Swanson MD served as the surgeon and pathologist during the procedure. Postoperative care: Wound care discussed at length. I urged the patient to call us if any problems or question should arise. Complications: none  Post-op medications: none  Patient condition after procedure: stable  Discharge plans: Plan for return to Mohs for suture removal, as scheduled in 7 days. BCC cleared with 1 stage of mohs and repaired with 4.5cm closure. Well tolerated. S/R in 1 week.   Scribe Attestation      I,:  Carmela Hernandez am acting as a scribe while in the presence of the attending physician.:       I,:  Alonso Swanson MD personally performed the services described in this documentation    as scribed in my presence.:

## 2023-10-09 NOTE — PATIENT INSTRUCTIONS
Mohs Microscopic Surgery After Care    WOUND CARE AFTER SURGERY:    Do NOT to remove the pressure bandage for 48 hours. Keep the area clean and dry while this bandage is on. After removing the bandage for the first time, gently clean the area with soap and water. If the bandage is difficult to remove, getting the bandage wet in the shower will sometimes help soften the adhesive and allow it to be removed more easily. You will now need to cleanse this area daily in the shower with gentle soap. There is no need to scrub the area. Apply plain Vaseline ointment (this is over the counter and not a prescription) to the site followed by a clean appropriately sized bandage to area. Non stick dressing and paper tape (or Hypafix) are recommended for sensitive skin but a bandaid is fine if it covers the area well. You will need to continue the above daily wound care until you return for suture removal in 7 days (generally 7 days for the face, 10-14 days off the face)      RESTRICTIONS:     For two DAYS:   - You will need to take it very easy as this time is highest risk for bleeding. Being a "couch potato" during these two days is generally recommended. - For surgeries on the face/neck/scalp: Avoid leaning down to pick things up off the floor as this brings blood up to your head. Instead, squat down to pick things up. For two WEEKS:   - No heavy lifting (anything greater than 10 pounds)   - You can start to do slow, gentle activities such as slow walking but nothing to increase your heart rate and blood pressure too much (such as cardiovascular exercise). It is important to take it easy as there is still a risk for bleeding and a high risk popping of stitches open during this time. If we did surgery near the eyes (including the nose, forehead, front part of your scalp, cheeks): It is VERY common to get a large amount of swelling around your eyes (puffy eyes).  Although less frequent, this can be enough to swell your eyes shut and can also come along with bruising. This should not hurt and is very expected and normal. It is typically worst at ~ 3 days out from your surgery and dramatically better 1 week post-operatively. MANAGING YOUR PAIN AFTER SURGERY     You can expect to have some pain after surgery. This is normal. The pain is typically worse the first two days after surgery, and quickly begins to get better. The best strategy for controlling your pain after surgery is around the clock pain control. You can take over the counter Acetaminophen (Tylenol) for discomfort, if no contraindications. If you are taking this at the maximum dose, you can alternate this with Motrin (ibuprofen or Advil) as well. Alternating these medications with each other allows you to maximize your pain control. In addition to Tylenol and Motrin, you can use heating pads or ice packs on your incisions to help reduce your pain. How will I alternate your regular strength over-the-counter pain medication? You will take a dose of pain medication every three hours. Start by taking 650 mg of Tylenol (2 pills of 325 mg)   3 hours later take 600 mg of Motrin (3 pills of 200 mg)   3 hours after taking the Motrin take 650 mg of Tylenol   3 hours after that take 600 mg of Motrin. See example - if your first dose of Tylenol is at 12:00 PM     12:00 PM  Tylenol 650 mg (2 pills of 325 mg)    3:00 PM  Motrin 600 mg (3 pills of 200 mg)    6:00 PM  Tylenol 650 mg (2 pills of 325 mg)    9:00 PM  Motrin 600 mg (3 pills of 200 mg)    Continue alternating every 3 hours      Important:   Do not take more than 4000mg of Tylenol or 3200mg of Motrin in a 24-hour period. What if I still have pain? If you have pain that is not controlled with the over-the-counter pain medications (Tylenol and Motrin or Advil), don't hesitate to call our staff using the number provided.  We will help make sure you are managing your pain in the best way possible, and if necessary, we can provide a prescription for additional pain medication. CALL OUR OFFICE IMMEDIATELY FOR ANY SIGNS OF INFECTION:    This includes fever, chills, increased redness, warmth, tenderness, severe discomfort/pain, or pus or foul smell coming from the wound. If you are experiencing any of the above, please call [facility] Mohs Department directly at [phone]. IF BLEEDING IS NOTICED:    Place a clean cloth over the area and apply firm pressure for thirty minutes. Check the wound ONLY after 30 minutes of direct pressure; do not cheat and sneak a peak, as that does not count. If bleeding persists after 30 minutes of legitimate direct pressure, then try one more round of direct pressure to the area. Should the bleeding become heavier or not stop after the second attempt, call [facility] Dermatology directly at [phone]. Your call will get routed to the dermatology surgeon on call even after hours.

## 2023-10-16 ENCOUNTER — OFFICE VISIT (OUTPATIENT)
Dept: DERMATOLOGY | Facility: CLINIC | Age: 58
End: 2023-10-16

## 2023-10-16 DIAGNOSIS — Z48.02 ENCOUNTER FOR REMOVAL OF SUTURES: Primary | ICD-10-CM

## 2023-10-16 PROCEDURE — 99024 POSTOP FOLLOW-UP VISIT: CPT | Performed by: DERMATOLOGY

## 2023-10-16 NOTE — PROGRESS NOTES
Suture removal    Date/Time: 10/16/2023 8:30 AM    Performed by: Alexandrea Lara RN  Authorized by: Amparo Dozier MD  [place] Protocol:  Consent: Verbal consent obtained. Written consent not obtained. Risks and benefits: risks, benefits and alternatives were discussed  Consent given by: patient  Time out: Immediately prior to procedure a "time out" was called to verify the correct patient, procedure, equipment, support staff and site/side marked as required. Timeout called at: 10/16/2023 8:46 AM.  Patient understanding: patient states understanding of the procedure being performed  Patient consent: the patient's understanding of the procedure matches consent given  Procedure consent: procedure consent matches procedure scheduled  Relevant documents: relevant documents present and verified  Test results: test results not available  Site marked: the operative site was not marked  Radiology Images displayed and confirmed. If images not available, report reviewed: imaging studies not available  Patient identity confirmed: verbally with patient      Patient location:  Clinic  Location:     Laterality:  Right    Location:  [address] [place] [address] location: right temple. Procedure details: Tools used:  Suture removal kit    Wound appearance:  No sign(s) of infection, good wound healing, clean, moist, nonpurulent, nontender and pink    Number of sutures removed:  14  Post-procedure details:     Post-procedure assessment: vaseline ointment applied. Patient tolerance of procedure: Tolerated well, no immediate complications  Comments:      Patient was encouraged to continue to clean and care for the wound until fully healed. Patient was encouraged to continue to follow up for regular full body skin exams as scheduled. Well healing scar, sutures removed.   Will schedule BCC on the back in the future and plan for multiple visit given size and patient's weight to use appropriate amount to lidocaine with epinephrine.     Scribe Attestation      I,:  Sandra Quinn RN am acting as a scribe while in the presence of the attending physician.:       I,:  Shahram Schwartz MD personally performed the services described in this documentation    as scribed in my presence.:

## 2023-10-19 ENCOUNTER — TELEPHONE (OUTPATIENT)
Dept: DERMATOLOGY | Facility: CLINIC | Age: 58
End: 2023-10-19

## 2023-10-19 NOTE — TELEPHONE ENCOUNTER
Pre- operative Mohs Telephone Scheduling Note    Do you have a pacemaker, defibrillator, spinal or brain stimulator? no    Do you take antibiotics before skin or dental procedures? no  If yes, will likely require pre-operative antibiotics. Ask  the patient why they take the antibiotics (usually because of joint replacement). Do you have a history of a joint replacements within the past 2 years? no   If yes, will likely require pre-operative antibiotics. Ask if orthopaedic surgeon has prescribed pre-operative antibiotics to take before procedures/dental work? Do you take any OTC medications that thin your blood (Aspirin, Aleve, Ibuprofen) or supplements that thin your blood (fish oil, garlic, vitamin E, Ginko Biloba)? no    Do you take any prescribed medications that thin your blood (Coumadin, Plavix, Xarelto, Eliquis or another prescribed blood thinner)? no    Do you have an allergy to lidocaine or epinephrine? no    Do you have allergies to Iodine? no    Do you wear a lidocaine patch? no    Have you ever been diagnosed with HIV, AIDS, Hep B and Hep C? no    Do you use a cane, walker or wheelchair? no    Is the patient from a nursing home? no If yes, Is there any special accommodations that is needed for patient n/a    Do you smoke? no      If yes,  patient to try and stop 2 days before surgery and 7 days after the surgery. Minimizing smoking as much as possible during this time will improve healing and the cosmetic result after surgery. Do you use supplemental oxygen? If so, how many liters and can you be off it for a short period of time? N/a    Date scheduled: November 1, 2023 @ 8:00 am with Dr. Chava Carias of Care with other provider (Brittany Stafford, ENT) required? no   IF YES, PLEASE FORWARD TO APPROPRIATE PERSONNEL TO HELP COORDINATE. Are there remaining tumors to be scheduled? no    Was Prior Authorization obtained?  No (please use [email or web address] to document prior auth)

## 2023-10-19 NOTE — LETTER
Rowena Graves     1965    [address] [place] [id number]    Dear Rowena Graves,    You are scheduled to have the MOHS procedure on November 1, 2023 at 8:00 am for back with [place]. Our office is located in The [address] [place] [address] at the [facility] our address is [address] [place] [address]), [address] [facility]. Once you arrive please check in with our front staff in [address] and they will escort you to the [address] [place] [address] waiting room. If you have someone bringing you to your appointment they may wait in the waiting room or accompany you in your visit. Below you will find some pre-op instructions along with some information regarding the MOHS procedure. If you have any questions please call our office at [phone]. Thank you,    [facility] MOHS Department         PRE-OPERATIVE INSTRUCTIONS - MOHS    Before your scheduled surgery, there are a number of important precautions and positive steps you should take to help prepare yourself for a successful treatment and speedy recovery. Some of the steps, which are listed below, may seem unnecessary and inconvenient, but they are important. For example, when you stop smoking, you increase your ability to heal. Occasionally, there may be valid reasons, personal or medical, why you can't comply. In such cases, please call the office so we can discuss possible ways to overcome any obstacles you may be encountering. If you have any questions about the surgery, or remember additional medical information that you forgot to mention to our staff, please contact the office prior to your surgery. GENERAL INFORMATION REGARDING MOHS MICROGRAPHIC SURGERY    Mohs surgery is a specialized technique for the removal of skin cancer developed by Dr. Lillie Lennert Mohs over 50 years ago to improve the cure rates of skin cancer.  Traditionally, skin cancers are treated by destructive methods (radiation, freezing, scraping, and burning) or excision (cutting out the tissue with standards margins and sending it to an outside laboratory for testing). These methods all yield cure rates between 65%-94%. However, for cancers located in cosmetically sensitive areas, large tumors, or tumors unsuccessfully treated by other means, Mohs surgery offers a higher cure rate. In most cases, Mohs surgery provides you with a 99% cure rate for primary (previously untreated) basal cell cancer and a 95% cure rate for primary squamous cell cancer. In Mohs surgery, tissue is removed and processed in a way that we are able to check 100% of the margins, giving the highest cure rate for any method of treating skin cancers while providing maximal preservation of normal skin. This allows the surgeon to produce an optimal cosmetic result for the patient by maximizing the amount of tissue removed yielding as small a scar as possible    On the day of surgery, you will be given local anesthesia only (similar to what was given to you during your initial biopsy). You will remain awake. You will verify the location of the skin cancer prior to the onset of the surgery. Once the area is numb, the tissue containing the skin cancer will be removed, taking a small safety margin. This margin is usually smaller than what would be taken with a standard excision. Once the tissue is removed, it is marked and oriented. The first layer (“Stage I”) will be processed in our laboratory. The wound will be treated for bleeding and a bandage will be placed to keep you comfortable while you wait an approximate 45 minutes-1 hour (for the processing of the tissue) in your room. Your Mohs surgeon will examine the pathology in the lab, checking all the margins. If any tumor remains, you will need to take a second layer of skin (“Stage 2”). The area will be re-anesthetized and your Mohs surgeon will remove more skin only in the area where the tumor exists.  This process will continue until all the skin cancer is removed. Unfortunately, there is no method to predict how many layers or stages will be taken. Once the tumor has been removed completely, we will discuss the best ways to close the defect. Most wounds may be closed with stitches. A larger wound may require a skin graft or a flap. In rare instances, especially for cancers around the eye or for larger cancers, we may work with another surgeon (oculoplastic, ENT, plastics) with special skills to assist with reconstruction. Medications: Please take all your normal medications the morning of your surgery. If you are a diabetic, please bring your insulin or medications with you, as well as a snack to avoid having low blood sugar during your day with us. Blood Thinners    VERY IMPORTANT: We do NOT stop or hold prescribed blood thinners (such as Coumadin/Warfarin, Plavix, Eliquis, Pradaxa, Brilinta, Apixaban, Xarelto, Lovonox, Rivaroxaban, or Aggrenox) before Mohs surgery. Additionally, If you take aspirin because you have had a stroke, heart attack, heart disease, other condition, or your physician has prescribed you to take it, please continue your aspirin. Although there is a risk of increased bruising and bleeding, we are still able to safely perform surgery while continuing these medications. Please NEVER stop your prescribed blood thinner without the managing doctor's (the doctor that prescribed the medication) permission or knowledge. If you have any concerns about not holding your blood thinner, please address these with your Mohs surgeon. Most people should stop all non-steroidal anti-inflammatory medications (Motrin, Naproxen, Advil, Midol, Aleve, etc.) for 7 days prior to your scheduled surgery and 2 days after (unless instructed otherwise after surgery). You may take Tylenol for pain. Antibiotics  If you usually require antibiotics prior to dental work, please let the office know at least 24 hours prior to your surgery. Medical conditions that sometimes require preoperative antibiotics include artificial heart valves, heart murmurs, artificial joints, and related problems. We may give you a different medication than the dentist, so please contact us for the correct antibiotic. If you were prescribed pre-operative antibiotics by our office, please take the medication 2 hours prior to your procedure. Vitamins and Supplements  Avoid taking any supplements with Vitamin E, Fish Oil, Gingko, Ginseng, and Garlic for 2 weeks before and 2 days after your surgery. These thin your blood. Alcohol: Avoid drinking alcohol for 2 days prior to your surgery, and for 2 days afterwards (it thins the blood and causes more bruising and swelling). Smoking: Try to STOP or reduce smoking significantly the week before your surgery, and especially the week afterwards (it greatly improves how well you heal). Tobacco smoke deprives the blood of oxygen, which is urgently needed by the wound during the healing process. Contact Lenses: Do not wear them on the day of the surgery. Instead, wear glasses and bring your case, in case we need to remove them. Clothing: Do not wear your nicest clothing on your surgery day. We recommend wearing a button down shirt that will not disrupt your post-operative dressing when changing later that night. Bathing: On the morning of your surgery, you may bathe or shower normally. If you get your hair done on a weekly basis, remember to get your hair washed the day before surgery.   - You will need to keep your surgical site dry for a minimum of 48 hours after surgery. Makeup: If your surgery is on the face, please do not wear any makeup on the day of the surgery. Jewelry: Please try to avoid wearing jewelry on the day of surgery. Food: On the morning of surgery, have breakfast but limit your intake of caffeinated beverages. They are diuretic and may inconvenience you during surgery.  If you are following up with another surgeon the same day as your Mohs surgery, you must receive permission to eat breakfast from that surgeon. What to bring with you on the day of your surgery:  Bring snacks - Since you could be at the office long, you may bring snacks and/or lunch with you. Some snacks and drinks are available at the office as well. Bring a sweater - Bring a sweater or jacket that buttons or zips down the front and will not disturb your wound dressing during removal.  Bring something to do - You will be spending much of the day in our office. There will be 45-60 minute waiting periods  between layers/stages, and while there is a television with cable in every room, it is nice to have something to keep you occupied such as books, magazines, knitting, music, or work. Planning Ahead:  Other Appointments - It is important to realize that no matter how small the skin cancer appears to be, looks can be deceiving. Since your surgery may last the entire day, you should not schedule any other appointments that day. Special Occasions - Surgery often creates swelling and bruising. Also, the post-op dressing may be rather large and obvious. Keep this in mind as you arrange your social/work schedule. If an important event is already planned, please check with your referring physician or your Mohs surgeon to see if the surgery can be postponed. Activity Limits after Surgery - If surgery was performed on your face, we recommend that you keep your activity level to a minimum for 2-3 days (the blood pressure elevation related to exercise can lead to bleeding). If you have stitches in an area that will be under tension or significant movement (neck, back, arms, legs), you will need to avoid heavy lifting (anything over 5 lbs) or exercise for at least 2 weeks and possibly longer. We also advise that you limit out of town travel for the first 7 days after surgery.  You should also wait at least 7 days before going into a pool or the ocean. Housework - Since you will need to minimize activity after surgery, plan to do your groceries, laundry, gardening, and other heavy household chores prior to your surgery. Please make arrangements for assistance during the post-op period. If surgery is around your mouth area, you may need to eat soft foods, such as soup, milkshakes, or yogurt for 48 hours. Purchasing bandage supplies: Prior to surgery, please purchase the following items to care for your surgical wound properly. Cotton swabs (Q-tips)  Vaseline or Aquaphor  Telfa pads (or any non-stick dressing)  Paper tape or Hypafix tape  Gauze pads (3x3)      We will provide you with some bandage supplies after surgery to get you started. Transportation: It is often reassuring and comforting to have a  drive you to and from the surgery. He or she is welcome to wait in the office during the surgery. Please note that for safety reasons, only the patient is allowed in the procedure room during surgery. Thank you for your cooperation. Rescheduling: If you need to reschedule your surgery, please notify the office as soon as possible.

## 2023-10-23 RX ORDER — LIDOCAINE HYDROCHLORIDE AND EPINEPHRINE 10; 10 MG/ML; UG/ML
25 INJECTION, SOLUTION INFILTRATION; PERINEURAL ONCE
Status: CANCELLED | OUTPATIENT
Start: 2023-10-23

## 2023-10-23 NOTE — TELEPHONE ENCOUNTER
TAX #[id number]     ? Valorie Like ([facility]#[id number])    MOHS Procedure:    ?64[address] trunk, arms, legs (include [phone] for each additional stage)     Repair CPT code:63745, 34989, 63861, 71434, 32194    ? Diagnosis/ICD code: D46.10        Primary Insurance:   [facility]    Phone#:    Member ID#:    Secondary insurance: Quentin Hidalgo [facility]     Phone#: [phone]    Member ID#: [id number]    ? Need Authorization: No                            ?Needs Referral: No    Reference#: MICHELLE RENNER no pre cert is required- follows [facility] guidelines. Advised me this plan is non par with us but stated patient would not get a bill, [facility] covers and [facility] will  after that, patient will not receive a bill.  Ref # [id number]

## 2023-10-26 ENCOUNTER — TELEPHONE (OUTPATIENT)
Dept: CARDIOLOGY CLINIC | Facility: CLINIC | Age: 58
End: 2023-10-26

## 2023-10-26 DIAGNOSIS — K51.00 ULCERATIVE (CHRONIC) ENTEROCOLITIS, WITHOUT COMPLICATIONS (HCC): ICD-10-CM

## 2023-10-26 DIAGNOSIS — I10 ESSENTIAL HYPERTENSION: ICD-10-CM

## 2023-10-26 DIAGNOSIS — K51.911 ULCERATIVE COLITIS WITH RECTAL BLEEDING, UNSPECIFIED LOCATION (HCC): ICD-10-CM

## 2023-10-26 RX ORDER — OMEPRAZOLE 40 MG/1
40 CAPSULE, DELAYED RELEASE ORAL DAILY
Qty: 30 CAPSULE | Refills: 5 | Status: SHIPPED | OUTPATIENT
Start: 2023-10-26

## 2023-10-26 RX ORDER — AMLODIPINE BESYLATE 5 MG/1
5 TABLET ORAL DAILY
Qty: 30 TABLET | Refills: 5 | Status: SHIPPED | OUTPATIENT
Start: 2023-10-26

## 2023-10-26 NOTE — TELEPHONE ENCOUNTER
Patient of Dr Valentina Archibald would like to know if she is to take antibiotics for [facility] surgery for skin cancer and also for dental work please advise

## 2023-11-01 ENCOUNTER — PROCEDURE VISIT (OUTPATIENT)
Dept: DERMATOLOGY | Facility: CLINIC | Age: 58
End: 2023-11-01
Payer: MEDICARE

## 2023-11-01 VITALS
OXYGEN SATURATION: 99 % | TEMPERATURE: 98.1 F | HEIGHT: 65 IN | DIASTOLIC BLOOD PRESSURE: 78 MMHG | SYSTOLIC BLOOD PRESSURE: 132 MMHG | WEIGHT: 115.4 LBS | BODY MASS INDEX: 19.22 KG/M2 | HEART RATE: 60 BPM

## 2023-11-01 DIAGNOSIS — C44.519 BCC (BASAL CELL CARCINOMA), BACK: Primary | ICD-10-CM

## 2023-11-01 PROCEDURE — 15271 SKIN SUB GRAFT TRNK/ARM/LEG: CPT | Performed by: STUDENT IN AN ORGANIZED HEALTH CARE EDUCATION/TRAINING PROGRAM

## 2023-11-01 PROCEDURE — 15272 SKIN SUB GRAFT T/A/L ADD-ON: CPT | Performed by: STUDENT IN AN ORGANIZED HEALTH CARE EDUCATION/TRAINING PROGRAM

## 2023-11-01 PROCEDURE — 17313 MOHS 1 STAGE T/A/L: CPT | Performed by: STUDENT IN AN ORGANIZED HEALTH CARE EDUCATION/TRAINING PROGRAM

## 2023-11-01 PROCEDURE — 17315 MOHS SURG ADDL BLOCK: CPT | Performed by: STUDENT IN AN ORGANIZED HEALTH CARE EDUCATION/TRAINING PROGRAM

## 2023-11-01 RX ORDER — LIDOCAINE HYDROCHLORIDE 10 MG/ML
INJECTION, SOLUTION INFILTRATION; PERINEURAL
Qty: 500 ML | Refills: 0 | Status: CANCELLED | OUTPATIENT
Start: 2023-11-01

## 2023-11-01 RX ORDER — BALSALAZIDE DISODIUM 750 MG/1
CAPSULE ORAL
Qty: 540 CAPSULE | Refills: 10 | Status: SHIPPED | OUTPATIENT
Start: 2023-11-01

## 2023-11-01 RX ORDER — ACETAMINOPHEN 325 MG/1
975 TABLET ORAL ONCE
Status: COMPLETED | OUTPATIENT
Start: 2023-11-01 | End: 2023-11-01

## 2023-11-01 RX ADMIN — ACETAMINOPHEN 975 MG: 325 TABLET ORAL at 11:52

## 2023-11-01 NOTE — PROGRESS NOTES
MOHS Procedure Note    Patient: Lennox Gonzalez  : 1965  MRN: [id number]  Date: 2023    History of Present Illness: The patient is a 59 y.o. female who presents with complaints of Basal cell carcinoma superficial type of the mid back.      Past Medical History:   Diagnosis Date    Anxiety     Arthritis     Atypical chest pain     last assessed: 13    Basal cell carcinoma 2023    right temple, mohs    Basal cell carcinoma 2023    mid back, mohs    Bloody diarrhea     last assesed:     Breast lump     last assessed: 10/10/14    Candidiasis of esophagus ([address])     last assessed: 16    Candidiasis of mouth     last assessed: 14    Choledocholithiasis     last assessed: 14    Cholelithiasis     last assessed: 7/21/15    Closed fracture of first metatarsal bone of right foot     last assessed: 13    Costovertebral angle pain     last assessed: 17    Elevated blood pressure reading     last assessed: 16    Grief reaction     last assessed: 16    Herpes zoster     last assessed: 11/3/16    High risk medication use     last assessed: 16    Hypokalemia     last assessed: 16    Hypotension     last assessed: 5/15/15    Inflammatory disorder of breast     last assessed: 13    Iron deficiency anemia     last assessed: 13    Microscopic hematuria     last assessed: 13    On prednisone therapy     last assessed: 10/19/17    Opioid use, unspecified, uncomplicated     last assessed: 3/1/17    Other polyp of sinus     last assessed: 13    Paronychia of toe     last assessed: 13    Pharyngoesophageal dysphagia     resolved: 3/1/16    Tinea corporis     last assessed: 16    Ulcerative colitis ([address])     Underweight     last assessed: 3/1/16    Urinary frequency     resolved: 16    Viral warts        Past Surgical History:   Procedure Laterality Date    BREAST SURGERY      left breast lumpectomy    CARPAL TUNNEL RELEASE Bilateral      SECTION N/A     CHOLECYSTECTOMY      COLONOSCOPY  2019    EGD  2019    ESOPHAGOGASTRODUODENOSCOPY N/A 2016    Procedure: ESOPHAGOGASTRODUODENOSCOPY (EGD); Surgeon: Lidia Parsons MD;  Location: [facility] GI LAB; Service:     MOHS SURGERY Right 10/09/2023    [facility] right temple-Dr Milagro Soriano    MOHS SURGERY  2023    [facility] mid back-Dr Harkins    TONSILLECTOMY N/A          Current Outpatient Medications:     amLODIPine (NORVASC) 5 mg tablet, Take 1 tablet (5 mg total) by mouth daily, Disp: 30 tablet, Rfl: 5    Blood Pressure KIT, Use 2 (two) times a day, Disp: 1 kit, Rfl: 0    calcium carbonate (OS-BUTCH) 600 MG tablet, Take 1 tablet (600 mg total) by mouth 2 (two) times a day with meals, Disp: 30 tablet, Rfl: 2    cholecalciferol (VITAMIN D3) 1,000 units tablet, Take 2 tablets (2,000 Units total) by mouth daily, Disp: 60 tablet, Rfl: 2    cyanocobalamin (VITAMIN B-12) 100 mcg tablet, Take by mouth daily, Disp: , Rfl:     folic acid (FOLVITE) 1 mg tablet, Take 1 mg by mouth daily. , Disp: , Rfl:     hydrOXYzine HCL (ATARAX) 10 mg tablet, Take 1 tablet (10 mg total) by mouth every 6 (six) hours as needed for itching, Disp: 30 tablet, Rfl: 0    lisinopril (ZESTRIL) 20 mg tablet, Take 1 tablet (20 mg total) by mouth daily, Disp: 30 tablet, Rfl: 5    omeprazole (PriLOSEC) 40 MG capsule, Take 1 capsule (40 mg total) by mouth daily, Disp: 30 capsule, Rfl: 5    predniSONE 10 mg tablet, Take 1 tablet (10 mg total) by mouth daily, Disp: 90 tablet, Rfl: 1    Adalimumab (Humira Pen) 80 MG/0.8ML PNKT, Inject 2 pens under the skin on day 1, then inject 1 pen under the skin on day 15.  (Patient not taking: Reported on 2023), Disp: 3 each, Rfl: 0    adalimumab (Humira) 40 MG/0.4ML, Inject 40mg under skin every 14 days (Patient not taking: Reported on 2023), Disp: 2 each, Rfl: 11    balsalazide (COLAZAL) 750 mg capsule, TAKE 3 CAPSULES TWICE DAILY, Disp: 540 capsule, Rfl: 10  No current facility-administered medications for this visit. Facility-Administered Medications Ordered in Other Visits:     lidocaine (XYLOCAINE) 1 % 25 mL, sodium bicarbonate 5 mEq, EPINEPHrine PF (ADRENALIN) 0.5 mg in sodium chloride 0.9 % 500 mL OR irrigation, , Irrigation, Once, Eriberto Gonzalez MD    Allergies   Allergen Reactions    Dairy Aid [Tilactase] GI Intolerance    Levaquin [Levofloxacin In D5w] Other (See Comments)     Redness at injection site    Mercaptopurine      Causes weakness in legs    Mesalamine GI Intolerance    Other Other (See Comments)     6 mp. Causes weakness in legs. Physical Exam:   Vitals:    11/01/23 0754   BP: 132/78   Pulse: 60   Temp: 98.1 °F (36.7 °C)   SpO2: 99%     General: Awake, Alert, Oriented x 3, Mood and affect appropriate  Respiratory: Respirations even and unlabored  Cardiovascular: Peripheral pulses intact; no edema  Musculoskeletal Exam: n/a    Skin: 5.5 cm x 7.7 cm pink/red plaque with central erosion on the mid back     Assessment: Biopsy confirmed basal cell carcinoma of the mid back. Plan: Mohs    Time of H&P Completion:0805    MOHS Procedure Timeout      Flowsheet Row Most Recent Value   Timeout: 0825   Patient Identity Verified: Yes   Correct Site Verified: Yes   Correct Procedure Verified:  Yes            MOHS Diagnosis/Indication/Location/ID      Flowsheet Row Most Recent Value   Pathology Type Basal cell carcinoma   Anatomic Site --  [mid back]   Indications for MOHS tumor location, large tumor size   MOHS ID [id number]            MOHS Site/Accession/Pre-Post      Flowsheet Row Most Recent Value   Original Site Identified (as submitted by referring clinician) Photo, Referral   Biopsy Accession/Specimen # (as submitted by referring clincian) [id number]  [A]   Pre-MOHS Size Length (cm) 5.5   Pre-MOHS Size Width (cm) 7.7   Post-MOHS Size-Length (cm) 8   Post MOHS Size-Width (cm) 8.2   Repair Type Allograft  [TheraSkin]   Suture Type Fast absorbing gut, Vicryl Fast Absorbing Suture Size 5   Prolene Suture Size 3   Flap/Graft area (cm2) 48            MOHS Tumor Stage 1 Information      Flowsheet Row Most Recent Value   Tissue Sections (blocks) 6   Microscopic Exam Section 1: No tumor identified in section. Microscopic Exam Section 2: No tumor identified in section. Microscopic Exam Section 3: No tumor identified in section. Microscopic Exam Section 4: No tumor identified in section. Microscopic Exam Section 5: No tumor identified in section. Microscopic Exam Section 6: No tumor identified in section. Tumor Clear After Stage I? Yes                    Patient identified, procedure verified, site identified and verified. Time out completed. Surgical removal of the lesion discussed with the patient (risks and benefits, including possibility of scarring, infection, recurrence or potential for further treatment)  I have specifically identified the site with the patient. I have discussed the fact that the patient will have a scar after the procedure regardless of granulation or repair with sutures. I have discussed that the repair options can range from granulation in some cases to linear or curvilinear closures to larger flaps or grafts. There are sometimes flaps or grafts used that require multiples stages of surgery and will not be completed today, rather be completed over a series of appointments. I have discussed that occasionally due to location, size or depth of the lesion I may recommend consultation with and transfer of care for further removal or the reconstruction to another provider such as ophthalmology surgery, plastic surgery, ENT surgery, or surgical oncology.  There are cases in which other testing such as imaging with MRI or CT scan or testing of lymph nodes is recommended because of the nature/depth/location of tumor seen during the removal. There is a risk of injury to nerves causing temporary or permanent numbness or the inability to move muscles full such as the inability to lift eyebrows. Questions answered and verbal and written consent was obtained. The tumor qualifies for Mohs based on AUC criteria. Dr. Yojana Driver served as the surgeon and pathologist during the procedure. OPERATIVE REPORT: THERA-SKIN ALLO-GRAFT APPLICATION WITH PARTIAL INTERMEDIATE CLOSURE    With the patient in the supine position and under adequate local anesthesia with 1% lidocaine with epinephrine 1:100,000, the defect was scrubbed with Chlorhexidine. Sterile drapes were placed from the sterile tray. After discussion, because of the location and sizes of the surgical defect,  a thera-skin allograft following partial intermediate closure was judged to give the best possible cosmetic and functional result. The edges of the defect were carefully debrided removing any dead or coagulated tissue. Hemostasis was obtained by pinpoint electrocoagulation. The wound was then partially approximated by a deep layer of buried vertical mattress sutures. The allo-graft was placed into the recipient site and secured by simple interrupted basting sutures and running sutures along periphery of graft as noted above. Estimated blood loss was less than 5 mL. The patient tolerated the procedure well. The wound was dressed with petrolatum, a non-stick pad, and a compression dressing. Selma Motley MD served as the surgeon and pathologist during the procedure. Postoperative care: Wound care discussed at length. I urged the patient to call us if any problems or question should arise. Complications: none  Post-op medications: none  Patient condition after procedure: stable  Discharge plans: Plan for return to [facility] as scheduled in 7 days. Do not get wet for 1 week. Do not remove bandage for 72 hours. Change bandage with Vaseline daily after this until follow up.         Scribe Attestation      I,:  Hussain Davis am acting as a scribe while in the presence of the attending physician.:       I,:  Ivon Juarez MD personally performed the services described in this documentation    as scribed in my presence.:

## 2023-11-01 NOTE — PATIENT INSTRUCTIONS
Mohs Microscopic Surgery After Care    WOUND CARE AFTER SURGERY:    For the graft site (back):     Do NOT remove the taped-on pressure bandage for 48 hours. After 48 hours, you can remove this bandage. You will now need to cleanse this area daily in the shower with gentle soap. The graft is very delicate. Do NOT get wound wet. There is no need to scrub the area. You will need to apply plain Vaseline or Aquaphor ointment (these are both over the counter ointments and not prescriptions) to the site for the next week until follow up appointment, followed by a clean appropriately sized bandage to area. Non stick dressing and paper tape (or Hypafix) are recommended for sensitive skin but a bandaid is fine if it covers the area well without sticking to the graft. All the stitches in this area will dissolve on their own over two weeks. Covering the area with Vaseline or Aquaphor and a bandage is NEEDED to protect the graft while it is healing for at least 3 weeks and for the stitches to dissolve appropriately over the first 2 weeks. Upon follow up, it will be decided if a new donor graft will need to be applied. Weekly visits will be necessary to monitor the donor graft and healing. RESTRICTIONS:     For two DAYS:   - You will need to take it very easy as this time is highest risk for bleeding. Being a "couch potato" during these two days is generally recommended. - For surgeries on the face/neck/scalp: Avoid leaning down to pick things up off the floor as this brings blood up to your head. Instead, squat down to pick things up. For two WEEKS:   - No heavy lifting (anything greater than 10 pounds)   - You can start to do slow, gentle activities such as slow walking but nothing to increase your heart rate and blood pressure too much (such as cardiovascular exercise). It is important to take it easy as there is still a risk for bleeding and a high risk popping of stitches open during this time.      MANAGING YOUR PAIN AFTER SURGERY     You can expect to have some pain after surgery. This is normal. The pain is typically worse the first two days after surgery, and quickly begins to get better. The best strategy for controlling your pain after surgery is around the clock pain control. You can take over the counter Acetaminophen (Tylenol) for discomfort, if no contraindications. If you are taking this at the maximum dose, you can alternate this with Motrin (ibuprofen or Advil) as well. Alternating these medications with each other allows you to maximize your pain control. In addition to Tylenol and Motrin, you can use heating pads or ice packs on your incisions to help reduce your pain. How will I alternate your regular strength over-the-counter pain medication? You will take a dose of pain medication every three hours. Start by taking 650 mg of Tylenol (2 pills of 325 mg)   3 hours later take 600 mg of Motrin (3 pills of 200 mg)   3 hours after taking the Motrin take 650 mg of Tylenol   3 hours after that take 600 mg of Motrin. See example - if your first dose of Tylenol is at 12:00 PM     12:00 PM  Tylenol 650 mg (2 pills of 325 mg)    3:00 PM  Motrin 600 mg (3 pills of 200 mg)    6:00 PM  Tylenol 650 mg (2 pills of 325 mg)    9:00 PM  Motrin 600 mg (3 pills of 200 mg)    Continue alternating every 3 hours      Important:   Do not take more than 4000mg of Tylenol or 3200mg of Motrin in a 24-hour period. What if I still have pain? If you have pain that is not controlled with the over-the-counter pain medications (Tylenol and Motrin or Advil), don't hesitate to call our staff using the number provided. We will help make sure you are managing your pain in the best way possible, and if necessary, we can provide a prescription for additional pain medication.      CALL OUR OFFICE IMMEDIATELY FOR ANY SIGNS OF INFECTION:    This includes fever, chills, increased redness, warmth, tenderness, severe discomfort/pain, or pus or foul smell coming from the wound. If you are experiencing any of the above, please call [facility] Mohs Department directly at [phone]. IF BLEEDING IS NOTICED:    Place a clean cloth over the area and apply firm pressure for thirty minutes. Check the wound ONLY after 30 minutes of direct pressure; do not cheat and sneak a peak, as that does not count. If bleeding persists after 30 minutes of legitimate direct pressure, then try one more round of direct pressure to the area. Should the bleeding become heavier or not stop after the second attempt, call [facility] Dermatology directly at [phone]. Your call will get routed to the dermatology surgeon on call even after hours.

## 2023-11-08 ENCOUNTER — OFFICE VISIT (OUTPATIENT)
Dept: DERMATOLOGY | Facility: CLINIC | Age: 58
End: 2023-11-08
Payer: COMMERCIAL

## 2023-11-08 DIAGNOSIS — Z48.89 ENCOUNTER FOR POSTOPERATIVE WOUND CHECK: Primary | ICD-10-CM

## 2023-11-08 PROCEDURE — 99212 OFFICE O/P EST SF 10 MIN: CPT | Performed by: STUDENT IN AN ORGANIZED HEALTH CARE EDUCATION/TRAINING PROGRAM

## 2023-11-08 NOTE — PROGRESS NOTES
WOUND CHECK    Physical Exam:  Anatomic Location Affected:  mid back  Description of wound: clean wound base with healing Thera-skin graft site, rim of erythema visible with visible rectangular marks from bandaging in periphery   Closure Type: Allograft with Thera-skin    Additional History of Present Condition:  Pt is s/p 1 week from application of [address]. Notes some tenderness with pressure on back but no drainage. Assessment and Plan:  Based on a thorough discussion of this condition and the management approach to it (including a comprehensive discussion of the known risks, side effects and potential benefits of treatment), the patient (family) agrees to implement the following specific plan:  Area cleaned today. Debrided edges and epidermal surface of allograft as much as possible. Discussed re-bandaging now and re-evaluating, possibly debriding and re-bandaging in 1 week at follow up. Healing well thus no need for reapplication of Thera-skin. Will cont to follow as healing as she has difficulty with wound care at home.      RTC in 1 week    Scribe Attestation      I,:  Kayla Yun RN am acting as a scribe while in the presence of the attending physician.:       I,:  Dhaval Armstrong personally performed the services described in this documentation    as scribed in my presence.:

## 2023-11-15 ENCOUNTER — OFFICE VISIT (OUTPATIENT)
Dept: DERMATOLOGY | Facility: CLINIC | Age: 58
End: 2023-11-15
Payer: COMMERCIAL

## 2023-11-15 DIAGNOSIS — Z48.89 ENCOUNTER FOR POST SURGICAL WOUND CHECK: Primary | ICD-10-CM

## 2023-11-15 PROCEDURE — 99212 OFFICE O/P EST SF 10 MIN: CPT | Performed by: STUDENT IN AN ORGANIZED HEALTH CARE EDUCATION/TRAINING PROGRAM

## 2023-11-15 NOTE — PROGRESS NOTES
WOUND CHECK    Physical Exam:  Anatomic Location Affected:  mid back  Description of wound: well healing, fibrin covered wound base with granulation tissue visible with allograft still present   Closure Type: allograft (Theraskin)    Additional History of Present Condition:  s/p Mohs sx on 11/1/23    Assessment and Plan:  Based on a thorough discussion of this condition and the management approach to it (including a comprehensive discussion of the known risks, side effects and potential benefits of treatment), the patient (family) agrees to implement the following specific plan:  Patient's wound cleansed well today. Epithelial layer of graft removed where able to remove it, trimmed excess on edges as well. Discussed possible silver nitrate if excessive granulation tissue at next visit. Patient will return in about 2 weeks for repeat wound check.         Scribe Attestation      I,:  Anat Butler RN am acting as a scribe while in the presence of the attending physician.:       I,:  Ivon Juarez MD personally performed the services described in this documentation    as scribed in my presence.:

## 2023-11-20 ENCOUNTER — CONSULT (OUTPATIENT)
Dept: ENDOCRINOLOGY | Facility: CLINIC | Age: 58
End: 2023-11-20
Payer: MEDICARE

## 2023-11-20 VITALS
BODY MASS INDEX: 19.35 KG/M2 | SYSTOLIC BLOOD PRESSURE: 152 MMHG | WEIGHT: 116.3 LBS | DIASTOLIC BLOOD PRESSURE: 78 MMHG | HEART RATE: 62 BPM

## 2023-11-20 DIAGNOSIS — R79.89 ABNORMAL TSH: ICD-10-CM

## 2023-11-20 DIAGNOSIS — K51.30 ULCERATIVE RECTOSIGMOIDITIS WITHOUT COMPLICATION (HCC): ICD-10-CM

## 2023-11-20 DIAGNOSIS — M81.6 LOCALIZED OSTEOPOROSIS WITHOUT CURRENT PATHOLOGICAL FRACTURE: Primary | ICD-10-CM

## 2023-11-20 DIAGNOSIS — N20.0 NEPHROLITHIASIS: ICD-10-CM

## 2023-11-20 DIAGNOSIS — Z79.52 CURRENT CHRONIC USE OF SYSTEMIC STEROIDS: ICD-10-CM

## 2023-11-20 DIAGNOSIS — E55.9 VITAMIN D DEFICIENCY: ICD-10-CM

## 2023-11-20 PROCEDURE — 99205 OFFICE O/P NEW HI 60 MIN: CPT | Performed by: INTERNAL MEDICINE

## 2023-11-20 NOTE — PROGRESS NOTES
Hellen Maloney 59 y.o. female MRN: [id number]    Encounter: [id number]      Assessment/Plan     Osteoporosis  Chronic glucocorticoid use  History of ulcerative colitis   Lactose intolerance, low dietary calcium  GERD on PPI  H/o nephrolithiasis   Meets criteria for osteoporosis medications; patient is hesitant about starting anything new    Recommend the following at this time:    Continue calcium, vitamin D supplementation, weightbearing exercises as tolerated  DXA as scheduled  will get the following labs-CMP, vitamin D, PTH, SPEP, UPEP  Follow-up in 3 months; will discuss osteoporosis medications at that time    - Reviewed pathophysiology of osteoporosis, including risk for fracture and morbidty and mortality associated with fracture. - Reviewed potential need for treatment to reduce risk of fracture. Reviewed limits of bone density test in predicting fracture risk  - Emphasized importance of regular calcium and vitamin D intake; Goal vitamin D >30 and calcium intake ~1200mg /day. - weight bearing exercise as tolerated    #6 suppressed Tsh, free t4 within normal limits -the second 3 to glucocorticoids  Check TSH, free T4, T3    CC:  osteoporosis    History of Present Illness      HPI:  Hellen Maloney is a 59 y.o. female who is here for new patient visit for evaluation of osteoporosis.   History of ulcerative colitis, on chronic glucocorticoids, GERD,     Treatment History:  Patient has known about osteoporosis for a few years but not been on any medication for it  Recently started taking calcium and vitamin D      Last DXA 1/2020 - osteoporosis; significant decrease as compared to before  1/2017 - osteoporosis; stable when compared to  4/2014    Is scheduled for repeat DXA 12/28/23    Has been on prednisone since 1998 , currently on prednisone 10 mg daily lower doses lead to flare of colitis     Previous fractures: Yes - few years ago ; hairline in the foot   h/o loss of 2 inches of adult height - No  H/o Falls No  H/o malabsorption Yes   H/o nephrolithiasisYes- h/o stent placement approx 10 years ago   H/o Rheumatoid Arthritis No  H/o hyperthyroidism No  Family History of Osteoporosis No    Contributing Drugs:  glucocorticoids:  Ever:  yes +  PPI                               Yes  Immunnosuppressants Yes                                             Antiestrogens               No                                              Antiandrogens             No  Lithium                         No                                                       Anticonvulsant            no     Diet:     lactose intolerance yes, not much dairy   calcium intake as an adult (diet and supplements): milk in cereal and tea   No cheese, no yogurt; no kale/ spinach/ broccoli                                            vitamin D intake D3 2000 IU daily     Tobacco use: No  ETOH use >2 units/day:     No     Menstrual history:   Age at menopause 46   Estrogen therapy No    Activity level: walks regualry     All other systems were reviewed and are negative.      Review of Systems    Historical Information   Past Medical History:   Diagnosis Date    Anxiety     Arthritis     Atypical chest pain     last assessed: 12/6/13    Basal cell carcinoma 08/23/2023    right temple, mohs    Basal cell carcinoma 08/23/2023    mid back, mohs    Bloody diarrhea     last assesed: 58/23/16    Breast lump     last assessed: 10/10/14    Candidiasis of esophagus ([address])     last assessed: 2/24/16    Candidiasis of mouth     last assessed: 8/7/14    Choledocholithiasis     last assessed: 6/5/14    Cholelithiasis     last assessed: 7/21/15    Closed fracture of first metatarsal bone of right foot     last assessed: 12/11/13    Costovertebral angle pain     last assessed: 5/4/17    Elevated blood pressure reading     last assessed: 7/29/16    Grief reaction     last assessed: 4/4/16    Herpes zoster     last assessed: 11/3/16    High risk medication use     last assessed: 16    Hypokalemia     last assessed: 16    Hypotension     last assessed: 5/15/15    Inflammatory disorder of breast     last assessed: 13    Iron deficiency anemia     last assessed: 13    Microscopic hematuria     last assessed: 13    On prednisone therapy     last assessed: 10/19/17    Opioid use, unspecified, uncomplicated     last assessed: 3/1/17    Other polyp of sinus     last assessed: 13    Paronychia of toe     last assessed: 13    Pharyngoesophageal dysphagia     resolved: 3/1/16    Tinea corporis     last assessed: 16    Ulcerative colitis ([address])     Underweight     last assessed: 3/1/16    Urinary frequency     resolved: 16    Viral warts      Past Surgical History:   Procedure Laterality Date    BREAST SURGERY      left breast lumpectomy    CARPAL TUNNEL RELEASE Bilateral      SECTION N/A     CHOLECYSTECTOMY      COLONOSCOPY  2019    EGD  2019    ESOPHAGOGASTRODUODENOSCOPY N/A 2016    Procedure: ESOPHAGOGASTRODUODENOSCOPY (EGD); Surgeon: Rose Harrell MD;  Location: [facility] GI LAB;   Service:     MOHS SURG[facility]09[facility]2023    [facility] back-Dr Harkins    TONSILLECTOMY N/A      Social History   Social History     Substance and Sexual Activity   Alcohol Use Never     Social History     Substance and Sexual Activity   Drug Use Never     Social History     Tobacco Use   Smoking Status Never   Smokeless Tobacco Never     Family History:   Family History   Problem Relation Age of Onset    Heart disease Mother     Stroke Mother     Heart disease Father         MI    Colon cancer Maternal Grandfather     Asthma Son     Autism Son     Heart disease Brother         CAD    Cancer Maternal Grandmother         colon    Heart disease Brother     COPD Brother         smoker    No Known Problems Brother     No Known Problems Half-Sister        Meds/Allergies   Current Outpatient Medications   Medication Sig Dispense Refill    Adalimumab (Humira Pen) 80 MG/0.8ML PNKT Inject 2 pens under the skin on day 1, then inject 1 pen under the skin on day 15. (Patient not taking: Reported on 7/24/2023) 3 each 0    adalimumab (Humira) 40 MG/0.4ML Inject 40mg under skin every 14 days (Patient not taking: Reported on 7/24/2023) 2 each 11    amLODIPine (NORVASC) 5 mg tablet Take 1 tablet (5 mg total) by mouth daily 30 tablet 5    balsalazide (COLAZAL) 750 mg capsule TAKE 3 CAPSULES TWICE DAILY 540 capsule 10    Blood Pressure KIT Use 2 (two) times a day 1 kit 0    calcium carbonate (OS-BUTCH) 600 MG tablet Take 1 tablet (600 mg total) by mouth 2 (two) times a day with meals 30 tablet 2    cholecalciferol (VITAMIN D3) 1,000 units tablet Take 2 tablets (2,000 Units total) by mouth daily 60 tablet 2    cyanocobalamin (VITAMIN B-12) 100 mcg tablet Take by mouth daily      folic acid (FOLVITE) 1 mg tablet Take 1 mg by mouth daily. hydrOXYzine HCL (ATARAX) 10 mg tablet Take 1 tablet (10 mg total) by mouth every 6 (six) hours as needed for itching 30 tablet 0    lisinopril (ZESTRIL) 20 mg tablet Take 1 tablet (20 mg total) by mouth daily 30 tablet 5    omeprazole (PriLOSEC) 40 MG capsule Take 1 capsule (40 mg total) by mouth daily 30 capsule 5    predniSONE 10 mg tablet Take 1 tablet (10 mg total) by mouth daily 90 tablet 1     No current facility-administered medications for this visit.      Facility-Administered Medications Ordered in Other Visits   Medication Dose Route Frequency Provider Last Rate Last Admin    lidocaine (XYLOCAINE) 1 % 25 mL, sodium bicarbonate 5 mEq, EPINEPHrine PF (ADRENALIN) 0.5 mg in sodium chloride 0.9 % 500 mL OR irrigation   Irrigation Once Theodore Gonzalez MD         Allergies   Allergen Reactions    Dairy Aid [Tilactase] GI Intolerance    Levaquin [Levofloxacin In D5w] Other (See Comments)     Redness at injection site    Mercaptopurine      Causes weakness in legs    Mesalamine GI Intolerance    Other Other (See Comments)     6 mp. Causes weakness in legs. Objective   Vitals: Blood pressure 152/78, pulse 62, weight 52.8 kg (116 lb 4.8 oz). Physical Exam  Constitutional:       Appearance: She is well-developed. HENT:      Head: Normocephalic and atraumatic. Eyes:      Conjunctiva/sclera: Conjunctivae normal.      Pupils: Pupils are equal, round, and reactive to light. Neck:      Thyroid: No thyromegaly. Cardiovascular:      Rate and Rhythm: Normal rate and regular rhythm. Heart sounds: Normal heart sounds. No murmur heard. Pulmonary:      Effort: Pulmonary effort is normal.      Breath sounds: Normal breath sounds. No wheezing. Abdominal:      General: There is no distension. Palpations: Abdomen is soft. Tenderness: There is no abdominal tenderness. Musculoskeletal:         General: No tenderness. Normal range of motion. Cervical back: Normal range of motion and neck supple. Comments: Dressing + on the back   Limited back exam   Skin:     General: Skin is warm and dry. Neurological:      Mental Status: She is alert and oriented to person, place, and time. Psychiatric:         Behavior: Behavior normal.         The history was obtained from the review of the chart, patient.     Lab Results:   Lab Results   Component Value Date/Time    Potassium 4.3 07/28/2023 11:18 AM    Potassium 3.5 07/11/2023 05:56 AM    Potassium 3.8 07/10/2023 05:12 AM    Chloride 103 07/28/2023 11:18 AM    Chloride 106 07/11/2023 05:56 AM    Chloride 109 (H) 07/10/2023 05:12 AM    CO2 31 07/28/2023 11:18 AM    CO2 26 07/11/2023 05:56 AM    CO2 21 07/10/2023 05:12 AM    BUN 26 (H) 07/28/2023 11:18 AM    BUN 12 07/11/2023 05:56 AM    BUN 12 07/10/2023 05:12 AM    Creatinine 0.92 07/28/2023 11:18 AM    Creatinine 0.75 07/11/2023 05:56 AM    Creatinine 0.73 07/10/2023 05:12 AM    Glucose, Fasting 69 07/07/2023 05:56 AM    Calcium 9.5 07/28/2023 11:18 AM    Calcium 8.4 07/11/2023 05:56 AM    Calcium 7.5 (L) 07/10/2023 05:12 AM    eGFR 68 07/28/2023 11:18 AM    eGFR 87 07/11/2023 05:56 AM    eGFR 90 07/10/2023 05:12 AM    TSH 3RD GENERATON 0.166 (L) 07/09/2023 04:46 AM    Free T4 0.81 07/09/2023 04:46 AM        Imaging Studies:         Results for orders placed during the hospital encounter of 01/06/20    DXA bone density spine hip and pelvis    Impression  1. Based on the [facility] classification, the T-score of -2.7 in lumbar spine and -2.6 at the femoral neck are consistent with osteoporosis. 2.  Since the prior study, there has been a significant decrease of the BMD in the lumbar spine of 0.033 gm/cm2 or 3.7%. The total hip bone mineral density is not significantly changed. This lumbar spine decrease  does exceed our own least significant  change and, therefore, is statistically significant within 95% confidence level. 3.  According to the 2000 [employer], prescription therapy is recommended with a T-score of -2.5 or less in the spine or hip after appropriate evaluation to exclude secondary causes. 4.  A daily intake of at least 1200 mg Calcium and 800 to 1000 IU of Vitamin D, as well as weight bearing and muscle strengthening exercise, fall prevention and avoidance of tobacco and excessive alcohol intake as  basic preventive measures are  suggested. 5.  Repeat DXA  in 18 - 24 months, on the same machine, as clinically indicated. The 10 year risk of hip fracture is 2.3%, with the 10 year risk of major osteoporotic fracture being 26.7%, as calculated by the [facility] fracture risk assessment tool (FRAX). The current NOF guidelines recommend treating patients with FRAX 10 year risk  score of >3% for hip fracture and >20% for major osteoporotic fracture.     WHO CLASSIFICATION:  Normal (a T-score of -1.0 or higher)  Low bone mineral density (a T-score of less than -1.0 but higher than -2.5)  Osteoporosis (a T-score of -2.5 or less)  Severe osteoporosis (a T-score of -2.5 or less with a fragility fracture)              Workstation performed: [id number]             I have personally reviewed pertinent reports. Portions of the record may have been created with voice recognition software. Occasional wrong word or "sound a like" substitutions may have occurred due to the inherent limitations of voice recognition software. Read the chart carefully and recognize, using context, where substitutions have occurred.

## 2023-11-20 NOTE — PATIENT INSTRUCTIONS
Please get labs done as ordered  Continue calcium, vitamin D supplementation, weightbearing exercises as tolerated  Follow-up in 3 months

## 2023-11-28 ENCOUNTER — OFFICE VISIT (OUTPATIENT)
Dept: DERMATOLOGY | Facility: CLINIC | Age: 58
End: 2023-11-28
Payer: COMMERCIAL

## 2023-11-28 DIAGNOSIS — Z48.89 ENCOUNTER FOR POST SURGICAL WOUND CHECK: Primary | ICD-10-CM

## 2023-11-28 PROCEDURE — 87070 CULTURE OTHR SPECIMN AEROBIC: CPT | Performed by: STUDENT IN AN ORGANIZED HEALTH CARE EDUCATION/TRAINING PROGRAM

## 2023-11-28 PROCEDURE — 87205 SMEAR GRAM STAIN: CPT | Performed by: STUDENT IN AN ORGANIZED HEALTH CARE EDUCATION/TRAINING PROGRAM

## 2023-11-28 PROCEDURE — 87186 SC STD MICRODIL/AGAR DIL: CPT | Performed by: STUDENT IN AN ORGANIZED HEALTH CARE EDUCATION/TRAINING PROGRAM

## 2023-11-28 PROCEDURE — 99214 OFFICE O/P EST MOD 30 MIN: CPT | Performed by: STUDENT IN AN ORGANIZED HEALTH CARE EDUCATION/TRAINING PROGRAM

## 2023-11-28 PROCEDURE — 87147 CULTURE TYPE IMMUNOLOGIC: CPT | Performed by: STUDENT IN AN ORGANIZED HEALTH CARE EDUCATION/TRAINING PROGRAM

## 2023-11-28 NOTE — PROGRESS NOTES
WOUND CHECK    Physical Exam:  Anatomic Location Affected:  mid back  Description of wound: Granulated tissue bed with greenish yellow discharge present on bandage and surrounding wound  Closure Type: allograft, theraskin    Additional History of Present Condition:  s/p Mohs procedure 11/1/23    Assessment and Plan:  Based on a thorough discussion of this condition and the management approach to it (including a comprehensive discussion of the known risks, side effects and potential benefits of treatment), the patient (family) agrees to implement the following specific plan:  Culture of wound collected today. Mupirocin prescribed for potential superficial bacterial overgrowth. Silver nitrate applied to wound for granular tissue overgrowth. Patient will begin dilute vinegar soaks and continue daily dressing changes. Hydrocortisone 2.5% cream prescribed for allergic contact rash on back.    Patient will return in 1 week for repeat wound exam.       Scribe Attestation      I,:  Sundar Fitch RN am acting as a scribe while in the presence of the attending physician.:       I,:  Ajith Gustafson MD personally performed the services described in this documentation    as scribed in my presence.:

## 2023-11-28 NOTE — PATIENT INSTRUCTIONS
Vinegar Soak   Mix 1-2 capfuls of distilled white vinegar with 1 liter of fresh water. Shake thoroughly. Pour mixture onto clean gauze or wash cloth, lay over biopsy site for 10 minutes. Carefully remove gauze and rinse with fresh water. Do this 4 times a day or as often as the bandage is changed. Apply mupirocin and a bandage daily after vinegar soaks.

## 2023-11-29 ENCOUNTER — OFFICE VISIT (OUTPATIENT)
Dept: FAMILY MEDICINE CLINIC | Facility: CLINIC | Age: 58
End: 2023-11-29
Payer: COMMERCIAL

## 2023-11-29 VITALS
HEART RATE: 65 BPM | TEMPERATURE: 97.2 F | WEIGHT: 114.38 LBS | SYSTOLIC BLOOD PRESSURE: 112 MMHG | DIASTOLIC BLOOD PRESSURE: 90 MMHG | HEIGHT: 65 IN | RESPIRATION RATE: 21 BRPM | OXYGEN SATURATION: 99 % | BODY MASS INDEX: 19.06 KG/M2

## 2023-11-29 DIAGNOSIS — T50.905A DRUG-INDUCED OSTEOPOROSIS: ICD-10-CM

## 2023-11-29 DIAGNOSIS — K51.30 ULCERATIVE RECTOSIGMOIDITIS WITHOUT COMPLICATION (HCC): ICD-10-CM

## 2023-11-29 DIAGNOSIS — Z85.828 HISTORY OF BASAL CELL CARCINOMA: ICD-10-CM

## 2023-11-29 DIAGNOSIS — M81.8 DRUG-INDUCED OSTEOPOROSIS: ICD-10-CM

## 2023-11-29 DIAGNOSIS — K21.9 GASTROESOPHAGEAL REFLUX DISEASE WITHOUT ESOPHAGITIS: ICD-10-CM

## 2023-11-29 DIAGNOSIS — L57.0 ACTINIC KERATOSIS: ICD-10-CM

## 2023-11-29 DIAGNOSIS — I10 ESSENTIAL HYPERTENSION: Primary | ICD-10-CM

## 2023-11-29 PROCEDURE — 99214 OFFICE O/P EST MOD 30 MIN: CPT | Performed by: FAMILY MEDICINE

## 2023-11-30 ENCOUNTER — OFFICE VISIT (OUTPATIENT)
Dept: GASTROENTEROLOGY | Facility: CLINIC | Age: 58
End: 2023-11-30
Payer: COMMERCIAL

## 2023-11-30 VITALS
BODY MASS INDEX: 19.66 KG/M2 | WEIGHT: 118 LBS | HEIGHT: 65 IN | OXYGEN SATURATION: 97 % | SYSTOLIC BLOOD PRESSURE: 135 MMHG | HEART RATE: 73 BPM | DIASTOLIC BLOOD PRESSURE: 80 MMHG

## 2023-11-30 DIAGNOSIS — K51.911 ULCERATIVE COLITIS WITH RECTAL BLEEDING, UNSPECIFIED LOCATION (HCC): ICD-10-CM

## 2023-11-30 DIAGNOSIS — K51.30 ULCERATIVE RECTOSIGMOIDITIS WITHOUT COMPLICATION (HCC): ICD-10-CM

## 2023-11-30 PROCEDURE — 99214 OFFICE O/P EST MOD 30 MIN: CPT | Performed by: INTERNAL MEDICINE

## 2023-11-30 RX ORDER — BALSALAZIDE DISODIUM 750 MG/1
2250 CAPSULE ORAL 3 TIMES DAILY
Qty: 810 CAPSULE | Refills: 3 | Status: SHIPPED | OUTPATIENT
Start: 2023-11-30 | End: 2023-12-06 | Stop reason: SDUPTHER

## 2023-11-30 RX ORDER — PREDNISONE 10 MG/1
10 TABLET ORAL DAILY
Qty: 90 TABLET | Refills: 1 | Status: SHIPPED | OUTPATIENT
Start: 2023-11-30 | End: 2023-12-06 | Stop reason: SDUPTHER

## 2023-11-30 NOTE — PATIENT INSTRUCTIONS
The patient had recent Mohs surgery with dermatology and is following up with them for wound care. Further management for the wound is being handled by dermatology. The patient has an actinic keratosis of the right ankle and cryotherapy was done today. The patient should have a follow-up cryotherapy visit in 1 month. The patient has a history of hypertension and should continue her hypertensive meds. The patient has a history of ulcerative colitis and has a follow-up appointment with GI for further management. The patient is still on prednisone 10 mg daily which is contributed to osteoporosis over the years. It would be in the patient's best interest if he will be weaned off the steroids. The patient has a history of GERD and should continue on PPI. The prednisone is at risk for increased GERD symptoms. The patient should follow-up with me in 3 months for a blood pressure check also. The patient also needs to schedule a Medicare wellness exam in the future. The patient is scheduled for a mammogram on December 19. She is also scheduled for a DEXA scan on December 28.

## 2023-11-30 NOTE — ASSESSMENT & PLAN NOTE
History of chronic ulcerative pancolitis has been in remission on prednisone 10 mg daily along with balsalazide. She had flareup when she tried to stop the prednisone.    -Discussed with the patient again about the biologic agents so that we can get her of prednisone and discussed about the long-term side effects from prednisone.   Patient is more concerned about the side effects from biologic agents and refusing stating that she had been on prednisone for 25 years.    -Continue balsalazide and prednisone 10 mg daily.    -Continue calcium and vitamin D supplements    -DEXA scan as scheduled    -Advised patient to call if she develops any symptoms of flareup    -Follow-up office visit in 3 to 4 months

## 2023-11-30 NOTE — PROGRESS NOTES
Assessment/Plan:    No problem-specific Assessment & Plan notes found for this encounter. Subjective:      Patient ID: Radha Dominguez is a 59 y.o. female. This is a follow-up appointment for this 53-year-old female who recently had Mohs surgery for a basal cell cancer on her mid back region. The patient has been following up with her dermatologist for wound care after the Mohs surgery. The patient has another follow-up appointment next week. The patient has a history of hypertension which is being followed. The patient also has a history of ulcerative colitis and has a follow-up appointment with her GI doctor tomorrow. The patient has been treated with steroids in the past for her UC. The patient has developed some side effects from the steroid use over the years including osteoporosis. The patient is taking vitamin D and calcium for her osteoporosis treatment. The patient has been resistant to trying new medicines for treatment of her ulcerative colitis in the past.  The patient does have a actinic keratosis lesion on her right ankle that is returned from past cryo treatments. She denies any other new problems today. Review of Systems   Constitutional: Negative. HENT: Negative. Eyes: Negative. Respiratory: Negative. Mild wheezes   Cardiovascular: Negative. Gastrointestinal: Negative. Endocrine: Negative. Genitourinary: Negative. Musculoskeletal: Negative. Skin: Negative. Allergic/Immunologic: Negative. Neurological: Negative. Hematological: Negative. Psychiatric/Behavioral: Negative. Objective:      /90 (BP Location: Left arm, Patient Position: Sitting, Cuff Size: Standard)   Pulse 65   Temp (!) 97.2 °F (36.2 °C) (Temporal)   Resp 21   Ht 5' 5" (1.651 m)   Wt 51.9 kg (114 lb 6 oz)   SpO2 99%   BMI 19.03 kg/m²          Physical Exam  Constitutional:       Comments:  The patient is underweight with a BMI of 19.03 Cardiovascular:      Rate and Rhythm: Normal rate and regular rhythm. Pulmonary:      Effort: Pulmonary effort is normal.      Breath sounds: Normal breath sounds. Abdominal:      General: Abdomen is flat. Bowel sounds are normal.      Palpations: Abdomen is soft. Musculoskeletal:         General: Normal range of motion. Skin:     Comments: Midline low risk for surgery wound with healing. Mild erythema from reaction to tape. Neurological:      General: No focal deficit present. Mental Status: She is alert and oriented to person, place, and time. Mental status is at baseline. Psychiatric:         Mood and Affect: Mood normal.         Behavior: Behavior normal.         Thought Content:  Thought content normal.

## 2023-12-01 ENCOUNTER — TELEPHONE (OUTPATIENT)
Age: 58
End: 2023-12-01

## 2023-12-01 LAB
BACTERIA WND AEROBE CULT: ABNORMAL
BACTERIA WND AEROBE CULT: ABNORMAL
GRAM STN SPEC: ABNORMAL
GRAM STN SPEC: ABNORMAL

## 2023-12-01 NOTE — TELEPHONE ENCOUNTER
PA for Balsalazide sent through [employer]  Key:  [id number] [facility] [id number] determination

## 2023-12-01 NOTE — TELEPHONE ENCOUNTER
[facility] prednisone [facility]  Key:  [address] [facility]

## 2023-12-04 NOTE — RESULT ENCOUNTER NOTE
Called patient to let her know about results of culture. Pt notes no spreading redness, no pain/tenderness. Still endorses drainage on bandage. Suspicious this is a superficial infection/overgrowth given appearance at time of last visit. She is using the topical mupirocin currently. Pt has scheduled appointment tomorrow with us to re-evaluate the area- will discuss oral antibiotics at that time pending appearance, pt agreeable with this plan.

## 2023-12-05 ENCOUNTER — OFFICE VISIT (OUTPATIENT)
Dept: DERMATOLOGY | Facility: CLINIC | Age: 58
End: 2023-12-05

## 2023-12-05 DIAGNOSIS — Z48.89 ENCOUNTER FOR POST SURGICAL WOUND CHECK: Primary | ICD-10-CM

## 2023-12-05 PROCEDURE — 99024 POSTOP FOLLOW-UP VISIT: CPT | Performed by: STUDENT IN AN ORGANIZED HEALTH CARE EDUCATION/TRAINING PROGRAM

## 2023-12-05 NOTE — PROGRESS NOTES
WOUND CHECK    Physical Exam:  Anatomic Location Affected:  mid back  Description of wound: granular tissue bed with mild bleeding noted  Closure Type: allograft/ theraskin    Additional History of Present Condition:  s/p Mohs sx on 11/1/2, presenting today for repeat wound exam     Assessment and Plan:  Based on a thorough discussion of this condition and the management approach to it (including a comprehensive discussion of the known risks, side effects and potential benefits of treatment), the patient (family) agrees to implement the following specific plan:  Re-applied silver nitrate applied to wound for hypergranulation today. Discussed healthy appearing wound despite positive culture for staph aureus. Discussed favor superficial overgrowth of this. (Superficial infection that mupirocin may cover well). Discussed oral antibiotics as other option but pt would like to hold off any for now as area looks improved. Patient has not started mupirocin ointment for superficial bacterial growth. Patient reports she will begin mupirocin ointment today. She is limited due to not having help at home. Will plan for RTC in 1 week and if any worsening would  plan on oral antibiotics She was also provided hypafix in the office to help with the likely allergic vs irritant rash around the wound for dressing changes. Patient verbalized understanding. Stated she will moisturize the area and use mupirocin as prescribed, return next week as scheduled for repeat wound check.              Scribe Attestation      I,:  Paulino Brooke RN am acting as a scribe while in the presence of the attending physician.:       I,:  Nanette Prasad MD personally performed the services described in this documentation    as scribed in my presence.:

## 2023-12-06 ENCOUNTER — NURSE TRIAGE (OUTPATIENT)
Age: 58
End: 2023-12-06

## 2023-12-06 DIAGNOSIS — K51.30 ULCERATIVE RECTOSIGMOIDITIS WITHOUT COMPLICATION (HCC): ICD-10-CM

## 2023-12-06 DIAGNOSIS — K51.911 ULCERATIVE COLITIS WITH RECTAL BLEEDING, UNSPECIFIED LOCATION (HCC): ICD-10-CM

## 2023-12-06 RX ORDER — BALSALAZIDE DISODIUM 750 MG/1
2250 CAPSULE ORAL 3 TIMES DAILY
Qty: 810 CAPSULE | Refills: 3 | Status: SHIPPED | OUTPATIENT
Start: 2023-12-06

## 2023-12-06 RX ORDER — PREDNISONE 10 MG/1
10 TABLET ORAL DAILY
Qty: 90 TABLET | Refills: 1 | Status: SHIPPED | OUTPATIENT
Start: 2023-12-06

## 2023-12-06 NOTE — TELEPHONE ENCOUNTER
Pt states that the mail order RX is not covered under her newly acquired insurance policy. Pt is running out of prednisone and balsalazide and requests a new script sent to the [id number] [facility]. Pt would like confirmation when script is submitted. Pharmacy verified. [address] [place] [address] Drive [address]   Store number: [id number]           Reason for Disposition   Information only question and nurse able to answer    Answer Assessment - Initial Assessment Questions  1. REASON FOR CALL or QUESTION: "What is your reason for calling today?" or "How can I best help you?" or "What question do you have that I can help answer?"      Pt requesting medication scripts be re-sent. Protocols used:  Information Only Call - No Triage-ADULT-[place]

## 2023-12-12 ENCOUNTER — OFFICE VISIT (OUTPATIENT)
Dept: DERMATOLOGY | Facility: CLINIC | Age: 58
End: 2023-12-12

## 2023-12-12 DIAGNOSIS — Z48.89 ENCOUNTER FOR POST SURGICAL WOUND CHECK: Primary | ICD-10-CM

## 2023-12-12 PROCEDURE — 99024 POSTOP FOLLOW-UP VISIT: CPT | Performed by: STUDENT IN AN ORGANIZED HEALTH CARE EDUCATION/TRAINING PROGRAM

## 2023-12-12 NOTE — PROGRESS NOTES
WOUND CHECK    Physical Exam:  Anatomic Location Affected:  mid back  Description of wound: hypergranulation tissue present, well healing wound, free from signs or symptoms if infection  Closure Type: allograft/theraskin    Additional History of Present Condition:  s/p Mohs sx on 11/1/23 here for repeat allograft check, has been usign mupirocin daily with lessd rainage.   Assessment and Plan:  Based on a thorough discussion of this condition and the management approach to it (including a comprehensive discussion of the known risks, side effects and potential benefits of treatment), the patient (family) agrees to implement the following specific plan:  Silver nitrate applied today for hypergranulation tissue to entire granulating base of wound  Patient will continue to cleanse the wound daily   Patient will continue to apply vaseline and a bandage daily  Patient will continue to monitor the site and return in 3 weeks for repeat wound check      Scribe Attestation      I,:  Satya Silva RN am acting as a scribe while in the presence of the attending physician.:       I,:  Kunal Urbina MD personally performed the services described in this documentation    as scribed in my presence.:

## 2023-12-27 ENCOUNTER — OFFICE VISIT (OUTPATIENT)
Dept: FAMILY MEDICINE CLINIC | Facility: CLINIC | Age: 58
End: 2023-12-27
Payer: COMMERCIAL

## 2023-12-27 VITALS
OXYGEN SATURATION: 97 % | HEART RATE: 68 BPM | WEIGHT: 117.5 LBS | BODY MASS INDEX: 19.55 KG/M2 | SYSTOLIC BLOOD PRESSURE: 130 MMHG | DIASTOLIC BLOOD PRESSURE: 74 MMHG

## 2023-12-27 DIAGNOSIS — L57.0 ACTINIC KERATOSIS: Primary | ICD-10-CM

## 2023-12-27 PROCEDURE — 99213 OFFICE O/P EST LOW 20 MIN: CPT | Performed by: FAMILY MEDICINE

## 2023-12-27 NOTE — PROGRESS NOTES
Assessment/Plan:    Actinic keratosis resolved.  No further treatment needed with cryo to the lesion on the left ankle.           Subjective:      Patient ID: Rubia Christine is a 58 y.o. female.    This is a follow-up appointment for this 58-year-old female that has a history of actinic keratoses on her left ankle area.  The area has been treated with cryotherapy in the past with good results.  She denies any other complaints about the lesion at this point.        Review of Systems   HENT: Negative.     Eyes: Negative.    Respiratory: Negative.     Cardiovascular: Negative.    Gastrointestinal: Negative.    Endocrine: Negative.    Musculoskeletal: Negative.    Allergic/Immunologic: Negative.    Neurological: Negative.    Hematological: Negative.    Psychiatric/Behavioral: Negative.     All other systems reviewed and are negative.      Objective:      /74 (BP Location: Left arm, Patient Position: Sitting, Cuff Size: Child)   Pulse 68   Wt 53.3 kg (117 lb 8 oz)   SpO2 97%   BMI 19.55 kg/m²          Physical Exam  Skin:     Comments: Area of redness on the anterior part of her left ankle.  Past scaliness and dryness has resolved with cryo.

## 2023-12-27 NOTE — PATIENT INSTRUCTIONS
Actinic keratosis is resolving with cryotherapy.  Patient at this point will not need further cryotherapy since the lesion is resolving.

## 2023-12-28 ENCOUNTER — HOSPITAL ENCOUNTER (OUTPATIENT)
Dept: RADIOLOGY | Facility: HOSPITAL | Age: 58
Discharge: HOME | End: 2023-12-28
Attending: FAMILY MEDICINE
Payer: COMMERCIAL

## 2023-12-28 DIAGNOSIS — M81.0 OSTEOPOROSIS, UNSPECIFIED OSTEOPOROSIS TYPE, UNSPECIFIED PATHOLOGICAL FRACTURE PRESENCE: ICD-10-CM

## 2023-12-28 PROCEDURE — 77080 DXA BONE DENSITY AXIAL: CPT

## 2024-01-03 DIAGNOSIS — M81.0 OSTEOPOROSIS WITHOUT CURRENT PATHOLOGICAL FRACTURE, UNSPECIFIED OSTEOPOROSIS TYPE: Primary | ICD-10-CM

## 2024-01-03 RX ORDER — RISEDRONATE SODIUM 35 MG/1
35 TABLET, FILM COATED ORAL
Qty: 12 TABLET | Refills: 3 | Status: SHIPPED | OUTPATIENT
Start: 2024-01-03

## 2024-01-04 ENCOUNTER — TELEPHONE (OUTPATIENT)
Age: 59
End: 2024-01-04

## 2024-01-04 ENCOUNTER — OFFICE VISIT (OUTPATIENT)
Dept: DERMATOLOGY | Facility: CLINIC | Age: 59
End: 2024-01-04
Payer: COMMERCIAL

## 2024-01-04 DIAGNOSIS — Z48.89 ENCOUNTER FOR POST SURGICAL WOUND CHECK: Primary | ICD-10-CM

## 2024-01-04 PROCEDURE — 99213 OFFICE O/P EST LOW 20 MIN: CPT | Performed by: STUDENT IN AN ORGANIZED HEALTH CARE EDUCATION/TRAINING PROGRAM

## 2024-01-04 NOTE — TELEPHONE ENCOUNTER
Patient has appt for wound check on 1/25 and needs to rs.  She asked if there are any appts earlier in the day around 11:30. Please contact patient to reschedule.

## 2024-01-04 NOTE — PROGRESS NOTES
WOUND CHECK    Physical Exam:  Anatomic Location Affected:  lower back  Description of wound: well healing granular tissue bed  Closure Type: allograft     Additional History of Present Condition:  s/p Mohs sx on 11/1/23, silver nitrate applied again today to middle    Assessment and Plan:  Based on a thorough discussion of this condition and the management approach to it (including a comprehensive discussion of the known risks, side effects and potential benefits of treatment), the patient (family) agrees to implement the following specific plan:  Patient will continue to apply vaseline and a bandage to this site until fully healed.   Patient will return in 3 weeks for repeat wound check.       Scribe Attestation      I,:  Sandi Smith RN am acting as a scribe while in the presence of the attending physician.:       I,:  Radha Harkins MD personally performed the services described in this documentation    as scribed in my presence.:

## 2024-01-18 ENCOUNTER — OFFICE VISIT (OUTPATIENT)
Dept: DERMATOLOGY | Facility: CLINIC | Age: 59
End: 2024-01-18
Payer: COMMERCIAL

## 2024-01-18 DIAGNOSIS — Z48.89 ENCOUNTER FOR POST SURGICAL WOUND CHECK: Primary | ICD-10-CM

## 2024-01-18 PROCEDURE — 99212 OFFICE O/P EST SF 10 MIN: CPT | Performed by: STUDENT IN AN ORGANIZED HEALTH CARE EDUCATION/TRAINING PROGRAM

## 2024-01-18 NOTE — PROGRESS NOTES
WOUND CHECK  Physical Exam:  Anatomic Location Affected:  lower back  Description of wound: healing wound with granulating base centrally, healed on periphery with post inflammatory erythema  Closure Type: allograft    Additional History of Present Condition:  s/p Mohs sx on 11/1/23    Assessment and Plan:  Based on a thorough discussion of this condition and the management approach to it (including a comprehensive discussion of the known risks, side effects and potential benefits of treatment), the patient (family) agrees to implement the following specific plan:  Patient will continue to cleanse the wound and apply vaseline and a bandage until fully healed.   Patient will follow up PRN and call if there are any questions or concerns.        Scribe Attestation      I,:  Sandi Smith RN am acting as a scribe while in the presence of the attending physician.:       I,:  Radha Harkins MD personally performed the services described in this documentation    as scribed in my presence.:

## 2024-01-25 ENCOUNTER — TELEPHONE (OUTPATIENT)
Dept: CARDIOLOGY CLINIC | Facility: CLINIC | Age: 59
End: 2024-01-25

## 2024-01-25 NOTE — TELEPHONE ENCOUNTER
Patient came into the office and showed us a bill from when she had her Zio patch put on in August of 2023.  I explained to patient that this may have happened because she did not have straight Medicare at the time of placement.  Patient stated that she did have straight Medicare at the time of the office visit.  I explained to patient that there is nothing we can do and provided her with the phone number for [facility].  I apologized to patient.

## 2024-01-30 DIAGNOSIS — K51.00 ULCERATIVE (CHRONIC) ENTEROCOLITIS, WITHOUT COMPLICATIONS (HCC): ICD-10-CM

## 2024-01-30 DIAGNOSIS — I10 ESSENTIAL HYPERTENSION: ICD-10-CM

## 2024-01-30 RX ORDER — OMEPRAZOLE 40 MG/1
40 CAPSULE, DELAYED RELEASE ORAL DAILY
Qty: 30 CAPSULE | Refills: 5 | Status: SHIPPED | OUTPATIENT
Start: 2024-01-30 | End: 2024-02-02

## 2024-01-30 RX ORDER — AMLODIPINE BESYLATE 5 MG/1
5 TABLET ORAL DAILY
Qty: 30 TABLET | Refills: 5 | Status: SHIPPED | OUTPATIENT
Start: 2024-01-30 | End: 2024-02-02 | Stop reason: SDUPTHER

## 2024-01-30 RX ORDER — LISINOPRIL 20 MG/1
20 TABLET ORAL DAILY
Qty: 30 TABLET | Refills: 5 | Status: SHIPPED | OUTPATIENT
Start: 2024-01-30 | End: 2024-02-02

## 2024-01-30 NOTE — TELEPHONE ENCOUNTER
Left on RX line:    Yes, this is Deejay Barkley. Need to order three prescriptions please. First one is not represent, it's all OME PR0Z 0L E-40 milligrams one capsule daily. The second one is and not noting 5 milligrams one tab every day and the Propranolol 20 milligram tabs one tab every day and I like it to be ordered to close for [facility] in [place] there. The phone number is [phone]. Again, the order is opazo 40 milligrams, one capsule every day. Alodine 5 milligrams, one tab every day. Laprell 20 milligrams tabs, one tab every day. My phone number is [phone]. Thank you.

## 2024-02-02 ENCOUNTER — OFFICE VISIT (OUTPATIENT)
Dept: FAMILY MEDICINE CLINIC | Facility: CLINIC | Age: 59
End: 2024-02-02
Payer: COMMERCIAL

## 2024-02-02 ENCOUNTER — TELEPHONE (OUTPATIENT)
Dept: FAMILY MEDICINE CLINIC | Facility: CLINIC | Age: 59
End: 2024-02-02

## 2024-02-02 VITALS
OXYGEN SATURATION: 98 % | TEMPERATURE: 98.4 F | SYSTOLIC BLOOD PRESSURE: 130 MMHG | BODY MASS INDEX: 20.49 KG/M2 | HEART RATE: 68 BPM | HEIGHT: 64 IN | DIASTOLIC BLOOD PRESSURE: 80 MMHG | RESPIRATION RATE: 18 BRPM | WEIGHT: 120 LBS

## 2024-02-02 DIAGNOSIS — I71.9 AORTIC ANEURYSM WITHOUT RUPTURE, UNSPECIFIED PORTION OF AORTA (HCC): Primary | ICD-10-CM

## 2024-02-02 DIAGNOSIS — Z23 ENCOUNTER FOR IMMUNIZATION: ICD-10-CM

## 2024-02-02 DIAGNOSIS — Z79.52 CURRENT CHRONIC USE OF SYSTEMIC STEROIDS: ICD-10-CM

## 2024-02-02 DIAGNOSIS — I10 ESSENTIAL HYPERTENSION: ICD-10-CM

## 2024-02-02 DIAGNOSIS — Z00.00 MEDICARE ANNUAL WELLNESS VISIT, SUBSEQUENT: Primary | ICD-10-CM

## 2024-02-02 DIAGNOSIS — I71.9 AORTIC ANEURYSM WITHOUT RUPTURE, UNSPECIFIED PORTION OF AORTA (HCC): ICD-10-CM

## 2024-02-02 DIAGNOSIS — K51.00 ULCERATIVE (CHRONIC) ENTEROCOLITIS, WITHOUT COMPLICATIONS (HCC): ICD-10-CM

## 2024-02-02 PROBLEM — F11.20 UNCOMPLICATED OPIOID DEPENDENCE (HCC): Status: RESOLVED | Noted: 2017-09-14 | Resolved: 2024-02-02

## 2024-02-02 PROCEDURE — G0439 PPPS, SUBSEQ VISIT: HCPCS | Performed by: FAMILY MEDICINE

## 2024-02-02 PROCEDURE — G0009 ADMIN PNEUMOCOCCAL VACCINE: HCPCS

## 2024-02-02 PROCEDURE — 90677 PCV20 VACCINE IM: CPT

## 2024-02-02 RX ORDER — LISINOPRIL 20 MG/1
20 TABLET ORAL DAILY
Qty: 90 TABLET | Refills: 3 | Status: SHIPPED | OUTPATIENT
Start: 2024-02-02

## 2024-02-02 RX ORDER — OMEPRAZOLE 40 MG/1
40 CAPSULE, DELAYED RELEASE ORAL DAILY
Qty: 90 CAPSULE | Refills: 3 | Status: SHIPPED | OUTPATIENT
Start: 2024-02-02

## 2024-02-02 RX ORDER — AMLODIPINE BESYLATE 5 MG/1
5 TABLET ORAL DAILY
Qty: 90 TABLET | Refills: 3 | Status: SHIPPED | OUTPATIENT
Start: 2024-02-02

## 2024-02-02 NOTE — TELEPHONE ENCOUNTER
Pt came in office and was asking about the lipid panel if it was ordered, I didn't see it, are you able to place the order?

## 2024-02-03 NOTE — PROGRESS NOTES
Assessment/Plan:    1.  Medicare wellness exam.  Yearly Medicare wellness exam recommended.  The patient denies problems with urinary incontinence or depression.  She denies any history of recent falls.  She was given a copy of his advance directive form to fill out.  2.  Hypertension stable patient should continue her present hypertensive meds.  3.  Aortic aneurysm.  Patient is scheduled for follow-up CT of her abdomen to check the status of the aneurysm.  Her last CT showed the aneurysm to be 4.2 cm  4 .  Chronic steroid use.  Patient has many side effects from long-term chronic steroid use.  5.  Osteoporosis.  Patient should continue to use vitamin D and calcium supplements.  She has stopped using Actonel that was prescribed to treat her osteoporosis.  Patient also should be weaned off steroids.  6.  Ulcerative colitis history.  Patient was prescribed Humira for her ulcerative colitis but is not taking the medication.  Patient needs to be weaned off steroids because of all the potential side effects.  7.  Pneumococcal 20 given.  8.  Lipid profile ordered.           Subjective:      Patient ID: Rubia Christine is a 58 y.o. female.    This is a Medicare wellness exam for this 58-year-old female.  The patient denies any new complaints today.  She is scheduled for a follow-up CAT scan of her abdomen to check the status of aortic aneurysm.  The measurement of the aneurysm was 4.2 cm in the past.  The patient has a history of hypertension which is well-controlled.  She also has a history of ulcerative colitis and has been treated with steroids in the past with many side effects secondary to the steroids.  She recently had surgery on her back for squamous carcinoma.  She had Mohs surgery and the surgical site is healing well.  She is followed up by dermatology.  The patient denies any problems with urinary incontinence or depression.  She denies any recent falls.  She was given a copy of advance directives to fill  "out return to this office.            Review of Systems   Constitutional: Negative.    HENT: Negative.     Eyes: Negative.    Respiratory: Negative.          Mild wheezes   Cardiovascular: Negative.    Gastrointestinal: Negative.    Endocrine: Negative.    Genitourinary: Negative.    Musculoskeletal: Negative.    Skin: Negative.    Allergic/Immunologic: Negative.    Neurological: Negative.    Hematological: Negative.    Psychiatric/Behavioral: Negative.           Objective:      /80 (BP Location: Left arm, Patient Position: Sitting, Cuff Size: Standard)   Pulse 68   Temp 98.4 °F (36.9 °C) (Tympanic)   Resp 18   Ht 5' 4\" (1.626 m)   Wt 54.4 kg (120 lb)   SpO2 98%   BMI 20.60 kg/m²          Physical Exam  Constitutional:       Appearance: Normal appearance.   Cardiovascular:      Rate and Rhythm: Normal rate and regular rhythm.      Heart sounds: Normal heart sounds.   Pulmonary:      Effort: Pulmonary effort is normal.      Breath sounds: Normal breath sounds.   Abdominal:      General: Abdomen is flat. Bowel sounds are normal.      Palpations: Abdomen is soft.   Skin:     Comments: Healing surgical site from her Mohs surgery on her mid back area.   Neurological:      General: No focal deficit present.      Mental Status: She is alert and oriented to person, place, and time.   Psychiatric:         Mood and Affect: Mood normal.         Behavior: Behavior normal.         Thought Content: Thought content normal.         Judgment: Judgment normal.           Answers submitted by the patient for this visit:  Medicare Annual Wellness Visit (Submitted on 1/26/2024)  How would you rate your overall health?: poor  Compared to last year, how is your physical health?: much worse  In general, how satisfied are you with your life?: very satisfied  Compared to last year, how is your eyesight?: much worse  Compared to last year, how is your hearing?: same  Compared to last year, how is your emotional/mental health?: " "same  How often is anger a problem for you?: never, rarely  How often do you feel unusually tired/fatigued?: sometimes  In the past 7 days, how much pain have you experienced?: some  If you answered \"some\" or \"a lot\", please rate the severity of your pain on a scale of 1 to 10 (1 being the least severe pain and 10 being the most intense pain).: 2/10  In the past 6 months, have you lost or gained 10 pounds without trying?: No  One or more falls in the last year: No  In the past 6 months, have you accidentally leaked urine?: No  Do you have trouble with the stairs inside or outside your home?: No  Does your home have working smoke alarms?: Yes  Does your home have a carbon monoxide monitor?: Yes  Which safety hazards (if any) have you experienced in your home? Please select all that apply.: none  How would you describe your current diet? Please select all that apply.: Regular  In addition to prescription medications, are you taking any over-the-counter supplements?: Yes  If yes, what supplements are you taking?: Calcium, vitamin C  Can you manage your medications?: Yes  Are you currently taking any opioid medications?: No  Can you walk and transfer into and out of your bed and chair?: Yes  Can you dress and groom yourself?: Yes  Can you bathe or shower yourself?: Yes  Can you feed yourself?: Yes  Can you do your laundry/ housekeeping?: Yes  Can you manage your money, pay your bills, and track your expenses?: Yes  Can you make your own meals?: Yes  Can you do your own shopping?: Yes  Within the last 12 months, have you had any hospitalizations or Emergency Department visits?: Yes  If yes, how many times have you been hospitalized within the past year?: 1-2  Do you have a living will?: No  Do you have a Durable POA (Power of ) for healthcare decisions?: No  Do you have an Advanced Directive for end of life decisions?: No  How often have you used an illegal drug (including marijuana) or a prescription medication " for non-medical reasons in the past year?: never  What is the typical number of drinks you consume in a day?: 0  What is the typical number of drinks you consume in a week?: 0  How often did you have a drink containing alcohol in the past year?: never  How many drinks did you have on a typical day  when you were drinking in the past year?: 0  How often did you have 6 or more drinks on one occasion in the past year?: never

## 2024-02-03 NOTE — PATIENT INSTRUCTIONS
The patient's hypertension is well-controlled at the present time with her medications.  The patient has a history of ulcerative colitis but is not compliant with recommended treatment which could include Humira.  The patient continues to take prednisone which is leading to risk factors for her that include osteoporosis.  The patient was also prescribed Actonel for her osteoporosis but she is still taking this medication.  The patient also has a history of squamous cell carcinoma of the back with Mohs surgery which is presently healing.  The patient is suffering from side effects of long-term steroid use.  Her hypertension and potentially wound healing has been hampered by steroid use.  The patient needs a pneumococcal 20 vaccine today because of her long-term steroid use and its risk factors and complications.  The patient is also a candidate for the RSV vaccine because of her chronic medical problems.  She was given a prescription to get this vaccine in her local pharmacy.  The patient was given a advance directive form to fill out.  The patient needs to continue to follow-up with her dermatologist because of her recent Mohs surgery for squamous cell carcinoma.  The patient is scheduled for mammogram on April 16, 2024.  The patient should schedule a yearly wellness Medicare exam.

## 2024-02-06 ENCOUNTER — APPOINTMENT (OUTPATIENT)
Dept: LAB | Facility: HOSPITAL | Age: 59
End: 2024-02-06
Payer: COMMERCIAL

## 2024-02-06 DIAGNOSIS — K51.30 ULCERATIVE RECTOSIGMOIDITIS WITHOUT COMPLICATION (HCC): ICD-10-CM

## 2024-02-06 DIAGNOSIS — R79.89 ABNORMAL TSH: ICD-10-CM

## 2024-02-06 DIAGNOSIS — N20.0 NEPHROLITHIASIS: ICD-10-CM

## 2024-02-06 DIAGNOSIS — Z79.52 CURRENT CHRONIC USE OF SYSTEMIC STEROIDS: ICD-10-CM

## 2024-02-06 DIAGNOSIS — M81.6 LOCALIZED OSTEOPOROSIS WITHOUT CURRENT PATHOLOGICAL FRACTURE: ICD-10-CM

## 2024-02-06 DIAGNOSIS — E55.9 VITAMIN D DEFICIENCY: ICD-10-CM

## 2024-02-06 LAB
25(OH)D3 SERPL-MCNC: 104.5 NG/ML (ref 30–100)
ALBUMIN SERPL BCP-MCNC: 3.7 G/DL (ref 3.5–5)
ALP SERPL-CCNC: 66 U/L (ref 34–104)
ALT SERPL W P-5'-P-CCNC: 18 U/L (ref 7–52)
ANION GAP SERPL CALCULATED.3IONS-SCNC: 8 MMOL/L
AST SERPL W P-5'-P-CCNC: 20 U/L (ref 13–39)
BILIRUB SERPL-MCNC: 0.4 MG/DL (ref 0.2–1)
BUN SERPL-MCNC: 22 MG/DL (ref 5–25)
CALCIUM SERPL-MCNC: 9.7 MG/DL (ref 8.4–10.2)
CHLORIDE SERPL-SCNC: 104 MMOL/L (ref 96–108)
CO2 SERPL-SCNC: 32 MMOL/L (ref 21–32)
CREAT SERPL-MCNC: 0.97 MG/DL (ref 0.6–1.3)
GFR SERPL CREATININE-BSD FRML MDRD: 63 ML/MIN/1.73SQ M
GLUCOSE P FAST SERPL-MCNC: 72 MG/DL (ref 65–99)
POTASSIUM SERPL-SCNC: 3.5 MMOL/L (ref 3.5–5.3)
PROT SERPL-MCNC: 6.3 G/DL (ref 6.4–8.4)
PTH-INTACT SERPL-MCNC: 22.8 PG/ML (ref 12–88)
SODIUM SERPL-SCNC: 144 MMOL/L (ref 135–147)
T3 SERPL-MCNC: 0.9 NG/ML
T4 FREE SERPL-MCNC: 0.69 NG/DL (ref 0.61–1.12)
TSH SERPL DL<=0.05 MIU/L-ACNC: 1.41 UIU/ML (ref 0.45–4.5)

## 2024-02-06 PROCEDURE — 84443 ASSAY THYROID STIM HORMONE: CPT

## 2024-02-06 PROCEDURE — 83970 ASSAY OF PARATHORMONE: CPT

## 2024-02-06 PROCEDURE — 86334 IMMUNOFIX E-PHORESIS SERUM: CPT

## 2024-02-06 PROCEDURE — 84166 PROTEIN E-PHORESIS/URINE/CSF: CPT

## 2024-02-06 PROCEDURE — 84480 ASSAY TRIIODOTHYRONINE (T3): CPT

## 2024-02-06 PROCEDURE — 82306 VITAMIN D 25 HYDROXY: CPT

## 2024-02-06 PROCEDURE — 84439 ASSAY OF FREE THYROXINE: CPT

## 2024-02-06 PROCEDURE — 80053 COMPREHEN METABOLIC PANEL: CPT

## 2024-02-06 PROCEDURE — 84165 PROTEIN E-PHORESIS SERUM: CPT

## 2024-02-06 PROCEDURE — 36415 COLL VENOUS BLD VENIPUNCTURE: CPT

## 2024-02-07 ENCOUNTER — HOSPITAL ENCOUNTER (OUTPATIENT)
Dept: RADIOLOGY | Facility: HOSPITAL | Age: 59
Discharge: HOME | End: 2024-02-07
Attending: INTERNAL MEDICINE
Payer: COMMERCIAL

## 2024-02-07 DIAGNOSIS — I71.20 THORACIC AORTIC ANEURYSM WITHOUT RUPTURE, UNSPECIFIED PART (HCC): ICD-10-CM

## 2024-02-07 PROCEDURE — G1004 CDSM NDSC: HCPCS

## 2024-02-07 PROCEDURE — 71250 CT THORAX DX C-: CPT

## 2024-02-08 DIAGNOSIS — K51.911 ULCERATIVE COLITIS WITH RECTAL BLEEDING, UNSPECIFIED LOCATION (HCC): ICD-10-CM

## 2024-02-08 LAB
ALBUMIN SERPL ELPH-MCNC: 3.48 G/DL (ref 3.2–5.1)
ALBUMIN SERPL ELPH-MCNC: 57 % (ref 48–70)
ALBUMIN UR ELPH-MCNC: 100 %
ALPHA1 GLOB MFR UR ELPH: 0 %
ALPHA1 GLOB SERPL ELPH-MCNC: 0.23 G/DL (ref 0.15–0.47)
ALPHA1 GLOB SERPL ELPH-MCNC: 3.8 % (ref 1.8–7)
ALPHA2 GLOB MFR UR ELPH: 0 %
ALPHA2 GLOB SERPL ELPH-MCNC: 0.69 G/DL (ref 0.42–1.04)
ALPHA2 GLOB SERPL ELPH-MCNC: 11.3 % (ref 5.9–14.9)
B-GLOBULIN MFR UR ELPH: 0 %
BETA GLOB ABNORMAL SERPL ELPH-MCNC: 0.41 G/DL (ref 0.31–0.57)
BETA1 GLOB SERPL ELPH-MCNC: 6.8 % (ref 4.7–7.7)
BETA2 GLOB SERPL ELPH-MCNC: 13.2 % (ref 3.1–7.9)
BETA2+GAMMA GLOB SERPL ELPH-MCNC: 0.81 G/DL (ref 0.2–0.58)
GAMMA GLOB ABNORMAL SERPL ELPH-MCNC: 0.48 G/DL (ref 0.4–1.66)
GAMMA GLOB MFR UR ELPH: 0 %
GAMMA GLOB SERPL ELPH-MCNC: 7.9 % (ref 6.9–22.3)
IGG/ALB SER: 1.33 {RATIO} (ref 1.1–1.8)
INTERPRETATION UR IFE-IMP: NORMAL
M PEAK ID 2: 2.5 %
M PROTEIN 1 MFR SERPL ELPH: 9 %
M PROTEIN 1 SERPL ELPH-MCNC: 0.55 G/DL
M PROTEIN 2 SERPL ELPH-MCNC: 0.15 G/DL
PROT PATTERN SERPL ELPH-IMP: ABNORMAL
PROT PATTERN UR ELPH-IMP: NORMAL
PROT SERPL-MCNC: 6.1 G/DL (ref 6.4–8.2)
PROT UR-MCNC: 16.9 MG/DL

## 2024-02-08 PROCEDURE — 84166 PROTEIN E-PHORESIS/URINE/CSF: CPT | Performed by: PATHOLOGY

## 2024-02-08 PROCEDURE — 86334 IMMUNOFIX E-PHORESIS SERUM: CPT | Performed by: PATHOLOGY

## 2024-02-08 PROCEDURE — 84165 PROTEIN E-PHORESIS SERUM: CPT | Performed by: PATHOLOGY

## 2024-02-08 RX ORDER — BALSALAZIDE DISODIUM 750 MG/1
2250 CAPSULE ORAL 3 TIMES DAILY
Qty: 810 CAPSULE | Refills: 3 | Status: SHIPPED | OUTPATIENT
Start: 2024-02-08

## 2024-02-08 NOTE — TELEPHONE ENCOUNTER
BRICE left on Rx Line     Yes, this is Rubia Hutchison. Rubia RAY need a prescription refill for balsalazide. BALSALAZIDE. 750 milligram capsules. I take 3 capsules three times a day. I like them delivered to the [facility], please in [address]. The phone number is [phone]. This is Fidel Villalba. Wow, my birthday is October 11th, 1965. Thank you. My phone number is [phone].  You received a voice mail from MARCIA CANSECO.

## 2024-02-09 ENCOUNTER — OFFICE VISIT (OUTPATIENT)
Dept: CARDIOLOGY CLINIC | Facility: CLINIC | Age: 59
End: 2024-02-09
Payer: COMMERCIAL

## 2024-02-09 VITALS
BODY MASS INDEX: 20.49 KG/M2 | DIASTOLIC BLOOD PRESSURE: 98 MMHG | HEART RATE: 80 BPM | WEIGHT: 120 LBS | SYSTOLIC BLOOD PRESSURE: 140 MMHG | OXYGEN SATURATION: 99 % | HEIGHT: 64 IN

## 2024-02-09 DIAGNOSIS — E78.00 HYPERCHOLESTEROLEMIA: ICD-10-CM

## 2024-02-09 DIAGNOSIS — I77.810 ASCENDING AORTA DILATATION (HCC): Primary | ICD-10-CM

## 2024-02-09 DIAGNOSIS — I10 ESSENTIAL HYPERTENSION: ICD-10-CM

## 2024-02-09 DIAGNOSIS — I71.21 ANEURYSM OF ASCENDING AORTA WITHOUT RUPTURE (HCC): ICD-10-CM

## 2024-02-09 DIAGNOSIS — I71.20 THORACIC AORTIC ANEURYSM WITHOUT RUPTURE, UNSPECIFIED PART (HCC): ICD-10-CM

## 2024-02-09 DIAGNOSIS — R00.1 SINUS BRADYCARDIA: ICD-10-CM

## 2024-02-09 PROCEDURE — 99214 OFFICE O/P EST MOD 30 MIN: CPT | Performed by: INTERNAL MEDICINE

## 2024-02-09 PROCEDURE — 93000 ELECTROCARDIOGRAM COMPLETE: CPT | Performed by: INTERNAL MEDICINE

## 2024-02-09 NOTE — PROGRESS NOTES
Cardiology   Kelby Moses DO, [facility]  Kyle Todd MD, [facility]  Adair Espinoza MD, [facility]  Veronique Nieto MD, [facility]  -------------------------------------------------------------------  [facility]  [address], [address], [address]  [place], [place], [address]  [phone]    Cardiology Follow Up  Rubia Christine  1965  [id number]          Assessment/Plan:    1. Ascending aorta dilatation (HCC)    2. Aneurysm of ascending aorta without rupture (HCC)    3. Essential hypertension    4. Sinus bradycardia    5. Thoracic aortic aneurysm without rupture, unspecified part (HCC)    6. Hypercholesterolemia      -Reviewed pathophysiology of aneurysm with patient.  Instructions for management discussed with her including refraining from smoking, ensuring adequate blood pressure control and restricting lifting less than 40 pounds.  -Will followup official CT read  - Follow up CT in 1 year       Interval History:     Rubia Christine is 58 y.o. female here for followup of recent CT scan and hypertension.    The patient has a history of a thoracic aortic aneurysm.  She had repeat CT scan done on February 7, 2024.  The report is still pending.  Her last CT scan showed a 4.2 cm ascending thoracic aneurysm.  Since her last visit, she denies any chest pain or shortness of breath.  She is under a large amount of stress and has anxiety.    Blood pressure controlled with lisinopril and amlodipine.  She has medical history of hypertension and palpitations.  She was on a beta-blocker in the past but was stopped due to bradycardia.  She has a family history of CAD.  Blood work was ordered last visit.  Her LDL was 109 mg/dL.  Total cholesterol was 203       The following portions of the patient's history were reviewed and updated as appropriate: allergies, current medications, past family history, past medical history, past social history, past surgical history, and problem list.        Current Outpatient Medications:     amLODIPine (NORVASC) 5 mg tablet, Take 1 tablet (5 mg total) by mouth daily, Disp: 90 tablet, Rfl: 3    balsalazide (COLAZAL) 750 mg capsule, Take 3 capsules (2,250 mg total) by mouth 3 (three) times a day, Disp: 810 capsule, Rfl: 3    calcium carbonate (OS-BUTCH) 600 MG tablet, Take 1 tablet (600 mg total) by mouth 2 (two) times a day with meals (Patient taking differently: Take 600 mg by mouth 3 (three) times a day with meals), Disp: 30 tablet, Rfl: 2    cholecalciferol (VITAMIN D3) 1,000 units tablet, Take 2 tablets (2,000 Units total) by mouth daily, Disp: 60 tablet, Rfl: 2    folic acid (FOLVITE) 1 mg tablet, Take 1 mg by mouth daily., Disp: , Rfl:     lisinopril (ZESTRIL) 20 mg tablet, Take 1 tablet (20 mg total) by mouth daily, Disp: 90 tablet, Rfl: 3    omeprazole (PriLOSEC) 40 MG capsule, Take 1 capsule (40 mg total) by mouth daily, Disp: 90 capsule, Rfl: 3    predniSONE 10 mg tablet, Take 1 tablet (10 mg total) by mouth daily, Disp: 90 tablet, Rfl: 1    Adalimumab (Humira Pen) 80 MG/0.8ML PNKT, Inject 2 pens under the skin on day 1, then inject 1 pen under the skin on day 15. (Patient not taking: Reported on 7/24/2023), Disp: 3 each, Rfl: 0    adalimumab (Humira) 40 MG/0.4ML, Inject 40mg under skin every 14 days (Patient not taking: Reported on 7/24/2023), Disp: 2 each, Rfl: 11    hydrocortisone 2.5 % ointment, Apply topically 2 (two) times a day (Patient not taking: Reported on 11/29/2023), Disp: 20 g, Rfl: 0    mupirocin (BACTROBAN) 2 % ointment, Apply topically 3 (three) times a day Apply to Mohs site during dressing changes (Patient not taking: Reported on 11/29/2023), Disp: 30 g, Rfl: 0    risedronate (ACTONEL) 35 mg tablet, Take 1 tablet (35 mg total) by mouth every 7 days with water on empty stomach, nothing by mouth or lie down for next 30 minutes. (Patient not taking: Reported on 2/2/2024), Disp: 12 tablet, Rfl: 3  No current facility-administered  "medications for this visit.    Facility-Administered Medications Ordered in Other Visits:     lidocaine (XYLOCAINE) 1 % 25 mL, sodium bicarbonate 5 mEq, EPINEPHrine PF (ADRENALIN) 0.5 mg in sodium chloride 0.9 % 500 mL OR irrigation, , Irrigation, Once, Radha Harkins MD        Review of Systems:  Review of Systems   Cardiovascular:  Negative for chest pain, palpitations and leg swelling.   Psychiatric/Behavioral:  The patient is nervous/anxious.    All other systems reviewed and are negative.        Physical Exam:  Vitals:  Vitals:    02/09/24 0850   BP: 140/98   BP Location: Right arm   Patient Position: Sitting   Cuff Size: Standard   Pulse: 80   SpO2: 99%   Weight: 54.4 kg (120 lb)   Height: 5' 4\" (1.626 m)     Physical Exam   Constitutional: She appears healthy. No distress.   Eyes: Pupils are equal, round, and reactive to light. Conjunctivae are normal.   Neck: No JVD present.   Cardiovascular: Normal rate, regular rhythm and normal heart sounds. Exam reveals no gallop and no friction rub.   No murmur heard.  Pulmonary/Chest: Effort normal and breath sounds normal. She has no wheezes. She has no rales.   Musculoskeletal:         General: No tenderness, deformity or edema.      Cervical back: Normal range of motion and neck supple.   Neurological: She is alert and oriented to person, place, and time.   Skin: Skin is warm and dry.        Cardiographics:  Last known EF: 55%    This note was completed in part utilizing M-Modal Fluency Direct Software.  Grammatical errors, random word insertions, spelling mistakes, and incomplete sentences can be an occasional consequence of this system secondary to software limitations, ambient noise, and hardware issues.  If you have any questions or concerns about the content, text, or information contained within the body of this dictation, please contact the provider for clarification.      "

## 2024-02-15 ENCOUNTER — TELEPHONE (OUTPATIENT)
Dept: CARDIOLOGY CLINIC | Facility: CLINIC | Age: 59
End: 2024-02-15

## 2024-02-15 NOTE — TELEPHONE ENCOUNTER
----- Message from Kelby Moses DO sent at 2/15/2024 11:41 AM EST -----  Can you please let the patient know official CT read showed no change compared to previous - repeat CT in 1-2 years

## 2024-02-21 PROBLEM — J32.4 PANSINUSITIS: Status: RESOLVED | Noted: 2018-05-29 | Resolved: 2024-02-21

## 2024-02-26 ENCOUNTER — OFFICE VISIT (OUTPATIENT)
Dept: ENDOCRINOLOGY | Facility: CLINIC | Age: 59
End: 2024-02-26
Payer: COMMERCIAL

## 2024-02-26 VITALS
OXYGEN SATURATION: 96 % | BODY MASS INDEX: 20.86 KG/M2 | HEIGHT: 64 IN | SYSTOLIC BLOOD PRESSURE: 122 MMHG | HEART RATE: 78 BPM | WEIGHT: 122.2 LBS | DIASTOLIC BLOOD PRESSURE: 70 MMHG

## 2024-02-26 DIAGNOSIS — M81.6 LOCALIZED OSTEOPOROSIS WITHOUT CURRENT PATHOLOGICAL FRACTURE: Primary | ICD-10-CM

## 2024-02-26 PROCEDURE — 99213 OFFICE O/P EST LOW 20 MIN: CPT | Performed by: NURSE PRACTITIONER

## 2024-02-27 NOTE — ASSESSMENT & PLAN NOTE
"Patient is declining pharmacologic treatment at this time patient is aware of risks of osteoporotic fractures.  Patient states that she is on multiple medications and at this time she is not interested in any pharmacologic treatment despite risks associated with osteoporosis.    - Reviewed pathophysiology of osteoporosis, including risk for fracture and morbidty and mortality associated with fracture.  - Reviewed potential need for treatment to reduce risk of fracture.  - Patient declined review of treatment options   - Emphasized importance of regular calcium and vitamin D intake; Goal vitamin D >30 and calcium intake ~1200mg /day.  - weight bearing exercise as tolerated    Patient states she spoke to primary care about \"abnormal protein\" SPEP.     Patient is aware our office is available to discuss her treatment options if she becomes interested in treatment.   For now, patient has requested to be transitioned back under the care of PCP.   "

## 2024-02-27 NOTE — PROGRESS NOTES
"  Established Patient Progress Note     CC: Endocrinology follow up visit    Impression & Plan:    Problem List Items Addressed This Visit          Musculoskeletal and Integument    Localized osteoporosis without current pathological fracture - Primary     Patient is declining pharmacologic treatment at this time patient is aware of risks of osteoporotic fractures.  Patient states that she is on multiple medications and at this time she is not interested in any pharmacologic treatment despite risks associated with osteoporosis.    - Reviewed pathophysiology of osteoporosis, including risk for fracture and morbidty and mortality associated with fracture.  - Reviewed potential need for treatment to reduce risk of fracture.  - Patient declined review of treatment options   - Emphasized importance of regular calcium and vitamin D intake; Goal vitamin D >30 and calcium intake ~1200mg /day.  - weight bearing exercise as tolerated    Patient states she spoke to primary care about \"abnormal protein\" SPEP.     Patient is aware our office is available to discuss her treatment options if she becomes interested in treatment.   For now, patient has requested to be transitioned back under the care of PCP.             History of Present Illness:     Rubia Christine is a 58 y.o. female with a history of osteoporosis.  History of ulcerative colitis with chronic glucocorticoid use.  And GERD.    Patient has a known history of osteoporosis for several years but has not been on any pharmacologic treatment for it.  Patient recently started taking regular calcium and vitamin D supplementation.    Has a history of a hairline fracture in her foot several years ago.  Denies any significant height loss more than 2 inches.  Denies any recent falls or fractures.  Has a history of nephrolithiasis with a stent placement approximately 10 years ago.  Patient has a history of glucocorticoid and PPI use.  Patient has been on prednisone since 1998 " and is currently on prednisone 10 mg daily for colitis.     Patient's last DEXA scan was from January 2022 which demonstrated osteoporosis with significant decrease as compared to prior DEXA scan in January 2017.  Patient had DEXA scan completed in December 2023.  Which demonstrated a T-score of -2.9 in the lumbar spine, -2.5 in the left total hip, T-score -2.7 in the left femoral neck, T-score -2.5 in the right total hip, T-score -2.8 in the right femoral neck.  There was noted to have a significant decrease in the bone mineral density in January from compare from January 2020.  The FRAX score was 10-year risk of hip fracture 10% with the 10-year risk of major major osteoporotic fracture being 31.1%.    Patient Active Problem List   Diagnosis    Ulcerative colitis with rectal bleeding (HCC)    Essential hypertension    Atypical pigmented skin lesion    Ulcerative colitis (HCC)    External hemorrhoids    Yeast infection    Skin lesion, superficial    Fungal dermatosis    Verruca    Dry eyes    Dry mouth    Dermatitis due to unknown cause    Gastroesophageal reflux disease without esophagitis    Underweight    Paronychia of great toe, right    Onychia of toe of right foot    Epigastric pain    Pain in both feet    Tinea pedis of both feet    Onychomycosis    Paronychia of toenail    Seborrheic keratoses    Breast cancer screening by mammogram    Need for diphtheria-tetanus-pertussis (Tdap) vaccine    Nephrolithiasis    Hematuria    Encounter for immunization    Low back pain without sciatica    History of ulcerative colitis    Screening mammogram, encounter for    Shingles    Restless legs syndrome    Pelvic pain in female    Pancreatic cyst    Other polyp of sinus    Oral thrush    Lipoma of abdominal wall    Lactase deficiency    Hypercholesterolemia    Fibroid, uterine    Drug-induced osteoporosis    Colitis    Sinus bradycardia    Mild protein-calorie malnutrition (HCC)    Wound of back, sequela    Anxiety about  health    Localized osteoporosis without current pathological fracture    Vitamin D deficiency    Current chronic use of systemic steroids      Past Medical History:   Diagnosis Date    Anxiety     Aortic aneurysm (HCC)     Arthritis     Atypical chest pain     last assessed: 12/6/13    Basal cell carcinoma 08/23/2023    right temple, mohs    Basal cell carcinoma 08/23/2023    mid back, mohs    Bloody diarrhea     last assesed: 58/23/16    Breast lump     last assessed: 10/10/14    Candidiasis of esophagus (HCC)     last assessed: 2/24/16    Candidiasis of mouth     last assessed: 8/7/14    Carpal tunnel syndrome     Choledocholithiasis     last assessed: 6/5/14    Cholelithiasis     last assessed: 7/21/15    Closed fracture of first metatarsal bone of right foot     last assessed: 12/11/13    Costovertebral angle pain     last assessed: 5/4/17    Elevated blood pressure reading     last assessed: 7/29/16    Grief reaction     last assessed: 4/4/16    Herpes zoster     last assessed: 11/3/16    High risk medication use     last assessed: 12/23/16    Hypertension     Hypokalemia     last assessed: 4/12/16    Hypotension     last assessed: 5/15/15    Inflammatory disorder of breast     last assessed: 12/6/13    Iron deficiency anemia     last assessed: 12/6/13    Microscopic hematuria     last assessed: 12/6/13    On prednisone therapy     last assessed: 10/19/17    Opioid use, unspecified, uncomplicated     last assessed: 3/1/17    Other polyp of sinus     last assessed: 12/6/13    Paronychia of toe     last assessed: 12/6/13    Pharyngoesophageal dysphagia     resolved: 3/1/16    Skin cancer     Tinea corporis     last assessed: 8/25/16    Ulcerative colitis (HCC)     Ulcerative colitis (HCC)     Uncomplicated opioid dependence (HCC)     Underweight     last assessed: 3/1/16    Urinary frequency     resolved: 7/29/16    Viral warts       Past Surgical History:   Procedure Laterality Date    BREAST SURGERY      left  breast lumpectomy    CARPAL TUNNEL RELEASE Bilateral      SECTION N/A     CHOLECYSTECTOMY      COLONOSCOPY  2019    EGD  2019    ESOPHAGOGASTRODUODENOSCOPY N/A 2016    Procedure: ESOPHAGOGASTRODUODENOSCOPY (EGD);  Surgeon: Shant Ivy MD;  Location: [facility];  Service:     MOHS SURGERY Right 10/09/2023    [facility] right temple-Dr Corbin    MOHS SURGERY  2023    [facility] mid back-Dr Harkins    SKIN CANCER EXCISION      TONSILLECTOMY N/A       Family History   Problem Relation Age of Onset    Heart disease Mother     Stroke Mother     Heart disease Father         MI    Colon cancer Maternal Grandfather     Asthma Son     Autism Son     Heart disease Brother         CAD    Cancer Maternal Grandmother         colon    Heart disease Brother     COPD Brother         smoker    No Known Problems Brother     No Known Problems Half-Sister      Social History     Tobacco Use    Smoking status: Never    Smokeless tobacco: Never   Substance Use Topics    Alcohol use: No     Allergies   Allergen Reactions    Dairy Aid [Tilactase] GI Intolerance    Levaquin [Levofloxacin In D5w] Other (See Comments)     Redness at injection site    Mercaptopurine      Causes weakness in legs    Mesalamine GI Intolerance    Other Other (See Comments)     6 mp. Causes weakness in legs.       Current Outpatient Medications:     amLODIPine (NORVASC) 5 mg tablet, Take 1 tablet (5 mg total) by mouth daily, Disp: 90 tablet, Rfl: 3    balsalazide (COLAZAL) 750 mg capsule, Take 3 capsules (2,250 mg total) by mouth 3 (three) times a day, Disp: 810 capsule, Rfl: 3    calcium carbonate (OS-BUTCH) 600 MG tablet, Take 1 tablet (600 mg total) by mouth 2 (two) times a day with meals (Patient taking differently: Take 600 mg by mouth 3 (three) times a day with meals), Disp: 30 tablet, Rfl: 2    folic acid (FOLVITE) 1 mg tablet, Take 1 mg by mouth daily., Disp: , Rfl:     lisinopril (ZESTRIL) 20 mg tablet, Take 1 tablet (20 mg total) by  mouth daily, Disp: 90 tablet, Rfl: 3    omeprazole (PriLOSEC) 40 MG capsule, Take 1 capsule (40 mg total) by mouth daily, Disp: 90 capsule, Rfl: 3    predniSONE 10 mg tablet, Take 1 tablet (10 mg total) by mouth daily, Disp: 90 tablet, Rfl: 1    Adalimumab (Humira Pen) 80 MG/0.8ML PNKT, Inject 2 pens under the skin on day 1, then inject 1 pen under the skin on day 15., Disp: 3 each, Rfl: 0    adalimumab (Humira) 40 MG/0.4ML, Inject 40mg under skin every 14 days, Disp: 2 each, Rfl: 11    cholecalciferol (VITAMIN D3) 1,000 units tablet, Take 2 tablets (2,000 Units total) by mouth daily (Patient not taking: Reported on 2/26/2024), Disp: 60 tablet, Rfl: 2    hydrocortisone 2.5 % ointment, Apply topically 2 (two) times a day, Disp: 20 g, Rfl: 0    mupirocin (BACTROBAN) 2 % ointment, Apply topically 3 (three) times a day Apply to Mohs site during dressing changes, Disp: 30 g, Rfl: 0    risedronate (ACTONEL) 35 mg tablet, Take 1 tablet (35 mg total) by mouth every 7 days with water on empty stomach, nothing by mouth or lie down for next 30 minutes. (Patient not taking: Reported on 2/2/2024), Disp: 12 tablet, Rfl: 3  No current facility-administered medications for this visit.    Facility-Administered Medications Ordered in Other Visits:     lidocaine (XYLOCAINE) 1 % 25 mL, sodium bicarbonate 5 mEq, EPINEPHrine PF (ADRENALIN) 0.5 mg in sodium chloride 0.9 % 500 mL OR irrigation, , Irrigation, Once, Radha Harkins MD    Review of Systems  Constitutional: Negative for activity change, appetite change, fatigue and unexpected weight change.   HENT: Negative for ear pain, sore throat, trouble swallowing and voice change.    Eyes: Negative for visual disturbance.   Respiratory: Negative for cough and shortness of breath.    Cardiovascular: Negative for chest pain and palpitations.   Gastrointestinal: Negative for abdominal distention, abdominal pain, constipation, diarrhea and vomiting.   Musculoskeletal: Negative for arthralgias  "and back pain.   Skin: Negative for color change and rash.   Neurological: Negative for weakness or tremors.   All other systems reviewed and are negative.      Physical Exam:  Body mass index is 20.98 kg/m².  /70 (BP Location: Left arm, Patient Position: Sitting, Cuff Size: Adult)   Pulse 78   Ht 5' 4\" (1.626 m)   Wt 55.4 kg (122 lb 3.2 oz)   SpO2 96%   BMI 20.98 kg/m²    Wt Readings from Last 3 Encounters:   02/26/24 55.4 kg (122 lb 3.2 oz)   02/09/24 54.4 kg (120 lb)   02/02/24 54.4 kg (120 lb)       Physical Exam   Constitutional: She is oriented to person, place, and time. She appears well-developed and well-nourished. No distress.   HENT:   Head: Normocephalic and atraumatic.   Neck: Normal range of motion.   Pulmonary/Chest: Effort normal.   Musculoskeletal: Normal range of motion.   Neurological: She is alert and oriented to person, place, and time.   Skin: She is not diaphoretic.   Psychiatric: She has a normal mood and affect. Her behavior is normal.    Labs:   Component      Latest Ref Rng 2/6/2024   Albumin/Globulin Ratio      1.10 - 1.80  1.33    ALBUMIN ELP      48.0 - 70.0 % 57.0    ALBUMIN CONC ELP      3.20 - 5.10 g/dl 3.48    ALPHA 1      1.8 - 7.0 % 3.8    ALPHA 1 CONC ELP      0.15 - 0.47 g/dL 0.23    ALPHA 2      5.9 - 14.9 % 11.3    ALPHA 2 CONC ELP      0.42 - 1.04 g/dL 0.69    BETA-1      4.7 - 7.7 % 6.8    BETA 1 CONC ELP      0.31 - 0.57 g/dL 0.41    BETA-2      3.1 - 7.9 % 13.2 (H)    BETA 2 CONC ELP      0.20 - 0.58 g/dL 0.81 (H)    GAMMA GLOBULIN      6.9 - 22.3 % 7.9    GAMMA CONC ELP      0.40 - 1.66 g/dL 0.48    M PEAK ID 1      % 9.00    M PEAK 1 CONC      g/dL 0.55    M PEAK ID 2      % 2.50    M Peak 2 CONC      g/dL 0.15    Total Protein      6.4 - 8.4 g/dL 6.3 (L)    Total Protein      6.4 - 8.2 g/dL 6.1 (L)    SPEP INTERPRETATION See Comment    Sodium      135 - 147 mmol/L 144    Potassium      3.5 - 5.3 mmol/L 3.5    Chloride      96 - 108 mmol/L 104    Carbon " Dioxide      21 - 32 mmol/L 32    ANION GAP      mmol/L 8    BUN      5 - 25 mg/dL 22    Creatinine      0.60 - 1.30 mg/dL 0.97    GLUCOSE, FASTING      65 - 99 mg/dL 72    Calcium      8.4 - 10.2 mg/dL 9.7    AST      13 - 39 U/L 20    ALT      7 - 52 U/L 18    ALK PHOS      34 - 104 U/L 66    Albumin      3.5 - 5.0 g/dL 3.7    Total Bilirubin      0.20 - 1.00 mg/dL 0.40    GFR, Calculated      ml/min/1.73sq m 63    Urine Protein      mg/dL 16.9    ALBUMIN URINE ELP      % 100.0    ALPHA 1 URINE      % 0.0    ALPHA 2 URINE      % 0.0    BETA URINE      % 0.0    GAMMA GLOBULIN URINE      % 0.0    UPEP INTERPRETATION See Comment    Vit D, 25-Hydroxy      30.0 - 100.0 ng/mL 104.5 (H)    PARATHYROID HORMONE      12.0 - 88.0 pg/mL 22.8    TSH 3RD GENERATON      0.450 - 4.500 uIU/mL 1.411    Free T4      0.61 - 1.12 ng/dL 0.69    T3, Total      0.9-1.8 ng/mL ng/mL 0.9        Discussed with the patient and all questioned fully answered. She will call me if any problems arise.    Follow-up appointment in 3 months.     Counseled patient on diagnostic results, prognosis, risk and benefit of treatment options, instruction for management, importance of treatment compliance, Risk  factor reduction and impressions    KAROLINA aGrdiner

## 2024-02-28 ENCOUNTER — TELEPHONE (OUTPATIENT)
Dept: CARDIOLOGY CLINIC | Facility: CLINIC | Age: 59
End: 2024-02-28

## 2024-02-28 NOTE — TELEPHONE ENCOUNTER
Newly prescribed Chlorhexidine for inflammation of her gums.  She wants to make sure this is OK to take.    Going for root canal tentatively in May.  She wants to double check to see if she needs an antibiotic for this procedure.

## 2024-03-06 DIAGNOSIS — K51.30 ULCERATIVE RECTOSIGMOIDITIS WITHOUT COMPLICATION (HCC): ICD-10-CM

## 2024-03-07 RX ORDER — PREDNISONE 10 MG/1
10 TABLET ORAL DAILY
Qty: 90 TABLET | Refills: 1 | Status: SHIPPED | OUTPATIENT
Start: 2024-03-07

## 2024-04-18 ENCOUNTER — OFFICE VISIT (OUTPATIENT)
Dept: GASTROENTEROLOGY | Facility: CLINIC | Age: 59
End: 2024-04-18
Payer: COMMERCIAL

## 2024-04-18 VITALS
HEIGHT: 64 IN | SYSTOLIC BLOOD PRESSURE: 162 MMHG | WEIGHT: 120.2 LBS | HEART RATE: 57 BPM | DIASTOLIC BLOOD PRESSURE: 102 MMHG | BODY MASS INDEX: 20.52 KG/M2

## 2024-04-18 DIAGNOSIS — K51.30 ULCERATIVE RECTOSIGMOIDITIS WITHOUT COMPLICATION (HCC): Primary | ICD-10-CM

## 2024-04-18 DIAGNOSIS — E44.1 MILD PROTEIN-CALORIE MALNUTRITION (HCC): ICD-10-CM

## 2024-04-18 DIAGNOSIS — K21.9 GASTROESOPHAGEAL REFLUX DISEASE WITHOUT ESOPHAGITIS: ICD-10-CM

## 2024-04-18 PROCEDURE — 99214 OFFICE O/P EST MOD 30 MIN: CPT | Performed by: PHYSICIAN ASSISTANT

## 2024-04-18 RX ORDER — CHLORHEXIDINE GLUCONATE ORAL RINSE 1.2 MG/ML
SOLUTION DENTAL
COMMUNITY
Start: 2024-02-28

## 2024-04-18 NOTE — PROGRESS NOTES
[facility] - Outpatient Progress Note  Rubia Christine 58 y.o. female MRN: [id number]  Encounter: [id number]    Assessment and Plan    1. Ulcerative rectosigmoiditis without complication (HCC)  -Currently maintained on 10 mg prednisone daily with balsalazide 750 mg capsule for which she takes 3 capsules 3 times daily  - get IBD labs (CBC, CMP, VitD, FA, Thiamine, Fecal calprotectin)  - she defers switching to biologic at this time.    2. Gastroesophageal reflux disease without esophagitis  - Patient is currently stable on omeprazole 40 mg daily.   No current daytime or night time break through symptoms.  No pain or difficulty swallowing.  No unintentional weight loss.  Also recommend:  - avoid NSAIDS  - avoid eating within 3 hours of sleeping  - elevated head of bed 4-6 inches while sleeping  - avoid trigger foods  - recommend daily supply of calcium and vitamin D          --------------------------------------------------------------------------------------------------------------------    Chief Complaint: Ulcerative colitis    HPI: Rubia Christine is a 58 y.o. female new to me who we follow with history of ulcerative colitis and GERD who presents today for follow up for her ulcerative colitis.    Inflammatory Bowel Disease  Diagnosis -ulcerative colitis  Age of diagnosis - 33 y/o  Current medications -prednisone 10 mg daily, balsalazide 750 mg tablets for which she takes 3 tablets 3 times daily  Previous medications - none  Previous surgeries - none  Last labs - none recently  Last flare - July 2023  Last hospitalization - July 2023  Last colonoscopy -July 2023 with inadequate bowel prep and continuous mild cobblestoning in the left colon with erythema and ulceration  Extra-intestinal manifestations - some skin issues      Patient denies any nausea, vomiting, abdominal pain, dysphagia, unexpected weight loss, diarrhea, constipation, blood in stool, or black tarry stools.      Endoscopy History:  EGD -July 2023 for history of GERD with a benign-appearing upper esophageal stricture dilated.  Mid and distal esophageal biopsies negative for eosinophilic esophagitis.  Distal esophageal biopsy also negative for Street's esophagus.  Gastric biopsy negative for H. pylori and gastric intestinal metaplasia  Colonoscopy - July 2023 with inadequate bowel prep and continuous mild cobblestoning in the left colon with erythema and ulceration    Review of Systems:   General: negative for fatigue, fever, night sweats or unexpected weight loss  Psychological: negative for anxiety or depression  Ophthalmic: negative for blurry vision or scleral icterus  ENT: negative for headaches, sore throat or dysphagia  Hematological and Lymphatic: negative for pallor or swollen lymph nodes  Respiratory: negative for cough, shortness of breath or wheezing  Cardiovascular: negative for chest pain, edema or murmur  Gastrointestinal: as mentioned in HPI  Genito-Urinary: negative for dysuria or incontinence  Musculoskeletal: negative for joint pain, joint stiffness or joint swelling  Dermatological: negative for pruritus, rash, or jaundice    Current Medications  Current Outpatient Medications   Medication Sig Dispense Refill   • amLODIPine (NORVASC) 5 mg tablet Take 1 tablet (5 mg total) by mouth daily 90 tablet 3   • balsalazide (COLAZAL) 750 mg capsule Take 3 capsules (2,250 mg total) by mouth 3 (three) times a day 810 capsule 3   • calcium carbonate (OS-BUTCH) 600 MG tablet Take 1 tablet (600 mg total) by mouth 2 (two) times a day with meals (Patient taking differently: Take 600 mg by mouth 3 (three) times a day with meals) 30 tablet 2   • chlorhexidine (PERIDEX) 0.12 % solution      • folic acid (FOLVITE) 1 mg tablet Take 1 mg by mouth daily.     • hydrocortisone 2.5 % ointment Apply topically 2 (two) times a day 20 g 0   • lisinopril (ZESTRIL) 20 mg tablet Take 1 tablet (20 mg total) by mouth daily 90 tablet 3   •  mupirocin (BACTROBAN) 2 % ointment Apply topically 3 (three) times a day Apply to Mohs site during dressing changes 30 g 0   • omeprazole (PriLOSEC) 40 MG capsule Take 1 capsule (40 mg total) by mouth daily 90 capsule 3   • predniSONE 10 mg tablet TAKE 1 TABLET DAILY 90 tablet 1   • Adalimumab (Humira Pen) 80 MG/0.8ML PNKT Inject 2 pens under the skin on day 1, then inject 1 pen under the skin on day 15. (Patient not taking: Reported on 4/18/2024) 3 each 0   • adalimumab (Humira) 40 MG/0.4ML Inject 40mg under skin every 14 days (Patient not taking: Reported on 4/18/2024) 2 each 11   • cholecalciferol (VITAMIN D3) 1,000 units tablet Take 2 tablets (2,000 Units total) by mouth daily (Patient not taking: Reported on 2/26/2024) 60 tablet 2   • risedronate (ACTONEL) 35 mg tablet Take 1 tablet (35 mg total) by mouth every 7 days with water on empty stomach, nothing by mouth or lie down for next 30 minutes. (Patient not taking: Reported on 2/2/2024) 12 tablet 3     No current facility-administered medications for this visit.     Facility-Administered Medications Ordered in Other Visits   Medication Dose Route Frequency Provider Last Rate Last Admin   • lidocaine (XYLOCAINE) 1 % 25 mL, sodium bicarbonate 5 mEq, EPINEPHrine PF (ADRENALIN) 0.5 mg in sodium chloride 0.9 % 500 mL OR irrigation   Irrigation Once Radha Harkins MD           Past Medical History  Past Medical History:   Diagnosis Date   • Anxiety    • Aortic aneurysm (HCC)    • Arthritis    • Atypical chest pain     last assessed: 12/6/13   • Basal cell carcinoma 08/23/2023    right temple, mohs   • Basal cell carcinoma 08/23/2023    mid back, mohs   • Bloody diarrhea     last assesed: 58/23/16   • Breast lump     last assessed: 10/10/14   • Candidiasis of esophagus (HCC)     last assessed: 2/24/16   • Candidiasis of mouth     last assessed: 8/7/14   • Carpal tunnel syndrome    • Choledocholithiasis     last assessed: 6/5/14   • Cholelithiasis     last assessed:  7/21/15   • Closed fracture of first metatarsal bone of right foot     last assessed: 13   • Costovertebral angle pain     last assessed: 17   • Elevated blood pressure reading     last assessed: 16   • GERD (gastroesophageal reflux disease)    • Grief reaction     last assessed: 16   • Herpes zoster     last assessed: 11/3/16   • High risk medication use     last assessed: 16   • Hypertension    • Hypokalemia     last assessed: 16   • Hypotension     last assessed: 5/15/15   • Inflammatory disorder of breast     last assessed: 13   • Iron deficiency anemia     last assessed: 13   • Microscopic hematuria     last assessed: 13   • On prednisone therapy     last assessed: 10/19/17   • Opioid use, unspecified, uncomplicated     last assessed: 3/1/17   • Other polyp of sinus     last assessed: 13   • Paronychia of toe     last assessed: 13   • Pharyngoesophageal dysphagia     resolved: 3/1/16   • Skin cancer    • Tinea corporis     last assessed: 16   • Ulcerative colitis (HCC)    • Ulcerative colitis (HCC)    • Uncomplicated opioid dependence (HCC)    • Underweight     last assessed: 3/1/16   • Urinary frequency     resolved: 16   • Viral warts        Past Surgical History  Past Surgical History:   Procedure Laterality Date   • BREAST SURGERY      left breast lumpectomy   • CARPAL TUNNEL RELEASE Bilateral    •  SECTION N/A    • CHOLECYSTECTOMY     • COLONOSCOPY  2019   • EGD  2019   • ESOPHAGOGASTRODUODENOSCOPY N/A 2016    Procedure: ESOPHAGOGASTRODUODENOSCOPY (EGD);  Surgeon: Shant Ivy MD;  Location: [facility];  Service:    • MOHS SURGERY Right [facility]09[facility]2023    [facility]   • MOHS SURGERY  2023    [facility] mid back-Dr Harkins   • SKIN CANCER EXCISION     • TONSILLECTOMY N/A    • UPPER GASTROINTESTINAL ENDOSCOPY         Past Social History   Social History     Socioeconomic History   • Marital status: Single  "    Spouse name: None   • Number of children: 1   • Years of education: None   • Highest education level: None   Occupational History   • None   Tobacco Use   • Smoking status: Never   • Smokeless tobacco: Never   Vaping Use   • Vaping status: Never Used   Substance and Sexual Activity   • Alcohol use: No   • Drug use: No   • Sexual activity: Not Currently   Other Topics Concern   • None   Social History Narrative   • None     Social Determinants of Health     Financial Resource Strain: Medium Risk (1/26/2024)    Overall Financial Resource Strain (CARDIA)    • Difficulty of Paying Living Expenses: Somewhat hard   Food Insecurity: No Food Insecurity (7/11/2023)    Hunger Vital Sign    • Worried About Running Out of Food in the Last Year: Never true    • Ran Out of Food in the Last Year: Never true   Transportation Needs: No Transportation Needs (1/26/2024)    PRAPARE - Transportation    • Lack of Transportation (Medical): No    • Lack of Transportation (Non-Medical): No   Physical Activity: Not on file   Stress: Not on file   Social Connections: Not on file   Intimate Partner Violence: Not on file   Housing Stability: Not on file       Vital Signs  Vitals:    04/18/24 1422   BP: (!) 162/102   BP Location: Right arm   Patient Position: Sitting   Cuff Size: Standard   Pulse: 57   Weight: 54.5 kg (120 lb 3.2 oz)   Height: 5' 4\" (1.626 m)       Physical Exam:  General appearance: alert, cooperative, no distress  HEENT: normocephalic, anicteric, no eye erythema or discharge, no oropharyngeal thrush  Neck: supple  Lungs: CTA b/l, no rales, rhonchi, or wheezing, unlabored respirations  Heart: RRR, no murmur, rubs, or gallops  Abdomen: soft, non-tender, non-distended, normal bowel sounds, no masses or organomegaly  Rectal: deferred  Extremities: no cyanosis, clubbing, or edema  Musculoskeletal: normal gait  Skin: color and texture normal, no jaundice, no rashes or lesions  Psychiatric: alert and oriented, normal affect and " behavior                                                          Mark Goss PA-C

## 2024-04-18 NOTE — LETTER
April 18, 2024     Zeus Ruano DO  [address] [facility] [address]  [facility] [address]    Patient: Rubia Christine   YOB: 1965   Date of Visit: 4/18/2024       Dear Dr. Ruano:    Thank you for referring Rubia Christine to me for evaluation. Below are my notes for this consultation.    If you have questions, please do not hesitate to call me. I look forward to following your patient along with you.         Sincerely,        Mark Goss PA-C        CC: No Recipients    Mark Goss PA-C  4/18/2024  2:35 PM  Incomplete  [facility] - Outpatient Progress Note  Rubia Christine 58 y.o. female MRN: [id number]  Encounter: [id number]    Assessment and Plan    1. Ulcerative rectosigmoiditis without complication (HCC)  -Currently maintained on 10 mg prednisone daily with balsalazide 750 mg capsule for which she takes 3 capsules 3 times daily  - get IBD labs (CBC, CMP, VitD, FA, Thiamine, Fecal calprotectin)  - she defers switching to biologic at this time.    2. Gastroesophageal reflux disease without esophagitis  - Patient is currently stable on omeprazole 40 mg daily.   No current daytime or night time break through symptoms.  No pain or difficulty swallowing.  No unintentional weight loss.  Also recommend:  - avoid NSAIDS  - avoid eating within 3 hours of sleeping  - elevated head of bed 4-6 inches while sleeping  - avoid trigger foods  - recommend daily supply of calcium and vitamin D          --------------------------------------------------------------------------------------------------------------------    Chief Complaint: Ulcerative colitis    HPI: Rubia Christine is a 58 y.o. female new to me who we follow with history of ulcerative colitis and GERD who presents today for follow up for her ulcerative colitis.    Inflammatory Bowel Disease  Diagnosis -ulcerative colitis  Age of diagnosis - 31 y/o  Current medications  -prednisone 10 mg daily, balsalazide 750 mg tablets for which she takes 3 tablets 3 times daily  Previous medications - none  Previous surgeries - none  Last labs - none recently  Last flare - July 2023  Last hospitalization - July 2023  Last colonoscopy -July 2023 with inadequate bowel prep and continuous mild cobblestoning in the left colon with erythema and ulceration  Extra-intestinal manifestations - some skin issues      Patient denies any nausea, vomiting, abdominal pain, dysphagia, unexpected weight loss, diarrhea, constipation, blood in stool, or black tarry stools.     Endoscopy History:  EGD -July 2023 for history of GERD with a benign-appearing upper esophageal stricture dilated.  Mid and distal esophageal biopsies negative for eosinophilic esophagitis.  Distal esophageal biopsy also negative for Street's esophagus.  Gastric biopsy negative for H. pylori and gastric intestinal metaplasia  Colonoscopy - July 2023 with inadequate bowel prep and continuous mild cobblestoning in the left colon with erythema and ulceration    Review of Systems:   General: negative for fatigue, fever, night sweats or unexpected weight loss  Psychological: negative for anxiety or depression  Ophthalmic: negative for blurry vision or scleral icterus  ENT: negative for headaches, sore throat or dysphagia  Hematological and Lymphatic: negative for pallor or swollen lymph nodes  Respiratory: negative for cough, shortness of breath or wheezing  Cardiovascular: negative for chest pain, edema or murmur  Gastrointestinal: as mentioned in HPI  Genito-Urinary: negative for dysuria or incontinence  Musculoskeletal: negative for joint pain, joint stiffness or joint swelling  Dermatological: negative for pruritus, rash, or jaundice    Current Medications  Current Outpatient Medications   Medication Sig Dispense Refill   • amLODIPine (NORVASC) 5 mg tablet Take 1 tablet (5 mg total) by mouth daily 90 tablet 3   • balsalazide (COLAZAL) 750 mg  capsule Take 3 capsules (2,250 mg total) by mouth 3 (three) times a day 810 capsule 3   • calcium carbonate (OS-BUTCH) 600 MG tablet Take 1 tablet (600 mg total) by mouth 2 (two) times a day with meals (Patient taking differently: Take 600 mg by mouth 3 (three) times a day with meals) 30 tablet 2   • chlorhexidine (PERIDEX) 0.12 % solution      • folic acid (FOLVITE) 1 mg tablet Take 1 mg by mouth daily.     • hydrocortisone 2.5 % ointment Apply topically 2 (two) times a day 20 g 0   • lisinopril (ZESTRIL) 20 mg tablet Take 1 tablet (20 mg total) by mouth daily 90 tablet 3   • mupirocin (BACTROBAN) 2 % ointment Apply topically 3 (three) times a day Apply to Mohs site during dressing changes 30 g 0   • omeprazole (PriLOSEC) 40 MG capsule Take 1 capsule (40 mg total) by mouth daily 90 capsule 3   • predniSONE 10 mg tablet TAKE 1 TABLET DAILY 90 tablet 1   • Adalimumab (Humira Pen) 80 MG/0.8ML PNKT Inject 2 pens under the skin on day 1, then inject 1 pen under the skin on day 15. (Patient not taking: Reported on 4/18/2024) 3 each 0   • adalimumab (Humira) 40 MG/0.4ML Inject 40mg under skin every 14 days (Patient not taking: Reported on 4/18/2024) 2 each 11   • cholecalciferol (VITAMIN D3) 1,000 units tablet Take 2 tablets (2,000 Units total) by mouth daily (Patient not taking: Reported on 2/26/2024) 60 tablet 2   • risedronate (ACTONEL) 35 mg tablet Take 1 tablet (35 mg total) by mouth every 7 days with water on empty stomach, nothing by mouth or lie down for next 30 minutes. (Patient not taking: Reported on 2/2/2024) 12 tablet 3     No current facility-administered medications for this visit.     Facility-Administered Medications Ordered in Other Visits   Medication Dose Route Frequency Provider Last Rate Last Admin   • lidocaine (XYLOCAINE) 1 % 25 mL, sodium bicarbonate 5 mEq, EPINEPHrine PF (ADRENALIN) 0.5 mg in sodium chloride 0.9 % 500 mL OR irrigation   Irrigation Once Radha Harkins MD           Past Medical  History  Past Medical History:   Diagnosis Date   • Anxiety    • Aortic aneurysm (HCC)    • Arthritis    • Atypical chest pain     last assessed: 12/6/13   • Basal cell carcinoma 08/23/2023    right temple, mohs   • Basal cell carcinoma 08/23/2023    mid back, mohs   • Bloody diarrhea     last assesed: 58/23/16   • Breast lump     last assessed: 10/10/14   • Candidiasis of esophagus (HCC)     last assessed: 2/24/16   • Candidiasis of mouth     last assessed: 8/7/14   • Carpal tunnel syndrome    • Choledocholithiasis     last assessed: 6/5/14   • Cholelithiasis     last assessed: 7/21/15   • Closed fracture of first metatarsal bone of right foot     last assessed: 12/11/13   • Costovertebral angle pain     last assessed: 5/4/17   • Elevated blood pressure reading     last assessed: 7/29/16   • GERD (gastroesophageal reflux disease)    • Grief reaction     last assessed: 4/4/16   • Herpes zoster     last assessed: 11/3/16   • High risk medication use     last assessed: 12/23/16   • Hypertension    • Hypokalemia     last assessed: 4/12/16   • Hypotension     last assessed: 5/15/15   • Inflammatory disorder of breast     last assessed: 12/6/13   • Iron deficiency anemia     last assessed: 12/6/13   • Microscopic hematuria     last assessed: 12/6/13   • On prednisone therapy     last assessed: 10/19/17   • Opioid use, unspecified, uncomplicated     last assessed: 3/1/17   • Other polyp of sinus     last assessed: 12/6/13   • Paronychia of toe     last assessed: 12/6/13   • Pharyngoesophageal dysphagia     resolved: 3/1/16   • Skin cancer    • Tinea corporis     last assessed: 8/25/16   • Ulcerative colitis (HCC)    • Ulcerative colitis (HCC)    • Uncomplicated opioid dependence (HCC)    • Underweight     last assessed: 3/1/16   • Urinary frequency     resolved: 7/29/16   • Viral warts        Past Surgical History  Past Surgical History:   Procedure Laterality Date   • BREAST SURGERY      left breast lumpectomy   • CARPAL  "TUNNEL RELEASE Bilateral    •  SECTION N/A    • CHOLECYSTECTOMY     • COLONOSCOPY  2019   • EGD  2019   • ESOPHAGOGASTRODUODENOSCOPY N/A 2016    Procedure: ESOPHAGOGASTRODUODENOSCOPY (EGD);  Surgeon: Shant Ivy MD;  Location: [facility] GI LAB;  Service:    • MOHS SURGERY Right 10/09/2023    BCC right temple-Dr Corbin   • MOHS SURGERY  2023    BCC mid back-Dr Harkins   • SKIN CANCER EXCISION     • TONSILLECTOMY N/A    • UPPER GASTROINTESTINAL ENDOSCOPY         Past Social History   Social History     Socioeconomic History   • Marital status: Single     Spouse name: None   • Number of children: 1   • Years of education: None   • Highest education level: None   Occupational History   • None   Tobacco Use   • Smoking status: Never   • Smokeless tobacco: Never   Vaping Use   • Vaping status: Never Used   Substance and Sexual Activity   • Alcohol use: No   • Drug use: No   • Sexual activity: Not Currently   Other Topics Concern   • None   Social History Narrative   • None     Social Determinants of Health     Financial Resource Strain: Medium Risk (2024)    Overall Financial Resource Strain (CARDIA)    • Difficulty of Paying Living Expenses: Somewhat hard   Food Insecurity: No Food Insecurity (2023)    Hunger Vital Sign    • Worried About Running Out of Food in the Last Year: Never true    • Ran Out of Food in the Last Year: Never true   Transportation Needs: No Transportation Needs (2024)    PRAPARE - Transportation    • Lack of Transportation (Medical): No    • Lack of Transportation (Non-Medical): No   Physical Activity: Not on file   Stress: Not on file   Social Connections: Not on file   Intimate Partner Violence: Not on file   Housing Stability: Not on file       Vital Signs  Vitals:    24 1422   BP: (!) 162/102   BP Location: Right arm   Patient Position: Sitting   Cuff Size: Standard   Pulse: 57   Weight: 54.5 kg (120 lb 3.2 oz)   Height: 5' 4\" (1.626 m) "       Physical Exam:  General appearance: alert, cooperative, no distress  HEENT: normocephalic, anicteric, no eye erythema or discharge, no oropharyngeal thrush  Neck: supple  Lungs: CTA b/l, no rales, rhonchi, or wheezing, unlabored respirations  Heart: RRR, no murmur, rubs, or gallops  Abdomen: soft, non-tender, non-distended, normal bowel sounds, no masses or organomegaly  Rectal: deferred  Extremities: no cyanosis, clubbing, or edema  Musculoskeletal: normal gait  Skin: color and texture normal, no jaundice, no rashes or lesions  Psychiatric: alert and oriented, normal affect and behavior                                                          Mark Goss PA-C  4/18/2024  1:34 PM  Sign when Signing Visit  [facility] - Outpatient Progress Note  Rubia Christine 58 y.o. female MRN: [id number]  Encounter: [id number]    Assessment and Plan    1. Ulcerative rectosigmoiditis without complication (HCC)  -Currently maintained on 10 mg prednisone daily with balsalazide 750 mg capsule for which she takes 3 capsules 3 times daily  - get IBD labs (CBC, CMP, VitD, FA, Thiamine, Fecal calprotectin)    2. Gastroesophageal reflux disease without esophagitis  - Patient is currently stable on omeprazole 40 mg daily.   No current daytime or night time break through symptoms.  No pain or difficulty swallowing.  No unintentional weight loss.  Also recommend:  - avoid NSAIDS  - avoid eating within 3 hours of sleeping  - elevated head of bed 4-6 inches while sleeping  - avoid trigger foods  - recommend daily supply of calcium and vitamin D          --------------------------------------------------------------------------------------------------------------------    Chief Complaint: Ulcerative colitis    HPI: Rubia Christine is a 58 y.o. female new to me who we follow with history of ulcerative colitis and GERD who presents today for follow up for her  ulcerative colitis.    Inflammatory Bowel Disease  Diagnosis -ulcerative colitis  Age of diagnosis -   Current medications -prednisone 10 mg daily, balsalazide 750 mg tablets for which she takes 3 tablets 3 times daily  Previous medications -   Previous surgeries -   Last labs -   Last flare -   Last hospitalization -   Last colonoscopy -July 2023 with inadequate bowel prep and continuous mild cobblestoning in the left colon with erythema and ulceration  Extra-intestinal manifestations -       Patient denies any nausea, vomiting, abdominal pain, dysphagia, unexpected weight loss, diarrhea, constipation, blood in stool, or black tarry stools.     Endoscopy History:  EGD -July 2023 for history of GERD with a benign-appearing upper esophageal stricture dilated.  Mid and distal esophageal biopsies negative for eosinophilic esophagitis.  Distal esophageal biopsy also negative for Street's esophagus.  Gastric biopsy negative for H. pylori and gastric intestinal metaplasia  Colonoscopy - July 2023 with inadequate bowel prep and continuous mild cobblestoning in the left colon with erythema and ulceration    Review of Systems:   General: negative for fatigue, fever, night sweats or unexpected weight loss  Psychological: negative for anxiety or depression  Ophthalmic: negative for blurry vision or scleral icterus  ENT: negative for headaches, sore throat or dysphagia  Hematological and Lymphatic: negative for pallor or swollen lymph nodes  Respiratory: negative for cough, shortness of breath or wheezing  Cardiovascular: negative for chest pain, edema or murmur  Gastrointestinal: as mentioned in HPI  Genito-Urinary: negative for dysuria or incontinence  Musculoskeletal: negative for joint pain, joint stiffness or joint swelling  Dermatological: negative for pruritus, rash, or jaundice    Current Medications  Current Outpatient Medications   Medication Sig Dispense Refill   • Adalimumab (Humira Pen) 80 MG/0.8ML PNKT Inject 2  pens under the skin on day 1, then inject 1 pen under the skin on day 15. 3 each 0   • adalimumab (Humira) 40 MG/0.4ML Inject 40mg under skin every 14 days 2 each 11   • amLODIPine (NORVASC) 5 mg tablet Take 1 tablet (5 mg total) by mouth daily 90 tablet 3   • balsalazide (COLAZAL) 750 mg capsule Take 3 capsules (2,250 mg total) by mouth 3 (three) times a day 810 capsule 3   • calcium carbonate (OS-BUTCH) 600 MG tablet Take 1 tablet (600 mg total) by mouth 2 (two) times a day with meals (Patient taking differently: Take 600 mg by mouth 3 (three) times a day with meals) 30 tablet 2   • cholecalciferol (VITAMIN D3) 1,000 units tablet Take 2 tablets (2,000 Units total) by mouth daily (Patient not taking: Reported on 2/26/2024) 60 tablet 2   • folic acid (FOLVITE) 1 mg tablet Take 1 mg by mouth daily.     • hydrocortisone 2.5 % ointment Apply topically 2 (two) times a day 20 g 0   • lisinopril (ZESTRIL) 20 mg tablet Take 1 tablet (20 mg total) by mouth daily 90 tablet 3   • mupirocin (BACTROBAN) 2 % ointment Apply topically 3 (three) times a day Apply to Mohs site during dressing changes 30 g 0   • omeprazole (PriLOSEC) 40 MG capsule Take 1 capsule (40 mg total) by mouth daily 90 capsule 3   • predniSONE 10 mg tablet TAKE 1 TABLET DAILY 90 tablet 1   • risedronate (ACTONEL) 35 mg tablet Take 1 tablet (35 mg total) by mouth every 7 days with water on empty stomach, nothing by mouth or lie down for next 30 minutes. (Patient not taking: Reported on 2/2/2024) 12 tablet 3     No current facility-administered medications for this visit.     Facility-Administered Medications Ordered in Other Visits   Medication Dose Route Frequency Provider Last Rate Last Admin   • lidocaine (XYLOCAINE) 1 % 25 mL, sodium bicarbonate 5 mEq, EPINEPHrine PF (ADRENALIN) 0.5 mg in sodium chloride 0.9 % 500 mL OR irrigation   Irrigation Once Radha Harkins MD           Past Medical History  Past Medical History:   Diagnosis Date   • Anxiety    •  Aortic aneurysm (HCC)    • Arthritis    • Atypical chest pain     last assessed: 13   • Basal cell carcinoma 2023    right temple, mohs   • Basal cell carcinoma 2023    mid back, mohs   • Bloody diarrhea     last assesed:    • Breast lump     last assessed: 10/10/14   • Candidiasis of esophagus (HCC)     last assessed: 16   • Candidiasis of mouth     last assessed: 14   • Carpal tunnel syndrome    • Choledocholithiasis     last assessed: 14   • Cholelithiasis     last assessed: 7/21/15   • Closed fracture of first metatarsal bone of right foot     last assessed: 13   • Costovertebral angle pain     last assessed: 17   • Elevated blood pressure reading     last assessed: 16   • Grief reaction     last assessed: 16   • Herpes zoster     last assessed: 11/3/16   • High risk medication use     last assessed: 16   • Hypertension    • Hypokalemia     last assessed: 16   • Hypotension     last assessed: 5/15/15   • Inflammatory disorder of breast     last assessed: 13   • Iron deficiency anemia     last assessed: 13   • Microscopic hematuria     last assessed: 13   • On prednisone therapy     last assessed: 10/19/17   • Opioid use, unspecified, uncomplicated     last assessed: 3/1/17   • Other polyp of sinus     last assessed: 13   • Paronychia of toe     last assessed: 13   • Pharyngoesophageal dysphagia     resolved: 3/1/16   • Skin cancer    • Tinea corporis     last assessed: 16   • Ulcerative colitis (HCC)    • Ulcerative colitis (HCC)    • Uncomplicated opioid dependence (HCC)    • Underweight     last assessed: 3/1/16   • Urinary frequency     resolved: 16   • Viral warts        Past Surgical History  Past Surgical History:   Procedure Laterality Date   • BREAST SURGERY      left breast lumpectomy   • CARPAL TUNNEL RELEASE Bilateral    •  SECTION N/A    • CHOLECYSTECTOMY     • COLONOSCOPY  2019   •  EGD  09/18/2019   • ESOPHAGOGASTRODUODENOSCOPY N/A 03/14/2016    Procedure: ESOPHAGOGASTRODUODENOSCOPY (EGD);  Surgeon: Shant Ivy MD;  Location: [facility] GI [facility];  Service:    • MOHS SURGERY Right 10/09/2023    BCC right temple-Dr Corbin   • MOHS SURGERY  11/01/2023    BCC mid back-Dr Harkins   • SKIN CANCER EXCISION     • TONSILLECTOMY N/A        Past Social History   Social History     Socioeconomic History   • Marital status: Single     Spouse name: Not on file   • Number of children: 1   • Years of education: Not on file   • Highest education level: Not on file   Occupational History   • Not on file   Tobacco Use   • Smoking status: Never   • Smokeless tobacco: Never   Vaping Use   • Vaping status: Never Used   Substance and Sexual Activity   • Alcohol use: No   • Drug use: No   • Sexual activity: Not Currently   Other Topics Concern   • Not on file   Social History Narrative   • Not on file     Social Determinants of Health     Financial Resource Strain: Medium Risk (1/26/2024)    Overall Financial Resource Strain (CARDIA)    • Difficulty of Paying Living Expenses: Somewhat hard   Food Insecurity: No Food Insecurity (7/11/2023)    Hunger Vital Sign    • Worried About Running Out of Food in the Last Year: Never true    • Ran Out of Food in the Last Year: Never true   Transportation Needs: No Transportation Needs (1/26/2024)    PRAPARE - Transportation    • Lack of Transportation (Medical): No    • Lack of Transportation (Non-Medical): No   Physical Activity: Not on file   Stress: Not on file   Social Connections: Not on file   Intimate Partner Violence: Not on file   Housing Stability: Not on file       Vital Signs  There were no vitals filed for this visit.    Physical Exam:  General appearance: alert, cooperative, no distress  HEENT: normocephalic, anicteric, no eye erythema or discharge, no oropharyngeal thrush  Neck: supple  Lungs: CTA b/l, no rales, rhonchi, or wheezing, unlabored respirations  Heart: RRR,  no murmur, rubs, or gallops  Abdomen: soft, non-tender, non-distended, normal bowel sounds, no masses or organomegaly  Rectal: deferred  Extremities: no cyanosis, clubbing, or edema  Musculoskeletal: normal gait  Skin: color and texture normal, no jaundice, no rashes or lesions  Psychiatric: alert and oriented, normal affect and behavior                                                          Mark Goss PA-C

## 2024-04-22 ENCOUNTER — APPOINTMENT (OUTPATIENT)
Dept: LAB | Facility: HOSPITAL | Age: 59
End: 2024-04-22
Payer: COMMERCIAL

## 2024-04-22 DIAGNOSIS — K51.30 CHRONIC ULCERATIVE RECTOSIGMOIDITIS WITHOUT COMPLICATIONS (HCC): ICD-10-CM

## 2024-04-22 DIAGNOSIS — K51.30 ULCERATIVE RECTOSIGMOIDITIS WITHOUT COMPLICATION (HCC): ICD-10-CM

## 2024-04-22 LAB
25(OH)D3 SERPL-MCNC: 39.6 NG/ML (ref 30–100)
BASOPHILS # BLD AUTO: 0.04 THOUSANDS/ÂΜL (ref 0–0.1)
BASOPHILS NFR BLD AUTO: 0 % (ref 0–1)
EOSINOPHIL # BLD AUTO: 0.02 THOUSAND/ÂΜL (ref 0–0.61)
EOSINOPHIL NFR BLD AUTO: 0 % (ref 0–6)
ERYTHROCYTE [DISTWIDTH] IN BLOOD BY AUTOMATED COUNT: 13.3 % (ref 11.6–15.1)
HCT VFR BLD AUTO: 45.6 % (ref 34.8–46.1)
HGB BLD-MCNC: 14.4 G/DL (ref 11.5–15.4)
IMM GRANULOCYTES # BLD AUTO: 0.19 THOUSAND/UL (ref 0–0.2)
IMM GRANULOCYTES NFR BLD AUTO: 2 % (ref 0–2)
LYMPHOCYTES # BLD AUTO: 1.36 THOUSANDS/ÂΜL (ref 0.6–4.47)
LYMPHOCYTES NFR BLD AUTO: 11 % (ref 14–44)
MCH RBC QN AUTO: 31.5 PG (ref 26.8–34.3)
MCHC RBC AUTO-ENTMCNC: 31.6 G/DL (ref 31.4–37.4)
MCV RBC AUTO: 100 FL (ref 82–98)
MONOCYTES # BLD AUTO: 0.34 THOUSAND/ÂΜL (ref 0.17–1.22)
MONOCYTES NFR BLD AUTO: 3 % (ref 4–12)
NEUTROPHILS # BLD AUTO: 9.93 THOUSANDS/ÂΜL (ref 1.85–7.62)
NEUTS SEG NFR BLD AUTO: 84 % (ref 43–75)
NRBC BLD AUTO-RTO: 0 /100 WBCS
PLATELET # BLD AUTO: 276 THOUSANDS/UL (ref 149–390)
PMV BLD AUTO: 9.4 FL (ref 8.9–12.7)
RBC # BLD AUTO: 4.57 MILLION/UL (ref 3.81–5.12)
WBC # BLD AUTO: 11.88 THOUSAND/UL (ref 4.31–10.16)

## 2024-04-22 PROCEDURE — 85025 COMPLETE CBC W/AUTO DIFF WBC: CPT

## 2024-04-22 PROCEDURE — 82306 VITAMIN D 25 HYDROXY: CPT

## 2024-04-22 PROCEDURE — 36415 COLL VENOUS BLD VENIPUNCTURE: CPT

## 2024-04-22 RX ORDER — PREDNISONE 10 MG/1
10 TABLET ORAL DAILY
Qty: 90 TABLET | Refills: 1 | Status: SHIPPED | OUTPATIENT
Start: 2024-04-22

## 2024-04-22 NOTE — TELEPHONE ENCOUNTER
States she just had a visit and was suppose to get a refill.    Reason for call:   [x] Refill   [] Prior Auth  [] Other:     Office:   [] PCP/Provider -   [x] Specialty/Provider -     Medication: Prednisone    Dose/Frequency: 10 mg    Quantity: #90    Pharmacy: [facility]    Does the patient have enough for 3 days?   [] Yes   [x] No - Send as HP to POD

## 2024-05-17 ENCOUNTER — TELEPHONE (OUTPATIENT)
Age: 59
End: 2024-05-17

## 2024-06-02 ENCOUNTER — HOSPITAL ENCOUNTER (EMERGENCY)
Facility: HOSPITAL | Age: 59
Discharge: HOME | End: 2024-06-02
Attending: EMERGENCY MEDICINE | Admitting: EMERGENCY MEDICINE
Payer: COMMERCIAL

## 2024-06-02 VITALS
SYSTOLIC BLOOD PRESSURE: 135 MMHG | TEMPERATURE: 96.7 F | HEART RATE: 60 BPM | BODY MASS INDEX: 21.32 KG/M2 | OXYGEN SATURATION: 98 % | RESPIRATION RATE: 18 BRPM | DIASTOLIC BLOOD PRESSURE: 82 MMHG | WEIGHT: 124.2 LBS

## 2024-06-02 DIAGNOSIS — S50.861A INSECT BITE OF RIGHT FOREARM, INITIAL ENCOUNTER: Primary | ICD-10-CM
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
## 2024-06-02 PROCEDURE — 99284 EMERGENCY DEPT VISIT MOD MDM: CPT | Performed by: EMERGENCY MEDICINE

## 2024-06-02 PROCEDURE — 99281 EMR DPT VST MAYX REQ PHY/QHP: CPT

## 2024-06-02 RX ORDER — CEPHALEXIN 500 MG/1
500 CAPSULE ORAL EVERY 6 HOURS SCHEDULED
Qty: 28 CAPSULE | Refills: 0 | Status: SHIPPED | OUTPATIENT
Start: 2024-06-02 | End: 2024-06-09

## 2024-06-03 NOTE — ED PROVIDER NOTES
History  Chief Complaint   Patient presents with    Insect Bite     States was outside and thinks something bit her right arm because right forearm was itching and she had a hive. Concerned that this might travel to her heart aneurysm      58-year-old female presenting to ED today for insect bite.  Patient states that around noon she noticed that she had an area on the right arm as she was standing outside over the garden that became suddenly very itchy.  She then noted that she developed a rash throughout the day.  She was concerned because she has a history of a thoracic aneurysm and wanted to make sure that it would not travel to her aneurysm and she was unsure whether or not this could be related.  She is complaining of itchiness at the bite but no other symptoms.  She says that the erythema she thinks may have gotten better since then.  No fevers or chills.        Prior to Admission Medications   Prescriptions Last Dose Informant Patient Reported? Taking?   Adalimumab (Humira Pen) 80 MG/0.8ML PNKT  Self No No   Sig: Inject 2 pens under the skin on day 1, then inject 1 pen under the skin on day 15.   Patient not taking: Reported on 4/18/2024   adalimumab (Humira) 40 MG/0.4ML  Self No No   Sig: Inject 40mg under skin every 14 days   Patient not taking: Reported on 4/18/2024   amLODIPine (NORVASC) 5 mg tablet  Self No No   Sig: Take 1 tablet (5 mg total) by mouth daily   balsalazide (COLAZAL) 750 mg capsule  Self No No   Sig: Take 3 capsules (2,250 mg total) by mouth 3 (three) times a day   calcium carbonate (OS-BUTCH) 600 MG tablet  Self No No   Sig: Take 1 tablet (600 mg total) by mouth 2 (two) times a day with meals   Patient taking differently: Take 600 mg by mouth 3 (three) times a day with meals   chlorhexidine (PERIDEX) 0.12 % solution   Yes No   cholecalciferol (VITAMIN D3) 1,000 units tablet  Self No No   Sig: Take 2 tablets (2,000 Units total) by mouth daily   Patient not taking: Reported on 2/26/2024    folic acid (FOLVITE) 1 mg tablet  Self Yes No   Sig: Take 1 mg by mouth daily.   hydrocortisone 2.5 % ointment  Self No No   Sig: Apply topically 2 (two) times a day   lisinopril (ZESTRIL) 20 mg tablet  Self No No   Sig: Take 1 tablet (20 mg total) by mouth daily   mupirocin (BACTROBAN) 2 % ointment  Self No No   Sig: Apply topically 3 (three) times a day Apply to Mohs site during dressing changes   omeprazole (PriLOSEC) 40 MG capsule  Self No No   Sig: Take 1 capsule (40 mg total) by mouth daily   predniSONE 10 mg tablet   No No   Sig: Take 1 tablet (10 mg total) by mouth daily   risedronate (ACTONEL) 35 mg tablet  Self No No   Sig: Take 1 tablet (35 mg total) by mouth every 7 days with water on empty stomach, nothing by mouth or lie down for next 30 minutes.   Patient not taking: Reported on 2/2/2024      Facility-Administered Medications: None       Past Medical History:   Diagnosis Date    Anxiety     Aortic aneurysm (HCC)     Arthritis     Atypical chest pain     last assessed: 12/6/13    Basal cell carcinoma 08/23/2023    right temple, mohs    Basal cell carcinoma 08/23/2023    mid back, mohs    Bloody diarrhea     last assesed: 58/23/16    Breast lump     last assessed: 10/10/14    Candidiasis of esophagus (HCC)     last assessed: 2/24/16    Candidiasis of mouth     last assessed: 8/7/14    Carpal tunnel syndrome     Choledocholithiasis     last assessed: 6/5/14    Cholelithiasis     last assessed: 7/21/15    Closed fracture of first metatarsal bone of right foot     last assessed: 12/11/13    Costovertebral angle pain     last assessed: 5/4/17    Elevated blood pressure reading     last assessed: 7/29/16    GERD (gastroesophageal reflux disease)     Grief reaction     last assessed: 4/4/16    Herpes zoster     last assessed: 11/3/16    High risk medication use     last assessed: 12/23/16    Hypertension     Hypokalemia     last assessed: 4/12/16    Hypotension     last assessed: 5/15/15    Inflammatory  disorder of breast     last assessed: 13    Iron deficiency anemia     last assessed: 13    Microscopic hematuria     last assessed: 13    On prednisone therapy     last assessed: 10/19/17    Opioid use, unspecified, uncomplicated     last assessed: 3/1/17    Other polyp of sinus     last assessed: 13    Paronychia of toe     last assessed: 13    Pharyngoesophageal dysphagia     resolved: 3/1/16    Skin cancer     Tinea corporis     last assessed: 16    Ulcerative colitis (HCC)     Ulcerative colitis (HCC)     Uncomplicated opioid dependence (HCC)     Underweight     last assessed: 3/1/16    Urinary frequency     resolved: 16    Viral warts        Past Surgical History:   Procedure Laterality Date    BREAST SURGERY      left breast lumpectomy    CARPAL TUNNEL RELEASE Bilateral      SECTION N/A     CHOLECYSTECTOMY      COLONOSCOPY  2019    EGD  2019    ESOPHAGOGASTRODUODENOSCOPY N/A 2016    Procedure: ESOPHAGOGASTRODUODENOSCOPY (EGD);  Surgeon: Shant Ivy MD;  Location: [facility];  Service:     MOHS SURGERY Right 10/09/2023    [facility] right temple-Dr Corbin    MOHS SURGERY  2023    [facility] back-Dr Harkins    SKIN CANCER EXCISION      TONSILLECTOMY N/A     UPPER GASTROINTESTINAL ENDOSCOPY         Family History   Problem Relation Age of Onset    Heart disease Mother         Passed away     Stroke Mother         Passed away     Heart disease Father         Passed away     Colon cancer Maternal Grandfather         Passed away     Asthma Son     Autism Son     Heart disease Brother         CAD    Cancer Maternal Grandmother         colon    Heart disease Brother     COPD Brother         smoker    Heart disease Brother         Stents    No Known Problems Half-Sister      I have reviewed and agree with the history as documented.    E-Cigarette/Vaping    E-Cigarette Use Never User      E-Cigarette/Vaping Substances    Nicotine No     THC No      CBD No     Flavoring No     Other No     Unknown No      Social History     Tobacco Use    Smoking status: Never    Smokeless tobacco: Never   Vaping Use    Vaping status: Never Used   Substance Use Topics    Alcohol use: No    Drug use: No       Review of Systems   Constitutional:  Negative for chills and fever.   HENT:  Negative for hearing loss.    Eyes:  Negative for visual disturbance.   Respiratory:  Negative for shortness of breath.    Cardiovascular:  Negative for chest pain.   Gastrointestinal:  Negative for abdominal pain, constipation, diarrhea, nausea and vomiting.   Genitourinary:  Negative for difficulty urinating.   Musculoskeletal:  Negative for myalgias.   Skin:  Positive for rash. Negative for color change.   Neurological:  Negative for dizziness and headaches.   Psychiatric/Behavioral:  Negative for agitation.    All other systems reviewed and are negative.      Physical Exam  Physical Exam  Vitals and nursing note reviewed.   Constitutional:       General: She is not in acute distress.     Appearance: Normal appearance. She is well-developed. She is not ill-appearing.   HENT:      Head: Normocephalic and atraumatic.      Right Ear: External ear normal.      Left Ear: External ear normal.      Nose: Nose normal.      Mouth/Throat:      Mouth: Mucous membranes are moist.      Pharynx: Oropharynx is clear. No oropharyngeal exudate.   Eyes:      General:         Right eye: No discharge.         Left eye: No discharge.      Extraocular Movements: Extraocular movements intact.      Conjunctiva/sclera: Conjunctivae normal.      Pupils: Pupils are equal, round, and reactive to light.   Cardiovascular:      Rate and Rhythm: Normal rate and regular rhythm.      Heart sounds: Normal heart sounds. No murmur heard.     No friction rub. No gallop.   Pulmonary:      Effort: Pulmonary effort is normal. No respiratory distress.      Breath sounds: Normal breath sounds. No stridor. No wheezing.   Abdominal:       General: Bowel sounds are normal. There is no distension.      Palpations: Abdomen is soft.      Tenderness: There is no abdominal tenderness.   Musculoskeletal:         General: No swelling. Normal range of motion.      Cervical back: Normal range of motion and neck supple. No rigidity.   Skin:     General: Skin is warm and dry.      Capillary Refill: Capillary refill takes less than 2 seconds.      Comments: There is an area of erythema on the right forearm.  Is about 8 to 10 cm in diameter.  Minimally raised.   Neurological:      General: No focal deficit present.      Mental Status: She is alert and oriented to person, place, and time. Mental status is at baseline.   Psychiatric:         Mood and Affect: Mood normal.         Behavior: Behavior normal.         Vital Signs  ED Triage Vitals [06/02/24 2150]   Temperature Pulse Respirations Blood Pressure SpO2   (!) 96.7 °F (35.9 °C) 60 18 135/82 98 %      Temp Source Heart Rate Source Patient Position - Orthostatic VS BP Location FiO2 (%)   Tympanic Monitor Sitting Left arm --      Pain Score       No Pain           Vitals:    06/02/24 2150   BP: 135/82   Pulse: 60   Patient Position - Orthostatic VS: Sitting         Visual Acuity      ED Medications  Medications - No data to display    Diagnostic Studies  Results Reviewed       None                   No orders to display              Procedures  Procedures         ED Course                               SBIRT 22yo+      Flowsheet Row Most Recent Value   Initial Alcohol Screen: US AUDIT-C     1. How often do you have a drink containing alcohol? 0 Filed at: 06/02/2024 2155   2. How many drinks containing alcohol do you have on a typical day you are drinking?  0 Filed at: 06/02/2024 2155   3a. Male UNDER 65: How often do you have five or more drinks on one occasion? 0 Filed at: 06/02/2024 2155   3b. FEMALE Any Age, or MALE 65+: How often do you have 4 or more drinks on one occassion? 0 Filed at: 06/02/2024 2155    Audit-C Score 0 Filed at: 06/02/2024 2155   VANNA: How many times in the past year have you...    Used an illegal drug or used a prescription medication for non-medical reasons? Never Filed at: 06/02/2024 2155                      Medical Decision Making  58-year-old female presenting to ED today with insect bite.  I think likely the erythema and swelling is secondary to a local reaction from the insect bite.  Minimal concern for cellulitis at this time however I did draw the patient's arm the borders of the redness.  I told her that I will give her a paper prescription for Keflex and should the redness spread that she should start the Keflex.  Encouraged outpatient follow-up with primary care provider.  Return precaution discussed and patient was discharged home.    Risk  Prescription drug management.             Disposition  Final diagnoses:   Insect bite of right forearm, initial encounter     Time reflects when diagnosis was documented in both MDM as applicable and the Disposition within this note       Time User Action Codes Description Comment    6/2/2024 10:03 PM Humberto Calderon Add [S50.861A,  W57.XXXA] Insect bite of right forearm, initial encounter           ED Disposition       ED Disposition   Discharge    Condition   Stable    Date/Time   Sun Jun 2, 2024 2202    Comment   Rubia Christine discharge to home/self care.                   Follow-up Information       Follow up With Specialties Details Why Contact Info Additional Information    Zeus Ruano DO Family Medicine Schedule an appointment as soon as possible for a visit in 2 days for follow up [address] [facility] [address]  [facility] [id number] [phone]       [facility] Emergency Department Emergency Medicine Go to  If symptoms worsen, As needed [address] [facility] [id number] [phone] [facility], [address] [place], [place], [address]             Discharge Medication List as of 6/2/2024 10:04 PM        START taking these medications    Details   cephalexin (KEFLEX) 500 mg capsule Take 1 capsule (500 mg total) by mouth every 6 (six) hours for 7 days, Starting Sun 6/2/2024, Until Sun 6/9/2024, Print           CONTINUE these medications which have NOT CHANGED    Details   Adalimumab (Humira Pen) 80 MG/0.8ML PNKT Inject 2 pens under the skin on day 1, then inject 1 pen under the skin on day 15., Normal      adalimumab (Humira) 40 MG/0.4ML Inject 40mg under skin every 14 days, Normal      amLODIPine (NORVASC) 5 mg tablet Take 1 tablet (5 mg total) by mouth daily, Starting Fri 2/2/2024, Normal      balsalazide (COLAZAL) 750 mg capsule Take 3 capsules (2,250 mg total) by mouth 3 (three) times a day, Starting Thu 2/8/2024, Normal      calcium carbonate (OS-BUTCH) 600 MG tablet Take 1 tablet (600 mg total) by mouth 2 (two) times a day with meals, Starting Fri 7/28/2023, Normal      chlorhexidine (PERIDEX) 0.12 % solution Historical Med      cholecalciferol (VITAMIN D3) 1,000 units tablet Take 2 tablets (2,000 Units total) by mouth daily, Starting Fri 7/28/2023, Normal      folic acid (FOLVITE) 1 mg tablet Take 1 mg by mouth daily., Historical Med      hydrocortisone 2.5 % ointment Apply topically 2 (two) times a day, Starting Tue 11/28/2023, Normal      lisinopril (ZESTRIL) 20 mg tablet Take 1 tablet (20 mg total) by mouth daily, Starting Fri 2/2/2024, Normal      mupirocin (BACTROBAN) 2 % ointment Apply topically 3 (three) times a day Apply to Mohs site during dressing changes, Starting Tue 11/28/2023, Normal      omeprazole (PriLOSEC) 40 MG capsule Take 1 capsule (40 mg total) by mouth daily, Starting Fri 2/2/2024, Normal      predniSONE 10 mg tablet Take 1 tablet (10 mg total) by mouth daily, Starting Mon 4/22/2024, Normal      risedronate (ACTONEL) 35 mg tablet Take 1 tablet (35 mg total) by mouth every 7 days with water on empty stomach, nothing by mouth or  lie down for next 30 minutes., Starting Wed 1/3/2024, Normal             No discharge procedures on file.    PDMP Review       None            ED Provider  Electronically Signed by             Humberto Calderon MD  06/02/24 1779

## 2024-06-03 NOTE — DISCHARGE INSTRUCTIONS
As we discussed, if the redness increases tomorrow morning, please start the antibiotic. You will take 1 tablet every 6 hours for 1 week.    Follow up with Dr. Ruano.    Return to the ER if you develop fevers.

## 2024-06-04 ENCOUNTER — TELEPHONE (OUTPATIENT)
Age: 59
End: 2024-06-04

## 2024-06-04 NOTE — TELEPHONE ENCOUNTER
Contacted Patient regarding recent ED Visit dated 6/2/24.  Patient was seen in [facility] ED 6/2/24.   Patient patient states her swelling has reduced and no  no discoloration redness has subsided.   Patient states she has NOT started ABX (Keflex) that was prescribed as she no longer has symptoms. Patient denies any fever at this time. Advised patient to contact the office with new or worsen symptoms as we have On Call Provider 24 hrs. Provided office hours and phone.   ED-[facility]  CC: Insect Bite  DX; Insect bite of right forearm  Time:  9:42 am  Last OV:2/2024

## 2024-06-13 NOTE — PROGRESS NOTES
Continued Stay SW/CM Assessment/Plan of Care Note       Active Substitute Decision Maker (SDM)       Ellen Jacome-Niece Temporary Decision Maker - Relative   Primary Phone: [phone] (Mobile)                 Progress note:  Discharge order is In . Patient will be discharged  to home today.  Requested  [facility] for transport to residence.  Confirmed ETA 16:50. Will notify Pts Niece of ETA for transport .   Requested Nursing to send wound care supplies with patient upon dc .    See SW/CM flowsheets for other objective data.    Disposition Recommendations:  Preliminary discharge destination:    SW/CM recommendation for discharge: DME, Home care, Home therapy    Destination Pharmacy:        Discharge Plan/Needs:     Continued Care and Services - Admitted Since 6/8/2024       Home Medical Care Coordination complete.      Service Provider Request Status Selected Services Address Phone Fax    [facility]ARE [facility] [address] [place] [address], [facility] [address] [address] -- --    [facility] Pending - No Request Sent N/A [address] [facility] [address] [address] -- --    [facility] Pending - No Request Sent N/A [address] [facility] [address] [phone] --     [facility] P[facility] - No [facility] N/A [address] [facility] [address] [phone] --    [facility] Pending - No Request Sent N/A [address] [facility] [address] [address] -- --                      Devices/ Equipment that need to be arranged for discharge:     Accepted   Pending insurance authorization   Others:    Anticipated date of DME availability:     Prior To Hospitalization:    Living Situation: Family members (Niece ellen) and residing at House    .  Support Systems: Family members   Home Devices/Equipment: Blood glucose monitor, Shower chair, Mobility assist device (stockinette for left AKA)            Mobility  Name: Ricco Nolan      : 1965      MRN: [id number]  Encounter Provider: Jonathan Larkin MD  Encounter Date: 2023   Encounter department: [address] [place] [address]     1. Essential hypertension  Comments:  quinapril not avaliable, switched to lisinopril  Orders:  -     Blood Pressure KIT; Use 2 (two) times a day  -     lisinopril (ZESTRIL) 20 mg tablet; Take 1 tablet (20 mg total) by mouth daily  -     lisinopril (ZESTRIL) 20 mg tablet; Take 1 tablet (20 mg total) by mouth daily    2. Mild protein-calorie malnutrition ([address])  Comments:  continue taking ensure in addition to supplementation with vitamin d and calcium  Orders:  -     CBC and differential; Future  -     calcium carbonate (OS-BUTCH) 600 MG tablet; Take 1 tablet (600 mg total) by mouth 2 (two) times a day with meals  -     cholecalciferol (VITAMIN D3) 1,000 units tablet; Take 2 tablets (2,000 Units total) by mouth daily    3. Anemia, unspecified type  Comments:  noted to have significant anemia during hospitalization in the beginning of July,  f/u w/ CBC  Orders:  -     Comprehensive metabolic panel; Future    4. Underweight due to inadequate caloric intake      BMI Counseling: Body mass index is 17.81 kg/m². The BMI is above normal. Nutrition recommendations include encouraging healthy choices of fruits and vegetables, consuming healthier snacks and increasing intake of lean protein. Exercise recommendations include vigorous physical activity 75 minutes/week and strength training exercises. BMI Counseling: Body mass index is 17.81 kg/m². The BMI is below normal. Patient advised to gain weight. Patient referred to PCP. Rationale for BMI follow-up plan is due to patient being underweight. Subjective      57-year-old female here to follow-up for elevated blood pressure. She is not exercising and is not adherent to low salt diet. Blood pressure is well controlled at home. Cardiac symptoms none. Assist Devices: Cane, Four wheel walker, Wheelchair (stockinette for left AKA)   Type of Service Prior to Hospitalization: Other (comment) ([employer] for prosthetic fittings)               Patient/Family discharge goal (s):  Home, Home Care, Home therapy     Resources provided:           Prior Function     Transfers: Modified Independent (06/11/24 1544 : Mary Bunn, OTR/L)  Ambulation in the Home: Modified  Independent (06/11/24 1544 : Mary Bunn, OTR/L)       Current Function  Last Filed Values         Value Time User    Current Function  slightly below baseline level of function 6/11/2024  5:06 PM Alka Dolan, PT    Therapy Impairments  activity tolerance 6/11/2024  5:06 PM Alka Dolan, PT    ADLs Requiring Support  ambulation 6/11/2024  5:06 PM Alka Dolan, PT            Therapy Recommendations for Discharge:   PT:      Last Filed Values         Value Time User    PT Discharge Needs  therapy 1-3 times per week 6/11/2024  5:06 PM Alka Dolan, PT          OT:       Last Filed Values         Value Time User    OT Discharge Needs  therapy 1-3 times per week 6/11/2024  3:58 PM Mary Bunn, OTR/L          SLP:    Last Filed Values       None            Mobility Equipment Recommended for Discharge: owns all necessary equipment      Barriers to Discharge  Identified Barriers to Discharge/Transition Planning:                     Patient denies chest pain, chest pressure/discomfort, dyspnea, exertional chest pressure/discomfort, irregular heart beat, palpitations, paroxysmal nocturnal dyspnea and syncope. Cardiovascular risk factors: dyslipidemia, hypertension and sedentary lifestyle. Use of agents associated with hypertension: none. History of target organ damage: Recent admission to hospital for ulcerative colitis on chronic steroids and anemia presents with worsening bloody diarrhea for 2 weeks. Patient was admitted for symptomatic anemia with hemoglobin 5.9 and colitis as evidenced by CTAP findings. Patient notes that there is a nationwide shortage of quinapril and has asked for a different medication. She has been started on lisinopril 20 mg daily. Additionally she has a follow-up appointment with cardiology on August 7 to remove the monitor and on August 8 she has a cardiac echo and stress test.      Review of Systems   Constitutional: Negative for chills and fever. HENT: Negative for ear pain and sore throat. Eyes: Negative for pain and visual disturbance. Respiratory: Negative for cough and shortness of breath. Cardiovascular: Negative for chest pain and palpitations. Gastrointestinal: Negative for abdominal pain and vomiting. Genitourinary: Negative for dysuria and hematuria. Musculoskeletal: Negative for arthralgias and back pain. Skin: Negative for color change and rash. Neurological: Negative for seizures and syncope. All other systems reviewed and are negative. Current Outpatient Medications on File Prior to Visit   Medication Sig   • amLODIPine (NORVASC) 5 mg tablet Take 1 tablet (5 mg total) by mouth daily Do not start before July 12, 2023. • balsalazide (COLAZAL) 750 mg capsule Take 3 capsules (2,250 mg total) by mouth 2 (two) times a day   • folic acid (FOLVITE) 1 mg tablet Take 1 mg by mouth daily.    • hydrOXYzine HCL (ATARAX) 10 mg tablet Take 1 tablet (10 mg total) by mouth every 6 (six) hours as needed for itching   • omeprazole (PriLOSEC) 40 MG capsule Take 1 capsule (40 mg total) by mouth daily   • predniSONE 5 mg tablet Take 8 tablets (40 mg total) by mouth daily for 7 days, THEN 7 tablets (35 mg total) daily for 7 days, THEN 6 tablets (30 mg total) daily for 7 days, THEN 5 tablets (25 mg total) daily for 7 days, THEN 4 tablets (20 mg total) daily for 7 days, THEN 3 tablets (15 mg total) daily for 7 days, THEN 3 tablets (15 mg total) daily for 7 days, THEN 2 tablets (10 mg total) daily for 7 days, THEN 1 tablet (5 mg total) daily for 7 days. Do not start before July 12, 2023. • [DISCONTINUED] Blood Pressure Monitor KIT Use 2 (two) times a day   • [DISCONTINUED] quinapril (ACCUPRIL) 20 mg tablet Take 40 mg by mouth daily   • Adalimumab (Humira Pen) 80 MG/0.8ML PNKT Inject 2 pens under the skin on day 1, then inject 1 pen under the skin on day 15. (Patient not taking: Reported on 7/24/2023)   • adalimumab (Humira) 40 MG/0.4ML Inject 40mg under skin every 14 days (Patient not taking: Reported on 7/24/2023)   • [DISCONTINUED] losartan (COZAAR) 50 mg tablet Take 1 tablet (50 mg total) by mouth daily (Patient not taking: Reported on 7/24/2023)       Objective     /86 (BP Location: Left arm, Patient Position: Sitting, Cuff Size: Standard)   Pulse 67   Temp (!) 97.4 °F (36.3 °C) (Temporal)   Resp 16   Ht 5' 5" (1.651 m)   Wt 48.5 kg (107 lb)   SpO2 99%   BMI 17.81 kg/m²     Physical Exam  Constitutional:       Appearance: Normal appearance. She is normal weight. HENT:      Head: Normocephalic and atraumatic. Right Ear: Tympanic membrane, ear canal and external ear normal.      Left Ear: Tympanic membrane, ear canal and external ear normal.      Nose: Nose normal.      Mouth/Throat:      Mouth: Mucous membranes are moist.      Pharynx: Oropharynx is clear. Eyes:      Extraocular Movements: Extraocular movements intact.       Conjunctiva/sclera: Conjunctivae normal.      Pupils: Pupils are equal, round, and reactive to light. Cardiovascular:      Rate and Rhythm: Normal rate and regular rhythm. Pulses: Normal pulses. Heart sounds: Normal heart sounds. Pulmonary:      Effort: Pulmonary effort is normal.      Breath sounds: Normal breath sounds. Abdominal:      General: Abdomen is flat. Bowel sounds are normal.   Musculoskeletal:         General: Normal range of motion. Cervical back: Normal range of motion. Skin:     General: Skin is warm. Capillary Refill: Capillary refill takes less than 2 seconds. Neurological:      General: No focal deficit present. Mental Status: She is alert and oriented to person, place, and time. Mental status is at baseline. Psychiatric:         Mood and Affect: Mood normal.         Behavior: Behavior normal.         Thought Content:  Thought content normal.         Judgment: Judgment normal.       Leonardo Leong MD

## 2024-09-02 DIAGNOSIS — K51.30 ULCERATIVE RECTOSIGMOIDITIS WITHOUT COMPLICATION (HCC): ICD-10-CM

## 2024-09-03 RX ORDER — PREDNISONE 10 MG/1
10 TABLET ORAL DAILY
Qty: 90 TABLET | Refills: 1 | Status: SHIPPED | OUTPATIENT
Start: 2024-09-03

## 2024-10-17 DIAGNOSIS — I10 ESSENTIAL HYPERTENSION: ICD-10-CM

## 2024-10-17 DIAGNOSIS — K51.00 ULCERATIVE (CHRONIC) ENTEROCOLITIS, WITHOUT COMPLICATIONS (HCC): ICD-10-CM

## 2024-10-18 RX ORDER — OMEPRAZOLE 40 MG/1
40 CAPSULE, DELAYED RELEASE ORAL DAILY
Qty: 90 CAPSULE | Refills: 3 | Status: SHIPPED | OUTPATIENT
Start: 2024-10-18

## 2024-10-18 RX ORDER — LISINOPRIL 20 MG/1
20 TABLET ORAL DAILY
Qty: 90 TABLET | Refills: 3 | Status: SHIPPED | OUTPATIENT
Start: 2024-10-18

## 2024-10-18 RX ORDER — AMLODIPINE BESYLATE 5 MG/1
5 TABLET ORAL DAILY
Qty: 90 TABLET | Refills: 3 | Status: SHIPPED | OUTPATIENT
Start: 2024-10-18

## 2024-10-19 ENCOUNTER — APPOINTMENT (EMERGENCY)
Dept: RADIOLOGY | Facility: HOSPITAL | Age: 59
End: 2024-10-19
Payer: COMMERCIAL

## 2024-10-19 ENCOUNTER — HOSPITAL ENCOUNTER (EMERGENCY)
Facility: HOSPITAL | Age: 59
Discharge: HOME | End: 2024-10-19
Attending: EMERGENCY MEDICINE | Admitting: EMERGENCY MEDICINE
Payer: COMMERCIAL

## 2024-10-19 VITALS
OXYGEN SATURATION: 98 % | SYSTOLIC BLOOD PRESSURE: 153 MMHG | HEART RATE: 64 BPM | RESPIRATION RATE: 18 BRPM | BODY MASS INDEX: 21.77 KG/M2 | DIASTOLIC BLOOD PRESSURE: 83 MMHG | WEIGHT: 126.8 LBS | TEMPERATURE: 97.7 F

## 2024-10-19 DIAGNOSIS — S69.91XA HAND INJURY, RIGHT, INITIAL ENCOUNTER: Primary | ICD-10-CM

## 2024-10-19 PROCEDURE — 73130 X-RAY EXAM OF HAND: CPT

## 2024-10-19 PROCEDURE — 99283 EMERGENCY DEPT VISIT LOW MDM: CPT

## 2024-10-19 PROCEDURE — 99284 EMERGENCY DEPT VISIT MOD MDM: CPT | Performed by: PHYSICIAN ASSISTANT

## 2024-10-19 NOTE — ED PROVIDER NOTES
Time reflects when diagnosis was documented in both MDM as applicable and the Disposition within this note       Time User Action Codes Description Comment    10/19/2024  4:09 PM CharlesJim smith Add [S69.91XA] Hand injury, right, initial encounter           ED Disposition       ED Disposition   Discharge    Condition   Stable    Date/Time   Sat Oct 19, 2024  4:09 PM    Comment   Rubia SANDY Christine discharge to home/self care.                   Assessment & Plan       Medical Decision Making  Differential diagnosis includes hand sprain, fx  I ordered an xray of R hand.   I independently interpreted as no acute osseous abnormality  Aliuminum splint was placed to R 3rd finger.   Pt was adivsed to follow up with [facility] this week  Return precautions reviewed.     Amount and/or Complexity of Data Reviewed  Radiology: ordered.             Medications - No data to display    ED Risk Strat Scores                           SBIRT 22yo+      Flowsheet Row Most Recent Value   Initial Alcohol Screen: US AUDIT-C     1. How often do you have a drink containing alcohol? 0 Filed at: 10/19/2024 1447   Audit-C Score 0 Filed at: 10/19/2024 1447   VANNA: How many times in the past year have you...    Used an illegal drug or used a prescription medication for non-medical reasons? Never Filed at: 10/19/2024 1447                            History of Present Illness       Chief Complaint   Patient presents with    Hand Injury     States last week she was lifting totes and felt a pop right hand and has had swelling 3 rd MCP region since.        Past Medical History:   Diagnosis Date    Anxiety     Aortic aneurysm (HCC)     Arthritis     Atypical chest pain     last assessed: 12/6/13    Basal cell carcinoma 08/23/2023    right temple, mohs    Basal cell carcinoma 08/23/2023    mid back, mohs    Bloody diarrhea     last assesed: 58/23/16    Breast lump     last assessed: 10/10/14    Candidiasis of esophagus (HCC)     last  assessed: 16    Candidiasis of mouth     last assessed: 14    Carpal tunnel syndrome     Choledocholithiasis     last assessed: 14    Cholelithiasis     last assessed: 7/21/15    Closed fracture of first metatarsal bone of right foot     last assessed: 13    Costovertebral angle pain     last assessed: 17    Elevated blood pressure reading     last assessed: 16    GERD (gastroesophageal reflux disease)     Grief reaction     last assessed: 16    Herpes zoster     last assessed: 11/3/16    High risk medication use     last assessed: 16    Hypertension     Hypokalemia     last assessed: 16    Hypotension     last assessed: 5/15/15    Inflammatory disorder of breast     last assessed: 13    Iron deficiency anemia     last assessed: 13    Microscopic hematuria     last assessed: 13    On prednisone therapy     last assessed: 10/19/17    Opioid use, unspecified, uncomplicated     last assessed: 3/1/17    Other polyp of sinus     last assessed: 13    Paronychia of toe     last assessed: 13    Pharyngoesophageal dysphagia     resolved: 3/1/16    Skin cancer     Tinea corporis     last assessed: 16    Ulcerative colitis (HCC)     Ulcerative colitis (HCC)     Uncomplicated opioid dependence (HCC)     Underweight     last assessed: 3/1/16    Urinary frequency     resolved: 16    Viral warts       Past Surgical History:   Procedure Laterality Date    BREAST SURGERY      left breast lumpectomy    CARPAL TUNNEL RELEASE Bilateral      SECTION N/A     CHOLECYSTECTOMY      COLONOSCOPY  2019    EGD  2019    ESOPHAGOGASTRODUODENOSCOPY N/A 2016    Procedure: ESOPHAGOGASTRODUODENOSCOPY (EGD);  Surgeon: Shant Ivy MD;  Location: [facility];  Service:     MOHS SURGERY Right 10/09/2023    [facility] right temple-Dr Corbin    MOHS SURGERY  2023    [facility] back-Dr Harkins    SKIN CANCER EXCISION      TONSILLECTOMY N/A     UPPER  GASTROINTESTINAL ENDOSCOPY        Family History   Problem Relation Age of Onset    Heart disease Mother         Passed away 2016    Stroke Mother         Passed away 2016    Heart disease Father         Passed away 1989    Colon cancer Maternal Grandfather         Passed away 1976    Asthma Son     Autism Son     Heart disease Brother         CAD    Cancer Maternal Grandmother         colon    Heart disease Brother     COPD Brother         smoker    Heart disease Brother         Stents    No Known Problems Half-Sister       Social History     Tobacco Use    Smoking status: Never    Smokeless tobacco: Never   Vaping Use    Vaping status: Never Used   Substance Use Topics    Alcohol use: No    Drug use: No      E-Cigarette/Vaping    E-Cigarette Use Never User       E-Cigarette/Vaping Substances    Nicotine No     THC No     CBD No     Flavoring No     Other No     Unknown No       I have reviewed and agree with the history as documented.     Patient is a 59-year-old white female with history of ulcerative colitis on prednisone, aortic aneurysm, anxiety who reports injuring right hand 1 week ago lifting a tote containing approximately 10 pounds of clothes.  States she felt a pop and has noted localized swelling near the third MCP joint since.  Patient is right-hand dominant.  She reports no numbness or tingling.  She reports no weakness of the fingers.  No other complaints.        Review of Systems   Musculoskeletal:  Positive for arthralgias and joint swelling.   Neurological:  Negative for numbness.           Objective       ED Triage Vitals [10/19/24 1445]   Temperature Pulse Blood Pressure Respirations SpO2 Patient Position - Orthostatic VS   97.7 °F (36.5 °C) 64 (!) 171/89 18 98 % Sitting      Temp Source Heart Rate Source BP Location FiO2 (%) Pain Score    Tympanic Monitor Left arm -- 6      Vitals      Date and Time Temp Pulse SpO2 Resp BP Pain Score FACES Pain Rating User   10/19/24 1600 -- -- -- -- 153/83 --  -- SF   10/19/24 1445 97.7 °F (36.5 °C) 64 98 % 18 171/89 6 -- SW            Physical Exam  Vitals and nursing note reviewed.   Constitutional:       General: She is not in acute distress.     Appearance: Normal appearance. She is not ill-appearing, toxic-appearing or diaphoretic.   Cardiovascular:      Rate and Rhythm: Normal rate and regular rhythm.      Pulses: Normal pulses.      Heart sounds: Normal heart sounds.   Pulmonary:      Effort: Pulmonary effort is normal.      Breath sounds: Normal breath sounds.   Musculoskeletal:      Comments: Right hand: Swelling at the right third MCP joint with mild tenderness.  No erythema.  Remainder of the right hand exam is nontender and  neurovascular intact.   Skin:     General: Skin is warm and dry.      Capillary Refill: Capillary refill takes less than 2 seconds.   Neurological:      Mental Status: She is alert.         Results Reviewed       None            XR hand 3+ views RIGHT    (Results Pending)       Procedures    ED Medication and Procedure Management   Prior to Admission Medications   Prescriptions Last Dose Informant Patient Reported? Taking?   Adalimumab (Humira Pen) 80 MG/0.8ML PNKT  Self No No   Sig: Inject 2 pens under the skin on day 1, then inject 1 pen under the skin on day 15.   Patient not taking: Reported on 4/18/2024   adalimumab (Humira) 40 MG/0.4ML  Self No No   Sig: Inject 40mg under skin every 14 days   Patient not taking: Reported on 4/18/2024   amLODIPine (NORVASC) 5 mg tablet   No No   Sig: TAKE 1 TABLET BY MOUTH DAILY   balsalazide (COLAZAL) 750 mg capsule  Self No No   Sig: Take 3 capsules (2,250 mg total) by mouth 3 (three) times a day   calcium carbonate (OS-BUTCH) 600 MG tablet  Self No No   Sig: Take 1 tablet (600 mg total) by mouth 2 (two) times a day with meals   Patient taking differently: Take 600 mg by mouth 3 (three) times a day with meals   chlorhexidine (PERIDEX) 0.12 % solution   Yes No   cholecalciferol (VITAMIN D3) 1,000 units  tablet  Self No No   Sig: Take 2 tablets (2,000 Units total) by mouth daily   Patient not taking: Reported on 2/26/2024   folic acid (FOLVITE) 1 mg tablet  Self Yes No   Sig: Take 1 mg by mouth daily.   hydrocortisone 2.5 % ointment  Self No No   Sig: Apply topically 2 (two) times a day   lisinopril (ZESTRIL) 20 mg tablet   No No   Sig: TAKE 1 TABLET BY MOUTH DAILY   mupirocin (BACTROBAN) 2 % ointment  Self No No   Sig: Apply topically 3 (three) times a day Apply to Mohs site during dressing changes   omeprazole (PriLOSEC) 40 MG capsule   No No   Sig: TAKE 1 CAPSULE BY MOUTH DAILY   predniSONE 10 mg tablet   No No   Sig: TAKE 1 TABLET BY MOUTH DAILY   risedronate (ACTONEL) 35 mg tablet  Self No No   Sig: Take 1 tablet (35 mg total) by mouth every 7 days with water on empty stomach, nothing by mouth or lie down for next 30 minutes.   Patient not taking: Reported on 2/2/2024      Facility-Administered Medications: None     Discharge Medication List as of 10/19/2024  4:10 PM        CONTINUE these medications which have NOT CHANGED    Details   Adalimumab (Humira Pen) 80 MG/0.8ML PNKT Inject 2 pens under the skin on day 1, then inject 1 pen under the skin on day 15., Normal      adalimumab (Humira) 40 MG/0.4ML Inject 40mg under skin every 14 days, Normal      amLODIPine (NORVASC) 5 mg tablet TAKE 1 TABLET BY MOUTH DAILY, Starting Fri 10/18/2024, Normal      balsalazide (COLAZAL) 750 mg capsule Take 3 capsules (2,250 mg total) by mouth 3 (three) times a day, Starting Thu 2/8/2024, Normal      calcium carbonate (OS-BUTCH) 600 MG tablet Take 1 tablet (600 mg total) by mouth 2 (two) times a day with meals, Starting Fri 7/28/2023, Normal      chlorhexidine (PERIDEX) 0.12 % solution Historical Med      cholecalciferol (VITAMIN D3) 1,000 units tablet Take 2 tablets (2,000 Units total) by mouth daily, Starting Fri 7/28/2023, Normal      folic acid (FOLVITE) 1 mg tablet Take 1 mg by mouth daily., Historical Med      hydrocortisone  2.5 % ointment Apply topically 2 (two) times a day, Starting Tue 11/28/2023, Normal      lisinopril (ZESTRIL) 20 mg tablet TAKE 1 TABLET BY MOUTH DAILY, Starting Fri 10/18/2024, Normal      mupirocin (BACTROBAN) 2 % ointment Apply topically 3 (three) times a day Apply to Mohs site during dressing changes, Starting Tue 11/28/2023, Normal      omeprazole (PriLOSEC) 40 MG capsule TAKE 1 CAPSULE BY MOUTH DAILY, Starting Fri 10/18/2024, Normal      predniSONE 10 mg tablet TAKE 1 TABLET BY MOUTH DAILY, Starting Tue 9/3/2024, Normal      risedronate (ACTONEL) 35 mg tablet Take 1 tablet (35 mg total) by mouth every 7 days with water on empty stomach, nothing by mouth or lie down for next 30 minutes., Starting Wed 1/3/2024, Normal           No discharge procedures on file.  ED SEPSIS DOCUMENTATION   Time reflects when diagnosis was documented in both MDM as applicable and the Disposition within this note       Time User Action Codes Description Comment    10/19/2024  4:09 PM Jim Devine Add [S69.91XA] Hand injury, right, initial encounter                  Jim Devine PA-C  10/19/24 1702

## 2024-10-19 NOTE — DISCHARGE INSTRUCTIONS
Follow up with [facility] this week. Call Monday for appointment    Return to ED for increased pain, worsening symptoms     Tylenol for pain if needed

## 2024-10-22 ENCOUNTER — TELEPHONE (OUTPATIENT)
Age: 59
End: 2024-10-22

## 2024-10-22 NOTE — TELEPHONE ENCOUNTER
Attempted Contacting Patient regarding recent ED Visit on 10/19/24.    Left message asking Patient to call the office to schedule Follow up appointment. Provided office hours and phone number.     ED:Tl  CC: Hand Injury  DX: Hand injury  Time: 2:39pm   Last OV: 2/2/24

## 2024-11-08 ENCOUNTER — OFFICE VISIT (OUTPATIENT)
Age: 59
End: 2024-11-08

## 2024-11-08 VITALS
HEART RATE: 67 BPM | BODY MASS INDEX: 20.86 KG/M2 | RESPIRATION RATE: 19 BRPM | WEIGHT: 125.2 LBS | OXYGEN SATURATION: 98 % | TEMPERATURE: 98 F | SYSTOLIC BLOOD PRESSURE: 130 MMHG | DIASTOLIC BLOOD PRESSURE: 86 MMHG | HEIGHT: 65 IN

## 2024-11-08 DIAGNOSIS — H26.9 CATARACT OF BOTH EYES, UNSPECIFIED CATARACT TYPE: ICD-10-CM

## 2024-11-08 DIAGNOSIS — K51.30 ULCERATIVE RECTOSIGMOIDITIS WITHOUT COMPLICATION (HCC): Primary | ICD-10-CM

## 2024-11-08 DIAGNOSIS — I10 ESSENTIAL HYPERTENSION: ICD-10-CM

## 2024-11-08 DIAGNOSIS — E55.9 VITAMIN D DEFICIENCY: ICD-10-CM

## 2024-11-08 DIAGNOSIS — T50.905A DRUG-INDUCED OSTEOPOROSIS: ICD-10-CM

## 2024-11-08 DIAGNOSIS — M79.641 RIGHT HAND PAIN: ICD-10-CM

## 2024-11-08 DIAGNOSIS — E78.2 MIXED HYPERLIPIDEMIA: ICD-10-CM

## 2024-11-08 DIAGNOSIS — I72.9 ANEURYSM (HCC): ICD-10-CM

## 2024-11-08 DIAGNOSIS — Z23 ENCOUNTER FOR IMMUNIZATION: ICD-10-CM

## 2024-11-08 DIAGNOSIS — M81.8 DRUG-INDUCED OSTEOPOROSIS: ICD-10-CM

## 2024-11-08 DIAGNOSIS — I10 PRIMARY HYPERTENSION: ICD-10-CM

## 2024-11-08 PROCEDURE — 99204 OFFICE O/P NEW MOD 45 MIN: CPT | Performed by: FAMILY MEDICINE

## 2024-11-08 PROCEDURE — G0008 ADMIN INFLUENZA VIRUS VAC: HCPCS | Performed by: FAMILY MEDICINE

## 2024-11-08 PROCEDURE — 90673 RIV3 VACCINE NO PRESERV IM: CPT | Performed by: FAMILY MEDICINE

## 2024-11-08 RX ORDER — PREDNISONE 5 MG/1
TABLET ORAL
Qty: 45 TABLET | Refills: 3 | Status: SHIPPED | OUTPATIENT
Start: 2024-11-08

## 2024-11-08 NOTE — PROGRESS NOTES
Ambulatory Visit  Name: Rubia Christine      : 1965      MRN: [id number]  Encounter Provider: Zeus Ruano DO  Encounter Date: 2024   Encounter department: [facility]    Assessment & Plan  Ulcerative rectosigmoiditis without complication (HCC)  Patient will continue on prednisone, Colazal, and Humira to treat her ulcerative colitis I will decrease her dose of prednisone from 10 mg to 7.5.mg over the course of the next 3 months..        Cataract of both eyes, unspecified cataract type    Orders:    Ambulatory Referral to Ophthalmology; Future  Patient referred to Dr. Boss for cataract evaluation.  Right hand pain  Patient given referral to orthopedic surgeon for further evaluation of right hand pain  Orders:    Ambulatory Referral to Orthopedic Surgery; Future    Primary hypertension  Blood pressure is stable patient will continue her present meds.       Mixed hyperlipidemia  Patient will continue on her statin.  Lipid panel ordered.  Orders:    Comprehensive metabolic panel; Future    Lipid panel; Future    Comprehensive metabolic panel    Lipid panel    Vitamin D deficiency  Vitamin D level ordered.  Vitamin D supplements will depend on her blood level.  Orders:    Vitamin D 25 hydroxy    Encounter for immunization  Flu vaccine given.  Orders:    influenza vaccine, recombinant, PF, 0.5 mL IM (Flublok)    Essential hypertension  Continue present BP meds.       Aneurysm (HCC)  CT of the patient's thoracic chest will be done in February 1 year after her last CT.  Her aneurysm was 4.1 cm in 2024.  Orders:    CT chest wo contrast; Future    Drug-induced osteoporosis  Osteoporosis function of long-term use of prednisone.  Prednisone dose will be decreased.          History of Present Illness     This is a well physical exam for this 59-year-old female with past medical history of ulcerative colitis, hiatal hernia, GERD, hypertension, hyperlipidemia,  "osteoporosis, and recent hand injury seen in ER.  The patient also has vitamin D deficiency she complains of pain in her right hand after lifting a tote boxes.  She was seen in the ER and x-rays were negative.  She still has swelling of the dorsal aspect of her right hand.  The patient also complains of increased blurred vision.  She does have a history of bilateral cataracts.          Review of Systems   Constitutional: Negative.    Respiratory: Negative.     Cardiovascular: Negative.    Endocrine: Negative.    Genitourinary: Negative.    Neurological: Negative.    Psychiatric/Behavioral: Negative.             Objective     /86   Pulse 67   Temp 98 °F (36.7 °C) (Axillary)   Resp 19   Ht 5' 5\" (1.651 m)   Wt 56.8 kg (125 lb 3.2 oz)   SpO2 98%   BMI 20.83 kg/m²     Physical Exam  Constitutional:       Appearance: Normal appearance.   HENT:      Right Ear: Tympanic membrane, ear canal and external ear normal.      Left Ear: Tympanic membrane, ear canal and external ear normal.      Nose: Nose normal.      Mouth/Throat:      Mouth: Mucous membranes are moist.   Cardiovascular:      Rate and Rhythm: Normal rate and regular rhythm.   Pulmonary:      Effort: Pulmonary effort is normal.      Breath sounds: Normal breath sounds.   Abdominal:      General: Abdomen is flat. Bowel sounds are normal.      Palpations: Abdomen is soft.   Musculoskeletal:      Cervical back: Normal range of motion.   Skin:     General: Skin is warm.   Neurological:      General: No focal deficit present.      Mental Status: She is alert. Mental status is at baseline.   Psychiatric:         Mood and Affect: Mood normal.         Behavior: Behavior normal.         Thought Content: Thought content normal.         Judgment: Judgment normal.         "

## 2024-11-08 NOTE — ASSESSMENT & PLAN NOTE
Patient will continue on prednisone, Colazal, and Humira to treat her ulcerative colitis I will decrease her dose of prednisone from 10 mg to 7.5.mg over the course of the next 3 months..

## 2024-11-08 NOTE — ASSESSMENT & PLAN NOTE
Vitamin D level ordered.  Vitamin D supplements will depend on her blood level.  Orders:    Vitamin D 25 hydroxy

## 2024-11-11 ENCOUNTER — TRANSCRIBE ORDERS (OUTPATIENT)
Dept: ADMINISTRATIVE | Facility: HOSPITAL | Age: 59
End: 2024-11-11

## 2024-11-11 ENCOUNTER — APPOINTMENT (OUTPATIENT)
Dept: LAB | Facility: HOSPITAL | Age: 59
End: 2024-11-11
Attending: FAMILY MEDICINE
Payer: COMMERCIAL

## 2024-11-11 DIAGNOSIS — E78.2 MIXED HYPERLIPIDEMIA: Primary | ICD-10-CM

## 2024-11-11 DIAGNOSIS — E78.5 HYPERLIPIDEMIA, UNSPECIFIED HYPERLIPIDEMIA TYPE: ICD-10-CM

## 2024-11-11 DIAGNOSIS — E55.9 VITAMIN D DEFICIENCY, UNSPECIFIED: ICD-10-CM

## 2024-11-11 LAB
25(OH)D3 SERPL-MCNC: 23.4 NG/ML (ref 30–100)
ALBUMIN SERPL BCG-MCNC: 3.8 G/DL (ref 3.5–5)
ALP SERPL-CCNC: 79 U/L (ref 34–104)
ALT SERPL W P-5'-P-CCNC: 17 U/L (ref 7–52)
ANION GAP SERPL CALCULATED.3IONS-SCNC: 5 MMOL/L (ref 4–13)
AST SERPL W P-5'-P-CCNC: 27 U/L (ref 13–39)
BILIRUB SERPL-MCNC: 0.65 MG/DL (ref 0.2–1)
BUN SERPL-MCNC: 20 MG/DL (ref 5–25)
CALCIUM SERPL-MCNC: 9.2 MG/DL (ref 8.4–10.2)
CHLORIDE SERPL-SCNC: 106 MMOL/L (ref 96–108)
CHOLEST SERPL-MCNC: 225 MG/DL
CO2 SERPL-SCNC: 31 MMOL/L (ref 21–32)
CREAT SERPL-MCNC: 1.09 MG/DL (ref 0.6–1.3)
GFR SERPL CREATININE-BSD FRML MDRD: 54 ML/MIN/1.73SQ M
GLUCOSE P FAST SERPL-MCNC: 75 MG/DL (ref 65–99)
HDLC SERPL-MCNC: 69 MG/DL
LDLC SERPL CALC-MCNC: 125 MG/DL (ref 0–100)
NONHDLC SERPL-MCNC: 156 MG/DL
POTASSIUM SERPL-SCNC: 3.6 MMOL/L (ref 3.5–5.3)
PROT SERPL-MCNC: 6.7 G/DL (ref 6.4–8.4)
SODIUM SERPL-SCNC: 142 MMOL/L (ref 135–147)
TRIGL SERPL-MCNC: 153 MG/DL

## 2024-11-11 PROCEDURE — 82306 VITAMIN D 25 HYDROXY: CPT | Performed by: FAMILY MEDICINE

## 2024-11-11 PROCEDURE — 36415 COLL VENOUS BLD VENIPUNCTURE: CPT | Performed by: FAMILY MEDICINE

## 2024-11-11 PROCEDURE — 80061 LIPID PANEL: CPT | Performed by: FAMILY MEDICINE

## 2024-11-11 PROCEDURE — 80053 COMPREHEN METABOLIC PANEL: CPT | Performed by: FAMILY MEDICINE

## 2024-11-17 ENCOUNTER — VBI (OUTPATIENT)
Dept: ADMINISTRATIVE | Facility: OTHER | Age: 59
End: 2024-11-17

## 2024-11-17 NOTE — TELEPHONE ENCOUNTER
11/17/24 2:33 PM     Chart reviewed for Blood Pressure was/were submitted to the patient's insurance.     Ginger Serrano MA   [facility]

## 2024-11-22 ENCOUNTER — OFFICE VISIT (OUTPATIENT)
Dept: OCCUPATIONAL THERAPY | Facility: CLINIC | Age: 59
End: 2024-11-22
Payer: COMMERCIAL

## 2024-11-22 ENCOUNTER — OFFICE VISIT (OUTPATIENT)
Dept: OBGYN CLINIC | Facility: CLINIC | Age: 59
End: 2024-11-22
Payer: COMMERCIAL

## 2024-11-22 VITALS
DIASTOLIC BLOOD PRESSURE: 80 MMHG | HEART RATE: 59 BPM | WEIGHT: 129.2 LBS | HEIGHT: 65 IN | BODY MASS INDEX: 21.52 KG/M2 | SYSTOLIC BLOOD PRESSURE: 128 MMHG

## 2024-11-22 DIAGNOSIS — S63.659D SPRAIN, MCP, HAND, LEFT, SUBSEQUENT ENCOUNTER: Primary | ICD-10-CM

## 2024-11-22 DIAGNOSIS — S63.659A SAGITTAL BAND RUPTURE AT METACARPOPHALANGEAL JOINT, INITIAL ENCOUNTER: Primary | ICD-10-CM

## 2024-11-22 DIAGNOSIS — M79.641 RIGHT HAND PAIN: ICD-10-CM

## 2024-11-22 PROCEDURE — 99204 OFFICE O/P NEW MOD 45 MIN: CPT | Performed by: STUDENT IN AN ORGANIZED HEALTH CARE EDUCATION/TRAINING PROGRAM

## 2024-11-22 PROCEDURE — 97165 OT EVAL LOW COMPLEX 30 MIN: CPT

## 2024-11-22 NOTE — PROGRESS NOTES
Orthosis    Diagnosis:   1. Sprain, MCP, hand, left, subsequent encounter          Indication: Motion Blocking    Location: Left  long finger  Supplies: Custom Fit Orthotic  Orthosis type:  yoke  Wearing Schedule: Remove for hygiene only  Describe Position: long finger extension relative to other digits    Precautions: Universal (skin contact/breakdown)    Patient or Caregiver expresses understanding of wearing Schedule and Precautions? Yes  Patient or Caregiver able to don/doff orthotic independently?Yes    Written orders provided to patient? No  Orders Obtained: Written  Orders Obtained from: Dr. Dunham    Return for evaluation and treatment No

## 2024-11-22 NOTE — PROGRESS NOTES
ORTHOPAEDIC HAND, WRIST, AND ELBOW OFFICE  VISIT     ASSESSMENT/PLAN:    Rubia Christine is a 59 y.o. 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  - Follow up in 6 weeks for repeat clinical evaluation        The patient verbalized understanding of exam findings and treatment plan. We engaged in the shared decision-making process and treatment options were discussed at length with the patient. Surgical and conservative management discussed today along with risks and benefits.    Follow Up:  6 weeks    ____________________________________________________________________________________________________________________________________________      CHIEF COMPLAINT:  Right hand pain    SUBJECTIVE:  Rubia Christine is a 59 y.o. female who is RHD and presents with right hand pain. She reports about 1.5 weeks ago she was lifting a tote with clothes about 10 lbs. When lifting the tote she felt a pop in her hand. She reports a consistent pain over the 3rd MCP. She feels like the finger wants to slip on occasion. She went to the ED on 11/19/2024 and was placed in an Alumafoam splint.    I have personally reviewed all the relevant PMH, PSH, SH, FH, Medications and allergies      PAST MEDICAL HISTORY:  Past Medical History:   Diagnosis Date    Anxiety     Aortic aneurysm (HCC)     Arthritis     Atypical chest pain     last assessed: 12/6/13    Basal cell carcinoma 08/23/2023    right temple, mohs    Basal cell carcinoma 08/23/2023    mid back, mohs    Bloody diarrhea     last assesed: 58/23/16    Breast lump     last assessed: 10/10/14    Candidiasis of esophagus (HCC)     last assessed: 2/24/16    Candidiasis of mouth     last assessed: 8/7/14    Carpal tunnel syndrome     Choledocholithiasis     last assessed: 6/5/14     Cholelithiasis     last assessed: 7/21/15    Closed fracture of first metatarsal bone of right foot     last assessed: 13    Costovertebral angle pain     last assessed: 17    Elevated blood pressure reading     last assessed: 16    GERD (gastroesophageal reflux disease)     Grief reaction     last assessed: 16    Herpes zoster     last assessed: 11/3/16    High risk medication use     last assessed: 16    Hypertension     Hypokalemia     last assessed: 16    Hypotension     last assessed: 5/15/15    Inflammatory disorder of breast     last assessed: 13    Iron deficiency anemia     last assessed: 13    Microscopic hematuria     last assessed: 13    On prednisone therapy     last assessed: 10/19/17    Opioid use, unspecified, uncomplicated     last assessed: 3/1/17    Other polyp of sinus     last assessed: 13    Paronychia of toe     last assessed: 13    Pharyngoesophageal dysphagia     resolved: 3/1/16    Skin cancer     Tinea corporis     last assessed: 16    Ulcerative colitis (HCC)     Ulcerative colitis (HCC)     Uncomplicated opioid dependence (HCC) 2017    Underweight     last assessed: 3/1/16    Urinary frequency     resolved: 16    Viral warts        PAST SURGICAL HISTORY:  Past Surgical History:   Procedure Laterality Date    BREAST SURGERY      left breast lumpectomy    CARPAL TUNNEL RELEASE Bilateral      SECTION N/A     CHOLECYSTECTOMY      COLONOSCOPY  2019    EGD  2019    ESOPHAGOGASTRODUODENOSCOPY N/A 2016    Procedure: ESOPHAGOGASTRODUODENOSCOPY (EGD);  Surgeon: Shant Ivy MD;  Location: [facility] GI LAB;  Service:     [facility]    MOHS SURGERY  2023    [facility]Dr Harkins    SKIN CANCER EXCISION      TONSILLECTOMY N/A     UPPER GASTROINTESTINAL ENDOSCOPY         FAMILY HISTORY:  Family History   Problem Relation Age of Onset    Heart disease  Mother         Passed away 2016    Stroke Mother         Passed away 2016    Heart disease Father         Passed away 1989    Colon cancer Maternal Grandfather         Passed away 1976    Asthma Son     Autism Son     Heart disease Brother         CAD    Cancer Maternal Grandmother         colon    Heart disease Brother     COPD Brother         smoker    Heart disease Brother         Stents    No Known Problems Half-Sister        SOCIAL HISTORY:  Social History     Tobacco Use    Smoking status: Never    Smokeless tobacco: Never   Vaping Use    Vaping status: Never Used   Substance Use Topics    Alcohol use: No    Drug use: No       MEDICATIONS:    Current Outpatient Medications:     Adalimumab (Humira Pen) 80 MG/0.8ML PNKT, Inject 2 pens under the skin on day 1, then inject 1 pen under the skin on day 15. (Patient not taking: Reported on 4/18/2024), Disp: 3 each, Rfl: 0    adalimumab (Humira) 40 MG/0.4ML, Inject 40mg under skin every 14 days (Patient not taking: Reported on 4/18/2024), Disp: 2 each, Rfl: 11    amLODIPine (NORVASC) 5 mg tablet, TAKE 1 TABLET BY MOUTH DAILY, Disp: 90 tablet, Rfl: 3    balsalazide (COLAZAL) 750 mg capsule, Take 3 capsules (2,250 mg total) by mouth 3 (three) times a day, Disp: 810 capsule, Rfl: 3    calcium carbonate (OS-BUTCH) 600 MG tablet, Take 1 tablet (600 mg total) by mouth 2 (two) times a day with meals (Patient not taking: Reported on 11/8/2024), Disp: 30 tablet, Rfl: 2    chlorhexidine (PERIDEX) 0.12 % solution, , Disp: , Rfl:     cholecalciferol (VITAMIN D3) 1,000 units tablet, Take 2 tablets (2,000 Units total) by mouth daily (Patient not taking: Reported on 2/26/2024), Disp: 60 tablet, Rfl: 2    folic acid (FOLVITE) 1 mg tablet, Take 1 mg by mouth daily., Disp: , Rfl:     hydrocortisone 2.5 % ointment, Apply topically 2 (two) times a day, Disp: 20 g, Rfl: 0    lisinopril (ZESTRIL) 20 mg tablet, TAKE 1 TABLET BY MOUTH DAILY, Disp: 90 tablet, Rfl: 3    mupirocin (BACTROBAN) 2 %  "ointment, Apply topically 3 (three) times a day Apply to Mohs site during dressing changes, Disp: 30 g, Rfl: 0    omeprazole (PriLOSEC) 40 MG capsule, TAKE 1 CAPSULE BY MOUTH DAILY, Disp: 90 capsule, Rfl: 3    predniSONE 5 mg tablet, 1 1/2 tabs daily.  Dose decreased from 10 mg daily, Disp: 45 tablet, Rfl: 3  No current facility-administered medications for this visit.    Facility-Administered Medications Ordered in Other Visits:     lidocaine (XYLOCAINE) 1 % 25 mL, sodium bicarbonate 5 mEq, EPINEPHrine PF (ADRENALIN) 0.5 mg in sodium chloride 0.9 % 500 mL OR irrigation, , Irrigation, Once, Radha Harkins MD    ALLERGIES:  Allergies   Allergen Reactions    Dairy Aid [Tilactase] GI Intolerance    Levaquin [Levofloxacin In D5w] Other (See Comments)     Redness at injection site    Mercaptopurine      Causes weakness in legs    Mesalamine GI Intolerance    Other Other (See Comments)     6 mp. Causes weakness in legs.           REVIEW OF SYSTEMS:  Pertinent items are noted in HPI.  A comprehensive review of systems was negative.    VITALS:  There were no vitals filed for this visit.    LABS:  HgA1c: No results found for: \"HGBA1C\"  BMP:   Lab Results   Component Value Date    GLUCOSE 107 01/04/2016    CALCIUM 9.2 11/11/2024     01/04/2016    K 3.6 11/11/2024    CO2 31 11/11/2024     11/11/2024    BUN 20 11/11/2024    CREATININE 1.09 11/11/2024       _____________________________________________________  PHYSICAL EXAMINATION:  General: well developed and well nourished, alert, oriented times 3, and appears comfortable  Psychiatric: Normal  HEENT: Normocephalic, Atraumatic Trachea Midline, No torticollis  Pulmonary: No audible wheezing or respiratory distress   Abdomen/GI: Non tender, non distended   Cardiovascular: No pitting edema, 2+ radial pulse   Skin: No masses, erythema, lacerations, fluctation, ulcerations  Neurovascular: Sensation Intact to the Median, Ulnar, Radial Nerve, Motor Intact to the Median, " "Ulnar, Radial Nerve, and Pulses Intact  Musculoskeletal: Normal, except as noted in detailed exam and in HPI.        FOCUSED MUSCULOSKELETAL EXAMINATION:  Right Upper Extremity  Inspection: skin intact, no notable deformity   Palpation: TTP over 3rd MCP  Neurologic: 5/5 elbow flexion, 5/5 elbow extension, 5/5 wrist extension, 5/5 wrist flexion, 5/5 finger flexion, 5/5 finger extension, 5/5 FPL, 5/5 EPL, 5/5 APB, 5/5 intrinsics, sensation intact to median, radial, and ulnar nerve distributions  Vascular: Palpable radial pulse, brisk cap refill <2sec, hand warm and well perfused  MSK:   RIGHT SIDE:  sagittal band rupture of 3rd MCP  there is subtle subluxation ulnarly of the EDC tendon to right long finger with deep MCP flexion, she can initiate and maintain extension, but pain along radial sagittal band    ___________________________________________________  STUDIES REVIEWED:  Xrays of the right hand were obtained on 10/19/2024 were independently reviewed which demonstrates no acute fracture or dislocation. No osseous abnormalities.      LABS REVIEWED:    HgA1c: No results found for: \"HGBA1C\"  BMP:   Lab Results   Component Value Date    GLUCOSE 107 01/04/2016    CALCIUM 9.2 11/11/2024     01/04/2016    K 3.6 11/11/2024    CO2 31 11/11/2024     11/11/2024    BUN 20 11/11/2024    CREATININE 1.09 11/11/2024               PROCEDURES PERFORMED:  Procedures  No Procedures performed today    _____________________________________________________      Scribe Attestation      I,:  Balwinder Gonzalez am acting as a scribe while in the presence of the attending physician.:       I,:  Pal Dunham MD personally performed the services described in this documentation    as scribed in my presence.:               I agree with the history, physical examination, assessment and plan of care as documented above.    Pal Dunham M.D.  Attending, Orthopaedic Surgery  Hand, Wrist, and Elbow Surgery  [facility]" [facility]

## 2024-11-24 ENCOUNTER — RESULTS FOLLOW-UP (OUTPATIENT)
Age: 59
End: 2024-11-24

## 2024-11-24 DIAGNOSIS — E44.1 MILD PROTEIN-CALORIE MALNUTRITION (HCC): ICD-10-CM

## 2024-11-24 RX ORDER — ATORVASTATIN CALCIUM 10 MG/1
10 TABLET, FILM COATED ORAL DAILY
Qty: 100 TABLET | Refills: 3 | Status: SHIPPED | OUTPATIENT
Start: 2024-11-24

## 2024-11-24 RX ORDER — CHOLECALCIFEROL (VITAMIN D3) 25 MCG
TABLET ORAL
Qty: 90 TABLET | Refills: 3 | Status: SHIPPED | OUTPATIENT
Start: 2024-11-24

## 2024-11-25 ENCOUNTER — TELEPHONE (OUTPATIENT)
Age: 59
End: 2024-11-25

## 2024-11-25 NOTE — TELEPHONE ENCOUNTER
[place] Placard Renewal Form    Scanned copy into encounter    Placed in Dr. Ruano's folder in precepting room.    Call when complete: [phone]

## 2024-11-29 NOTE — TELEPHONE ENCOUNTER
"Called patient to inform form ready for . Left VM     Made copies of completed form and placed in \"to be scanned\" bin. Original placed in envelope and placed in  bin for .     "

## 2024-12-02 ENCOUNTER — TELEPHONE (OUTPATIENT)
Age: 59
End: 2024-12-02

## 2024-12-02 NOTE — TELEPHONE ENCOUNTER
LOV 4/18/24 ALEJANDRA Goss.     Patient's pcp lowered prednisone dose to 7.5 mg daily at last office visit 11/8/24. I spoke with patient she has not started lower dose and continues at her usual 10 mg and she states every time prednisone is lowered she has increased symptoms. She is requesting to stay at current 10 mg dose and would like GI to order.     Patient would like call back to update if approved.

## 2024-12-02 NOTE — TELEPHONE ENCOUNTER
Patient came into office asking why her medication was changed to:  predniSONE 5 mg tablet     She states that she cannot take anything under 10mg.  She would like to know why it was changed.    Also she is inquiring why she was prescribed calcium.    Please advise.    She can be reached at:  [phone]  .

## 2024-12-02 NOTE — TELEPHONE ENCOUNTER
Patients GI provider:  Dr. Zimmerman    Number to return call: [phone]    Reason for call: Pt called asking if Dr Zimmerman can help with her Prednisone. Dr Ruano writes for it, but he changed it to 7.5 mg when she always took 10mg. Pt would like Dr Zimmerman to send an RX for 10 mg. Please reach out to pt with advise.    Scheduled procedure/appointment date if applicable: Apt/procedure N/A

## 2024-12-03 NOTE — TELEPHONE ENCOUNTER
Called and spoke to patient she stated she doesn't want to go lower than the 10 mg she has been on this dose since 1998     She was not interested in a biologic therapy and would like to just remain on the prednisone

## 2024-12-05 NOTE — TELEPHONE ENCOUNTER
Patient wanted our office to reach out to see if her message could be reviewed; she is out of the prednisone 10 mg completely and would need a refill if this is the mg she needs to be on

## 2024-12-06 ENCOUNTER — TELEPHONE (OUTPATIENT)
Age: 59
End: 2024-12-06

## 2024-12-06 DIAGNOSIS — K51.30 ULCERATIVE RECTOSIGMOIDITIS WITHOUT COMPLICATION (HCC): ICD-10-CM

## 2024-12-06 NOTE — TELEPHONE ENCOUNTER
Message from patient:    Dr. Ruano I picked up my medicine today and the prednisone is 5MG, 1 1/2 Tabs Daily. They don’t come in 7 1/2 tabs, you would have to spilt one in half and they are very small they would just crumble in particles. I would like to stay on the 10 MG, because as I said before if I go down under 10MG I have trouble which I did and I ended up in the hospital and they had to give me 2 bags of blood. The 10MG keeps me steady why don’t just leave it that way?     Dr. Ruano - can you review? Patient would like 10mg predniSONE.

## 2024-12-06 NOTE — TELEPHONE ENCOUNTER
Patient called to speak with PCP because she has yet to get a response in regards to her medication. She has concerns and questions on the dosage and would like a call at [phone]. Please Review, Thank you

## 2024-12-07 DIAGNOSIS — K51.919 ULCERATIVE COLITIS WITH COMPLICATION, UNSPECIFIED LOCATION (HCC): Primary | ICD-10-CM

## 2024-12-07 RX ORDER — PREDNISONE 2.5 MG/1
2.5 TABLET ORAL DAILY
Qty: 30 TABLET | Refills: 5 | Status: SHIPPED | OUTPATIENT
Start: 2024-12-07

## 2025-01-07 ENCOUNTER — OFFICE VISIT (OUTPATIENT)
Dept: OBGYN CLINIC | Facility: CLINIC | Age: 60
End: 2025-01-07
Payer: COMMERCIAL

## 2025-01-07 ENCOUNTER — APPOINTMENT (OUTPATIENT)
Dept: RADIOLOGY | Facility: CLINIC | Age: 60
End: 2025-01-07
Payer: COMMERCIAL

## 2025-01-07 VITALS — WEIGHT: 123 LBS | BODY MASS INDEX: 20.49 KG/M2 | HEIGHT: 65 IN

## 2025-01-07 DIAGNOSIS — M79.641 RIGHT HAND PAIN: Primary | ICD-10-CM

## 2025-01-07 DIAGNOSIS — S63.659D SAGITTAL BAND RUPTURE AT METACARPOPHALANGEAL JOINT, SUBSEQUENT ENCOUNTER: ICD-10-CM

## 2025-01-07 DIAGNOSIS — S60.221A CONTUSION OF DORSUM OF RIGHT HAND: ICD-10-CM

## 2025-01-07 DIAGNOSIS — M79.641 RIGHT HAND PAIN: ICD-10-CM

## 2025-01-07 PROCEDURE — 73130 X-RAY EXAM OF HAND: CPT

## 2025-01-07 PROCEDURE — 99214 OFFICE O/P EST MOD 30 MIN: CPT | Performed by: STUDENT IN AN ORGANIZED HEALTH CARE EDUCATION/TRAINING PROGRAM
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
## 2025-03-02 DIAGNOSIS — K51.30 ULCERATIVE RECTOSIGMOIDITIS WITHOUT COMPLICATION (HCC): ICD-10-CM

## 2025-03-03 RX ORDER — PREDNISONE 5 MG/1
TABLET ORAL
Qty: 45 TABLET | Refills: 3 | Status: SHIPPED | OUTPATIENT
Start: 2025-03-03

## 2025-03-25 DIAGNOSIS — K51.911 ULCERATIVE COLITIS WITH RECTAL BLEEDING, UNSPECIFIED LOCATION (HCC): ICD-10-CM

## 2025-03-27 RX ORDER — BALSALAZIDE DISODIUM 750 MG/1
2250 CAPSULE ORAL 3 TIMES DAILY
Qty: 810 CAPSULE | Refills: 3 | Status: SHIPPED | OUTPATIENT
Start: 2025-03-27

## 2025-04-11 ENCOUNTER — OFFICE VISIT (OUTPATIENT)
Age: 60
End: 2025-04-11

## 2025-04-11 VITALS
TEMPERATURE: 98.3 F | WEIGHT: 119.3 LBS | SYSTOLIC BLOOD PRESSURE: 138 MMHG | OXYGEN SATURATION: 98 % | BODY MASS INDEX: 19.85 KG/M2 | DIASTOLIC BLOOD PRESSURE: 80 MMHG | HEART RATE: 67 BPM

## 2025-04-11 DIAGNOSIS — I10 ESSENTIAL HYPERTENSION: ICD-10-CM

## 2025-04-11 DIAGNOSIS — K51.30 ULCERATIVE RECTOSIGMOIDITIS WITHOUT COMPLICATION (HCC): ICD-10-CM

## 2025-04-11 DIAGNOSIS — Z79.52 CURRENT CHRONIC USE OF SYSTEMIC STEROIDS: ICD-10-CM

## 2025-04-11 DIAGNOSIS — E78.2 MIXED HYPERLIPIDEMIA: Primary | ICD-10-CM

## 2025-04-11 DIAGNOSIS — M81.6 LOCALIZED OSTEOPOROSIS WITHOUT CURRENT PATHOLOGICAL FRACTURE: ICD-10-CM

## 2025-04-11 DIAGNOSIS — Z12.31 BREAST CANCER SCREENING BY MAMMOGRAM: ICD-10-CM

## 2025-04-11 DIAGNOSIS — Z00.00 MEDICARE ANNUAL WELLNESS VISIT, SUBSEQUENT: ICD-10-CM

## 2025-04-11 DIAGNOSIS — E55.9 VITAMIN D DEFICIENCY: ICD-10-CM

## 2025-04-11 PROCEDURE — G0439 PPPS, SUBSEQ VISIT: HCPCS | Performed by: FAMILY MEDICINE

## 2025-04-11 NOTE — PATIENT INSTRUCTIONS
Medicare Preventive Visit Patient Instructions  Thank you for completing your Welcome to Medicare Visit or Medicare Annual Wellness Visit today. Your next wellness visit will be due in one year (4/12/2026).  The screening/preventive services that you may require over the next 5-10 years are detailed below. Some tests may not apply to you based off risk factors and/or age. Screening tests ordered at today's visit but not completed yet may show as past due. Also, please note that scanned in results may not display below.  Preventive Screenings:  Service Recommendations Previous Testing/Comments   Colorectal Cancer Screening  * Colonoscopy    * Fecal Occult Blood Test (FOBT)/Fecal Immunochemical Test (FIT)  * Fecal DNA/Cologuard Test  * Flexible Sigmoidoscopy Age: 45-75 years old   Colonoscopy: every 10 years (may be performed more frequently if at higher risk)  OR  FOBT/FIT: every 1 year  OR  Cologuard: every 3 years  OR  Sigmoidoscopy: every 5 years  Screening may be recommended earlier than age 45 if at higher risk for colorectal cancer. Also, an individualized decision between you and your healthcare provider will decide whether screening between the ages of 76-85 would be appropriate. Colonoscopy: 07/10/2023  FOBT/FIT: Not on file  Cologuard: Not on file  Sigmoidoscopy: Not on file    Screening Current     Breast Cancer Screening Age: 40+ years old  Frequency: every 1-2 years  Not required if history of left and right mastectomy Mammogram: 03/07/2016        Cervical Cancer Screening Between the ages of 21-29, pap smear recommended once every 3 years.   Between the ages of 30-65, can perform pap smear with HPV co-testing every 5 years.   Recommendations may differ for women with a history of total hysterectomy, cervical cancer, or abnormal pap smears in past. Pap Smear: 03/18/2016        Hepatitis C Screening Once for adults born between 1945 and 1965  More frequently in patients at high risk for Hepatitis C Hep C  Antibody: Not on file    Screening Current   Diabetes Screening 1-2 times per year if you're at risk for diabetes or have pre-diabetes Fasting glucose: 75 mg/dL (11/11/2024)  A1C: No results in last 5 years (No results in last 5 years)  Screening Current   Cholesterol Screening Once every 5 years if you don't have a lipid disorder. May order more often based on risk factors. Lipid panel: 11/11/2024    Screening Not Indicated  History Lipid Disorder     Other Preventive Screenings Covered by Medicare:  Abdominal Aortic Aneurysm (AAA) Screening: covered once if your at risk. You're considered to be at risk if you have a family history of AAA.  Lung Cancer Screening: covers low dose CT scan once per year if you meet all of the following conditions: (1) Age 55-77; (2) No signs or symptoms of lung cancer; (3) Current smoker or have quit smoking within the last 15 years; (4) You have a tobacco smoking history of at least 20 pack years (packs per day multiplied by number of years you smoked); (5) You get a written order from a healthcare provider.  Glaucoma Screening: covered annually if you're considered high risk: (1) You have diabetes OR (2) Family history of glaucoma OR (3)  aged 50 and older OR (4)  American aged 65 and older  Osteoporosis Screening: covered every 2 years if you meet one of the following conditions: (1) You're estrogen deficient and at risk for osteoporosis based off medical history and other findings; (2) Have a vertebral abnormality; (3) On glucocorticoid therapy for more than 3 months; (4) Have primary hyperparathyroidism; (5) On osteoporosis medications and need to assess response to drug therapy.   Last bone density test (DXA Scan): 12/28/2023.  HIV Screening: covered annually if you're between the age of 15-65. Also covered annually if you are younger than 15 and older than 65 with risk factors for HIV infection. For pregnant patients, it is covered up to 3 times per  pregnancy.    Immunizations:  Immunization Recommendations   Influenza Vaccine Annual influenza vaccination during flu season is recommended for all persons aged >= 6 months who do not have contraindications   Pneumococcal Vaccine   * Pneumococcal conjugate vaccine = PCV13 (Prevnar 13), PCV15 (Vaxneuvance), PCV20 (Prevnar 20)  * Pneumococcal polysaccharide vaccine = PPSV23 (Pneumovax) Adults 19-63 yo with certain risk factors or if 65+ yo  If never received any pneumonia vaccine: recommend Prevnar 20 (PCV20)  Give PCV20 if previously received 1 dose of PCV13 or PPSV23   Hepatitis B Vaccine 3 dose series if at intermediate or high risk (ex: diabetes, end stage renal disease, liver disease)   Respiratory syncytial virus (RSV) Vaccine - COVERED BY MEDICARE PART D  * RSVPreF3 (Arexvy) CDC recommends that adults 60 years of age and older may receive a single dose of RSV vaccine using shared clinical decision-making (SCDM)   Tetanus (Td) Vaccine - COST NOT COVERED BY MEDICARE PART B Following completion of primary series, a booster dose should be given every 10 years to maintain immunity against tetanus. Td may also be given as tetanus wound prophylaxis.   Tdap Vaccine - COST NOT COVERED BY MEDICARE PART B Recommended at least once for all adults. For pregnant patients, recommended with each pregnancy.   Shingles Vaccine (Shingrix) - COST NOT COVERED BY MEDICARE PART B  2 shot series recommended in those 19 years and older who have or will have weakened immune systems or those 50 years and older     Health Maintenance Due:      Topic Date Due   • Breast Cancer Screening: Mammogram  03/07/2017   • Cervical Cancer Screening  03/18/2021   • Colorectal Cancer Screening  07/09/2024   • DXA SCAN  12/28/2025   • HIV Screening  Completed   • Hepatitis C Screening  Completed     Immunizations Due:      Topic Date Due   • COVID-19 Vaccine (4 - 2024-25 season) 09/01/2024     Advance Directives   What are advance directives?  Advance  directives are legal documents that state your wishes and plans for medical care. These plans are made ahead of time in case you lose your ability to make decisions for yourself. Advance directives can apply to any medical decision, such as the treatments you want, and if you want to donate organs.   What are the types of advance directives?  There are many types of advance directives, and each state has rules about how to use them. You may choose a combination of any of the following:  Living will:  This is a written record of the treatment you want. You can also choose which treatments you do not want, which to limit, and which to stop at a certain time. This includes surgery, medicine, IV fluid, and tube feedings.   Durable power of  for healthcare (DPAHC):  This is a written record that states who you want to make healthcare choices for you when you are unable to make them for yourself. This person, called a proxy, is usually a family member or a friend. You may choose more than 1 proxy.  Do not resuscitate (DNR) order:  A DNR order is used in case your heart stops beating or you stop breathing. It is a request not to have certain forms of treatment, such as CPR. A DNR order may be included in other types of advance directives.  Medical directive:  This covers the care that you want if you are in a coma, near death, or unable to make decisions for yourself. You can list the treatments you want for each condition. Treatment may include pain medicine, surgery, blood transfusions, dialysis, IV or tube feedings, and a ventilator (breathing machine).  Values history:  This document has questions about your views, beliefs, and how you feel and think about life. This information can help others choose the care that you would choose.  Why are advance directives important?  An advance directive helps you control your care. Although spoken wishes may be used, it is better to have your wishes written down. Spoken  wishes can be misunderstood, or not followed. Treatments may be given even if you do not want them. An advance directive may make it easier for your family to make difficult choices about your care.       © Copyright [employer] 2018 Information is for End User's use only and may not be sold, redistributed or otherwise used for commercial purposes. All illustrations and images included in CareNotes® are the copyrighted property of [employer]. or [employer]€™[employer]  This is a Medicare wellness exam for this 59-year-old female.  She denies any history of recent falls.  She denies any urinary incontinence.  The patient does not have a copy of advance directives on file.  The patient denies any problems with depression but states problems with anxiety related to court issues with her autistic son and his father.  The patient does need a mammogram and was given an order in the past which she still has.  The patient also needs a repeat colonoscopy secondary to a poor prep and also her history of ulcerative colitis.  At this point she states she would like to delay the mammogram and the colonoscopy until her COVID issues are resolved for her son.  The patient is aware of the cancer risk with her diagnosis of ulcerative colitis.  The patient is not due for a Pap until next year.  She has a history of osteoporosis secondary to longstanding steroid use for her ulcerative colitis.  Her blood pressure is controlled with her present medications today.  The patient should follow-up in 1 year for a Medicare wellness exam.    Medicare Preventive Visit Patient Instructions  Thank you for completing your Welcome to Medicare Visit or Medicare Annual Wellness Visit today. Your next wellness visit will be due in one year (4/12/2026).  The screening/preventive services that you may require over the next 5-10 years are detailed below. Some tests may not apply to you based off risk factors and/or age. Screening tests ordered at  today's visit but not completed yet may show as past due. Also, please note that scanned in results may not display below.  Preventive Screenings:  Service Recommendations Previous Testing/Comments   Colorectal Cancer Screening  * Colonoscopy    * Fecal Occult Blood Test (FOBT)/Fecal Immunochemical Test (FIT)  * Fecal DNA/Cologuard Test  * Flexible Sigmoidoscopy Age: 45-75 years old   Colonoscopy: every 10 years (may be performed more frequently if at higher risk)  OR  FOBT/FIT: every 1 year  OR  Cologuard: every 3 years  OR  Sigmoidoscopy: every 5 years  Screening may be recommended earlier than age 45 if at higher risk for colorectal cancer. Also, an individualized decision between you and your healthcare provider will decide whether screening between the ages of 76-85 would be appropriate. Colonoscopy: 07/10/2023  FOBT/FIT: Not on file  Cologuard: Not on file  Sigmoidoscopy: Not on file    Screening Current     Breast Cancer Screening Age: 40+ years old  Frequency: every 1-2 years  Not required if history of left and right mastectomy Mammogram: 03/07/2016        Cervical Cancer Screening Between the ages of 21-29, pap smear recommended once every 3 years.   Between the ages of 30-65, can perform pap smear with HPV co-testing every 5 years.   Recommendations may differ for women with a history of total hysterectomy, cervical cancer, or abnormal pap smears in past. Pap Smear: 03/18/2016        Hepatitis C Screening Once for adults born between 1945 and 1965  More frequently in patients at high risk for Hepatitis C Hep C Antibody: Not on file    Screening Current   Diabetes Screening 1-2 times per year if you're at risk for diabetes or have pre-diabetes Fasting glucose: 75 mg/dL (11/11/2024)  A1C: No results in last 5 years (No results in last 5 years)  Screening Current   Cholesterol Screening Once every 5 years if you don't have a lipid disorder. May order more often based on risk factors. Lipid panel:  11/11/2024    Screening Not Indicated  History Lipid Disorder     Other Preventive Screenings Covered by Medicare:  Abdominal Aortic Aneurysm (AAA) Screening: covered once if your at risk. You're considered to be at risk if you have a family history of AAA.  Lung Cancer Screening: covers low dose CT scan once per year if you meet all of the following conditions: (1) Age 55-77; (2) No signs or symptoms of lung cancer; (3) Current smoker or have quit smoking within the last 15 years; (4) You have a tobacco smoking history of at least 20 pack years (packs per day multiplied by number of years you smoked); (5) You get a written order from a healthcare provider.  Glaucoma Screening: covered annually if you're considered high risk: (1) You have diabetes OR (2) Family history of glaucoma OR (3)  aged 50 and older OR (4)  American aged 65 and older  Osteoporosis Screening: covered every 2 years if you meet one of the following conditions: (1) You're estrogen deficient and at risk for osteoporosis based off medical history and other findings; (2) Have a vertebral abnormality; (3) On glucocorticoid therapy for more than 3 months; (4) Have primary hyperparathyroidism; (5) On osteoporosis medications and need to assess response to drug therapy.   Last bone density test (DXA Scan): 12/28/2023.  HIV Screening: covered annually if you're between the age of 15-65. Also covered annually if you are younger than 15 and older than 65 with risk factors for HIV infection. For pregnant patients, it is covered up to 3 times per pregnancy.    Immunizations:  Immunization Recommendations   Influenza Vaccine Annual influenza vaccination during flu season is recommended for all persons aged >= 6 months who do not have contraindications   Pneumococcal Vaccine   * Pneumococcal conjugate vaccine = PCV13 (Prevnar 13), PCV15 (Vaxneuvance), PCV20 (Prevnar 20)  * Pneumococcal polysaccharide vaccine = PPSV23 (Pneumovax) Adults  19-63 yo with certain risk factors or if 65+ yo  If never received any pneumonia vaccine: recommend Prevnar 20 (PCV20)  Give PCV20 if previously received 1 dose of PCV13 or PPSV23   Hepatitis B Vaccine 3 dose series if at intermediate or high risk (ex: diabetes, end stage renal disease, liver disease)   Respiratory syncytial virus (RSV) Vaccine - COVERED BY MEDICARE PART D  * RSVPreF3 (Arexvy) CDC recommends that adults 60 years of age and older may receive a single dose of RSV vaccine using shared clinical decision-making (SCDM)   Tetanus (Td) Vaccine - COST NOT COVERED BY MEDICARE PART B Following completion of primary series, a booster dose should be given every 10 years to maintain immunity against tetanus. Td may also be given as tetanus wound prophylaxis.   Tdap Vaccine - COST NOT COVERED BY MEDICARE PART B Recommended at least once for all adults. For pregnant patients, recommended with each pregnancy.   Shingles Vaccine (Shingrix) - COST NOT COVERED BY MEDICARE PART B  2 shot series recommended in those 19 years and older who have or will have weakened immune systems or those 50 years and older     Health Maintenance Due:      Topic Date Due   • Breast Cancer Screening: Mammogram  03/07/2017   • Cervical Cancer Screening  03/18/2021   • Colorectal Cancer Screening  07/09/2024   • DXA SCAN  12/28/2025   • HIV Screening  Completed   • Hepatitis C Screening  Completed     Immunizations Due:      Topic Date Due   • COVID-19 Vaccine (4 - 2024-25 season) 09/01/2024     Advance Directives   What are advance directives?  Advance directives are legal documents that state your wishes and plans for medical care. These plans are made ahead of time in case you lose your ability to make decisions for yourself. Advance directives can apply to any medical decision, such as the treatments you want, and if you want to donate organs.   What are the types of advance directives?  There are many types of advance directives, and  each state has rules about how to use them. You may choose a combination of any of the following:  Living will:  This is a written record of the treatment you want. You can also choose which treatments you do not want, which to limit, and which to stop at a certain time. This includes surgery, medicine, IV fluid, and tube feedings.   Durable power of  for healthcare (DPAHC):  This is a written record that states who you want to make healthcare choices for you when you are unable to make them for yourself. This person, called a proxy, is usually a family member or a friend. You may choose more than 1 proxy.  Do not resuscitate (DNR) order:  A DNR order is used in case your heart stops beating or you stop breathing. It is a request not to have certain forms of treatment, such as CPR. A DNR order may be included in other types of advance directives.  Medical directive:  This covers the care that you want if you are in a coma, near death, or unable to make decisions for yourself. You can list the treatments you want for each condition. Treatment may include pain medicine, surgery, blood transfusions, dialysis, IV or tube feedings, and a ventilator (breathing machine).  Values history:  This document has questions about your views, beliefs, and how you feel and think about life. This information can help others choose the care that you would choose.  Why are advance directives important?  An advance directive helps you control your care. Although spoken wishes may be used, it is better to have your wishes written down. Spoken wishes can be misunderstood, or not followed. Treatments may be given even if you do not want them. An advance directive may make it easier for your family to make difficult choices about your care.       © Copyright [employer] 2018 Information is for End User's use only and may not be sold, redistributed or otherwise used for commercial purposes. All illustrations and images included  in CareNotes® are the copyrighted property of [employer] or [employer]

## 2025-04-11 NOTE — ASSESSMENT & PLAN NOTE
Orders:  •  Vitamin D 25 hydroxy   Render In Strict Bullet Format?: Yes Plan: .\\nAdvise patient to monitor and track menstrual cycle and acne flares to see if there is any correlation Modify Regimen: .\\nIncrease from Tretinoin 0.05% cream to Tretinoin 0.1% cream.\\nAlternate 0.05% cream with 0.1% cream for 2-3 weeks to better tolerate the increase. Initiate Treatment: .\\nRestart\\nQAM:\\nCerave benzoyl peroxide 4% cleanser\\nClindamycin 1% lotion - apply thin layer to affected areas\\nMoisturizer prn\\nSPF 30+ Continue Regimen: .\\nQHS:\\nGentle Cleanser\\nTretinoin cream \\nMoisturizer Detail Level: Zone Initiate Treatment: .\\nTriamcinolone 0.1% cream - apply thin layer to affected areas BID ONLY up to 2 weeks prn itching/flare (arms, legs, posterior neck)\\nDo not apply in other areas Plan: .\\nRecommend:\\nCeraVe Hydrating Body Cleanser to use in bath/shower \\nCeraVe moisturizing cream \\nIdeally, moisturize 2-3x/ day ; especially after shower/bath \\nVaseline or CeraVe healing ointment at night before bed\\n\\nCeraVe itch-relief moisturizer prn (may refrigerate and apply cold) \\n\\nRe-reviewed appropriate use of topical steroids and risk of steroid atrophy; this is not recommended for ongoing long-term use.

## 2025-04-11 NOTE — PROGRESS NOTES
Name: Rubia Christine      : 1965      MRN: [id number]  Encounter Provider: Zeus Ruano DO  Encounter Date: 2025   Encounter department: [facility]  :  Assessment & Plan  Medicare annual wellness visit, subsequent         Mixed hyperlipidemia    Orders:  •  Lipid panel; Future    Vitamin D deficiency    Orders:  •  Vitamin D 25 hydroxy    Ulcerative rectosigmoiditis without complication (HCC)         Current chronic use of systemic steroids         Breast cancer screening by mammogram         Localized osteoporosis without current pathological fracture         Essential hypertension              Preventive health issues were discussed with patient, and age appropriate screening tests were ordered as noted in patient's After Visit Summary. Personalized health advice and appropriate referrals for health education or preventive services given if needed, as noted in patient's After Visit Summary.    History of Present Illness     HPI   Patient Care Team:  Zeus Ruano DO as PCP - General  Robin Bansal MD as PCP - PCP-[facility] ([facility])  Robin Bansal MD as PCP - [facility]CP-[facility] ([facility])  Zeus Ruano DO as PCP - PCP-[facility] ([facility])  Zeus Ruano DO    Review of Systems  Medical History Reviewed by provider this encounter:       Annual Wellness Visit Questionnaire   Annual Wellness Visit  Social Drivers of Health     Financial Resource Strain: Low Risk  (2025)    Overall Financial Resource Strain (CARDIA)    • Difficulty of Paying Living Expenses: Not very hard   Recent Concern: Financial Resource Strain - Medium Risk (2025)    Overall Financial Resource Strain (CARDIA)    • Difficulty of Paying Living Expenses: Somewhat hard   Food Insecurity: No Food Insecurity (2025)    Hunger Vital Sign    • Worried About Running Out of Food in the Last Year: Never true    • Ran Out of Food in the Last Year: Never true   Recent Concern:  Food Insecurity - Food Insecurity Present (4/9/2025)    Hunger Vital Sign    • Worried About Running Out of Food in the Last Year: Sometimes true    • Ran Out of Food in the Last Year: Sometimes true   Transportation Needs: No Transportation Needs (4/11/2025)    PRAPARE - Transportation    • Lack of Transportation (Medical): No    • Lack of Transportation (Non-Medical): No   Housing Stability: Low Risk  (4/11/2025)    Housing Stability Vital Sign    • Unable to Pay for Housing in the Last Year: No    • Number of Times Moved in the Last Year: 0    • Homeless in the Last Year: No   Utilities: Not At Risk (4/11/2025)    [facility] Utilities    • Threatened with loss of utilities: No     No results found.    Objective   /80 (BP Location: Left arm, Patient Position: Sitting, Cuff Size: Standard)   Pulse 67   Temp 98.3 °F (36.8 °C) (Tympanic)   Wt 54.1 kg (119 lb 4.8 oz)   SpO2 98%   BMI 19.85 kg/m²     Physical Exam

## 2025-04-11 NOTE — PROGRESS NOTES
Name: Rubia Christine      : 1965      MRN: [id number]  Encounter Provider: Zeus Ruano DO  Encounter Date: 2025   Encounter department: [facility]  :  Assessment & Plan       Preventive health issues were discussed with patient, and age appropriate screening tests were ordered as noted in patient's After Visit Summary. Personalized health advice and appropriate referrals for health education or preventive services given if needed, as noted in patient's After Visit Summary.    History of Present Illness     HPI   Patient Care Team:  Zeus Ruano DO as PCP - General  Robin Bansal MD as PCP - PCP-[facility] ([facility])  Robin Bansal MD as PCP - PCP-[facility] ([facility])  Zeus Ruano DO as PCP - PCP-[facility] ([facility])  Zeus Ruano DO    Review of Systems  Medical History Reviewed by provider this encounter:       Annual Wellness Visit Questionnaire   Rubia is here for her Subsequent Wellness visit.     Health Risk Assessment:   Patient rates overall health as poor. Patient feels that their physical health rating is same. Patient is satisfied with their life. Eyesight was rated as slightly worse. Hearing was rated as same. Patient feels that their emotional and mental health rating is same. Patients states they are never, rarely angry. Patient states they are sometimes unusually tired/fatigued. Pain experienced in the last 7 days has been some. Patient's pain rating has been 5/10. Patient states that she has experienced no weight loss or gain in last 6 months.     Depression Screening:   PHQ-2 Score: 0      Fall Risk Screening:   In the past year, patient has experienced: no history of falling in past year      Urinary Incontinence Screening:   Patient has not leaked urine accidently in the last six months.     Home Safety:  Patient does not have trouble with stairs inside or outside of their home. Patient has working smoke alarms and has  working carbon monoxide detector. Home safety hazards include: none.     Nutrition:   Current diet is Regular.     Medications:   Patient is currently taking over-the-counter supplements. OTC medications include: see medication list. Patient is able to manage medications.     Activities of Daily Living (ADLs)/Instrumental Activities of Daily Living (IADLs):   Walk and transfer into and out of bed and chair?: Yes  Dress and groom yourself?: Yes    Bathe or shower yourself?: Yes    Feed yourself? Yes  Do your laundry/housekeeping?: Yes  Manage your money, pay your bills and track your expenses?: Yes  Make your own meals?: Yes    Do your own shopping?: Yes    Previous Hospitalizations:   Any hospitalizations or ED visits within the last 12 months?: No      Advance Care Planning:   Living will: No    Durable POA for healthcare: No    Advanced directive: No      Preventive Screenings      Cardiovascular Screening:    General: Screening Not Indicated and History Lipid Disorder      Diabetes Screening:     General: Screening Current      Colorectal Cancer Screening:     General: Screening Current      Osteoporosis Screening:    General: Screening Not Indicated and History Osteoporosis      Lung Cancer Screening:     General: Screening Not Indicated      Hepatitis C Screening:    General: Screening Current    Immunizations:  - Immunizations due: Zoster (Shingrix)    Screening, Brief Intervention, and Referral to Treatment (SBIRT)     Screening  Typical number of drinks in a day: 0  Typical number of drinks in a week: 0  Interpretation: Low risk drinking behavior.    AUDIT-C Screenin) How often did you have a drink containing alcohol in the past year? never  2) How many drinks did you have on a typical day when you were drinking in the past year? 0  3) How often did you have 6 or more drinks on one occasion in the past year? never    AUDIT-C Score: 0  Interpretation: Score 0-2 (female): Negative screen for alcohol  misuse    Single Item Drug Screening:  How often have you used an illegal drug (including marijuana) or a prescription medication for non-medical reasons in the past year? never    Single Item Drug Screen Score: 0  Interpretation: Negative screen for possible drug use disorder    SDOH Risk Assessment  Social determinants of health (SDOH) risk assesment tool was completed. The tool at a minimum covered housing stability, food insecurity, transportation needs, and utility difficulty. Patient had at risk responses for the following SDOH domains: food insecurity.     Social Drivers of Health     Financial Resource Strain: Medium Risk (4/9/2025)    Overall Financial Resource Strain (CARDIA)     Difficulty of Paying Living Expenses: Somewhat hard   Food Insecurity: Food Insecurity Present (4/9/2025)    Hunger Vital Sign     Worried About Running Out of Food in the Last Year: Sometimes true     Ran Out of Food in the Last Year: Sometimes true   Transportation Needs: No Transportation Needs (4/9/2025)    PRAPARE - Transportation     Lack of Transportation (Medical): No     Lack of Transportation (Non-Medical): No   Housing Stability: Low Risk  (4/9/2025)    Housing Stability Vital Sign     Unable to Pay for Housing in the Last Year: No     Number of Times Moved in the Last Year: 0     Homeless in the Last Year: No   Utilities: Not At Risk (4/9/2025)    [facility] Utilities     Threatened with loss of utilities: No     No results found.    Objective   There were no vitals taken for this visit.    Physical Exam

## 2025-04-18 ENCOUNTER — APPOINTMENT (OUTPATIENT)
Dept: LAB | Facility: HOSPITAL | Age: 60
End: 2025-04-18
Attending: FAMILY MEDICINE
Payer: COMMERCIAL

## 2025-04-18 ENCOUNTER — RESULTS FOLLOW-UP (OUTPATIENT)
Age: 60
End: 2025-04-18

## 2025-04-18 DIAGNOSIS — E78.2 MIXED HYPERLIPIDEMIA: Primary | ICD-10-CM

## 2025-04-18 DIAGNOSIS — E55.9 VITAMIN D DEFICIENCY: ICD-10-CM

## 2025-04-18 LAB
25(OH)D3 SERPL-MCNC: 94.6 NG/ML (ref 30–100)
CHOLEST SERPL-MCNC: 171 MG/DL (ref ?–200)
HDLC SERPL-MCNC: 81 MG/DL
LDLC SERPL CALC-MCNC: 62 MG/DL (ref 0–100)
NONHDLC SERPL-MCNC: 90 MG/DL
TRIGL SERPL-MCNC: 141 MG/DL (ref ?–150)

## 2025-04-18 PROCEDURE — 80061 LIPID PANEL: CPT

## 2025-04-18 PROCEDURE — 36415 COLL VENOUS BLD VENIPUNCTURE: CPT

## 2025-04-18 PROCEDURE — 82306 VITAMIN D 25 HYDROXY: CPT

## 2025-05-04 DIAGNOSIS — K51.919 ULCERATIVE COLITIS WITH COMPLICATION, UNSPECIFIED LOCATION (HCC): ICD-10-CM

## 2025-05-05 RX ORDER — PREDNISONE 2.5 MG/1
2.5 TABLET ORAL DAILY
Qty: 30 TABLET | Refills: 6 | Status: SHIPPED | OUTPATIENT
Start: 2025-05-05

## 2025-06-29 DIAGNOSIS — K51.30 ULCERATIVE RECTOSIGMOIDITIS WITHOUT COMPLICATION (HCC): ICD-10-CM

## 2025-06-30 RX ORDER — PREDNISONE 5 MG/1
TABLET ORAL
Qty: 30 TABLET | Refills: 5 | Status: SHIPPED | OUTPATIENT
Start: 2025-06-30

## 2025-07-31 DIAGNOSIS — K51.919 ULCERATIVE COLITIS WITH COMPLICATION, UNSPECIFIED LOCATION (HCC): ICD-10-CM

## 2025-08-01 ENCOUNTER — TELEPHONE (OUTPATIENT)
Age: 60
End: 2025-08-01

## 2025-08-01 DIAGNOSIS — K51.30 ULCERATIVE RECTOSIGMOIDITIS WITHOUT COMPLICATION (HCC): ICD-10-CM

## 2025-08-01 RX ORDER — PREDNISONE 2.5 MG/1
2.5 TABLET ORAL DAILY
Qty: 30 TABLET | Refills: 6 | OUTPATIENT
Start: 2025-08-01

## 2025-08-01 RX ORDER — PREDNISONE 5 MG/1
TABLET ORAL
Qty: 30 TABLET | Refills: 5 | Status: SHIPPED | OUTPATIENT
Start: 2025-08-01

## 2025-08-04 DIAGNOSIS — K51.919 ULCERATIVE COLITIS WITH COMPLICATION, UNSPECIFIED LOCATION (HCC): Primary | ICD-10-CM

## 2025-08-04 RX ORDER — PREDNISONE 5 MG/1
TABLET ORAL
Qty: 30 TABLET | Refills: 0 | OUTPATIENT
Start: 2025-08-04

## 2025-08-04 RX ORDER — PREDNISONE 2.5 MG/1
2.5 TABLET ORAL DAILY
Qty: 30 TABLET | Refills: 1 | Status: SHIPPED | OUTPATIENT
Start: 2025-08-04